# Patient Record
Sex: FEMALE | Race: WHITE | HISPANIC OR LATINO | Employment: FULL TIME | URBAN - NONMETROPOLITAN AREA
[De-identification: names, ages, dates, MRNs, and addresses within clinical notes are randomized per-mention and may not be internally consistent; named-entity substitution may affect disease eponyms.]

---

## 2017-01-05 ENCOUNTER — HOSPITAL ENCOUNTER (EMERGENCY)
Facility: HOSPITAL | Age: 22
Discharge: HOME | End: 2017-01-05
Attending: PHYSICIAN ASSISTANT | Admitting: PHYSICIAN ASSISTANT
Payer: COMMERCIAL

## 2017-01-05 VITALS
DIASTOLIC BLOOD PRESSURE: 83 MMHG | TEMPERATURE: 97.9 F | OXYGEN SATURATION: 100 % | SYSTOLIC BLOOD PRESSURE: 121 MMHG | RESPIRATION RATE: 16 BRPM

## 2017-01-05 DIAGNOSIS — K29.00 ACUTE SUPERFICIAL GASTRITIS WITHOUT HEMORRHAGE: Primary | ICD-10-CM

## 2017-01-05 LAB
ALBUMIN SERPL-MCNC: 3.9 G/DL (ref 3.4–5)
ALP SERPL-CCNC: 91 U/L (ref 40–150)
ALT SERPL W P-5'-P-CCNC: 37 U/L (ref 0–50)
ANION GAP SERPL CALCULATED.3IONS-SCNC: 9 MMOL/L (ref 3–14)
AST SERPL W P-5'-P-CCNC: 15 U/L (ref 0–45)
BASOPHILS # BLD AUTO: 0 10E9/L (ref 0–0.2)
BASOPHILS NFR BLD AUTO: 0.2 %
BILIRUB SERPL-MCNC: 0.2 MG/DL (ref 0.2–1.3)
BUN SERPL-MCNC: 12 MG/DL (ref 7–30)
CALCIUM SERPL-MCNC: 9 MG/DL (ref 8.5–10.1)
CHLORIDE SERPL-SCNC: 106 MMOL/L (ref 94–109)
CO2 SERPL-SCNC: 25 MMOL/L (ref 20–32)
CREAT SERPL-MCNC: 0.9 MG/DL (ref 0.52–1.04)
DIFFERENTIAL METHOD BLD: ABNORMAL
EOSINOPHIL # BLD AUTO: 0.2 10E9/L (ref 0–0.7)
EOSINOPHIL NFR BLD AUTO: 1.9 %
ERYTHROCYTE [DISTWIDTH] IN BLOOD BY AUTOMATED COUNT: 12.8 % (ref 10–15)
GFR SERPL CREATININE-BSD FRML MDRD: 79 ML/MIN/1.7M2
GLUCOSE SERPL-MCNC: 102 MG/DL (ref 70–99)
HCT VFR BLD AUTO: 38.6 % (ref 35–47)
HGB BLD-MCNC: 13.4 G/DL (ref 11.7–15.7)
IMM GRANULOCYTES # BLD: 0 10E9/L (ref 0–0.4)
IMM GRANULOCYTES NFR BLD: 0.2 %
LYMPHOCYTES # BLD AUTO: 3.7 10E9/L (ref 0.8–5.3)
LYMPHOCYTES NFR BLD AUTO: 29.8 %
MCH RBC QN AUTO: 30.2 PG (ref 26.5–33)
MCHC RBC AUTO-ENTMCNC: 34.7 G/DL (ref 31.5–36.5)
MCV RBC AUTO: 87 FL (ref 78–100)
MONOCYTES # BLD AUTO: 0.6 10E9/L (ref 0–1.3)
MONOCYTES NFR BLD AUTO: 5.1 %
NEUTROPHILS # BLD AUTO: 7.7 10E9/L (ref 1.6–8.3)
NEUTROPHILS NFR BLD AUTO: 62.8 %
NRBC # BLD AUTO: 0 10*3/UL
NRBC BLD AUTO-RTO: 0 /100
PLATELET # BLD AUTO: 303 10E9/L (ref 150–450)
POTASSIUM SERPL-SCNC: 3.4 MMOL/L (ref 3.4–5.3)
PROT SERPL-MCNC: 7.2 G/DL (ref 6.8–8.8)
RBC # BLD AUTO: 4.43 10E12/L (ref 3.8–5.2)
SODIUM SERPL-SCNC: 140 MMOL/L (ref 133–144)
WBC # BLD AUTO: 12.3 10E9/L (ref 4–11)

## 2017-01-05 PROCEDURE — 99283 EMERGENCY DEPT VISIT LOW MDM: CPT

## 2017-01-05 PROCEDURE — 99284 EMERGENCY DEPT VISIT MOD MDM: CPT | Performed by: PHYSICIAN ASSISTANT

## 2017-01-05 PROCEDURE — 80053 COMPREHEN METABOLIC PANEL: CPT | Performed by: PHYSICIAN ASSISTANT

## 2017-01-05 PROCEDURE — 85025 COMPLETE CBC W/AUTO DIFF WBC: CPT | Performed by: PHYSICIAN ASSISTANT

## 2017-01-05 PROCEDURE — 36415 COLL VENOUS BLD VENIPUNCTURE: CPT | Performed by: PHYSICIAN ASSISTANT

## 2017-01-05 RX ORDER — FAMOTIDINE 40 MG/1
40 TABLET, FILM COATED ORAL
Qty: 30 TABLET | Refills: 0 | Status: SHIPPED | OUTPATIENT
Start: 2017-01-05 | End: 2017-03-27

## 2017-01-05 ASSESSMENT — ENCOUNTER SYMPTOMS
DYSURIA: 0
UNEXPECTED WEIGHT CHANGE: 0
BLOOD IN STOOL: 0
ABDOMINAL PAIN: 1
CHILLS: 0
ABDOMINAL DISTENTION: 0
FLANK PAIN: 0
DIFFICULTY URINATING: 0
MUSCULOSKELETAL NEGATIVE: 1
SORE THROAT: 0
SHORTNESS OF BREATH: 0
CONSTIPATION: 0
FREQUENCY: 0
DIARRHEA: 0
HEMATURIA: 0
ANAL BLEEDING: 0
FEVER: 0
APPETITE CHANGE: 0
VOMITING: 0
NEUROLOGICAL NEGATIVE: 1
NAUSEA: 0

## 2017-01-05 NOTE — ED AVS SNAPSHOT
[facility]    [address] [address]    Phone:  [phone]                                       Carolyn Mallory   MRN: [id number]    Department:  [facility]   Date of Visit:  1/5/2017           Patient Information     Date Of Birth          1995        Your diagnoses for this visit were:     Acute superficial gastritis without hemorrhage        You were seen by Billie Casarez PA-C.      Follow-up Information     Follow up with None In 1 week.        Follow up with [facility].    Specialty:  EMERGENCY MEDICINE    Why:  If symptoms worsen    Contact information:    [address] [address] [phone]    Additional information:    From [place]: Take [address]. Turn left at [address]. Turn left at the first stoplight on [address]. At the first stop sign, take a right onto [address]. Take a left into the parking lot and continue through until you reach the North enterance of the building.       From [place]: Take [address]. Take the [address] ramp towards [place]. Turn left onto [address]. Take a slight right onto [address]. Turn left at the first stoplight on [address]. At the first stop sign, take a right onto [address]. Take a left into the parking lot and continue through until you reach the North enterance of the building.       From [place]: Take [address]. Take a right at [address]. At the first stop sign, take a right onto [address]. Take a left into the parking lot and continue through until you reach the North enterance of the building.         Discharge Instructions       Take the Pepcid as prescribed. Avoid spicy foods, caffeine, alcohol, and tobacco as this will worsen your symptoms. Follow up with primary care in 1 week for re-check.         Gastritis or Ulcer (No Antibiotic Treatment)    Gastritis is  irritation and inflammation of the stomach lining. This means the lining is red and swollen. An ulcer is an open sore in the lining of the stomach. It may also occur in the duodenum (first part of the small intestine). The causes and symptoms of gastritis and ulcers are very similar.  Causes and risk factors for both problems can include:    Long-term use of nonsteroidal anti-inflammatory drugs (NSAIDs), such as aspirin and ibuprofen    H. pylori bacteria infection    Tobacco use    Alcohol use  Symptoms for both problems can include:    Dull or burning pain in the upper part of the belly    Loss of appetite    Heartburn or upset stomach    Frequent burping    Bloated feeling    Nausea with or without vomiting  You likely had an evaluation to help determine the exact cause and extent of your problem. This may have included a health history, exam, and certain tests.  Results showed that your problem is not due to H. pylori infection. For this reason, no antibiotics are needed as part of your treatment.  Whether your problem is found to be gastritis or an ulcer, you will still need to take other medicines, however. You will also need to follow instructions to help reduce stomach irritation so your stomach can heal.   Home care    Take any medicines you re prescribed exactly as directed. Common medicines used to treat gastritis include:    Antacids. These help neutralize the normal acids in your stomach.    Proton pump inhibitors. These block your stomach from making any acid.    H2 blockers.These reduce the amount of acid your stomach makes.    Bismuth subsalicylate. This helps protect the lining of your stomach from acid.    Avoid taking any NSAIDS during your treatment. If you take NSAID to help treat other health problems, tell your healthcare provider. He or she may need to adjust your medicine plan or change the dosage.    Don t use tobacco. Also don t drink alcohol. These products can increase the amount of acid  your stomach makes. This can delay healing. It can also worsen symptoms.  Follow-up care  Follow up with your healthcare provider, or as advised. In some cases, further testing may be needed.  When to seek medical advice  Call your healthcare provider right away if any of these occur:    Fever of 100.4 F (38 C) or higher or as directed by your provider    Stomach pain that worsens or moves to the lower right part of abdomen    Extreme fatigue    Weakness or dizziness    Continued weight loss    Frequent vomiting, blood in your vomit, or coffee ground-like substance in your vomit    Black, tarry, or bloody stools  Call 911  Call 911 right away if any of these occur:    Chest pain appears or worsens, or spreads to the back, neck, shoulder, or arm    Unusually fast heart rate    Trouble breathing or swallowing    Confusion    Extreme drowsiness or trouble waking up    Fainting    Large amounts of blood present in vomit or stool    9339-8045 [employer]. [address] [place] [address]. All rights reserved. This information is not intended as a substitute for professional medical care. Always follow your healthcare professional's instructions.             Review of your medicines      START taking        Dose / Directions Last dose taken    famotidine 40 MG tablet   Commonly known as:  PEPCID   Dose:  40 mg   Quantity:  30 tablet        Take 1 tablet (40 mg) by mouth nightly as needed for heartburn   Refills:  0          Our records show that you are taking the medicines listed below. If these are incorrect, please call your family doctor or clinic.        Dose / Directions Last dose taken    EPINEPHrine 0.3 MG/0.3ML injection   Commonly known as:  EPIPEN   Dose:  0.3 mg   Quantity:  4 each        Inject 0.3 mLs (0.3 mg) into the muscle once as needed for anaphylaxis   Refills:  6        etonogestrel 68 MG Impl   Commonly known as:  IMPLANON/NEXPLANON   Dose:  1 each        1 each (68 mg) by  Subdermal route continuous   Refills:  0        ibuprofen 400 MG tablet   Commonly known as:  ADVIL/MOTRIN   Dose:  800 mg   Quantity:  60 tablet        Take 2 tablets (800 mg) by mouth every 6 hours as needed for other (cramping)   Refills:  1        loratadine 10 MG tablet   Commonly known as:  CLARITIN   Dose:  10 mg        Take 10 mg by mouth daily   Refills:  0        sulindac 200 MG tablet   Commonly known as:  CLINORIL   Dose:  200 mg   Quantity:  60 tablet        Take 1 tablet (200 mg) by mouth 2 times daily   Refills:  1                Prescriptions were sent or printed at these locations (1 Prescription)                   [id number] - [place], [place] - [address] AT [facility] [address]   [address], [place] [address]    Telephone:  [phone]   Fax:  [phone]   Hours:                  E-Prescribed (1 of 1)         famotidine (PEPCID) 40 MG tablet                Procedures and tests performed during your visit     CBC with platelets differential    Comprehensive metabolic panel      Orders Needing Specimen Collection     None      Pending Results     No orders found from 1/4/2017 to 1/6/2017.            Pending Culture Results     No orders found from 1/4/2017 to 1/6/2017.            Thank you for choosing [facility]       Thank you for choosing [facility] for your care. Our goal is always to provide you with excellent care. Hearing back from our patients is one way we can continue to improve our services. Please take a few minutes to complete the written survey that you may receive in the mail after you visit with us. Thank you!        [employer] Information     [employer] gives you secure access to your electronic health record. If you see a primary care provider, you can also send messages to your care team and make appointments. If you have questions, please call your primary care clinic.  If you do not have a primary care provider, please call [phone] and they will assist  you.        Care EveryWhere ID     This is your Care EveryWhere ID. This could be used by other organizations to access your House medical records  [id number]        After Visit Summary       This is your record. Keep this with you and show to your community pharmacist(s) and doctor(s) at your next visit.

## 2017-01-05 NOTE — ED AVS SNAPSHOT
[facility]    [address] [address]    Phone:  [phone]                                       Carolyn Mallory   MRN: [id number]    Department:  [facility]   Date of Visit:  1/5/2017           After Visit Summary Signature Page     I have received my discharge instructions, and my questions have been answered. I have discussed any challenges I see with this plan with the nurse or doctor.    ..........................................................................................................................................  Patient/Patient Representative Signature      ..........................................................................................................................................  Patient Representative Print Name and Relationship to Patient    ..................................................               ................................................  Date                                            Time    ..........................................................................................................................................  Reviewed by Signature/Title    ...................................................              ..............................................  Date                                                            Time

## 2017-01-06 NOTE — DISCHARGE INSTRUCTIONS
Take the Pepcid as prescribed. Avoid spicy foods, caffeine, alcohol, and tobacco as this will worsen your symptoms. Follow up with primary care in 1 week for re-check.         Gastritis or Ulcer (No Antibiotic Treatment)    Gastritis is irritation and inflammation of the stomach lining. This means the lining is red and swollen. An ulcer is an open sore in the lining of the stomach. It may also occur in the duodenum (first part of the small intestine). The causes and symptoms of gastritis and ulcers are very similar.  Causes and risk factors for both problems can include:    Long-term use of nonsteroidal anti-inflammatory drugs (NSAIDs), such as aspirin and ibuprofen    H. pylori bacteria infection    Tobacco use    Alcohol use  Symptoms for both problems can include:    Dull or burning pain in the upper part of the belly    Loss of appetite    Heartburn or upset stomach    Frequent burping    Bloated feeling    Nausea with or without vomiting  You likely had an evaluation to help determine the exact cause and extent of your problem. This may have included a health history, exam, and certain tests.  Results showed that your problem is not due to H. pylori infection. For this reason, no antibiotics are needed as part of your treatment.  Whether your problem is found to be gastritis or an ulcer, you will still need to take other medicines, however. You will also need to follow instructions to help reduce stomach irritation so your stomach can heal.   Home care    Take any medicines you re prescribed exactly as directed. Common medicines used to treat gastritis include:    Antacids. These help neutralize the normal acids in your stomach.    Proton pump inhibitors. These block your stomach from making any acid.    H2 blockers.These reduce the amount of acid your stomach makes.    Bismuth subsalicylate. This helps protect the lining of your stomach from acid.    Avoid taking any NSAIDS during your treatment. If you take  NSAID to help treat other health problems, tell your healthcare provider. He or she may need to adjust your medicine plan or change the dosage.    Don t use tobacco. Also don t drink alcohol. These products can increase the amount of acid your stomach makes. This can delay healing. It can also worsen symptoms.  Follow-up care  Follow up with your healthcare provider, or as advised. In some cases, further testing may be needed.  When to seek medical advice  Call your healthcare provider right away if any of these occur:    Fever of 100.4 F (38 C) or higher or as directed by your provider    Stomach pain that worsens or moves to the lower right part of abdomen    Extreme fatigue    Weakness or dizziness    Continued weight loss    Frequent vomiting, blood in your vomit, or coffee ground-like substance in your vomit    Black, tarry, or bloody stools  Call 911  Call 911 right away if any of these occur:    Chest pain appears or worsens, or spreads to the back, neck, shoulder, or arm    Unusually fast heart rate    Trouble breathing or swallowing    Confusion    Extreme drowsiness or trouble waking up    Fainting    Large amounts of blood present in vomit or stool    [id number] [employer]. [address] [place] [address]. All rights reserved. This information is not intended as a substitute for professional medical care. Always follow your healthcare professional's instructions.

## 2017-01-06 NOTE — ED PROVIDER NOTES
History     Chief Complaint   Patient presents with     Abdominal Pain     denies pain at present note pain this morning around 7am. points to epigastric area. emesis x2. notes ate chili for supper tonight and no vomiting.      Dizziness     HPI  Carolyn Mallory is a 21 year old female who presents for evaluation following an episode of epigastric pain that awoke her from sleep this morning that lasted about 45 minutes. The episode was accompanied by diaphoresis and vomiting. States she has felt shaky the rest of the day. Has been able to eat without difficulties today, had chili for dinner. Denies pain currently. Notes this has been happening intermittently for the past month. H/o cholesyctectomy.     I have reviewed the Medications, Allergies, Past Medical and Surgical History, and Social History in the Epic system.    Review of Systems   Constitutional: Negative for fever, chills, appetite change and unexpected weight change.   HENT: Negative for sore throat.    Respiratory: Negative for shortness of breath.    Cardiovascular: Negative for chest pain.   Gastrointestinal: Positive for abdominal pain. Negative for nausea, vomiting, diarrhea, constipation, blood in stool, abdominal distention and anal bleeding.   Genitourinary: Negative.  Negative for dysuria, urgency, frequency, hematuria, flank pain, vaginal bleeding, vaginal discharge, difficulty urinating, genital sores, vaginal pain, pelvic pain and dyspareunia.   Musculoskeletal: Negative.    Skin: Negative.    Neurological: Negative.    All other systems reviewed and are negative.    Past Medical History:   Past Medical History   Diagnosis Date     NO ACTIVE PROBLEMS      Pregnancy      LMP 4/2015       Past Surgical History   Procedure Laterality Date     None       Arthroscopy knee Right 5/4/2015     Procedure: ARTHROSCOPY KNEE;  Surgeon: Hernando Kulkarni MD;  Location: [facility]     Laparoscopic cholecystectomy N/A 10/10/2015     Procedure: LAPAROSCOPIC  CHOLECYSTECTOMY;  Surgeon: Drew Padgett MD;  Location: [facility]       Social History     Social History     Marital Status: Single     Spouse Name: N/A     Number of Children: N/A     Years of Education: N/A     Occupational History     Not on file.     Social History Main Topics     Smoking status: Current Every Day Smoker -- 0.20 packs/day     Types: Cigarettes     Start date: 01/20/2014     Smokeless tobacco: Never Used     Alcohol Use: No     Drug Use: No     Sexual Activity:     Partners: Male     Other Topics Concern      Service No     Blood Transfusions Yes     Permits if needed     Seat Belt Yes     Social History Narrative       Patient's Medications   New Prescriptions    FAMOTIDINE (PEPCID) 40 MG TABLET    Take 1 tablet (40 mg) by mouth nightly as needed for heartburn   Previous Medications    EPINEPHRINE (EPIPEN) 0.3 MG/0.3ML INJECTION    Inject 0.3 mLs (0.3 mg) into the muscle once as needed for anaphylaxis    ETONOGESTREL (IMPLANON/NEXPLANON) 68 MG IMPL    1 each (68 mg) by Subdermal route continuous    IBUPROFEN (ADVIL,MOTRIN) 400 MG TABLET    Take 2 tablets (800 mg) by mouth every 6 hours as needed for other (cramping)    LORATADINE (CLARITIN) 10 MG TABLET    Take 10 mg by mouth daily    SULINDAC (CLINORIL) 200 MG TABLET    Take 1 tablet (200 mg) by mouth 2 times daily   Modified Medications    No medications on file   Discontinued Medications    ORDER FOR DME    Equipment being ordered: Chopat strap       Allergies: Oxycodone and Tylenol    Physical Exam   BP: 134/88 mmHg  Heart Rate: 93  Temp: 97.3  F (36.3  C)  Resp: 16  SpO2: 100 %  Physical Exam   Constitutional: She is oriented to person, place, and time. Vital signs are normal. She appears well-developed and well-nourished.  Non-toxic appearance. She does not have a sickly appearance. She does not appear ill. No distress.   HENT:   Head: Normocephalic and atraumatic.   Right Ear: External ear normal.   Left Ear: External ear  normal.   Nose: Nose normal.   Mouth/Throat: Oropharynx is clear and moist. No oropharyngeal exudate.   Eyes: Conjunctivae are normal. Pupils are equal, round, and reactive to light. Right eye exhibits no discharge. Left eye exhibits no discharge. No scleral icterus.   Neck: Normal range of motion. Neck supple.   Cardiovascular: Normal rate, regular rhythm, normal heart sounds and intact distal pulses.  Exam reveals no gallop and no friction rub.    No murmur heard.  Pulmonary/Chest: Effort normal and breath sounds normal. No respiratory distress. She has no wheezes. She has no rales. She exhibits no tenderness.   Abdominal: Soft. Bowel sounds are normal. She exhibits no distension and no mass. There is no tenderness. There is no rebound and no guarding.   Musculoskeletal: Normal range of motion. She exhibits no edema.   Lymphadenopathy:     She has no cervical adenopathy.   Neurological: She is alert and oriented to person, place, and time. No cranial nerve deficit.   Skin: Skin is warm and dry. No rash noted. She is not diaphoretic. No erythema. No pallor.   Psychiatric: She has a normal mood and affect. Her behavior is normal. Judgment and thought content normal.   Nursing note and vitals reviewed.      ED Course   Procedures               Labs Ordered and Resulted from Time of ED Arrival Up to the Time of Departure from the ED   CBC WITH PLATELETS DIFFERENTIAL - Abnormal; Notable for the following:     WBC 12.3 (*)     All other components within normal limits   COMPREHENSIVE METABOLIC PANEL - Abnormal; Notable for the following:     Glucose 102 (*)     All other components within normal limits       Assessments & Plan (with Medical Decision Making)   Pt is in NAD and VS are WNL. Labs unremarkable. No pain while here and no abdominal tenderness. History consistent with gastritis vs ulcer. Will prescribe her pepcid for home to take nightly PRN. She was discharged home in good condition with her friend.     Plan:  Take the Pepcid as prescribed. Avoid spicy foods, caffeine, alcohol, and tobacco as this will worsen your symptoms. Follow up with primary care in 1 week for re-check.     I have reviewed the nursing notes.    I have reviewed the findings, diagnosis, plan and need for follow up with the patient.    New Prescriptions    FAMOTIDINE (PEPCID) 40 MG TABLET    Take 1 tablet (40 mg) by mouth nightly as needed for heartburn       Final diagnoses:   Acute superficial gastritis without hemorrhage       1/5/2017   [facility]      Billie Casarez PA-C  01/05/17 [id number]

## 2017-01-16 ENCOUNTER — OFFICE VISIT (OUTPATIENT)
Dept: FAMILY MEDICINE | Facility: OTHER | Age: 22
End: 2017-01-16
Attending: NURSE PRACTITIONER
Payer: COMMERCIAL

## 2017-01-16 VITALS
DIASTOLIC BLOOD PRESSURE: 72 MMHG | TEMPERATURE: 99.1 F | OXYGEN SATURATION: 98 % | RESPIRATION RATE: 22 BRPM | HEIGHT: 61 IN | BODY MASS INDEX: 33.99 KG/M2 | HEART RATE: 101 BPM | WEIGHT: 180 LBS | SYSTOLIC BLOOD PRESSURE: 116 MMHG

## 2017-01-16 DIAGNOSIS — R10.13 ABDOMINAL PAIN, EPIGASTRIC: Primary | ICD-10-CM

## 2017-01-16 PROCEDURE — 99213 OFFICE O/P EST LOW 20 MIN: CPT | Performed by: NURSE PRACTITIONER

## 2017-01-16 ASSESSMENT — PAIN SCALES - GENERAL: PAINLEVEL: WORST PAIN (10)

## 2017-01-16 NOTE — PROGRESS NOTES
[facility] Website verified for patient immunization history.      SUBJECTIVE:                                                    Carolyn Mallory is a 21 year old female who presents to clinic today for the following health issues:    Carolyn states she has a friend with similar symptoms and was diagnosed with a hiatel hernia. Carolyn states she has had 2 episodes in the past 2 weeks of severe abdominal pain that radiates around lower rib cage. She has tried eliminating foods such as spicy foods, dairy and gluten.  Carolyn states she is mainly eating venison, occasionally will have noodles and some crackers. If she eats small amounts of food she can tolerate it, but if she eats a regular meal she vomits.    Rib pain      Duration: 5 months    Description (location/character/radiation): sharp pain starts between breast, wakes her up frequently at night; pain radiated around chest by ribs.    Intensity:  severe    Accompanying signs and symptoms: N&V, sweating, crying with the pain    History (similar episodes/previous evaluation): yes, has been to ER3 times with dx reflux and mediations that are not helping    Precipitating or alleviating factors: nothing    Therapies tried and outcome: sulindac and pepcid           Problem list and histories reviewed & adjusted, as indicated.  Additional history: as documented    Patient Active Problem List   Diagnosis     Allergic rhinitis     Food allergy     Allergic rhinitis due to pollen     Patellofemoral syndrome of both knees     Acute cholecystitis     Dehydration, moderate     C. difficile enteritis     Rectal bleeding     Sacro ilial pain     Pain     Surgery, elective     Calculus of kidney     Flank pain     ACP (advance care planning)     Chronic right-sided thoracic back pain     Past Surgical History   Procedure Laterality Date     None       Arthroscopy knee Right 5/4/2015     Procedure: ARTHROSCOPY KNEE;  Surgeon: Hernando Kulkarni MD;  Location: [facility]     Laparoscopic  cholecystectomy N/A 10/10/2015     Procedure: LAPAROSCOPIC CHOLECYSTECTOMY;  Surgeon: Drew Padgett MD;  Location: [facility]       Social History   Substance Use Topics     Smoking status: Current Every Day Smoker -- 0.04 packs/day     Types: Cigarettes     Start date: 01/20/2014     Smokeless tobacco: Never Used     Alcohol Use: No     Family History   Problem Relation Age of Onset     Asthma Sister      Thrombophilia Mother      blood clotting     Lupus Mother      erythematosus     DIABETES Mother      DIABETES Maternal Grandfather      Hypertension Maternal Grandfather      Hypertension Father      Lupus Maternal Aunt      erythematosus         Current Outpatient Prescriptions   Medication Sig Dispense Refill     famotidine (PEPCID) 40 MG tablet Take 1 tablet (40 mg) by mouth nightly as needed for heartburn 30 tablet 0     sulindac (CLINORIL) 200 MG tablet Take 1 tablet (200 mg) by mouth 2 times daily 60 tablet 1     etonogestrel (IMPLANON/NEXPLANON) 68 MG IMPL 1 each (68 mg) by Subdermal route continuous       ibuprofen (ADVIL,MOTRIN) 400 MG tablet Take 2 tablets (800 mg) by mouth every 6 hours as needed for other (cramping) 60 tablet 1     loratadine (CLARITIN) 10 MG tablet Take 10 mg by mouth daily       EPINEPHrine (EPIPEN) 0.3 MG/0.3ML injection Inject 0.3 mLs (0.3 mg) into the muscle once as needed for anaphylaxis 4 each 6     Allergies   Allergen Reactions     Oxycodone      Vomiting, hallucinations.     Tylenol [Acetaminophen] Other (See Comments)     Patient reports seeing spots after taking Tylenol.       ROS:  C: NEGATIVE for fever, chills, change in weight  I: NEGATIVE for worrisome rashes, moles or lesions  E: NEGATIVE for vision changes or irritation  E/M: NEGATIVE for ear, mouth and throat problems  R: NEGATIVE for significant cough or SOB  CV: NEGATIVE for chest pain, palpitations or peripheral edema  GI: abdominal pain epigastric, gas or bloating, nausea and vomiting  : NEGATIVE for  "frequency, dysuria, or hematuria  M: NEGATIVE for significant arthralgias or myalgia  N: NEGATIVE for weakness, dizziness or paresthesias  E: NEGATIVE for temperature intolerance, skin/hair changes  H: NEGATIVE for bleeding problems  P: NEGATIVE for changes in mood or affect    OBJECTIVE:                                                    /72 mmHg  Pulse 101  Temp(Src) 99.1  F (37.3  C) (Tympanic)  Resp 22  Ht 5' 1\" (1.549 m)  Wt 180 lb (81.647 kg)  BMI 34.03 kg/m2  SpO2 98%  Body mass index is 34.03 kg/(m^2).   GENERAL: healthy, alert and no distress  RESP: lungs clear to auscultation - no rales, rhonchi or wheezes  CV: regular rate and rhythm, normal S1 S2, no S3 or S4, no murmur, click or rub, no peripheral edema and peripheral pulses strong  ABDOMEN: tenderness epigastric, bowel sounds normal and no palpable or pulsatile masses  SKIN: no suspicious lesions or rashes  PSYCH: mentation appears normal, affect normal/bright    Diagnostic Test Results:  none      ASSESSMENT:                                                        PLAN:                                                    ASSESSMENT / PLAN:  (R10.13) Abdominal pain, epigastric  (primary encounter diagnosis)  Comment:   Plan:  GASTROENTEROLOGY ADULT REFERRAL +/- PROCEDURE   Stop taking sulindac   Make a diary of the day of events and diet when having an episode    Follow up with Gastroenterology for continued symptoms and no relief            Susan Clifford, [facility]    "

## 2017-01-16 NOTE — MR AVS SNAPSHOT
After Visit Summary   1/16/2017    Carolyn Mallory    MRN: [id number]           Patient Information     Date Of Birth          1995        Visit Information        Provider Department      1/16/2017 11:00 AM Susan Clifford APRN [facility]        Today's Diagnoses     Abdominal pain, epigastric    -  1       Care Instructions      Tips to Control Acid Reflux  To control acid reflux, you ll need to make some basic diet and lifestyle changes. The simple steps outlined below may be all you ll need to relieve discomfort.  Watch What You Eat      Avoid fatty foods and spicy foods.    Eat fewer acidic foods, such as citrus and tomato-based foods. These can increase symptoms.    Limit drinking alcohol, caffeine, and fizzy beverages. All increase acid reflux.    Try limiting chocolate, peppermint, and spearmint. These can worsen acid reflux in some people.  Watch When You Eat    Avoid lying down for 3 hours after eating.    Do not snack before going to bed.  Raise Your Head    Raising your head and upper body by 4 inches to 6 inches helps limit reflux when you re lying down. Put blocks under the head of the bed frame to raise it.  Other Changes    Lose weight, if you need to.    Don t work out near bedtime.    Avoid tight-fitting clothes.    Limit aspirin and ibuprofen.    Stop smoking.     4972-4088 [employer]. [address] [place] [address], [place] [id number]. All rights reserved. This information is not intended as a substitute for professional medical care. Always follow your healthcare professional's instructions.              Follow-ups after your visit        Additional Services     GASTROENTEROLOGY ADULT REFERRAL +/- PROCEDURE       Your provider has referred you to Gastroenterology Services.    English    Procedure/Referral: REFERRAL ONLY - [facility] in [place]    Please be aware that coverage of these services is subject to the terms and limitations of your  health insurance plan.  Call member services at your health plan with any benefit or coverage questions.  Any procedures must be performed at a [facility] facility OR coordinated by your clinic's referral office.    Please bring the following with you to your appointment:    (1) Any X-Rays, CTs or MRIs which have been performed.  Contact the facility where they were done to arrange for  prior to your scheduled appointment.    (2) List of current medications   (3) This referral request   (4) Any documents/labs given to you for this referral                  Who to contact     If you have questions or need follow up information about today's clinic visit or your schedule please contact [facility] directly at [phone].  Normal or non-critical lab and imaging results will be communicated to you by MyChart, letter or phone within 4 business days after the clinic has received the results. If you do not hear from us within 7 days, please contact the clinic through [employer] or phone. If you have a critical or abnormal lab result, we will notify you by phone as soon as possible.  Submit refill requests through [employer] or call your pharmacy and they will forward the refill request to us. Please allow 3 business days for your refill to be completed.          Additional Information About Your Visit        [employer] Information     [employer] gives you secure access to your electronic health record. If you see a primary care provider, you can also send messages to your care team and make appointments. If you have questions, please call your primary care clinic.  If you do not have a primary care provider, please call [phone] and they will assist you.        Care EveryWhere ID     This is your Care EveryWhere ID. This could be used by other organizations to access your [facility] medical records  [id number]        Your Vitals Were     Pulse Temperature Respirations Height BMI (Body Mass Index) Pulse Oximetry  "   101 99.1  F (37.3  C) (Tympanic) 22 5' 1\" (1.549 m) 34.03 kg/m2 98%       Blood Pressure from Last 3 Encounters:   01/16/17 116/72   01/05/17 121/83   11/05/16 105/72    Weight from Last 3 Encounters:   01/16/17 180 lb (81.647 kg)   10/20/16 173 lb (78.472 kg)   10/05/16 170 lb (77.111 kg)              We Performed the Following     GASTROENTEROLOGY ADULT REFERRAL +/- PROCEDURE        Primary Care Provider    None       No address on file        Thank you!     [facility] you [facility]  [facility]. Our goal is always to provide you with excellent care. Hearing back from our patients is one way we can continue to improve our services. Please take a few minutes to complete the written survey that you may receive in the mail after your visit with us. Thank you!             Your Updated Medication List - Protect others around you: Learn how to safely use, store and throw away your medicines at www.disposemymeds.org.          This list is accurate as of: 1/16/17 11:36 AM.  Always use your most recent med list.                   Brand Name Dispense Instructions for use    EPINEPHrine 0.3 MG/0.3ML injection    EPIPEN    4 each    Inject 0.3 mLs (0.3 mg) into the muscle once as needed for anaphylaxis       etonogestrel 68 MG Impl    IMPLANON/NEXPLANON     1 each (68 mg) by Subdermal route continuous       famotidine 40 MG tablet    PEPCID    30 tablet    Take 1 tablet (40 mg) by mouth nightly as needed for heartburn       ibuprofen 400 MG tablet    ADVIL/MOTRIN    60 tablet    Take 2 tablets (800 mg) by mouth every 6 hours as needed for other (cramping)       loratadine 10 MG tablet    CLARITIN     Take 10 mg by mouth daily         "

## 2017-01-16 NOTE — PATIENT INSTRUCTIONS
Tips to Control Acid Reflux  To control acid reflux, you ll need to make some basic diet and lifestyle changes. The simple steps outlined below may be all you ll need to relieve discomfort.  Watch What You Eat      Avoid fatty foods and spicy foods.    Eat fewer acidic foods, such as citrus and tomato-based foods. These can increase symptoms.    Limit drinking alcohol, caffeine, and fizzy beverages. All increase acid reflux.    Try limiting chocolate, peppermint, and spearmint. These can worsen acid reflux in some people.  Watch When You Eat    Avoid lying down for 3 hours after eating.    Do not snack before going to bed.  Raise Your Head    Raising your head and upper body by 4 inches to 6 inches helps limit reflux when you re lying down. Put blocks under the head of the bed frame to raise it.  Other Changes    Lose weight, if you need to.    Don t work out near bedtime.    Avoid tight-fitting clothes.    Limit aspirin and ibuprofen.    Stop smoking.     [id number] [employer]. [address] [place] [address], [place] [address]. All rights reserved. This information is not intended as a substitute for professional medical care. Always follow your healthcare professional's instructions.

## 2017-01-16 NOTE — NURSING NOTE
"Declines HPV.  Chief Complaint   Patient presents with     Rib Pain       Initial /72 mmHg  Pulse 101  Temp(Src) 99.1  F (37.3  C) (Tympanic)  Resp 22  Ht 5' 1\" (1.549 m)  Wt 180 lb (81.647 kg)  BMI 34.03 kg/m2  SpO2 98% Estimated body mass index is 34.03 kg/(m^2) as calculated from the following:    Height as of this encounter: 5' 1\" (1.549 m).    Weight as of this encounter: 180 lb (81.647 kg).  BP completed using cuff size: regular  Carly Perez      "

## 2017-03-27 ENCOUNTER — OFFICE VISIT (OUTPATIENT)
Dept: FAMILY MEDICINE | Facility: OTHER | Age: 22
End: 2017-03-27
Attending: NURSE PRACTITIONER
Payer: COMMERCIAL

## 2017-03-27 VITALS
WEIGHT: 187 LBS | RESPIRATION RATE: 20 BRPM | SYSTOLIC BLOOD PRESSURE: 110 MMHG | TEMPERATURE: 98.3 F | HEIGHT: 61 IN | DIASTOLIC BLOOD PRESSURE: 62 MMHG | OXYGEN SATURATION: 99 % | BODY MASS INDEX: 35.3 KG/M2 | HEART RATE: 88 BPM

## 2017-03-27 DIAGNOSIS — Z30.9 ENCOUNTER FOR CONTRACEPTIVE MANAGEMENT, UNSPECIFIED CONTRACEPTIVE ENCOUNTER TYPE: ICD-10-CM

## 2017-03-27 DIAGNOSIS — K21.9 GASTROESOPHAGEAL REFLUX DISEASE WITHOUT ESOPHAGITIS: Primary | ICD-10-CM

## 2017-03-27 PROCEDURE — 99213 OFFICE O/P EST LOW 20 MIN: CPT | Performed by: NURSE PRACTITIONER

## 2017-03-27 RX ORDER — FAMOTIDINE 40 MG/1
40 TABLET, FILM COATED ORAL
Qty: 30 TABLET | Refills: 0 | Status: SHIPPED | OUTPATIENT
Start: 2017-03-27 | End: 2017-10-09

## 2017-03-27 RX ORDER — DIPHENHYDRAMINE HCL 25 MG
25 TABLET ORAL
COMMUNITY
Start: 2013-09-24 | End: 2019-04-17

## 2017-03-27 ASSESSMENT — PAIN SCALES - GENERAL: PAINLEVEL: NO PAIN (0)

## 2017-03-27 NOTE — NURSING NOTE
"Chief Complaint   Patient presents with     Edema     left upper arm       Initial /62 (BP Location: Right arm, Patient Position: Chair, Cuff Size: Adult Regular)  Pulse 88  Temp 98.3  F (36.8  C) (Tympanic)  Resp 20  Ht 5' 1\" (1.549 m)  Wt 187 lb (84.8 kg)  SpO2 99%  BMI 35.33 kg/m2 Estimated body mass index is 35.33 kg/(m^2) as calculated from the following:    Height as of this encounter: 5' 1\" (1.549 m).    Weight as of this encounter: 187 lb (84.8 kg).  Medication Reconciliation: complete   Carly Perez      "

## 2017-03-27 NOTE — MR AVS SNAPSHOT
After Visit Summary   3/27/2017    Carolyn Mallory    MRN: [id number]           Patient Information     Date Of Birth          1995        Visit Information        Provider Department      3/27/2017 9:20 AM Susan Clifford APRN CNP [facility]        Today's Diagnoses     Gastroesophageal reflux disease without esophagitis    -  1    Encounter for contraceptive management, unspecified contraceptive encounter type          Care Instructions    Follow up with Jennifer Fang for evaluation of implanted birth control    Soak arm in warm water  Ice to area  Ibuprofen as needed for discomfort        Follow-ups after your visit        Additional Services     OB/GYN REFERRAL       Your provider has referred you to:  Jennifer Fang - [facility]    Please be aware that coverage of these services is subject to the terms and limitations of your health insurance plan.  Call member services at your health plan with any benefit or coverage questions.      Please bring the following with you to your appointment:    (1) Any X-Rays, CTs or MRIs which have been performed.  Contact the facility where they were done to arrange for  prior to your scheduled appointment.   (2) List of current medications   (3) This referral request   (4) Any documents/labs given to you for this referral                  Who to contact     If you have questions or need follow up information about today's clinic visit or your schedule please contact [facility] directly at [phone].  Normal or non-critical lab and imaging results will be communicated to you by MyChart, letter or phone within 4 business days after the clinic has received the results. If you do not hear from us within 7 days, please contact the clinic through MyChart or phone. If you have a critical or abnormal lab result, we will notify you by phone as soon as possible.  Submit refill requests through [employer] or call your  "pharmacy and they will forward the refill request to us. Please allow 3 business days for your refill to be completed.          Additional Information About Your Visit        [employer] Information     [employer] gives you secure access to your electronic health record. If you see a primary care provider, you can also send messages to your care team and make appointments. If you have questions, please call your primary care clinic.  If you do not have a primary care provider, please call [phone] and they will assist you.        Care EveryWhere ID     This is your Care EveryWhere ID. This could be used by other organizations to access your [facility] medical records  [id number]        Your Vitals Were     Pulse Temperature Respirations Height Pulse Oximetry BMI (Body Mass Index)    88 98.3  F (36.8  C) (Tympanic) 20 5' 1\" (1.549 m) 99% 35.33 kg/m2       Blood Pressure from Last 3 Encounters:   03/27/17 110/62   01/16/17 116/72   01/05/17 121/83    Weight from Last 3 Encounters:   03/27/17 187 lb (84.8 kg)   01/16/17 180 lb (81.6 kg)   10/20/16 173 lb (78.5 kg)              We Performed the Following     OB/GYN REFERRAL          Where to get your medicines      These medications were sent to [facility] [address] [facility], [place] - [address] AT [facility] [address]  [address], [place] [facility] [address]     Phone:  [phone]     famotidine 40 MG tablet          Primary Care Provider    None       No address on file        Thank you!     Thank you for choosing [facility]  for your care. Our goal is always to provide you with excellent care. Hearing back from our patients is one way we can continue to improve our services. Please take a few minutes to complete the written survey that you may receive in the mail after your visit with us. Thank you!             Your Updated Medication List - Protect others around you: Learn how to safely use, store and throw away your medicines at " www.disposemymeds.org.          This list is accurate as of: 3/27/17  9:49 AM.  Always use your most recent med list.                   Brand Name Dispense Instructions for use    diphenhydrAMINE 25 MG tablet    BENADRYL     Take 25 mg by mouth       EPINEPHrine 0.3 MG/0.3ML injection    EPIPEN    4 each    Inject 0.3 mLs (0.3 mg) into the muscle once as needed for anaphylaxis       etonogestrel 68 MG Impl    IMPLANON/NEXPLANON     1 each (68 mg) by Subdermal route continuous       famotidine 40 MG tablet    PEPCID    30 tablet    Take 1 tablet (40 mg) by mouth nightly as needed for heartburn       ibuprofen 400 MG tablet    ADVIL/MOTRIN    60 tablet    Take 2 tablets (800 mg) by mouth every 6 hours as needed for other (cramping)       loratadine 10 MG tablet    CLARITIN     Take 10 mg by mouth daily

## 2017-03-27 NOTE — PATIENT INSTRUCTIONS
Follow up with Jennifer Fang for evaluation of implanted birth control    Soak arm in warm water  Ice to area  Ibuprofen as needed for discomfort

## 2017-03-27 NOTE — PROGRESS NOTES
SUBJECTIVE:                                                    Carolyn Mallory is a 21 year old female who presents to clinic today for the following health issues:      edema      Duration: 5 months    Description (location/character/radiation): hot, swollen, left arm    Intensity:  Pain only when bumped    Accompanying signs and symptoms: pain at times, prescence of implant-etonogestrel from [facility] Kyte, placed approx one year ago    History (similar episodes/previous evaluation): None    Precipitating or alleviating factors: bumping area causes discomfort - only time she has pain    Therapies tried and outcome: ibuprofen -            Problem list and histories reviewed & adjusted, as indicated.  Additional history: as documented    Patient Active Problem List   Diagnosis     Allergic rhinitis     Food allergy     Allergic rhinitis due to pollen     Patellofemoral syndrome of both knees     Acute cholecystitis     Dehydration, moderate     C. difficile enteritis     Rectal bleeding     Sacro ilial pain     Pain     Surgery, elective     Calculus of kidney     Flank pain     ACP (advance care planning)     Chronic right-sided thoracic back pain     Past Surgical History:   Procedure Laterality Date     ARTHROSCOPY KNEE Right 5/4/2015    Procedure: ARTHROSCOPY KNEE;  Surgeon: Hernando Kulkarni MD;  Location: [facility]     AS ESOPHAGOSCOPY, DIAGNOSTIC      upper     LAPAROSCOPIC CHOLECYSTECTOMY N/A 10/10/2015    Procedure: LAPAROSCOPIC CHOLECYSTECTOMY;  Surgeon: Drew Padgett MD;  Location: [facility] OR     none         Social History   Substance Use Topics     Smoking status: Current Every Day Smoker     Packs/day: 0.04     Types: Cigarettes     Start date: 1/20/2014     Smokeless tobacco: Never Used     Alcohol use No     Family History   Problem Relation Age of Onset     Thrombophilia Mother      blood clotting     Lupus Mother      erythematosus     DIABETES Mother      Hypertension Father      Asthma Sister       "DIABETES Maternal Grandfather      Hypertension Maternal Grandfather      Lupus Maternal Aunt      erythematosus         Current Outpatient Prescriptions   Medication Sig Dispense Refill     diphenhydrAMINE (BENADRYL) 25 MG tablet Take 25 mg by mouth       famotidine (PEPCID) 40 MG tablet Take 1 tablet (40 mg) by mouth nightly as needed for heartburn 30 tablet 0     etonogestrel (IMPLANON/NEXPLANON) 68 MG IMPL 1 each (68 mg) by Subdermal route continuous       ibuprofen (ADVIL,MOTRIN) 400 MG tablet Take 2 tablets (800 mg) by mouth every 6 hours as needed for other (cramping) 60 tablet 1     loratadine (CLARITIN) 10 MG tablet Take 10 mg by mouth daily       EPINEPHrine (EPIPEN) 0.3 MG/0.3ML injection Inject 0.3 mLs (0.3 mg) into the muscle once as needed for anaphylaxis 4 each 6     Allergies   Allergen Reactions     Oxycodone      Vomiting, hallucinations.     Tylenol [Acetaminophen] Other (See Comments)     Patient reports seeing spots after taking Tylenol.       Reviewed and updated as needed this visit by clinical staff  Tobacco  Allergies  Meds  Med Hx  Surg Hx  Fam Hx  Soc Hx      Reviewed and updated as needed this visit by Provider         ROS:  C: NEGATIVE for fever, chills, change in weight  INTEGUMENTARY/SKIN: swelling left upper arm  R: NEGATIVE for significant cough or SOB  CV: NEGATIVE for chest pain, palpitations or peripheral edema  MUSCULOSKELETAL: tenderness and edema left upper arm    OBJECTIVE:                                                    /62 (BP Location: Right arm, Patient Position: Chair, Cuff Size: Adult Regular)  Pulse 88  Temp 98.3  F (36.8  C) (Tympanic)  Resp 20  Ht 5' 1\" (1.549 m)  Wt 187 lb (84.8 kg)  SpO2 99%  BMI 35.33 kg/m2  Body mass index is 35.33 kg/(m^2).   GENERAL: healthy, alert and no distress  RESP: Non-labored  MS: normal muscle tone, normal range of motion, slight edema to left upper arm, peripheral pulses normal, tenderness to palpation point " tenderness to lump and Negative for erthema  SKIN: no suspicious lesions or rashes  PSYCH: mentation appears normal, affect normal/bright    Diagnostic Test Results:  none      ASSESSMENT:                                                        PLAN:                                                    ASSESSMENT / PLAN:  (K21.9) Gastroesophageal reflux disease without esophagitis  (primary encounter diagnosis)  Comment:   Plan:  famotidine (PEPCID) 40 MG tablet            (Z30.9) Encounter for contraceptive management, unspecified contraceptive encounter type  Comment:   Plan:  OB/GYN REFERRAL - Jennifer Fang (3/28/27)   Warm soaks   Ice to area   Ibuprofen as needed            Follow up with OB/GYN  Follow up with PCP if no improvement or worsening symptoms        Susan Clifford, AMEL[facility]

## 2017-05-30 ENCOUNTER — OFFICE VISIT (OUTPATIENT)
Dept: OBGYN | Facility: OTHER | Age: 22
End: 2017-05-30
Attending: NURSE PRACTITIONER
Payer: COMMERCIAL

## 2017-05-30 VITALS
HEIGHT: 61 IN | WEIGHT: 185 LBS | BODY MASS INDEX: 34.93 KG/M2 | HEART RATE: 88 BPM | SYSTOLIC BLOOD PRESSURE: 120 MMHG | DIASTOLIC BLOOD PRESSURE: 70 MMHG

## 2017-05-30 DIAGNOSIS — M79.622 PAIN OF LEFT UPPER ARM: Primary | ICD-10-CM

## 2017-05-30 PROCEDURE — 99212 OFFICE O/P EST SF 10 MIN: CPT | Performed by: NURSE PRACTITIONER

## 2017-05-30 NOTE — PROGRESS NOTES
"[facility]                HPI   Carolyn presents for concern of a lump that has developed on her left triceps.  It has been present for about 1 month and has increased in size from silver dollar to about 5 cm across.  She notes dull ache and a sharp pain if she bumps it.  She states the swelling is worse some days than others but that it does not go away.  She was recently seen by Susan Farah NP who felt it may be related to her Nexplanon.  Nexplanon has been in place for 1 year without concerns.  No shifting, moving, or pain at insertion site. She was instructed to try ibuprofen and ice which did not help.               Medications:     Current Outpatient Prescriptions Ordered in Epic   Medication     diphenhydrAMINE (BENADRYL) 25 MG tablet     famotidine (PEPCID) 40 MG tablet     etonogestrel (IMPLANON/NEXPLANON) 68 MG IMPL     ibuprofen (ADVIL,MOTRIN) 400 MG tablet     loratadine (CLARITIN) 10 MG tablet     EPINEPHrine (EPIPEN) 0.3 MG/0.3ML injection     No current [facility]-ordered facility-administered medications on file.                 Allergies:   Oxycodone and Tylenol [acetaminophen]         Review of Systems:   The 5 point Review of Systems is negative other than noted in the HPI                     Physical Exam:   Blood pressure 120/70, pulse 88, height 5' 1\" (1.549 m), weight 185 lb (83.9 kg), not currently breastfeeding.  Constitutional:   awake, alert, cooperative, no apparent distress, and appears stated age     Musculoskeletal:   left upper extremity with poorly defined, edematous, slightly tender, enlargement of the triceps.  left upper extremity tone normal, no trauma noted. No ecchymosis.   Nexplanon implant remains just below the skin surface at the original insertion site.  No edema, non tender.              Assessment and Plan:   Arm pain - encouraged to continue with RICE rx for comfort.  Reassurance provided that i think this is not related to her Nexplanon.  We will set " her up with PC to establish care and follow further for this.      ROLDAN Bridges  5/30/2017  2:52 PM

## 2017-05-30 NOTE — NURSING NOTE
"Chief Complaint   Patient presents with     Mass     Patients arm swollen has been for awhile. She believes it is due to her nexplanon.       Initial /70 (BP Location: Left arm, Patient Position: Chair, Cuff Size: Adult Large)  Pulse 88  Ht 5' 1\" (1.549 m)  Wt 185 lb (83.9 kg)  BMI 34.96 kg/m2 Estimated body mass index is 34.96 kg/(m^2) as calculated from the following:    Height as of this encounter: 5' 1\" (1.549 m).    Weight as of this encounter: 185 lb (83.9 kg).  Medication Reconciliation: complete   Amy Iglesias      "

## 2017-05-30 NOTE — MR AVS SNAPSHOT
After Visit Summary   5/30/2017    Carolyn Mallory    MRN: [id number]           Patient Information     Date Of Birth          1995        Visit Information        Provider Department      5/30/2017 11:00 AM Jennifer Senior NP [facility]        Today's Diagnoses     Pain of left upper arm    -  1      Care Instructions    Follow up with primary care as scheduled.          Follow-ups after your visit        Your next 10 appointments already scheduled     Jun 08, 2017  1:45 PM CDT   (Arrive by 1:30 PM)   Office Visit with Randee Jones MD   [facility] [place] [facility]    [address]  [place] [address]   [phone]           Bring a current list of meds and any records pertaining to this visit.  For Physicals, please bring immunization records and any forms needing to be filled out.    Please arrive 15 minutes early to complete paperwork and register.              Who to contact     If you have questions or need follow up information about today's clinic visit or your schedule please contact [facility] directly at [phone].  Normal or non-critical lab and imaging results will be communicated to you by MyChart, letter or phone within 4 business days after the clinic has received the results. If you do not hear from us within 7 days, please contact the clinic through [employer] or phone. If you have a critical or abnormal lab result, we will notify you by phone as soon as possible.  Submit refill requests through [employer] or call your pharmacy and they will forward the refill request to us. Please allow 3 business days for your refill to be completed.          Additional Information About Your Visit        MyChart Information     [employer] gives you secure access to your electronic health record. If you see a primary care provider, you can also send messages to your care team and make appointments. If you have questions, please call your primary care  "clinic.  If you do not have a primary care provider, please call [phone] and they will assist you.        Care EveryWhere ID     This is your Care EveryWhere ID. This could be used by other organizations to access your [place] medical records  [id number]        Your Vitals Were     Pulse Height BMI (Body Mass Index)             88 5' 1\" (1.549 m) 34.96 kg/m2          Blood Pressure from Last 3 Encounters:   05/30/17 120/70   03/27/17 110/62   01/16/17 116/72    Weight from Last 3 Encounters:   05/30/17 185 lb (83.9 kg)   03/27/17 187 lb (84.8 kg)   01/16/17 180 lb (81.6 kg)              Today, you had the following     No orders found for display       Primary Care Provider    None       No address on file        Thank you!     Thank you for choosing [facility]  for your care. Our goal is always to provide you with excellent care. Hearing back from our patients is one way we can continue to improve our services. Please take a few minutes to complete the written survey that you may receive in the mail after your visit with us. Thank you!             Your Updated Medication List - Protect others around you: Learn how to safely use, store and throw away your medicines at www.disposemymeds.org.          This list is accurate as of: 5/30/17  3:06 PM.  Always use your most recent med list.                   Brand Name Dispense Instructions for use    diphenhydrAMINE 25 MG tablet    BENADRYL     Take 25 mg by mouth       EPINEPHrine 0.3 MG/0.3ML injection    EPIPEN    4 each    Inject 0.3 mLs (0.3 mg) into the muscle once as needed for anaphylaxis       etonogestrel 68 MG Impl    IMPLANON/NEXPLANON     1 each (68 mg) by Subdermal route continuous       famotidine 40 MG tablet    PEPCID    30 tablet    Take 1 tablet (40 mg) by mouth nightly as needed for heartburn       ibuprofen 400 MG tablet    ADVIL/MOTRIN    60 tablet    Take 2 tablets (800 mg) by mouth every 6 hours as needed for other (cramping) "       loratadine 10 MG tablet    CLARITIN     Take 10 mg by mouth daily

## 2017-06-09 ENCOUNTER — OFFICE VISIT (OUTPATIENT)
Dept: FAMILY MEDICINE | Facility: OTHER | Age: 22
End: 2017-06-09
Attending: FAMILY MEDICINE
Payer: COMMERCIAL

## 2017-06-09 VITALS
OXYGEN SATURATION: 98 % | SYSTOLIC BLOOD PRESSURE: 128 MMHG | HEART RATE: 77 BPM | DIASTOLIC BLOOD PRESSURE: 64 MMHG | RESPIRATION RATE: 20 BRPM | WEIGHT: 180 LBS | HEIGHT: 62 IN | BODY MASS INDEX: 33.13 KG/M2

## 2017-06-09 DIAGNOSIS — J30.89 SEASONAL ALLERGIC RHINITIS DUE TO OTHER ALLERGIC TRIGGER: ICD-10-CM

## 2017-06-09 DIAGNOSIS — D17.1 BENIGN LIPOMATOUS NEOPLASM OF SKIN AND SUBCUTANEOUS TISSUE OF TRUNK: ICD-10-CM

## 2017-06-09 DIAGNOSIS — Z76.89 ESTABLISHING CARE WITH NEW DOCTOR, ENCOUNTER FOR: Primary | ICD-10-CM

## 2017-06-09 DIAGNOSIS — K21.9 GASTROESOPHAGEAL REFLUX DISEASE, ESOPHAGITIS PRESENCE NOT SPECIFIED: ICD-10-CM

## 2017-06-09 DIAGNOSIS — F17.200 TOBACCO USE DISORDER: ICD-10-CM

## 2017-06-09 DIAGNOSIS — E66.9 OBESITY, UNSPECIFIED OBESITY SEVERITY, UNSPECIFIED OBESITY TYPE: ICD-10-CM

## 2017-06-09 PROCEDURE — 99213 OFFICE O/P EST LOW 20 MIN: CPT

## 2017-06-09 PROCEDURE — 99203 OFFICE O/P NEW LOW 30 MIN: CPT | Performed by: FAMILY MEDICINE

## 2017-06-09 ASSESSMENT — ANXIETY QUESTIONNAIRES
3. WORRYING TOO MUCH ABOUT DIFFERENT THINGS: SEVERAL DAYS
1. FEELING NERVOUS, ANXIOUS, OR ON EDGE: SEVERAL DAYS
2. NOT BEING ABLE TO STOP OR CONTROL WORRYING: SEVERAL DAYS
GAD7 TOTAL SCORE: 5
5. BEING SO RESTLESS THAT IT IS HARD TO SIT STILL: NOT AT ALL
IF YOU CHECKED OFF ANY PROBLEMS ON THIS QUESTIONNAIRE, HOW DIFFICULT HAVE THESE PROBLEMS MADE IT FOR YOU TO DO YOUR WORK, TAKE CARE OF THINGS AT HOME, OR GET ALONG WITH OTHER PEOPLE: SOMEWHAT DIFFICULT
6. BECOMING EASILY ANNOYED OR IRRITABLE: SEVERAL DAYS
7. FEELING AFRAID AS IF SOMETHING AWFUL MIGHT HAPPEN: SEVERAL DAYS

## 2017-06-09 ASSESSMENT — PATIENT HEALTH QUESTIONNAIRE - PHQ9: 5. POOR APPETITE OR OVEREATING: NOT AT ALL

## 2017-06-09 ASSESSMENT — PAIN SCALES - GENERAL: PAINLEVEL: NO PAIN (0)

## 2017-06-09 NOTE — PATIENT INSTRUCTIONS
Referral to general surgery for lipoma excision.  Smoking cessation advised.  Continue to work on healthy diet, exercise, weight loss.  Consider dietician referral.  Follow up when due for annual exam.  Pap is due age 22.

## 2017-06-09 NOTE — MR AVS SNAPSHOT
After Visit Summary   6/9/2017    Carolyn Mallory    MRN: [id number]           Patient Information     Date Of Birth          1995        Visit Information        Provider Department      6/9/2017 11:15 Randee Cummings MD [facility]        Today's Diagnoses     Establishing care with new doctor, encounter for    -  1    Benign lipomatous neoplasm of skin and subcutaneous tissue of trunk        Obesity, unspecified obesity severity, unspecified obesity type        Tobacco use disorder          Care Instructions    Referral to general surgery for lipoma excision.  Smoking cessation advised.  Continue to work on healthy diet, exercise, weight loss.  Consider dietician referral.  Follow up when due for annual exam.  Pap is due age 21.          Follow-ups after your visit        Additional Services     GENERAL SURG ADULT REFERRAL       Your provider has referred you to: [facility]: Marcella May [phone]   [email or web address]    Large lipoma, left upper arm    Please be aware that coverage of these services is subject to the terms and limitations of your health insurance plan.  Call member services at your health plan with any benefit or coverage questions.      Please bring the following with you to your appointment:    (1) Any X-Rays, CTs or MRIs which have been performed.  Contact the facility where they were done to arrange for  prior to your scheduled appointment.   (2) List of current medications   (3) This referral request   (4) Any documents/labs given to you for this referral                  Who to contact     If you have questions or need follow up information about today's clinic visit or your schedule please contact [facility] directly at [phone].  Normal or non-critical lab and imaging results will be communicated to you by MyChart, letter or phone within 4 business days  "after the clinic has received the results. If you do not hear from us within 7 days, please contact the clinic through [employer] or phone. If you have a critical or abnormal lab result, we will notify you by phone as soon as possible.  Submit refill requests through [employer] or call your pharmacy and they will forward the refill request to us. Please allow 3 business days for your refill to be completed.          Additional Information About Your Visit        [employer] Information     [employer] gives you secure access to your electronic health record. If you see a primary care provider, you can also send messages to your care team and make appointments. If you have questions, please call your primary care clinic.  If you do not have a primary care provider, please call [phone] and they will assist you.        Care EveryWhere ID     This is your Care EveryWhere ID. This could be used by other organizations to access your [place] medical records  [id number]        Your Vitals Were     Pulse Respirations Height Pulse Oximetry Breastfeeding? BMI (Body Mass Index)    77 20 5' 2\" (1.575 m) 98% No 32.92 kg/m2       Blood Pressure from Last 3 Encounters:   06/09/17 128/64   05/30/17 120/70   03/27/17 110/62    Weight from Last 3 Encounters:   06/09/17 180 lb (81.6 kg)   05/30/17 185 lb (83.9 kg)   03/27/17 187 lb (84.8 kg)              We Performed the Following     GENERAL SURG ADULT REFERRAL        Primary Care Provider    None       No address on file        Thank you!     Thank you for choosing [facility]  for your care. Our goal is always to provide you with excellent care. Hearing back from our patients is one way we can continue to improve our services. Please take a few minutes to complete the written survey that you may receive in the mail after your visit with us. Thank you!             Your Updated Medication List - Protect others around you: Learn how to safely use, store and throw away your " medicines at www.disposemymeds.org.          This list is accurate as of: 6/9/17 11:38 AM.  Always use your most recent med list.                   Brand Name Dispense Instructions for use    diphenhydrAMINE 25 MG tablet    BENADRYL     Take 25 mg by mouth       EPINEPHrine 0.3 MG/0.3ML injection    EPIPEN    4 each    Inject 0.3 mLs (0.3 mg) into the muscle once as needed for anaphylaxis       etonogestrel 68 MG Impl    IMPLANON/NEXPLANON     1 each (68 mg) by Subdermal route continuous       famotidine 40 MG tablet    PEPCID    30 tablet    Take 1 tablet (40 mg) by mouth nightly as needed for heartburn       ibuprofen 400 MG tablet    ADVIL/MOTRIN    60 tablet    Take 2 tablets (800 mg) by mouth every 6 hours as needed for other (cramping)       loratadine 10 MG tablet    CLARITIN     Take 10 mg by mouth daily

## 2017-06-09 NOTE — NURSING NOTE
"Chief Complaint   Patient presents with     Establish Care     Mass     lump on left arm been there for a long time, causing some numbness. No known injury. No pain today, just tender to the touch       Initial /64  Pulse 77  Resp 20  Ht 5' 2\" (1.575 m)  Wt 180 lb (81.6 kg)  SpO2 98%  Breastfeeding? No  BMI 32.92 kg/m2 Estimated body mass index is 32.92 kg/(m^2) as calculated from the following:    Height as of this encounter: 5' 2\" (1.575 m).    Weight as of this encounter: 180 lb (81.6 kg).  Medication Reconciliation: complete   Zoila Avilez      "

## 2017-06-09 NOTE — PROGRESS NOTES
SUBJECTIVE:  Carolyn is a 21 year old female who comes in today for establish care.  Main concern is lump of left upper arm, present for months, increasing in size, tender to touch or pressure.  Some tingling in arm and hand.  No neck pain.  No injury.  No rash.  Is a smoker, 3-5 cigarettes per day.  Not ready to quit.  Denies other drug use or regular alcohol use.    Is obese, and weight fluctuates.  No routine exercise or specific diet.  Does have seasonal allergies and uses Claritin  Has reflux and uses Pepcid with fair relief.    Current Outpatient Prescriptions   Medication     diphenhydrAMINE (BENADRYL) 25 MG tablet     famotidine (PEPCID) 40 MG tablet     etonogestrel (IMPLANON/NEXPLANON) 68 MG IMPL     ibuprofen (ADVIL,MOTRIN) 400 MG tablet     loratadine (CLARITIN) 10 MG tablet     EPINEPHrine (EPIPEN) 0.3 MG/0.3ML injection     No current facility-administered medications for this visit.         Allergies   Allergen Reactions     Oxycodone      Vomiting, hallucinations.     Tylenol [Acetaminophen] Other (See Comments)     Patient reports seeing spots after taking Tylenol.       Past Medical History:   Diagnosis Date     NO ACTIVE PROBLEMS      Pregnancy     LMP 4/2015     Past Surgical History:   Procedure Laterality Date     ARTHROSCOPY KNEE Right 5/4/2015    Procedure: ARTHROSCOPY KNEE;  Surgeon: Hernando Kulkarni MD;  Location: [facility] OR     AS ESOPHAGOSCOPY, DIAGNOSTIC      upper     LAPAROSCOPIC CHOLECYSTECTOMY N/A 10/10/2015    Procedure: LAPAROSCOPIC CHOLECYSTECTOMY;  Surgeon: Drew Padgett MD;  Location: [facility] OR     none       Social History     Social History     Marital status: Single     Spouse name: N/A     Number of children: N/A     Years of education: N/A     Occupational History     Not on file.     Social History Main Topics     Smoking status: Current Every Day Smoker     Packs/day: 0.04     Types: Cigarettes     Start date: 1/20/2014     Smokeless tobacco: Never Used     Alcohol use No  "    Drug use: No     Sexual activity: Yes     Partners: Male     Other Topics Concern     Parent/Sibling W/ Cabg, Mi Or Angioplasty Before 65f 55m? No      Service No     Blood Transfusions Yes     Permits if needed     Seat Belt Yes     Social History Narrative         ROS:  General: negative for, fever  Skin: negative for, rash  ENT: positive for postnasal drainage, rhinorrhea  Resp: No cough  CV: negative for and chest pain  GI: positive for reflux  : negative  Psychiatric: negative  PHQ-9 SCORE 6/9/2017   Total Score 3     HAMMAD-7 SCORE 10/20/2016 6/9/2017   Total Score 0 5       OBJECTIVE:  Vitals:    06/09/17 1101   BP: 128/64   Pulse: 77   Resp: 20   SpO2: 98%   Weight: 180 lb (81.6 kg)   Height: 5' 2\" (1.575 m)     GENERAL APPEARANCE: alert, no distress, cooperative and obese  NECK: no adenopathy, no asymmetry, masses, or scars and thyroid normal to palpation  BREAST: normal without masses, tenderness or nipple discharge and no palpable axillary masses or adenopathy  CV: regular rates and rhythm, normal S1 S2, no S3 or S4 and no murmur, click or rub -  MS: gait normal, normal muscle tone and left upper arm, overlying mid humerous, there is an ill defined subcutaneous mass, several cm in size, tender to palpation; normal AROM UE and neck  SKIN: no suspicious lesions or rashes  PSYCH: mentation appears normal. and affect normal/bright    ASSESSMENT/ORDERS:    ICD-10-CM    1. Establishing care with new doctor, encounter for Z71.89    2. Benign lipomatous neoplasm of skin and subcutaneous tissue of trunk D17.1 GENERAL SURG ADULT REFERRAL   3. Obesity, unspecified obesity severity, unspecified obesity type E66.9    4. Tobacco use disorder F17.200    5. Seasonal allergic rhinitis due to other allergic trigger J30.89    6. Gastroesophageal reflux disease, esophagitis presence not specified K21.9      PLAN:  Discussed probably lipoma, benign nature.  However, given increasing size and symptoms, she would " like it excised.    Patient Instructions   Referral to general surgery for lipoma excision.  Smoking cessation advised.  Continue to work on healthy diet, exercise, weight loss.  Consider dietician referral.  Follow up when due for annual exam.  Pap is due age 22.        Randee Estes

## 2017-06-10 ASSESSMENT — PATIENT HEALTH QUESTIONNAIRE - PHQ9: SUM OF ALL RESPONSES TO PHQ QUESTIONS 1-9: 3

## 2017-06-10 ASSESSMENT — ANXIETY QUESTIONNAIRES: GAD7 TOTAL SCORE: 5

## 2017-06-16 ENCOUNTER — HOSPITAL ENCOUNTER (EMERGENCY)
Facility: HOSPITAL | Age: 22
Discharge: HOME | End: 2017-06-16
Attending: NURSE PRACTITIONER | Admitting: NURSE PRACTITIONER
Payer: COMMERCIAL

## 2017-06-16 VITALS
OXYGEN SATURATION: 100 % | SYSTOLIC BLOOD PRESSURE: 124 MMHG | TEMPERATURE: 99.2 F | RESPIRATION RATE: 16 BRPM | DIASTOLIC BLOOD PRESSURE: 76 MMHG

## 2017-06-16 DIAGNOSIS — M79.89 SWELLING OF LEFT RING FINGER: ICD-10-CM

## 2017-06-16 PROCEDURE — 99212 OFFICE O/P EST SF 10 MIN: CPT

## 2017-06-16 PROCEDURE — 99213 OFFICE O/P EST LOW 20 MIN: CPT | Performed by: NURSE PRACTITIONER

## 2017-06-16 ASSESSMENT — ENCOUNTER SYMPTOMS: CONSTITUTIONAL NEGATIVE: 1

## 2017-06-16 NOTE — ED AVS SNAPSHOT
[facility]    [address] [address]    Phone:  [phone]                                       Carolyn Mallory   MRN: [id number]    Department:  [facility]   Date of Visit:  6/16/2017           Patient Information     Date Of Birth          1995        Your diagnoses for this visit were:     Swelling of left ring finger Removal of ring       You were seen by Carlene Starks, NP.      Follow-up Information     Please follow up.    Why:  Follow up with PCP as needed        Discharge Instructions       Ice and elevate the hand.       Future Appointments        Provider Department Dept Phone Center    7/13/2017 1:00 PM Brandon Smith DO [facility] [phone] [facility]         Review of your medicines      Our records show that you are taking the medicines listed below. If these are incorrect, please call your family doctor or clinic.        Dose / Directions Last dose taken    diphenhydrAMINE 25 MG tablet   Commonly known as:  BENADRYL   Dose:  25 mg        Take 25 mg by mouth   Refills:  0        EPINEPHrine 0.3 MG/0.3ML injection   Commonly known as:  EPIPEN   Dose:  0.3 mg   Quantity:  4 each        Inject 0.3 mLs (0.3 mg) into the muscle once as needed for anaphylaxis   Refills:  6        etonogestrel 68 MG Impl   Commonly known as:  IMPLANON/NEXPLANON   Dose:  1 each        1 each (68 mg) by Subdermal route continuous   Refills:  0        famotidine 40 MG tablet   Commonly known as:  PEPCID   Dose:  40 mg   Quantity:  30 tablet        Take 1 tablet (40 mg) by mouth nightly as needed for heartburn   Refills:  0        ibuprofen 400 MG tablet   Commonly known as:  ADVIL/MOTRIN   Dose:  800 mg   Quantity:  60 tablet        Take 2 tablets (800 mg) by mouth every 6 hours as needed for other (cramping)   Refills:  1        loratadine 10 MG tablet   Commonly known as:  CLARITIN   Dose:  10 mg        Take 10 mg by mouth daily   Refills:   0                Orders Needing Specimen Collection     None      Pending Results     No orders found from 6/14/2017 to 6/17/2017.            Pending Culture Results     No orders found from 6/14/2017 to 6/17/2017.            Thank you for choosing [facility]       Thank you for choosing [facility] for your care. Our goal is always to provide you with excellent care. Hearing back from our patients is one way we can continue to improve our services. Please take a few minutes to complete the written survey that you may receive in the mail after you visit with us. Thank you!        [employer] Information     "[employer]" gives you secure access to your electronic health record. If you see a primary care provider, you can also send messages to your care team and make appointments. If you have questions, please call your primary care clinic.  If you do not have a primary care provider, please call [phone] and they will assist you.        Care EveryWhere ID     This is your Care EveryWhere ID. This could be used by other organizations to access your [facility] medical records  [id number]        After Visit Summary       This is your record. Keep this with you and show to your community pharmacist(s) and doctor(s) at your next visit.

## 2017-06-16 NOTE — ED NOTES
Pt presents with swelling to left 4th finger since this morning. Has a tumor in left upper arm. Wants engagement ring off with a string.

## 2017-06-16 NOTE — ED PROVIDER NOTES
"  History     Chief Complaint   Patient presents with     Swelling     left arm and hand, Reports \"its been going on for a long time for about 9 months\"     HPI  Carolyn Mallory is a 21 year old female who presents with a ring on her fingers, unable to remove it d/t swelling. 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 Recently engaged and had been wearing her new ring. Attempted to remove it at home today with soaps, lotions, and the string method with no success. Finger is painful at this time, has good color and sensation is intact.     I have reviewed the Medications, Allergies, Past Medical and Surgical History, and Social History in the Epic system.    Allergies:   Allergies   Allergen Reactions     Oxycodone      Vomiting, hallucinations.     Tylenol [Acetaminophen] Other (See Comments)     Patient reports seeing spots after taking Tylenol.         No current facility-administered medications on file prior to encounter.   Current Outpatient Prescriptions on File Prior to Encounter:  diphenhydrAMINE (BENADRYL) 25 MG tablet Take 25 mg by mouth   famotidine (PEPCID) 40 MG tablet Take 1 tablet (40 mg) by mouth nightly as needed for heartburn   etonogestrel (IMPLANON/NEXPLANON) 68 MG IMPL 1 each (68 mg) by Subdermal route continuous   ibuprofen (ADVIL,MOTRIN) 400 MG tablet Take 2 tablets (800 mg) by mouth every 6 hours as needed for other (cramping)   loratadine (CLARITIN) 10 MG tablet Take 10 mg by mouth daily   EPINEPHrine (EPIPEN) 0.3 MG/0.3ML injection Inject 0.3 mLs (0.3 mg) into the muscle once as needed for anaphylaxis       Patient Active Problem List   Diagnosis     Allergic rhinitis     Food allergy     Patellofemoral syndrome of both knees     Sacro ilial pain     Calculus of kidney     ACP (advance care planning)     Chronic right-sided thoracic back pain     Obesity, unspecified obesity severity, unspecified obesity type     " "Tobacco use disorder     Esophageal reflux       Past Surgical History:   Procedure Laterality Date     ARTHROSCOPY KNEE Right 5/4/2015    Procedure: ARTHROSCOPY KNEE;  Surgeon: Hernando Kulkarni MD;  Location: [facility]     AS ESOPHAGOSCOPY, DIAGNOSTIC      upper     LAPAROSCOPIC CHOLECYSTECTOMY N/A 10/10/2015    Procedure: LAPAROSCOPIC CHOLECYSTECTOMY;  Surgeon: Drew Padgett MD;  Location: [facility]     none         Social History   Substance Use Topics     Smoking status: Current Every Day Smoker     Packs/day: 0.04     Types: Cigarettes     Start date: 1/20/2014     Smokeless tobacco: Never Used     Alcohol use No       Most Recent Immunizations   Administered Date(s) Administered     Influenza Vaccine IM 3yrs+ 4 Valent IIV4 09/24/2015     TDAP Vaccine (Adacel) 01/07/2015     TDAP Vaccine (Boostrix) 02/09/2016       BMI: Estimated body mass index is 32.92 kg/(m^2) as calculated from the following:    Height as of 6/9/17: 1.575 m (5' 2\").    Weight as of 6/9/17: 81.6 kg (180 lb).      Review of Systems   Constitutional: Negative.    Musculoskeletal:        Left finger pain and swelling, engagement ring in place       Physical Exam   BP: 124/76  Heart Rate: 93  Temp: 99.2  F (37.3  C)  Resp: 16  SpO2: 100 %  Physical Exam   Constitutional: She is oriented to person, place, and time. She appears well-developed and well-nourished. No distress.   HENT:   Head: Normocephalic and atraumatic.   Pulmonary/Chest: Effort normal.   Musculoskeletal:   Left ring finger, mildly erythematous and swollen. Large janelle ring on finger, attempted to remove with lubricant and ice. Nurse using the string method, very slowly wedged the ring up the finger and removed it without complications. She has full movement, color and sensation following the removal of the ring.    Neurological: She is alert and oriented to person, place, and time.   Skin: Skin is warm and dry. She is not diaphoretic.   Psychiatric: She has a normal mood and " affect. Her behavior is normal.   Nursing note and vitals reviewed.      ED Course     ED Course     Procedures          Assessments & Plan (with Medical Decision Making)     I have reviewed the nursing notes.  I have reviewed the findings, diagnosis, plan and need for follow up with the patient.  Follow up as needed.   Final diagnoses:   Swelling of left ring finger - Removal of ring       6/16/2017   [facility]     Carlene Starks NP  06/16/17 [id number]

## 2017-06-16 NOTE — LETTER
[facility]  [address]  [place] [address]  Phone: [phone]    April 16, 2018        Carolyn Mallory  [address]  [place] [address]          To whom it may concern:    RE: Carolyn Mallory    Patient was seen and treated today at our clinic.    Please, excuse her from school today.      Sincerely,        Ludmila Zavala Certified  Physician Assistant  4/16/2018  3:26 PM  UR[facility]

## 2017-07-13 ENCOUNTER — OFFICE VISIT (OUTPATIENT)
Dept: SURGERY | Facility: OTHER | Age: 22
End: 2017-07-13
Attending: FAMILY MEDICINE
Payer: COMMERCIAL

## 2017-07-13 VITALS
WEIGHT: 190 LBS | HEIGHT: 62 IN | HEART RATE: 89 BPM | DIASTOLIC BLOOD PRESSURE: 62 MMHG | TEMPERATURE: 98.6 F | OXYGEN SATURATION: 99 % | SYSTOLIC BLOOD PRESSURE: 116 MMHG | BODY MASS INDEX: 34.96 KG/M2

## 2017-07-13 DIAGNOSIS — Z71.6 ENCOUNTER FOR TOBACCO USE CESSATION COUNSELING: ICD-10-CM

## 2017-07-13 DIAGNOSIS — R60.0 EDEMA OF UPPER EXTREMITY: Primary | ICD-10-CM

## 2017-07-13 DIAGNOSIS — F17.200 TOBACCO DEPENDENCE: ICD-10-CM

## 2017-07-13 DIAGNOSIS — R20.2 ARM PARESTHESIA, LEFT: ICD-10-CM

## 2017-07-13 DIAGNOSIS — D17.1 BENIGN LIPOMATOUS NEOPLASM OF SKIN AND SUBCUTANEOUS TISSUE OF TRUNK: ICD-10-CM

## 2017-07-13 DIAGNOSIS — R29.898 LUE WEAKNESS: ICD-10-CM

## 2017-07-13 PROCEDURE — 99203 OFFICE O/P NEW LOW 30 MIN: CPT | Performed by: SURGERY

## 2017-07-13 PROCEDURE — 99213 OFFICE O/P EST LOW 20 MIN: CPT

## 2017-07-13 ASSESSMENT — PAIN SCALES - GENERAL: PAINLEVEL: MODERATE PAIN (4)

## 2017-07-13 NOTE — MR AVS SNAPSHOT
After Visit Summary   7/13/2017    Carolyn Mallory    MRN: [id number]           Patient Information     Date Of Birth          1995        Visit Information        Provider Department      7/13/2017 1:00 PM Brandon Smith, DO 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 Smith and our surgical team to participate in your care. Please call with any scheduling questions or concerns to [facility] at [phone] or for nursing questions Kena [phone].  You have an order placed with [facility]. They will call you to set up an appointment for your test specified by Dr. Smith.            Follow-ups after your visit        Who to contact     If you have questions or need follow up information about today's clinic visit or your schedule please contact [facility] directly at [phone].  Normal or non-critical lab and imaging results will be communicated to you by MyChart, letter or phone within 4 business days after the clinic has received the results. If you do not hear from us within 7 days, please contact the clinic through [employer] or phone. If you have a critical or abnormal lab result, we will notify you by phone as soon as possible.  Submit refill requests through [employer] or call your pharmacy and they will forward the refill request to us. Please allow 3 business days for your refill to be completed.          Additional Information About Your Visit        MyChart Information     [employer] gives you secure access to your electronic health record. If you see a primary care provider, you can also send messages to your care team and make appointments. If you have questions, please call your primary care clinic.  If you do not have a primary care provider, please call [phone] and they will assist you.        Care EveryWhere ID     This  "is your Care EveryWhere ID. This could be used by other organizations to access your [place] medical records  [id number]        Your Vitals Were     Pulse Temperature Height Pulse Oximetry BMI (Body Mass Index)       89 98.6  F (37  C) (Tympanic) 5' 2\" (1.575 m) 99% 34.75 kg/m2        Blood Pressure from Last 3 Encounters:   07/13/17 116/62   06/16/17 124/76   06/09/17 128/64    Weight from Last 3 Encounters:   07/13/17 190 lb (86.2 kg)   06/09/17 180 lb (81.6 kg)   05/30/17 185 lb (83.9 kg)               Primary Care Provider    None       No address on file        Equal Access to Services     CRISS CHEN : Laxmi Villasenor, waevangelistda lujuan carlosadaha, qaybta kaalmada devorayaflako, vianey sorenson . So [facility] [phone].    ATENCIÓN: Si habla español, tiene a solis disposición servicios gratuitos de asistencia lingüística. Llame al [phone].    We comply with applicable federal civil rights laws and [place] laws. We do not discriminate on the basis of race, color, national origin, age, disability sex, sexual orientation or gender identity.            Thank you!     Thank you for choosing [facility]  [facility]. Our goal is always to provide you with excellent care. Hearing back from our patients is one way we can continue to improve our services. Please take a few minutes to complete the written survey that you may receive in the mail after your visit with us. Thank you!             Your Updated Medication List - Protect others around you: Learn how to safely use, store and throw away your medicines at www.disposemymeds.org.          This list is accurate as of: 7/13/17  1:39 PM.  Always use your most recent med list.                   Brand Name Dispense Instructions for use Diagnosis    diphenhydrAMINE 25 MG tablet    BENADRYL     Take 25 mg by mouth        EPINEPHrine 0.3 MG/0.3ML injection    EPIPEN    4 each    Inject 0.3 mLs (0.3 mg) into the muscle once as " needed for anaphylaxis    Allergic rhinitis due to pollen       etonogestrel 68 MG Impl    IMPLANON/NEXPLANON     1 each (68 mg) by Subdermal route continuous    Nexplanon insertion       famotidine 40 MG tablet    PEPCID    30 tablet    Take 1 tablet (40 mg) by mouth nightly as needed for heartburn    Gastroesophageal reflux disease without esophagitis       ibuprofen 400 MG tablet    ADVIL/MOTRIN    60 tablet    Take 2 tablets (800 mg) by mouth every 6 hours as needed for other (cramping)     (spontaneous vaginal delivery)       loratadine 10 MG tablet    CLARITIN     Take 10 mg by mouth daily

## 2017-07-13 NOTE — PATIENT INSTRUCTIONS
Thank you for allowing Dr. Smith and our surgical team to participate in your care. Please call with any scheduling questions or concerns to [facility] at [phone] or for nursing questions Kena [phone].  You have an order placed with [facility]. They will call you to set up an appointment for your test specified by Dr. Smith.

## 2017-07-13 NOTE — PROGRESS NOTES
Surgery Consult Clinic Note      RE: Carolyn Mallory  : 1995  M[place]: 2017      Chief Complaint:  Arm mass    History of Present Illness:  Mrs. Mallory is a very pleasant 21 year old year old female who I am seeing at the request of Dr. Randee Jones MD for evaluation of left arm mass and to make further recommendations.  Noticed feeling a bulge 1 year ago.  Started off small and has progressively gotten bigger.  The mass causes pain with pressed.  Complains of hand weakness, dropping dishes, numbness, swelling.  Was recently seen in the ER for them to remove her wedding on her left hand because the swelling was so bad.  Swelling is worse in the morning and not improved with elevation.   She specifically denies fever, chills, nausea, vomiting, chest pain, shortness of breath or palpitations.      Medical history:  Past Medical History:   Diagnosis Date     NO ACTIVE PROBLEMS      Pregnancy     LMP 2015       Surgical history:  Past Surgical History:   Procedure Laterality Date     ARTHROSCOPY KNEE Right 2015    Procedure: ARTHROSCOPY KNEE;  Surgeon: Hernando Kulkarni MD;  [facility]: [facility]     AS ESOPHAGOSCOPY, DIAGNOSTIC      upper     LAPAROSCOPIC CHOLECYSTECTOMY N/A 10/10/2015    Procedure: LAPAROSCOPIC CHOLECYSTECTOMY;  Surgeon: Drew Padgett MD;  Location: [facility]     none         Family history:  Family History   Problem Relation Age of Onset     Thrombophilia Mother      blood clotting     Lupus Mother      erythematosus     DIABETES Mother      Hypertension Father      Asthma Sister      DIABETES Maternal Grandfather      Hypertension Maternal Grandfather      Lupus Maternal Aunt      erythematosus       Medications:  Current Outpatient Prescriptions   Medication Sig Dispense Refill     diphenhydrAMINE (BENADRYL) 25 MG tablet Take 25 mg by mouth       famotidine (PEPCID) 40 MG tablet Take 1 tablet (40 mg) by mouth nightly as needed for heartburn 30 tablet 0     etonogestrel  "(IMPLANON/NEXPLANON) 68 MG IMPL 1 each (68 mg) by Subdermal route continuous       ibuprofen (ADVIL,MOTRIN) 400 MG tablet Take 2 tablets (800 mg) by mouth every 6 hours as needed for other (cramping) 60 tablet 1     EPINEPHrine (EPIPEN) 0.3 MG/0.3ML injection Inject 0.3 mLs (0.3 mg) into the muscle once as needed for anaphylaxis 4 each 6     loratadine (CLARITIN) 10 MG tablet Take 10 mg by mouth daily       Allergies:  The patientis allergic to oxycodone and tylenol [acetaminophen].  .  Social history:  Social History   Substance Use Topics     Smoking status: Current Every Day Smoker     Packs/day: 0.04     Types: Cigarettes     Start date: 1/20/2014     Smokeless tobacco: Never Used     Alcohol use No     Marital status: single.    Review of Systems:    Constitutional: Negative for fever, chills and weight loss.   HENT: Negative for ear pain, nosebleeds, congestion, sore throat, tinnitus and ear discharge.    Eyes: Negative for blurred vision, double vision, photophobia and pain.   Respiratory: Negative for cough, hemoptysis, shortness of breath, wheezing and stridor.    Cardiovascular: Negative for chest pain, palpitations and orthopnea.   Gastrointestinal: Negative for heartburn, nausea, vomiting, abdominal pain and blood in stool.   Genitourinary: Negative for urgency, frequency and hematuria.   Musculoskeletal: Negative for myalgias, back pain and joint pain.   Neurological: Negative for tingling, speech change and headaches.   Endo/Heme/Allergies: Does not bruise/bleed easily.   Psychiatric/Behavioral: Negative for depression, suicidal ideas and hallucinations. The patient is not nervous/anxious.    Physical Examination:  /62 (BP Location: Right arm, Patient Position: Chair, Cuff Size: Adult Regular)  Pulse 89  Temp 98.6  F (37  C) (Tympanic)  Ht 1.575 m (5' 2\")  Wt 86.2 kg (190 lb)  SpO2 99%  BMI 34.75 kg/m2  General: AAOx4, NAD, WN/WD, ambulating without assistance  HEENT:NCAT, EOMI, PERRL " Sclerae anicteric; Trachea mideline, no JVD  Chest:   Clear to auscultation bilaterally.  Cardiac: S1S2 , regular rate and rhythm without additional sounds  Abdomen: Obese, soft, ND/NT no rebound, no gaurding  Extremities: Cursory exam reveals mild size discrepancy between the left and right upper extremity, the left looking bigger.  MS 5/5 B/L biceps, wrist, fingers.  Sensation intact C5-7.  Soft fixed mass left upper extremity lateral aspect near the deltoid insertion  Skin: Warm, dry, < 2 sec cap refill  Neuro: CN 2-12 grossly intact, no focal deficit, GCS 15  Psych: happy, calm, asks appropriate questions      Assessment/Plan:  #1 Left upper extremity mass  #2 Left upper extremity edema  #3 Left upper extremity paresthesia  #4 Left upper extremity weakness  #5 Tobacco dependence     Ordering MRI to characterize.  I would think a mass affecting neurovascular structures would be more on the medial aspect.  This is large enough to with possible invasion that worry about a malignant process.  Orthopedics might be the appropriate referral but will know after the MRI. We discussed her tobacco use increases risk of surgical complications and was encouraged to stop.            Dr Smith  [facility]  [address] [place] [address], [address]  [place]    [address]    Referring Provider:  [place] [address]     Primary Care Provider:  None

## 2017-07-13 NOTE — NURSING NOTE
"Chief Complaint   Patient presents with     Consult     Lipoma/lump on left upper arm-referred by Dr Jones.  The lump has been there for a year.  It makes her whole arm goesnumb and makes her hands swell.  Painful if touched.  Heavy, tired feeling in her arm.         Initial /62 (BP Location: Right arm, Patient Position: Chair, Cuff Size: Adult Regular)  Pulse 89  Temp 98.6  F (37  C) (Tympanic)  Ht 5' 2\" (1.575 m)  Wt 190 lb (86.2 kg)  SpO2 99%  BMI 34.75 kg/m2 Estimated body mass index is 34.75 kg/(m^2) as calculated from the following:    Height as of this encounter: 5' 2\" (1.575 m).    Weight as of this encounter: 190 lb (86.2 kg).  Medication Reconciliation: complete   BHARAT QUEEN      "

## 2017-07-14 DIAGNOSIS — D17.1 BENIGN LIPOMATOUS NEOPLASM OF SKIN AND SUBCUTANEOUS TISSUE OF TRUNK: Primary | ICD-10-CM

## 2017-07-17 ENCOUNTER — HOSPITAL ENCOUNTER (OUTPATIENT)
Dept: MRI IMAGING | Facility: HOSPITAL | Age: 22
Discharge: HOME | End: 2017-07-17
Attending: SURGERY | Admitting: SURGERY
Payer: COMMERCIAL

## 2017-07-17 PROCEDURE — 73218 MRI UPPER EXTREMITY W/O DYE: CPT | Mod: TC,LT

## 2017-07-19 ENCOUNTER — TELEPHONE (OUTPATIENT)
Dept: SURGERY | Facility: OTHER | Age: 22
End: 2017-07-19

## 2017-07-19 NOTE — TELEPHONE ENCOUNTER
Patient notified of her results.  She will await further explanation from Dr Smith once he is able to discuss her results with Radiology.

## 2017-07-19 NOTE — TELEPHONE ENCOUNTER
[facility] results are [facility]. Please call patient back at [phone], thanks.

## 2017-07-21 ENCOUNTER — HOSPITAL ENCOUNTER (EMERGENCY)
Facility: HOSPITAL | Age: 22
Discharge: HOME | End: 2017-07-21
Attending: NURSE PRACTITIONER | Admitting: NURSE PRACTITIONER
Payer: COMMERCIAL

## 2017-07-21 VITALS
HEART RATE: 94 BPM | DIASTOLIC BLOOD PRESSURE: 71 MMHG | RESPIRATION RATE: 12 BRPM | SYSTOLIC BLOOD PRESSURE: 133 MMHG | TEMPERATURE: 98 F | OXYGEN SATURATION: 98 %

## 2017-07-21 DIAGNOSIS — J30.2 ACUTE SEASONAL ALLERGIC RHINITIS, UNSPECIFIED TRIGGER: ICD-10-CM

## 2017-07-21 DIAGNOSIS — H69.93 ETD (EUSTACHIAN TUBE DYSFUNCTION), BILATERAL: ICD-10-CM

## 2017-07-21 PROCEDURE — 99213 OFFICE O/P EST LOW 20 MIN: CPT

## 2017-07-21 PROCEDURE — 99213 OFFICE O/P EST LOW 20 MIN: CPT | Performed by: NURSE PRACTITIONER

## 2017-07-21 RX ORDER — CIPROFLOXACIN AND DEXAMETHASONE 3; 1 MG/ML; MG/ML
4 SUSPENSION/ DROPS AURICULAR (OTIC) 2 TIMES DAILY
Qty: 7.5 ML | Refills: 0 | Status: SHIPPED | OUTPATIENT
Start: 2017-07-21 | End: 2017-07-28

## 2017-07-21 RX ORDER — FLUTICASONE PROPIONATE 50 MCG
1-2 SPRAY, SUSPENSION (ML) NASAL DAILY
Qty: 1 BOTTLE | Refills: 11 | Status: SHIPPED | OUTPATIENT
Start: 2017-07-21 | End: 2020-08-09

## 2017-07-21 NOTE — ED AVS SNAPSHOT
[facility]    [address] [address]    Phone:  [phone]                                       Carolyn Mallory   MRN: [id number]    Department:  [facility]   Date of Visit:  7/21/2017           After Visit Summary Signature Page     I have received my discharge instructions, and my questions have been answered. I have discussed any challenges I see with this plan with the nurse or doctor.    ..........................................................................................................................................  Patient/Patient Representative Signature      ..........................................................................................................................................  Patient Representative Print Name and Relationship to Patient    ..................................................               ................................................  Date                                            Time    ..........................................................................................................................................  Reviewed by Signature/Title    ...................................................              ..............................................  Date                                                            Time

## 2017-07-21 NOTE — ED AVS SNAPSHOT
[facility]    [address]    [place] [address]    Phone:  [phone]                                       Carolyn Mallory   MRN: [id number]    Department:  [facility]   Date of Visit:  7/21/2017           Patient Information     Date Of Birth          1995        Your diagnoses for this visit were:     ETD (eustachian tube dysfunction), bilateral     Acute seasonal allergic rhinitis, unspecified trigger        You were seen by Mari Bonilla NP.      Follow-up Information     Follow up with [facility].    Specialty:  EMERGENCY MEDICINE    Why:  As needed, If symptoms worsen    Contact information:    [address]  [place] [address] [phone]    Additional information:    From [facility]: Take [address]. Turn left at [address]. Turn left at the first stoplight on [address]. At the first stop sign, take a right onto [address]. Take a left into the parking lot and continue through until you reach the North enterance of the building.       From [place]: Take [address]. Take the [address] ramp towards [place]. Turn left onto [address]. Take a slight right onto [address]. Turn left at the first stoplight on [address]. At the first stop sign, take a right onto [address]. Take a left into the parking lot and continue through until you reach the North enterance of the building.       From [place]: Take [address]. Take a right at [address]. At the first stop sign, take a right onto [address]. Take a left into the parking lot and continue through until you reach the North enterance of the building.         Discharge Instructions         Allergic Rhinitis  Allergic rhinitis is an allergic reaction that affects the nose, and often the eyes. It s often known as nasal allergies. Nasal allergies are often due to things in the environment that are  breathed in. Depending what you are sensitive to, nasal allergies may occur only during certain seasons. Or they may occur year round. Common indoor allergens include house dust mites, mold, cockroaches, and pet dander. Outdoor allergens include pollen from trees, grasses, and weeds.   Symptoms include a drippy, stuffy, and itchy nose. They also include sneezing and red and itchy eyes. You may feel tired more often. Severe allergies may also affect your breathing and trigger a condition called asthma.   Tests can be done to see what allergens are affecting you. You may be referred to an allergy specialist for testing and further evaluation.  Home care  The healthcare provider may prescribe medicines to help relieve allergy symptoms.   Ask the provider for advice on how to avoid substances that you are allergic to. Below are a few tips for each type of allergen.  Pet dander:    Do not have pets with fur and feathers.    If you cannot avoid having a pet, keep it out of your bedroom and off upholstered furniture.  Pollen:    When pollen counts are high, keep windows of your car and home closed. If possible, use an air conditioner instead.    Wear a filter mask when mowing or doing yard work.  House dust mites:    Wash bedding every week in warm water and detergent and dry on a hot setting.    Cover the mattress, box spring, and pillows with allergy covers.     If possible, sleep in a room with no carpet, curtains, or upholstered furniture.  Cockroaches:    Store food in sealed containers.    Remove garbage from the home promptly.    Fix water leaks  Mold:    Keep humidity low by using a dehumidifier or air conditioner. Keep the dehumidifier and air conditioner clean and free of mold.    Clean moldy areas with bleach and water.  In general:    Vacuum once or twice a week. If possible, use a vacuum with a high-efficiency particulate air (HEPA) filter.    Do not smoke. Avoid cigarette smoke. Cigarette smoke is an irritant  that can make symptoms worse.  Follow-up care  Follow up as advised by the health care provider or our staff. If you were referred to an allergy specialist, make this appointment promptly.  When to seek medical advice  Call your healthcare provider right away if the following occur:    Coughing or wheezing    Fever greater than 100.4 F (38 C)    Continuing symptoms, new symptoms, or worsening symptoms  Call 911 right away if you have:    Trouble breathing    Hives (raised red bumps)    Severe swelling of the face or severe itching of the eyes or mouth  Date Last Reviewed: 4/26/2015 2000-2017 [employer]. [address] [place] [address]. All rights reserved. This information is not intended as a substitute for professional medical care. Always follow your healthcare professional's instructions.          Discharge References/Attachments     EXTERNAL EAR INFECTION (ADULT) (ENGLISH)         Review of your medicines      START taking        Dose / Directions Last dose taken    ciprofloxacin-dexamethasone otic suspension   Commonly known as:  CIPRODEX   Dose:  4 drop   Quantity:  7.5 mL        Place 4 drops into both ears 2 times daily for 7 days   Refills:  0        fluticasone 50 MCG/ACT spray   Commonly known as:  FLONASE   Dose:  1-2 spray   Quantity:  1 Bottle        Spray 1-2 sprays into both nostrils daily   Refills:  11          Our records show that you are taking the medicines listed below. If these are incorrect, please call your family doctor or clinic.        Dose / Directions Last dose taken    diphenhydrAMINE 25 MG tablet   Commonly known as:  BENADRYL   Dose:  25 mg        Take 25 mg by mouth   Refills:  0        EPINEPHrine 0.3 MG/0.3ML injection   Commonly known as:  EPIPEN   Dose:  0.3 mg   Quantity:  4 each        Inject 0.3 mLs (0.3 mg) into the muscle once as needed for anaphylaxis   Refills:  6        etonogestrel 68 MG Impl   Commonly known as:  IMPLANON/NEXPLANON   Dose:   1 each        1 each (68 mg) by Subdermal route continuous   Refills:  0        famotidine 40 MG tablet   Commonly known as:  PEPCID   Dose:  40 mg   Quantity:  30 tablet        Take 1 tablet (40 mg) by mouth nightly as needed for heartburn   Refills:  0        ibuprofen 400 MG tablet   Commonly known as:  ADVIL/MOTRIN   Dose:  800 mg   Quantity:  60 tablet        Take 2 tablets (800 mg) by mouth every 6 hours as needed for other (cramping)   Refills:  1        loratadine 10 MG tablet   Commonly known as:  CLARITIN   Dose:  10 mg        Take 10 mg by mouth daily   Refills:  0                Prescriptions were sent or printed at these locations (2 Prescriptions)                   [facility] - [place], [place] - [address] AT [facility] [address], [place] [address]    Telephone:  [phone]   Fax:  [phone]   Hours:                  E-Prescribed (2 of 2)         fluticasone (FLONASE) 50 MCG/ACT spray               ciprofloxacin-dexamethasone (CIPRODEX) otic suspension                Orders Needing Specimen Collection     None      Pending Results     No orders found from 7/19/2017 to 7/22/2017.            Pending Culture Results     No orders found from 7/19/2017 to 7/22/2017.            Thank you for choosing [facility]       Thank you for choosing [facility] for your care. Our goal is always to provide you with excellent care. Hearing back from our patients is one way we can continue to improve our services. Please take a few minutes to complete the written survey that you may receive in the mail after you visit with us. Thank you!        [employer] Information     [employer] gives you secure access to your electronic health record. If you see a primary care provider, you can also send messages to your care team and make appointments. If you have questions, please call your primary care clinic.  If you do not have a primary care provider, please call [phone] and they will  assist you.        Care EveryWhere ID     This is your Care EveryWhere ID. This could be used by other organizations to access your [facility] medical records  [id number]        Equal Access to Services     CRISS CHEN : Laxmi Villasenor, milton marie, shiraz olivia, vianey guevara. So [facility] [phone].    ATENCIÓN: Si habla español, tiene a solis disposición servicios gratuitos de asistencia lingüística. Llame al [phone].    We comply with applicable federal civil rights laws and [place] laws. We do not discriminate on the basis of race, color, national origin, age, disability sex, sexual orientation or gender identity.            After Visit Summary       This is your record. Keep this with you and show to your community pharmacist(s) and doctor(s) at your next visit.

## 2017-07-21 NOTE — DISCHARGE INSTRUCTIONS
Allergic Rhinitis  Allergic rhinitis is an allergic reaction that affects the nose, and often the eyes. It s often known as nasal allergies. Nasal allergies are often due to things in the environment that are breathed in. Depending what you are sensitive to, nasal allergies may occur only during certain seasons. Or they may occur year round. Common indoor allergens include house dust mites, mold, cockroaches, and pet dander. Outdoor allergens include pollen from trees, grasses, and weeds.   Symptoms include a drippy, stuffy, and itchy nose. They also include sneezing and red and itchy eyes. You may feel tired more often. Severe allergies may also affect your breathing and trigger a condition called asthma.   Tests can be done to see what allergens are affecting you. You may be referred to an allergy specialist for testing and further evaluation.  Home care  The healthcare provider may prescribe medicines to help relieve allergy symptoms.   Ask the provider for advice on how to avoid substances that you are allergic to. Below are a few tips for each type of allergen.  Pet dander:    Do not have pets with fur and feathers.    If you cannot avoid having a pet, keep it out of your bedroom and off upholstered furniture.  Pollen:    When pollen counts are high, keep windows of your car and home closed. If possible, use an air conditioner instead.    Wear a filter mask when mowing or doing yard work.  House dust mites:    Wash bedding every week in warm water and detergent and dry on a hot setting.    Cover the mattress, box spring, and pillows with allergy covers.     If possible, sleep in a room with no carpet, curtains, or upholstered furniture.  Cockroaches:    Store food in sealed containers.    Remove garbage from the home promptly.    Fix water leaks  Mold:    Keep humidity low by using a dehumidifier or air conditioner. Keep the dehumidifier and air conditioner clean and free of mold.    Clean moldy areas with  bleach and water.  In general:    Vacuum once or twice a week. If possible, use a vacuum with a high-efficiency particulate air (HEPA) filter.    Do not smoke. Avoid cigarette smoke. Cigarette smoke is an irritant that can make symptoms worse.  Follow-up care  Follow up as advised by the health care provider or our staff. If you were referred to an allergy specialist, make this appointment promptly.  When to seek medical advice  Call your healthcare provider right away if the following occur:    Coughing or wheezing    Fever greater than 100.4 F (38 C)    Continuing symptoms, new symptoms, or worsening symptoms  Call 911 right away if you have:    Trouble breathing    Hives (raised red bumps)    Severe swelling of the face or severe itching of the eyes or mouth  Date Last Reviewed: 4/26/2015 2000-2017 [employer]. [address] [place] [address], [place] [address]. All rights reserved. This information is not intended as a substitute for professional medical care. Always follow your healthcare professional's instructions.

## 2017-07-23 ASSESSMENT — ENCOUNTER SYMPTOMS
MUSCULOSKELETAL NEGATIVE: 1
EYE REDNESS: 0
FEVER: 0
CARDIOVASCULAR NEGATIVE: 1
RHINORRHEA: 0
CONSTITUTIONAL NEGATIVE: 1
HEMATOLOGIC/LYMPHATIC NEGATIVE: 1
EYE DISCHARGE: 0
RESPIRATORY NEGATIVE: 1
APPETITE CHANGE: 0
GASTROINTESTINAL NEGATIVE: 1
HEADACHES: 0
ACTIVITY CHANGE: 0
NEUROLOGICAL NEGATIVE: 1
EYE ITCHING: 0
EYES NEGATIVE: 1

## 2017-07-24 ENCOUNTER — TELEPHONE (OUTPATIENT)
Dept: SURGERY | Facility: OTHER | Age: 22
End: 2017-07-24

## 2017-07-24 NOTE — ED PROVIDER NOTES
"  History     Chief Complaint   Patient presents with     Hearing Problem     Left ear sounds \"muffled\" since Friday     The history is provided by the patient. No  was used.     Carolyn Mallory is a 21 year old female who Presents with a muffled sounding ear on the left, She has not been swimming, no hot tub use.  She has not had otalgia .  She has not tried anything.  No chronic issues with seasonal allergies. No URI sx. She has noted this for the past few days.   She  normally healthy     I have reviewed the Medications, Allergies, Past Medical and Surgical History, and Social History in the Epic system.    Review of Systems   Constitutional: Negative.  Negative for activity change, appetite change and fever.   HENT: Positive for ear pain (muffled sounding on the left) and hearing loss. Negative for congestion, ear discharge, mouth sores, postnasal drip, rhinorrhea and sneezing.    Eyes: Negative.  Negative for discharge, redness and itching.   Respiratory: Negative.    Cardiovascular: Negative.    Gastrointestinal: Negative.    Genitourinary: Negative.  Negative for decreased urine volume.   Musculoskeletal: Negative.    Skin: Negative.    Neurological: Negative.  Negative for headaches.   Hematological: Negative.        Physical Exam   BP: 133/71  Pulse: 94  Temp: 98  F (36.7  C)  Resp: 12  SpO2: 98 %  Physical Exam   Constitutional: She is oriented to person, place, and time. She appears well-developed and well-nourished. No distress.   HENT:   Head: Normocephalic and atraumatic.   Mouth/Throat: No oropharyngeal exudate.    Some mimal erythema on the left eac and pain with traction on the pinnae and tragus, TM's are flattened and retracted, BLM's are visualized and light reflexes are intact  Nasal mucosa is erythematous and pale and there is mucopurulent drainage present  Posterior pharynx with patchy erythema   Eyes: Conjunctivae and EOM are normal. Pupils are equal, round, and reactive to " light. Right eye exhibits no discharge. Left eye exhibits no discharge.   Neck: Normal range of motion. Neck supple.   Cardiovascular: Normal rate and normal heart sounds.  Exam reveals no gallop and no friction rub.    No murmur heard.  Pulmonary/Chest: Effort normal and breath sounds normal. She has no wheezes. She has no rales.   Abdominal: Soft. Bowel sounds are normal. She exhibits no mass. There is no tenderness. There is no rebound and no guarding.   No CVA tenderness   Musculoskeletal: Normal range of motion.   Neurological: She is alert and oriented to person, place, and time.   Skin: Skin is warm and dry. No rash noted. She is not diaphoretic.   Nursing note and vitals reviewed.      ED Course     ED Course     Procedures                 Labs Ordered and Resulted from Time of ED Arrival Up to the Time of Departure from the ED - No data to display    Assessments & Plan (with Medical Decision Making)     I have reviewed the nursing notes.    Pathophysiology, possible etiology and treatment with potential outcomes, risks, benefits, and alternatives discussed to the best of my ability      She has some minimal erythema which should resolve with the ciprodex, there may be fluid behind the TM's which will take time to resolve.  Decongestants, nasal spray may be helpful but It may take a month or more to clear up.   If pain develops, drainage or hearing worsens or  systemic signs of illness she needs to f/u.    I have reviewed the findings, diagnosis, plan and need for follow up with the patient.      Discharge Medication List as of 7/21/2017 12:28 PM      START taking these medications    Details   fluticasone (FLONASE) 50 MCG/ACT spray Spray 1-2 sprays into both nostrils daily, Disp-1 Bottle, R-11, E-Prescribe      ciprofloxacin-dexamethasone (CIPRODEX) otic suspension Place 4 drops into both ears 2 times daily for 7 days, Disp-7.5 mL, R-0, E-Prescribe             Final diagnoses:   ETD (eustachian tube  dysfunction), bilateral   Acute seasonal allergic rhinitis, unspecified trigger     Pt verbalizes understanding and agreement with plan.  Follow up for worsening symptoms    7/21/2017   [facility]     Mari Bonilla, NP  07/23/17 2736

## 2017-07-24 NOTE — TELEPHONE ENCOUNTER
Patient would like a call back regarding if Dr Smith has spoke with the radiologist yet. Please call patient back at [phone], thanks.

## 2017-07-26 NOTE — TELEPHONE ENCOUNTER
Spoke with patient and told her again that Dr Estrada will not be back in the office until Monday July 31st.     Kena Sahni

## 2017-08-01 ENCOUNTER — TELEPHONE (OUTPATIENT)
Dept: SURGERY | Facility: OTHER | Age: 22
End: 2017-08-01

## 2017-08-01 DIAGNOSIS — R22.32 MASS OF LEFT UPPER EXTREMITY: Primary | ICD-10-CM

## 2017-08-01 NOTE — TELEPHONE ENCOUNTER
Patient called and wants to know if Dr Smith has talked to the radiologist yet. Please call patient back at [phone]. Thanks.

## 2017-08-21 ENCOUNTER — TELEPHONE (OUTPATIENT)
Dept: SURGERY | Facility: OTHER | Age: 22
End: 2017-08-21

## 2017-08-21 DIAGNOSIS — R22.32 MASS OF LEFT UPPER EXTREMITY: Primary | ICD-10-CM

## 2017-08-21 NOTE — TELEPHONE ENCOUNTER
Patient called regarding getting a referral for an Ultrasound in [place]. She stated that her Ultrasound that was scheduled here on 8-10-17 was cancelled due to the radiologist not feeling comfortable giving her her results. Her chart shows that she did not show up for her Ultrasound that day. She said when she called to reschedule this they told her that she could get in sooner in [place] and to get a referral for down there. Please advise on what you would like to do.   Thank you.

## 2017-09-06 ENCOUNTER — TELEPHONE (OUTPATIENT)
Dept: SURGERY | Facility: OTHER | Age: 22
End: 2017-09-06

## 2017-09-06 NOTE — TELEPHONE ENCOUNTER
Patient would like a call back regarding test results. Patient can be reached at [phone], vopcdv.

## 2017-09-07 DIAGNOSIS — I89.0 LYMPHEDEMA OF LEFT UPPER EXTREMITY: Primary | ICD-10-CM

## 2017-09-07 NOTE — TELEPHONE ENCOUNTER
Writer was able to track down report from [facility] on procedure she had done and had surgeon look at report. The report was non diagnostic and patient was instructed to follow up with her pcp or if there was someone she was seeing at [facility].     Kena Sahni

## 2017-09-07 NOTE — TELEPHONE ENCOUNTER
Pt called back looking for test results from her scan at [facility] (pt wasn't sure if this was where it was done for sure). Stated she had test done for LT arm tumor on Wed. Please call her back at [phone]

## 2017-09-21 ENCOUNTER — HOSPITAL ENCOUNTER (OUTPATIENT)
Dept: OCCUPATIONAL THERAPY | Facility: HOSPITAL | Age: 22
Setting detail: THERAPIES SERIES
End: 2017-09-21
Attending: SURGERY
Payer: COMMERCIAL

## 2017-09-21 PROCEDURE — 40000118 ZZH STATISTIC OT DEPT VISIT

## 2017-09-21 PROCEDURE — 97165 OT EVAL LOW COMPLEX 30 MIN: CPT | Mod: GO

## 2017-09-21 NOTE — PROGRESS NOTES
09/21/17 0834   Rehab Discipline   Discipline OT   Type of Visit   Type of visit Initial Edema Evaluation   General Information   Start of care 09/21/17   Referring physician Brandon Smith MD   Orders Evaluate and treat as indicated   Order date 09/07/17   Medical diagnosis L u/e lymphedema   Onset of illness / date of surgery 04/10/16   Edema onset 01/21/17   Affected body parts LUE   Edema etiology (Mass in upper arm)   Pertinent history of current problem (PT: include personal factors and/or comorbidities that impact the POC; OT: include additional occupational profile info) Pt noticed a mass in upper arm after her son was born   Surgical / medical history reviewed Yes   Edema special tests Ultrasound;MRI   Prior level of functional mobility independent   Prior treatment Elevation  (ice, heat, ibuprofen)   Community support Family / friend caregiver   Patient role / employment history Employed  (struggling with job d/t weakness in u/e)   Psychosocial concerns Impaired body image;Isolation;Depression   Living environment [place] [facility]   General observations Pt reports other symptoms such as exhaustion, waking in the night with nausea and vomitting and feels short of breath with shooting pain from her chest to her back.     Fall Screening   Fall screen completed by OT   Per patient, fall 2 or more times in past year? No   Subjective Report   Patient report of symptoms swelling, weak, drops things, unable to wear her engagement ring,    Patient / Family Goals   Patient / family goals statement to be able to wear engagement ring again   Pain   Patient currently in pain Yes   Pain location l u/e    Pain rating 5/10   Pain description (tingling in fingers, shooting from hand to upper arm)   Pain comments Pt reports pain in intermittent, driving, holding her son and working exaccerbate pain   Cognitive Status   Orientation Orientation to person, place and time   Level of consciousness Alert   Edema Exam /  Assessment   Ulceration No   Girth Measurements   Girth Measurements Refer to separate girth measurement flowsheet   Volume UE   Right UE (mL) 2,667.88   Left UE (mL) 2567.39   UE volume comparison RUE volume greater than LUE volume   Range of Motion   ROM No deficits were identified   Strength   Strength Other  (R U/E 5/5 throughout  L U/E 4/5 throughout)   Right hand  (pounds) 65   Left hand  (pounds) 55   Strength comments rihjt handed   Bed Mobility   Bed mobility no deficits identified   Transfers   Transfers no deficits identified   Gait / Locomotion   Gait / Locomotion no deficites identified   Sensory   Sensory perception Light touch;Proprioception   Light touch Intact   Proprioception Intact   Clinical Impression   Criteria for skilled therapeutic intervention met No problems identified which require skilled intervention   Clinical Decision Making (Complexity) Low complexity   Total Evaluation Time   Total evaluation time 36

## 2017-09-25 DIAGNOSIS — I89.0 LYMPHEDEMA OF LEFT UPPER EXTREMITY: Primary | ICD-10-CM

## 2017-09-28 ENCOUNTER — HOSPITAL ENCOUNTER (OUTPATIENT)
Dept: CT IMAGING | Facility: HOSPITAL | Age: 22
Discharge: HOME | End: 2017-09-28
Attending: SURGERY | Admitting: SURGERY
Payer: COMMERCIAL

## 2017-09-28 PROCEDURE — 71260 CT THORAX DX C+: CPT | Mod: TC

## 2017-09-28 RX ORDER — IOPAMIDOL 612 MG/ML
100 INJECTION, SOLUTION INTRAVASCULAR ONCE
Status: COMPLETED | OUTPATIENT
Start: 2017-09-28 | End: 2017-09-28

## 2017-09-28 RX ADMIN — IOPAMIDOL 100 ML: 612 INJECTION, SOLUTION INTRAVASCULAR at 13:55

## 2017-09-29 ENCOUNTER — TELEPHONE (OUTPATIENT)
Dept: SURGERY | Facility: OTHER | Age: 22
End: 2017-09-29

## 2017-09-29 NOTE — TELEPHONE ENCOUNTER
Patient called inquiring about if her CT results were back yet. Please call patient back at [phone].

## 2017-09-29 NOTE — TELEPHONE ENCOUNTER
Spoke with patient and let her know that we didn't receive her ct results and that we would call her as soon as we had them.    Kena Sahni

## 2017-10-02 ENCOUNTER — TELEPHONE (OUTPATIENT)
Dept: SURGERY | Facility: OTHER | Age: 22
End: 2017-10-02

## 2017-10-02 NOTE — TELEPHONE ENCOUNTER
[facility] CT results. Please call patient back at [phone].

## 2017-10-09 ENCOUNTER — OFFICE VISIT (OUTPATIENT)
Dept: FAMILY MEDICINE | Facility: OTHER | Age: 22
End: 2017-10-09
Attending: NURSE PRACTITIONER
Payer: COMMERCIAL

## 2017-10-09 VITALS
OXYGEN SATURATION: 99 % | WEIGHT: 195 LBS | TEMPERATURE: 99.2 F | HEIGHT: 62 IN | HEART RATE: 96 BPM | SYSTOLIC BLOOD PRESSURE: 118 MMHG | BODY MASS INDEX: 35.88 KG/M2 | RESPIRATION RATE: 18 BRPM | DIASTOLIC BLOOD PRESSURE: 56 MMHG

## 2017-10-09 DIAGNOSIS — Z71.6 ENCOUNTER FOR SMOKING CESSATION COUNSELING: Primary | ICD-10-CM

## 2017-10-09 DIAGNOSIS — R53.83 FATIGUE, UNSPECIFIED TYPE: ICD-10-CM

## 2017-10-09 DIAGNOSIS — K21.9 GASTROESOPHAGEAL REFLUX DISEASE WITHOUT ESOPHAGITIS: ICD-10-CM

## 2017-10-09 LAB
ALBUMIN UR-MCNC: NEGATIVE MG/DL
ANION GAP SERPL CALCULATED.3IONS-SCNC: 6 MMOL/L (ref 3–14)
APPEARANCE UR: CLEAR
BACTERIA #/AREA URNS HPF: ABNORMAL /HPF
BASOPHILS # BLD AUTO: 0 10E9/L (ref 0–0.2)
BASOPHILS NFR BLD AUTO: 0.3 %
BILIRUB UR QL STRIP: NEGATIVE
BUN SERPL-MCNC: 11 MG/DL (ref 7–30)
CALCIUM SERPL-MCNC: 8.9 MG/DL (ref 8.5–10.1)
CHLORIDE SERPL-SCNC: 110 MMOL/L (ref 94–109)
CO2 SERPL-SCNC: 25 MMOL/L (ref 20–32)
COLOR UR AUTO: ABNORMAL
CREAT SERPL-MCNC: 0.87 MG/DL (ref 0.52–1.04)
DIFFERENTIAL METHOD BLD: NORMAL
EOSINOPHIL # BLD AUTO: 0.2 10E9/L (ref 0–0.7)
EOSINOPHIL NFR BLD AUTO: 2.4 %
ERYTHROCYTE [DISTWIDTH] IN BLOOD BY AUTOMATED COUNT: 12.8 % (ref 10–15)
GFR SERPL CREATININE-BSD FRML MDRD: 82 ML/MIN/1.7M2
GLUCOSE SERPL-MCNC: 95 MG/DL (ref 70–99)
GLUCOSE UR STRIP-MCNC: NEGATIVE MG/DL
HCT VFR BLD AUTO: 38.9 % (ref 35–47)
HGB BLD-MCNC: 13.3 G/DL (ref 11.7–15.7)
HGB UR QL STRIP: NEGATIVE
IMM GRANULOCYTES # BLD: 0 10E9/L (ref 0–0.4)
IMM GRANULOCYTES NFR BLD: 0.2 %
KETONES UR STRIP-MCNC: NEGATIVE MG/DL
LEUKOCYTE ESTERASE UR QL STRIP: ABNORMAL
LYMPHOCYTES # BLD AUTO: 3.6 10E9/L (ref 0.8–5.3)
LYMPHOCYTES NFR BLD AUTO: 38.2 %
MCH RBC QN AUTO: 31.1 PG (ref 26.5–33)
MCHC RBC AUTO-ENTMCNC: 34.2 G/DL (ref 31.5–36.5)
MCV RBC AUTO: 91 FL (ref 78–100)
MONOCYTES # BLD AUTO: 0.6 10E9/L (ref 0–1.3)
MONOCYTES NFR BLD AUTO: 6.9 %
MUCOUS THREADS #/AREA URNS LPF: PRESENT /LPF
NEUTROPHILS # BLD AUTO: 4.8 10E9/L (ref 1.6–8.3)
NEUTROPHILS NFR BLD AUTO: 52 %
NITRATE UR QL: NEGATIVE
NRBC # BLD AUTO: 0 10*3/UL
NRBC BLD AUTO-RTO: 0 /100
PH UR STRIP: 6 PH (ref 4.7–8)
PLATELET # BLD AUTO: 311 10E9/L (ref 150–450)
POTASSIUM SERPL-SCNC: 3.9 MMOL/L (ref 3.4–5.3)
RBC # BLD AUTO: 4.28 10E12/L (ref 3.8–5.2)
RBC #/AREA URNS AUTO: 1 /HPF (ref 0–2)
SODIUM SERPL-SCNC: 141 MMOL/L (ref 133–144)
SOURCE: ABNORMAL
SP GR UR STRIP: 1.01 (ref 1–1.03)
SQUAMOUS #/AREA URNS AUTO: 4 /HPF (ref 0–1)
TSH SERPL DL<=0.005 MIU/L-ACNC: 1.85 MU/L (ref 0.4–4)
UROBILINOGEN UR STRIP-MCNC: NORMAL MG/DL (ref 0–2)
WBC # BLD AUTO: 9.3 10E9/L (ref 4–11)
WBC #/AREA URNS AUTO: 4 /HPF (ref 0–2)

## 2017-10-09 PROCEDURE — 84443 ASSAY THYROID STIM HORMONE: CPT | Mod: ZL | Performed by: NURSE PRACTITIONER

## 2017-10-09 PROCEDURE — 81001 URINALYSIS AUTO W/SCOPE: CPT | Mod: ZL | Performed by: NURSE PRACTITIONER

## 2017-10-09 PROCEDURE — 99213 OFFICE O/P EST LOW 20 MIN: CPT

## 2017-10-09 PROCEDURE — 85025 COMPLETE CBC W/AUTO DIFF WBC: CPT | Mod: ZL | Performed by: NURSE PRACTITIONER

## 2017-10-09 PROCEDURE — 80048 BASIC METABOLIC PNL TOTAL CA: CPT | Mod: ZL | Performed by: NURSE PRACTITIONER

## 2017-10-09 PROCEDURE — 36415 COLL VENOUS BLD VENIPUNCTURE: CPT | Mod: ZL | Performed by: NURSE PRACTITIONER

## 2017-10-09 PROCEDURE — 82306 VITAMIN D 25 HYDROXY: CPT | Mod: ZL | Performed by: NURSE PRACTITIONER

## 2017-10-09 PROCEDURE — 99214 OFFICE O/P EST MOD 30 MIN: CPT | Performed by: NURSE PRACTITIONER

## 2017-10-09 PROCEDURE — 99000 SPECIMEN HANDLING OFFICE-LAB: CPT | Performed by: NURSE PRACTITIONER

## 2017-10-09 RX ORDER — FAMOTIDINE 40 MG/1
40 TABLET, FILM COATED ORAL DAILY
Qty: 90 TABLET | Refills: 3 | Status: SHIPPED | OUTPATIENT
Start: 2017-10-09 | End: 2019-04-17

## 2017-10-09 ASSESSMENT — ANXIETY QUESTIONNAIRES
4. TROUBLE RELAXING: NEARLY EVERY DAY
1. FEELING NERVOUS, ANXIOUS, OR ON EDGE: MORE THAN HALF THE DAYS
3. WORRYING TOO MUCH ABOUT DIFFERENT THINGS: NEARLY EVERY DAY
GAD7 TOTAL SCORE: 17
7. FEELING AFRAID AS IF SOMETHING AWFUL MIGHT HAPPEN: MORE THAN HALF THE DAYS
IF YOU CHECKED OFF ANY PROBLEMS ON THIS QUESTIONNAIRE, HOW DIFFICULT HAVE THESE PROBLEMS MADE IT FOR YOU TO DO YOUR WORK, TAKE CARE OF THINGS AT HOME, OR GET ALONG WITH OTHER PEOPLE: SOMEWHAT DIFFICULT
5. BEING SO RESTLESS THAT IT IS HARD TO SIT STILL: MORE THAN HALF THE DAYS
2. NOT BEING ABLE TO STOP OR CONTROL WORRYING: MORE THAN HALF THE DAYS
6. BECOMING EASILY ANNOYED OR IRRITABLE: NEARLY EVERY DAY

## 2017-10-09 ASSESSMENT — PATIENT HEALTH QUESTIONNAIRE - PHQ9: SUM OF ALL RESPONSES TO PHQ QUESTIONS 1-9: 15

## 2017-10-09 ASSESSMENT — PAIN SCALES - GENERAL: PAINLEVEL: MILD PAIN (2)

## 2017-10-09 NOTE — PATIENT INSTRUCTIONS
"  GERD (Adult)    The esophagus is a tube that carries food from the mouth to the stomach. A valve at the lower end of the esophagus prevents stomach acid from flowing upward. When this valve doesn't work properly, stomach contents may repeatedly flow back up (reflux) into the esophagus. This is called gastroesophageal reflux disease (GERD). GERD can irritate the esophagus. It can cause problems with swallowing or breathing. In severe cases, GERD can cause recurrent pneumonia or other serious problems.  Symptoms of reflux include burning, pressure or sharp pain in the upper abdomen or mid to lower chest. The pain can spread to the neck, back, or shoulder. There may be belching, an acid taste in the back of the throat, chronic cough, or sore throat or hoarseness. GERD symptoms often occur during the day after a big meal. They can also occur at night when lying down.   Home care  Lifestyle changes can help reduce symptoms. If needed, medicines may be prescribed. Symptoms often improve with treatment, but if treatment is stopped, the symptoms often return after a few months. So most persons with GERD will need to continue treatment.  Lifestyle changes    Limit or avoid fatty, fried, and spicy foods, as well as coffee, chocolate, mint, and foods with high acid content such as tomatoes and citrus fruit and juices (orange, grapefruit, lemon).    Don t eat large meals, especially at night. Frequent, smaller meals are best. Do not lie down right after eating. And don t eat anything 3 hours before going to bed.    Avoid drinking alcohol and smoking. As much as possible, stay away from second hand smoke.    If you are overweight, losing weight will reduce symptoms.     Avoid wearing tight clothing around your stomach area.    If your symptoms occur during sleep, use a foam wedge to elevate your upper body (not just your head.) Or, place 4\" blocks under the head of your bed.  Medicines  If needed, medicines can help relieve " the symptoms of GERD and prevent damage to the esophagus. Discuss a medicine plan with your healthcare provider. This may include one or more of the following medicines:    Antacids to help neutralize the normal acids in your stomach.    Acid blockers (H2 blockers) to decrease acid production.    Acid inhibitors (PPIs) to decrease acid production in a different way than the blockers. They may work better, but can take a little longer to take effect.  Take an antacid 30-60 minutes after eating and at bedtime, but not at the same time as an acid blocker.  Try not to take medicines such as ibuprofen and aspirin. If you are taking aspirin for your heart or other medical reasons, talk to your healthcare provider about stopping it.  Follow-up care  Follow up with your healthcare provider or as advised by our staff.  When to seek medical advice  Call your healthcare provider if any of the following occur:    Stomach pain gets worse or moves to the lower right abdomen (appendix area)    Chest pain appears or gets worse, or spreads to the back, neck, shoulder, or arm    Frequent vomiting (can t keep down liquids)    Blood in the stool or vomit (red or black in color)    Feeling weak or dizzy    Fever of 100.4 F (38 C) or higher, or as directed by your healthcare provider  Date Last Reviewed: 6/23/2015 2000-2017 [employer]. [address] [place] [address], [place] [address]. All rights reserved. This information is not intended as a substitute for professional medical care. Always follow your healthcare professional's instructions.      Managing Fatigue     Family members can help with meals and chores around the house.   Fatigue is common. It can be caused by worry, lack of sleep, or poor appetite. Fatigue can also be a sign of anemia, a shortage of red blood cells. You might need medical treatment for anemia. The tips below can help you feel better.  Conserving energy    Keep track of the times of day when you are  most tired and plan around them. For instance, if you are more tired in the afternoon, try to get tasks done in the morning.    Decide which tasks are most important. Do those first.    Pass tasks along to others when you need to. Ask for help.    Accept help when it s offered. Tell people what they can do to help. For instance, you may need someone to fix a meal, fold clothes, or put gas in your car.    Plan rest times. You may want to take a nap each day. Just sitting quietly for a few minutes can make you feel more rested.  What you can do to feel better    Relax before you try to sleep. Take a bath or read for a while.    Form a sleep pattern. Go to bed at the same time each night and get up at the same time each morning.    Eat well. Choose foods from all of the food groups each day.    Exercise. Take a brisk walk to help increase your energy.    Avoid caffeine and alcohol. Drink plenty of water or fruit juices instead.  Treating anemia  If you begin to feel more tired than normal, tell your doctor. Fatigue could be a sign of anemia. This problem is fairly common in cancer patients, especially during chemotherapy and radiation treatments. If your red blood cell count is too low, you may get a blood transfusion. In some cases, you may need medicine to increase the number of red blood cells your body makes.  When to call your healthcare provider  Call your healthcare provider if you have:    Shortness of breath or chest pain    A dizzy feeling when you get up from lying or sitting down    Paler skin than normal    Extreme tiredness that is not helped by sleep   Date Last Reviewed: 1/3/2016    6077-8456 [employer]. [address] [place] [address], [place] [address]. All rights reserved. This information is not intended as a substitute for professional medical care. Always follow your healthcare professional's instructions.          HOW TO QUIT SMOKING  Smoking is one of the hardest habits to break. About  half of all those who have ever smoked have been able to quit, and most of those (about 70%) who still smoke want to quit. Here are some of the best ways to stop smoking.     KEEP TRYING:  It takes most smokers about 8 tries before they are finally able to fully quit. So, the more often you try and fail, the better your chance of quitting the next time! So, don't give up!    GO COLD TURKEY:  Most ex-smokers quit cold turkey. Trying to cut back gradually doesn't seem to work as well, perhaps because it continues the smoking habit. Also, it is possible to fool yourself by inhaling more while smoking fewer cigarettes. This results in the same amount of nicotine in your body!    GET SUPPORT:  Support programs can make an important difference, especially for the heavy smoker. These groups offer lectures, methods to change your behavior and peer support. Call the free national Quitline for more information. 800-QUIT-NOW (069-216-8798). Low-cost or free programs are offered by many hospitals, local chapters of the American Lung Association (899-710-4137) and the American Cancer Society (067-652-0070). Support at home is important too. Non-smokers can help by offering praise and encouragement. If the smoker fails to quit, encourage them to try again!    OVER-THE-COUNTER MEDICINES:  For those who can't quit on their own, Nicotine Replacement Therapy (NRT) may make quitting much easier. Certain aids such as the nicotine patch, gum and lozenge are available without a prescription. However, it is best to use these under the guidance of your doctor. The skin patch provides a steady supply of nicotine to the body. Nicotine gum and lozenge gives temporary bursts of low levels of nicotine. Both methods take the edge off the craving for cigarettes. WARNING: If you feel symptoms of nicotine overdose, such as nausea, vomiting, dizziness, weakness, or fast heartbeat, stop using these and see your doctor.    PRESCRIPTION  MEDICINES:  After evaluating your smoking patterns and prior attempts at quitting, your doctor may offer a prescription medicine such as bupropion (Zyban, Wellbutrin), varenicline (Chantix, Champix), a niocotine inhaler or nasal spray. Each has its unique advantage and side effects which your doctor can review with you.    HEALTH BENEFITS OF QUITTING:  The benefits of quitting start right away and keep improving the longer you go without smokin minutes: blood pressure and pulse return to normal  8 hours: oxygen levels return to normal  2 days: ability to smell and taste begins to improve as damaged nerves start to regrow  2-3 weeks: circulation and lung function improves  1-9 months: decreased cough, congestion and shortness of breath; less tired  1 year: risk of heart attack decreases by half  5 years: risk of lung cancer decreases by half; risk of stroke becomes the same as a non-smoker  For information about how to quit smoking, visit the following links:  National [employer] [place] ,   Clearing the Air, Quit Smoking Today   - an online booklet. http://www.smokefree.gov/pubs/clearing_the_air.pdf  Smokefree.gov http://smokefree.gov/  QuitNet http://www.quitnet.com/    9379-5152 [facility], [address] [place] [address], [place] [address]. All rights reserved. This information is not intended as a substitute for professional medical care. Always follow your healthcare professional's instructions.    The Benefits of Living Smoke Free  What do you want to gain from quitting? Check off some reasons to quit.  Health Benefits  ___ Reduce my risk of lung cancer, heart disease, chronic lung disease  ___ Have fewer wrinkles and softer skin  ___ Improve my sense of taste and smell  ___ For pregnant women reduce the risk of having a miscarriage, stillbirth, premature birth, or low-birth-weight baby  Personal Benefits  ___ Feel more in control of my life  ___ Have better-smelling hair, breath, clothes, home, and car  ___  Save time by not having to take smoke breaks, buy cigarettes, or hunt for a light  ___ Have whiter teeth  Family Benefits  ___ Reduce my children s respiratory tract infections  ___ Set a good example for my children  ___ Reduce my family s cancer risk  Financial Benefits  ___ Save hundreds of dollars each year that would be spent on cigarettes  ___ Save money on medical bills  ___ Save on life, health, and car insurance premiums    Those Dollars Add Up!  Cigarettes are expensive, and getting more expensive all the time. Do you realize how much money you are spending on cigarettes per year? What is the average amount you spend on a pack of cigarettes? What is the average number of packs that you smoke per day? Using your answers to these questions, fill in this formula to help you find out:  ($ _____ per pack) ×  ( _____ number of packs per day) × (365 days) =  $ _____ yearly cost of smoking  Besides tobacco, there are other costs, including extra cleaning bills and replacement costs for clothing and furniture; medical expenses for smoking-related illnesses; and higher health, life, and car insurance premiums.    Cigars and Pipes Count Too!  Cigars and pipes are also dangerous. So are smokeless (chewing) tobacco and snuff. All of these products contain nicotine, a highly addictive substance that has harmful effects on your body. Quitting smoking means giving up all tobacco products.      9690-9884 [facility], [address] [place] [address], [place] [address]. All rights reserved. This information is not intended as a substitute for professional medical care. Always follow your healthcare professional's instructions.

## 2017-10-09 NOTE — NURSING NOTE
"Chief Complaint   Patient presents with     Establish Care       Initial /56 (BP Location: Right arm, Patient Position: Sitting, Cuff Size: Adult Large)  Pulse 96  Temp 99.2  F (37.3  C) (Tympanic)  Resp 18  Ht 5' 2\" (1.575 m)  Wt 195 lb (88.5 kg)  SpO2 99%  BMI 35.67 kg/m2 Estimated body mass index is 35.67 kg/(m^2) as calculated from the following:    Height as of this encounter: 5' 2\" (1.575 m).    Weight as of this encounter: 195 lb (88.5 kg).  Medication Reconciliation: complete   Carly Perez      "

## 2017-10-09 NOTE — MR AVS SNAPSHOT
After Visit Summary   10/9/2017    Carolyn Mallory    MRN: [id number]           Patient Information     Date Of Birth          1995        Visit Information        Provider Department      10/9/2017 1:30 PM Susan Clifford APRN [facility]        Today's Diagnoses     Encounter for smoking cessation counseling    -  1    Gastroesophageal reflux disease without esophagitis        Fatigue, unspecified type          Care Instructions      GERD (Adult)    The esophagus is a tube that carries food from the mouth to the stomach. A valve at the lower end of the esophagus prevents stomach acid from flowing upward. When this valve doesn't work properly, stomach contents may repeatedly flow back up (reflux) into the esophagus. This is called gastroesophageal reflux disease (GERD). GERD can irritate the esophagus. It can cause problems with swallowing or breathing. In severe cases, GERD can cause recurrent pneumonia or other serious problems.  Symptoms of reflux include burning, pressure or sharp pain in the upper abdomen or mid to lower chest. The pain can spread to the neck, back, or shoulder. There may be belching, an acid taste in the back of the throat, chronic cough, or sore throat or hoarseness. GERD symptoms often occur during the day after a big meal. They can also occur at night when lying down.   Home care  Lifestyle changes can help reduce symptoms. If needed, medicines may be prescribed. Symptoms often improve with treatment, but if treatment is stopped, the symptoms often return after a few months. So most persons with GERD will need to continue treatment.  Lifestyle changes    Limit or avoid fatty, fried, and spicy foods, as well as coffee, chocolate, mint, and foods with high acid content such as tomatoes and citrus fruit and juices (orange, grapefruit, lemon).    Don t eat large meals, especially at night. Frequent, smaller meals are best. Do not lie down right after  "eating. And don t eat anything 3 hours before going to bed.    Avoid drinking alcohol and smoking. As much as possible, stay away from second hand smoke.    If you are overweight, losing weight will reduce symptoms.     Avoid wearing tight clothing around your stomach area.    If your symptoms occur during sleep, use a foam wedge to elevate your upper body (not just your head.) Or, place 4\" blocks under the head of your bed.  Medicines  If needed, medicines can help relieve the symptoms of GERD and prevent damage to the esophagus. Discuss a medicine plan with your healthcare provider. This may include one or more of the following medicines:    Antacids to help neutralize the normal acids in your stomach.    Acid blockers (H2 blockers) to decrease acid production.    Acid inhibitors (PPIs) to decrease acid production in a different way than the blockers. They may work better, but can take a little longer to take effect.  Take an antacid 30-60 minutes after eating and at bedtime, but not at the same time as an acid blocker.  Try not to take medicines such as ibuprofen and aspirin. If you are taking aspirin for your heart or other medical reasons, talk to your healthcare provider about stopping it.  Follow-up care  Follow up with your healthcare provider or as advised by our staff.  When to seek medical advice  Call your healthcare provider if any of the following occur:    Stomach pain gets worse or moves to the lower right abdomen (appendix area)    Chest pain appears or gets worse, or spreads to the back, neck, shoulder, or arm    Frequent vomiting (can t keep down liquids)    Blood in the stool or vomit (red or black in color)    Feeling weak or dizzy    Fever of 100.4 F (38 C) or higher, or as directed by your healthcare provider  Date Last Reviewed: 6/23/2015 2000-2017 [employer]. [address] [place] [address], [place] [address]. All rights reserved. This information is not intended as a substitute for " professional medical care. Always follow your healthcare professional's instructions.      Managing Fatigue     Family members can help with meals and chores around the house.   Fatigue is common. It can be caused by worry, lack of sleep, or poor appetite. Fatigue can also be a sign of anemia, a shortage of red blood cells. You might need medical treatment for anemia. The tips below can help you feel better.  Conserving energy    Keep track of the times of day when you are most tired and plan around them. For instance, if you are more tired in the afternoon, try to get tasks done in the morning.    Decide which tasks are most important. Do those first.    Pass tasks along to others when you need to. Ask for help.    Accept help when it s offered. Tell people what they can do to help. For instance, you may need someone to fix a meal, fold clothes, or put gas in your car.    Plan rest times. You may want to take a nap each day. Just sitting quietly for a few minutes can make you feel more rested.  What you can do to feel better    Relax before you try to sleep. Take a bath or read for a while.    Form a sleep pattern. Go to bed at the same time each night and get up at the same time each morning.    Eat well. Choose foods from all of the food groups each day.    Exercise. Take a brisk walk to help increase your energy.    Avoid caffeine and alcohol. Drink plenty of water or fruit juices instead.  Treating anemia  If you begin to feel more tired than normal, tell your doctor. Fatigue could be a sign of anemia. This problem is fairly common in cancer patients, especially during chemotherapy and radiation treatments. If your red blood cell count is too low, you may get a blood transfusion. In some cases, you may need medicine to increase the number of red blood cells your body makes.  When to call your healthcare provider  Call your healthcare provider if you have:    Shortness of breath or chest pain    A dizzy feeling  when you get up from lying or sitting down    Paler skin than normal    Extreme tiredness that is not helped by sleep   Date Last Reviewed: 1/3/2016    4203-6393 [employer], [employer]. [address] [place] [address], [place] [address]. All rights reserved. This information is not intended as a substitute for professional medical care. Always follow your healthcare professional's instructions.          HOW TO QUIT SMOKING  Smoking is one of the hardest habits to break. About half of all those who have ever smoked have been able to quit, and most of those (about 70%) who still smoke want to quit. Here are some of the best ways to stop smoking.     KEEP TRYING:  It takes most smokers about 8 tries before they are finally able to fully quit. So, the more often you try and fail, the better your chance of quitting the next time! So, don't give up!    GO COLD TURKEY:  Most ex-smokers quit cold turkey. Trying to cut back gradually doesn't seem to work as well, perhaps because it continues the smoking habit. Also, it is possible to fool yourself by inhaling more while smoking fewer cigarettes. This results in the same amount of nicotine in your body!    GET SUPPORT:  Support programs can make an important difference, especially for the heavy smoker. These groups offer lectures, methods to change your behavior and peer support. Call the free national Quitline for more information. 800-QUIT-NOW (441-891-3540). Low-cost or free programs are offered by many hospitals, local chapters of the American Lung Association (976-998-1704) and the American Cancer Society (428-619-8859). Support at home is important too. Non-smokers can help by offering praise and encouragement. If the smoker fails to quit, encourage them to try again!    OVER-THE-COUNTER MEDICINES:  For those who can't quit on their own, Nicotine Replacement Therapy (NRT) may make quitting much easier. Certain aids such as the nicotine patch, gum and lozenge are available  without a prescription. However, it is best to use these under the guidance of your doctor. The skin patch provides a steady supply of nicotine to the body. Nicotine gum and lozenge gives temporary bursts of low levels of nicotine. Both methods take the edge off the craving for cigarettes. WARNING: If you feel symptoms of nicotine overdose, such as nausea, vomiting, dizziness, weakness, or fast heartbeat, stop using these and see your doctor.    PRESCRIPTION MEDICINES:  After evaluating your smoking patterns and prior attempts at quitting, your doctor may offer a prescription medicine such as bupropion (Zyban, Wellbutrin), varenicline (Chantix, Champix), a niocotine inhaler or nasal spray. Each has its unique advantage and side effects which your doctor can review with you.    HEALTH BENEFITS OF QUITTING:  The benefits of quitting start right away and keep improving the longer you go without smokin minutes: blood pressure and pulse return to normal  8 hours: oxygen levels return to normal  2 days: ability to smell and taste begins to improve as damaged nerves start to regrow  2-3 weeks: circulation and lung function improves  1-9 months: decreased cough, congestion and shortness of breath; less tired  1 year: risk of heart attack decreases by half  5 years: risk of lung cancer decreases by half; risk of stroke becomes the same as a non-smoker  For information about how to quit smoking, visit the following links:  [employer] [place] ,   Clearing the Air, Quit Smoking Today   - an online booklet. http://www.smokefree.gov/pubs/clearing_the_air.pdf  Smokefree.gov http://smokefree.gov/  QuitNet http://www.quitnet.com/    5170-9569 [employer], [address] [place] [address], [place] [address]. All rights reserved. This information is not intended as a substitute for professional medical care. Always follow your healthcare professional's instructions.    The Benefits of Living Smoke Free  What do you want to gain  from quitting? Check off some reasons to quit.  Health Benefits  ___ Reduce my risk of lung cancer, heart disease, chronic lung disease  ___ Have fewer wrinkles and softer skin  ___ Improve my sense of taste and smell  ___ For pregnant women--reduce the risk of having a miscarriage, stillbirth, premature birth, or low-birth-weight baby  Personal Benefits  ___ Feel more in control of my life  ___ Have better-smelling hair, breath, clothes, home, and car  ___ Save time by not having to take smoke breaks, buy cigarettes, or hunt for a light  ___ Have whiter teeth  Family Benefits  ___ Reduce my children s respiratory tract infections  ___ Set a good example for my children  ___ Reduce my family s cancer risk  Financial Benefits  ___ Save hundreds of dollars each year that would be spent on cigarettes  ___ Save money on medical bills  ___ Save on life, health, and car insurance premiums    Those Dollars Add Up!  Cigarettes are expensive, and getting more expensive all the time. Do you realize how much money you are spending on cigarettes per year? What is the average amount you spend on a pack of cigarettes? What is the average number of packs that you smoke per day? Using your answers to these questions, fill in this formula to help you find out:  ($ _____ per pack) ×  ( _____ number of packs per day) × (365 days) =  $ _____ yearly cost of smoking  Besides tobacco, there are other costs, including extra cleaning bills and replacement costs for clothing and furniture; medical expenses for smoking-related illnesses; and higher health, life, and car insurance premiums.    Cigars and Pipes Count Too!  Cigars and pipes are also dangerous. So are smokeless (chewing) tobacco and snuff. All of these products contain nicotine, a highly addictive substance that has harmful effects on your body. Quitting smoking means giving up all tobacco products.      0248-8393 [employer], [address] [place] [address], [place] [address]. All  "rights reserved. This information is not intended as a substitute for professional medical care. Always follow your healthcare professional's instructions.          Follow-ups after your visit        Follow-up notes from your care team     Return if symptoms worsen or fail to improve.      Who to contact     If you have questions or need follow up information about today's clinic visit or your schedule please contact [facility] [place] directly at [phone].  Normal or non-critical lab and imaging results will be communicated to you by [employer], letter or phone within 4 business days after the clinic has received the results. If you do not hear from us within 7 days, please contact the clinic through [employer] or phone. If you have a critical or abnormal lab result, we will notify you by phone as soon as possible.  Submit refill requests through [employer] or call your pharmacy and they will forward the refill request to us. Please allow 3 business days for your refill to be completed.          Additional Information About Your Visit        MyChart Information     [employer] gives you secure access to your electronic health record. If you see a primary care provider, you can also send messages to your care team and make appointments. If you have questions, please call your primary care clinic.  If you do not have a primary care provider, please call [phone] and they will assist you.        Care EveryWhere ID     This is your Care EveryWhere ID. This could be used by other organizations to access your [facility] medical records  [id number]        Your Vitals Were     Pulse Temperature Respirations Height Pulse Oximetry BMI (Body Mass Index)    96 99.2  F (37.3  C) (Tympanic) 18 5' 2\" (1.575 m) 99% 35.67 kg/m2       Blood Pressure from Last 3 Encounters:   10/09/17 118/56   07/21/17 133/71   07/13/17 116/62    Weight from Last 3 Encounters:   10/09/17 195 lb (88.5 kg)   07/13/17 190 lb (86.2 kg)   06/09/17 180 lb " (81.6 kg)              We Performed the Following     Basic metabolic panel  (Ca, Cl, CO2, Creat, Gluc, K, Na, BUN)     CBC with platelets and differential     Tobacco Cessation - for Health Maintenance     TSH with free T4 reflex     UA with Microscopic reflex to Culture - [place]     Vitamin D Deficiency          Today's Medication Changes          These changes are accurate as of: 10/9/17  2:56 PM.  If you have any questions, ask your nurse or doctor.               These medicines have changed or have updated prescriptions.        Dose/Directions    famotidine 40 MG tablet   Commonly known as:  PEPCID   This may have changed:    - when to take this  - reasons to take this   Used for:  Gastroesophageal reflux disease without esophagitis   Changed by:  Susan Clifford APRN CNP        Dose:  40 mg   Take 1 tablet (40 mg) by mouth daily   Quantity:  90 tablet   Refills:  3            Where to get your medicines      These medications [id number] [place] [facility] [address] AT [facility] [address], [place] [address]     Phone:  [phone]     famotidine 40 MG tablet                Primary Care Provider    Physician No Ref-Primary       NO REF-PRIMARY PHYSICIAN        Equal Access to Services     CARLOS CHEN AH: Hadii stella meyers hadasho Soelisabeth, waaxda luqadaha, qaybta kaalmada adeegyada, vianey sorenson . So [facility] [phone].    ATENCIÓN: Si habla español, tiene a solis disposición servicios gratuitos de asistencia lingüística. [facility] [phone].    We comply with applicable federal civil rights laws and [place] laws. We do not discriminate on the basis of race, color, national origin, age, disability, sex, sexual orientation, or gender identity.            Thank you!     Thank you for choosing [facility]  for your care. Our goal is always to provide you with excellent care. Hearing back from our patients is one way  we can continue to improve our services. Please take a few minutes to complete the written survey that you may receive in the mail after your visit with us. Thank you!             Your Updated Medication List - Protect others around you: Learn how to safely use, store and throw away your medicines at www.disposemymeds.org.          This list is accurate as of: 10/9/17  2:56 PM.  Always use your most recent med list.                   Brand Name Dispense Instructions for use Diagnosis    diphenhydrAMINE 25 MG tablet    BENADRYL     Take 25 mg by mouth        EPINEPHrine 0.3 MG/0.3ML injection    EPIPEN    4 each    Inject 0.3 mLs (0.3 mg) into the muscle once as needed for anaphylaxis    Allergic rhinitis due to pollen       etonogestrel 68 MG Impl    IMPLANON/NEXPLANON     1 each (68 mg) by Subdermal route continuous    Nexplanon insertion       famotidine 40 MG tablet    PEPCID    90 tablet    Take 1 tablet (40 mg) by mouth daily    Gastroesophageal reflux disease without esophagitis       fluticasone 50 MCG/ACT spray    FLONASE    1 Bottle    Spray 1-2 sprays into both nostrils daily        ibuprofen 400 MG tablet    ADVIL/MOTRIN    60 tablet    Take 2 tablets (800 mg) by mouth every 6 hours as needed for other (cramping)     (spontaneous vaginal delivery)       loratadine 10 MG tablet    CLARITIN     Take 10 mg by mouth daily

## 2017-10-09 NOTE — PROGRESS NOTES
SUBJECTIVE:   Carolyn Mallory is a 22 year old female who presents to clinic today for the following health issues:      New Patient/Transfer of Care    Abdominal Pain      Duration: 2 months    Description (location/character/radiation): stayed at belly button and moved downward       Associated flank pain: on and off, heating pad helps    Intensity:  moderate    Accompanying signs and symptoms:        Fever/Chills: no        Gas/Bloating: no        Nausea/vomitting: YES       Diarrhea: YES       Dysuria or Hematuria: no     History (previous similar pain/trauma/previous testing): none    Precipitating or alleviating factors:       Pain worse with eating/BM/urination: not worse       Pain relieved by BM: no     Therapies tried and outcome: None    LMP:  Does not get has implant    Did see GI and had EGD done    Derm-left arm      Duration: one year    Description (location/character/radiation): left upper arm 2 inch swelling under skin    Intensity:  moderate    Accompanying signs and symptoms: painful    History (similar episodes/previous evaluation): yes evaluated by Dr. Smith.  Tingling down whole arm and some numbness.    Precipitating or alleviating factors: None    Therapies tried and outcome: MRI done previously.  UA of artery.  No results given. Trying some stretching and exercising.        Fatigue  For a couple months feels like she cannot get enough sleep. To bed around 9 PM and wakes around 8 AM. Usually sleeps through the night. Takes about a 3 mile walk every evening. Feels like she is stay hydrated. Small meal once a day. Drinks maybe one coffee or pop a day.     Problem list and histories reviewed & adjusted, as indicated.  Additional history: as documented    Patient Active Problem List   Diagnosis     Allergic rhinitis     Food allergy     Patellofemoral syndrome of both knees     Sacro ilial pain     Calculus of kidney     ACP (advance care planning)     Chronic right-sided thoracic back pain      Obesity, unspecified obesity severity, unspecified obesity type     Tobacco use disorder     Esophageal reflux     Past Surgical History:   Procedure Laterality Date     ARTHROSCOPY KNEE Right 5/4/2015    Procedure: ARTHROSCOPY KNEE;  Surgeon: Hernando Kulkarni MD;  Location: [facility]     AS ESOPHAGOSCOPY, DIAGNOSTIC      upper     LAPAROSCOPIC CHOLECYSTECTOMY N/A 10/10/2015    Procedure: LAPAROSCOPIC CHOLECYSTECTOMY;  Surgeon: Drew Padgett MD;  Location: [facility]     none         Social History   Substance Use Topics     Smoking status: Current Every Day Smoker     Packs/day: 0.03     Types: Cigarettes     Start date: 1/20/2014     Smokeless tobacco: Never Used     Alcohol use No     Family History   Problem Relation Age of Onset     Thrombophilia Mother      blood clotting     Lupus Mother      erythematosus     DIABETES Mother      Hypertension Father      Asthma Sister      DIABETES Maternal Grandfather      Hypertension Maternal Grandfather      Lupus Maternal Aunt      erythematosus         Current Outpatient Prescriptions   Medication Sig Dispense Refill     fluticasone (FLONASE) 50 MCG/ACT spray Spray 1-2 sprays into both nostrils daily 1 Bottle 11     diphenhydrAMINE (BENADRYL) 25 MG tablet Take 25 mg by mouth       famotidine (PEPCID) 40 MG tablet Take 1 tablet (40 mg) by mouth nightly as needed for heartburn 30 tablet 0     etonogestrel (IMPLANON/NEXPLANON) 68 MG IMPL 1 each (68 mg) by Subdermal route continuous       ibuprofen (ADVIL,MOTRIN) 400 MG tablet Take 2 tablets (800 mg) by mouth every 6 hours as needed for other (cramping) 60 tablet 1     loratadine (CLARITIN) 10 MG tablet Take 10 mg by mouth daily       EPINEPHrine (EPIPEN) 0.3 MG/0.3ML injection Inject 0.3 mLs (0.3 mg) into the muscle once as needed for anaphylaxis 4 each 6     Allergies   Allergen Reactions     Oxycodone      Vomiting, hallucinations.     Tylenol [Acetaminophen] Other (See Comments)     Patient reports seeing spots  "after taking Tylenol.         Reviewed and updated as needed this visit by clinical staffTobacco  Allergies  Meds  Med Hx  Surg Hx  Fam Hx  Soc Hx      Reviewed and updated as needed this visit by Provider         ROS:  C: NEGATIVE for fever, chills, change in weight  INTEGUMENTARY/SKIN: NEGATIVE for worrisome rashes, moles or lesions  E/M: NEGATIVE for ear, mouth and throat problems  RESP:shortness of breath with stairs, but able to walk 3 miles every evening - worse in the heat  CV: feels occasional pain in chest with taking a deep - states she does not have pain when she is taking her pepcid  GI: abdominal pain RUQ, diarrhea, heartburn or reflux, nausea and vomiting  : denies dysuria, has the nexplanon and does not have a menstrual cycle   PSYCHIATRIC: NEGATIVE for changes in mood or affect    OBJECTIVE:     /56 (BP Location: Right arm, Patient Position: Sitting, Cuff Size: Adult Large)  Pulse 96  Temp 99.2  F (37.3  C) (Tympanic)  Resp 18  Ht 5' 2\" (1.575 m)  Wt 195 lb (88.5 kg)  SpO2 99%  BMI 35.67 kg/m2  Body mass index is 35.67 kg/(m^2).   GENERAL: healthy, alert and no distress  EYES: Eyes grossly normal to inspection, PERRL and conjunctivae and sclerae normal  HENT: ear canals and TM's normal, nose and mouth without ulcers or lesions  NECK: no adenopathy, no asymmetry, masses, or scars and thyroid normal to palpation  RESP: lungs clear to auscultation - no rales, rhonchi or wheezes  CV: regular rate and rhythm, normal S1 S2, no S3 or S4, no murmur, click or rub, no peripheral edema and peripheral pulses strong  ABDOMEN: soft, nontender, no hepatosplenomegaly, no masses and bowel sounds normal  SKIN: no suspicious lesions or rashes  PSYCH: mentation appears normal and affect flat  LYMPH: normal ant/post cervical, supraclavicular nodes    Diagnostic Test Results:  Results for orders placed or performed in visit on 10/09/17 (from the past 24 hour(s))   CBC with platelets and differential "   Result Value Ref Range    WBC 9.3 4.0 - 11.0 10e9/L    RBC Count 4.28 3.8 - 5.2 10e12/L    Hemoglobin 13.3 11.7 - 15.7 g/dL    Hematocrit 38.9 35.0 - 47.0 %    MCV 91 78 - 100 fl    MCH 31.1 26.5 - 33.0 pg    MCHC 34.2 31.5 - 36.5 g/dL    RDW 12.8 10.0 - 15.0 %    Platelet Count 311 150 - 450 10e9/L    Diff Method Automated Method     % Neutrophils 52.0 %    % Lymphocytes 38.2 %    % Monocytes 6.9 %    % Eosinophils 2.4 %    % Basophils 0.3 %    % Immature Granulocytes 0.2 %    Nucleated RBCs 0 0 /100    Absolute Neutrophil 4.8 1.6 - 8.3 10e9/L    Absolute Lymphocytes 3.6 0.8 - 5.3 10e9/L    Absolute Monocytes 0.6 0.0 - 1.3 10e9/L    Absolute Eosinophils 0.2 0.0 - 0.7 10e9/L    Absolute Basophils 0.0 0.0 - 0.2 10e9/L    Abs Immature Granulocytes 0.0 0 - 0.4 10e9/L    Absolute Nucleated RBC 0.0    Basic metabolic panel  (Ca, Cl, CO2, Creat, Gluc, K, Na, BUN)   Result Value Ref Range    Sodium 141 133 - 144 mmol/L    Potassium 3.9 3.4 - 5.3 mmol/L    Chloride 110 (H) 94 - 109 mmol/L    Carbon Dioxide 25 20 - 32 mmol/L    Anion Gap 6 3 - 14 mmol/L    Glucose 95 70 - 99 mg/dL    Urea Nitrogen 11 7 - 30 mg/dL    Creatinine 0.87 0.52 - 1.04 mg/dL    GFR Estimate 82 >60 mL/min/1.7m2    GFR Estimate If Black >90 >60 mL/min/1.7m2    Calcium 8.9 8.5 - 10.1 mg/dL   TSH with free T4 reflex   Result Value Ref Range    TSH 1.85 0.40 - 4.00 mU/L   UA with Microscopic reflex to Culture - [place]   Result Value Ref Range    Color Urine Light Yellow     Appearance Urine Clear     Glucose Urine Negative NEG^Negative mg/dL    Bilirubin Urine Negative NEG^Negative    Ketones Urine Negative NEG^Negative mg/dL    Specific Gravity Urine 1.015 1.003 - 1.035    Blood Urine Negative NEG^Negative    pH Urine 6.0 4.7 - 8.0 pH    Protein Albumin Urine Negative NEG^Negative mg/dL    Urobilinogen mg/dL Normal 0.0 - 2.0 mg/dL    Nitrite Urine Negative NEG^Negative    Leukocyte Esterase Urine Trace (A) NEG^Negative    Source Midstream Urine      WBC Urine 4 (H) 0 - 2 /HPF    RBC Urine 1 0 - 2 /HPF    Bacteria Urine None (A) NEG^Negative /HPF    Squamous Epithelial /HPF Urine 4 (H) 0 - 1 /HPF    Mucous Urine Present (A) NEG^Negative /LPF     Vitamin D level - pending        Barney Love MD - 2017  9:51 AM CST  [facility]    Patient Name: NANDINI ZAMUDIO  Date of Service:  2017   : 1995 Age: 21Y Sex: F  MRN: [id number]    Site MRN:   Patient Loc/Room #: VRHPAC/V PACU  Provider: Barney Love MD, Gastroenterology    GI PROCEDURE    SITE: [facility]  ORDERED BY:      PROCEDURE: EGD with biopsies.     PREOPERATIVE DIAGNOSIS: Abdominal pain, nausea.     POSTOPERATIVE DIAGNOSIS: Same, LA grade A reflux esophagitis status post biopsies of distal esophagus, mild gastritis status post biopsies for CLOtest, preliminary results negative.     ANESTHESIA: Propofol.     SCOPE: GIF-190.     ESTIMATED BLOOD LOSS: Minimal.     COMPLICATIONS: None.     DESCRIPTION OF PROCEDURE: After obtaining informed consent, and reviewing risks of bleeding, perforation and infection, the patient is placed in a left lateral decubitus position, sedated, and GIF-190 guided to the posterior pharynx. Examination of the esophagus reveals small linear erosions arising from the GE junction. Biopsies of the distal esophagus are obtained. Examination of the stomach including retroflexion reveals mild edema and erythema in the antrum without erosions or ulcerations. Biopsies of the antrum and body are obtained for DIONY testing. Preliminary results are negative. The duodenal bulb and second duodenum are normal in appearance. The endoscope was withdrawn. The patient tolerated the procedure well.     RECOMMENDATIONS: Check biopsy results.       Barney Love MD  [facility]  Gastroenterology     Non-Invasive Vascular  "Laboratory                                                                                                                                                     [address] [address]                                                                                                                                                      [phone]                                                                   Upper Extremity Segmental Exam  Name: NANDINI ZAMUDIO                   Study Date: 2017 01:31 PM  MRN: [id number]                           Patient Location: [facility]  : 1995 (M/d/yyyy)             Gender: Female  Age: 22 yrs                            Ethnicity:       History  thoracic outlet syndrome.    Interpretation Summary  Normal multiphasic flow seen throughout both arms, although the right ulnar artery waveform has elevated baseline.    On provocative maneuvers the left arm first digit ppg waveform diminishes at some locations including \"symptomatic\". This can be normal but could represent  arterial compression in such position. It does not diagnose neurogenic thoracic outlet compression.          _________________________________________________________________________________________________________________________________________________________    Electronically Signed by:Amaury Ricketts MD 2017 04:23 PM  Ordering Physician: CHAU VIRK  Performed By: Aysha Lee RVT  ASSESSMENT:       PLAN:   ASSESSMENT / PLAN:  (Z71.6,  Z72.0) Encounter for smoking cessation counseling  (primary encounter diagnosis)  Comment: not interested in any additional " assistance at this time. She will continue to try smoking cessation on her own.  Plan:  Tobacco Cessation - for Health Maintenance            (K21.9) Gastroesophageal reflux disease without esophagitis  Comment: reordered pepcid today as it helps decrease her acid reflux. She did follow up with GI for an EGD and was told to continue with the Pepcid for the inflammation. She is encouraged to start eating small frequent meals and stay hydrated. She should limit acidic foods.  Plan: famotidine (PEPCID) 40 MG tablet            (R53.83) Fatigue, unspecified type  Comment: Encouraged to start taking Vitamin D3 daily. She is encouraged to eat regularly because at this time she is only eating once a day. She should continue to stay active. May need to consider weight management to increase energy with a possible dietary referral. Also discussed possible therapy for CBT. Carolyn agrees with the plan.  Will follow up as needed  Plan:  UA with Microscopic reflex to Culture - [place],    CBC with platelets and differential,    Basic metabolic panel  (Ca, Cl, CO2, Creat, Gluc, K, Na, BUN),    TSH with free T4 reflex,    Vitamin D Deficiency          Discussed tenderness and swelling in upper left arm that continues to bother her. With negative results at this time. Carolyn can consider possible massage therapy.      Tobacco Cessation:   reports that she has been smoking Cigarettes.  She started smoking about 3 years ago. She has been smoking about 0.03 packs per day. She has never used smokeless tobacco.  Tobacco Cessation Action Plan: Information offered: Patient not interested at this time  Self help information given to patient        See Patient Instructions    [facility]

## 2017-10-10 ENCOUNTER — TELEPHONE (OUTPATIENT)
Dept: FAMILY MEDICINE | Facility: OTHER | Age: 22
End: 2017-10-10

## 2017-10-10 ASSESSMENT — ANXIETY QUESTIONNAIRES: GAD7 TOTAL SCORE: 17

## 2017-10-10 NOTE — TELEPHONE ENCOUNTER
Pt. Called, advised will call when labs released to me, still waiting for some results.  Carly Perez

## 2017-10-10 NOTE — TELEPHONE ENCOUNTER
2:47 PM    Reason for Call: Phone Call    Description: Patient is requesting the results of labs performed on 10-9-17    Was an appointment offered for this call? No  If yes : Appointment type              Date    Preferred method for responding to this message: Telephone Call  What is your phone number ? [phone]    If we cannot reach you directly, may we leave a detailed response at the number you provided? Yes    Can this message wait until your PCP/provider returns, if available today? YES    Mara Hernandez

## 2017-10-11 DIAGNOSIS — E55.9 VITAMIN D DEFICIENCY: Primary | ICD-10-CM

## 2017-10-11 LAB — DEPRECATED CALCIDIOL+CALCIFEROL SERPL-MC: 12 UG/L (ref 20–75)

## 2017-10-11 RX ORDER — ERGOCALCIFEROL 1.25 MG/1
50000 CAPSULE, LIQUID FILLED ORAL
Qty: 8 CAPSULE | Refills: 0 | Status: SHIPPED | OUTPATIENT
Start: 2017-10-11 | End: 2017-11-30

## 2017-10-11 NOTE — PROGRESS NOTES
Vitamin D Deficiency screening 12 (L) 20 - 75 ug/L Final 10/09/2017  2:26 PM 51   Comment:     Low vitamin D level ordered D2 weekly for 8 weeks. Should have recheck in 3 months. Continue with daily D3 on other days of the week.  Order sent to [facility] in [place]

## 2017-10-31 ENCOUNTER — HOSPITAL ENCOUNTER (EMERGENCY)
Facility: HOSPITAL | Age: 22
Discharge: HOME | End: 2017-10-31
Attending: INTERNAL MEDICINE | Admitting: INTERNAL MEDICINE
Payer: COMMERCIAL

## 2017-10-31 ENCOUNTER — APPOINTMENT (OUTPATIENT)
Dept: GENERAL RADIOLOGY | Facility: HOSPITAL | Age: 22
End: 2017-10-31
Attending: INTERNAL MEDICINE
Payer: COMMERCIAL

## 2017-10-31 VITALS — HEART RATE: 109 BPM | TEMPERATURE: 98.6 F | OXYGEN SATURATION: 96 % | RESPIRATION RATE: 17 BRPM

## 2017-10-31 DIAGNOSIS — J20.9 ACUTE BRONCHITIS, UNSPECIFIED ORGANISM: ICD-10-CM

## 2017-10-31 PROCEDURE — 71020 XR CHEST 2 VW: CPT | Mod: TC

## 2017-10-31 PROCEDURE — 25000125 ZZHC RX 250: Performed by: INTERNAL MEDICINE

## 2017-10-31 PROCEDURE — 25000132 ZZH RX MED GY IP 250 OP 250 PS 637

## 2017-10-31 PROCEDURE — 99283 EMERGENCY DEPT VISIT LOW MDM: CPT | Mod: 25

## 2017-10-31 PROCEDURE — 25000132 ZZH RX MED GY IP 250 OP 250 PS 637: Performed by: INTERNAL MEDICINE

## 2017-10-31 PROCEDURE — 99284 EMERGENCY DEPT VISIT MOD MDM: CPT | Performed by: INTERNAL MEDICINE

## 2017-10-31 RX ORDER — AZITHROMYCIN 250 MG/1
500 TABLET, FILM COATED ORAL ONCE
Status: COMPLETED | OUTPATIENT
Start: 2017-10-31 | End: 2017-10-31

## 2017-10-31 RX ORDER — BENZONATATE 200 MG/1
200 CAPSULE ORAL 2 TIMES DAILY PRN
Qty: 6 CAPSULE | Refills: 0 | Status: SHIPPED | OUTPATIENT
Start: 2017-10-31 | End: 2018-01-10

## 2017-10-31 RX ORDER — PREDNISONE 20 MG/1
20 TABLET ORAL DAILY
Qty: 3 TABLET | Refills: 0 | Status: SHIPPED | OUTPATIENT
Start: 2017-10-31 | End: 2017-11-03

## 2017-10-31 RX ORDER — PREDNISONE 20 MG/1
20 TABLET ORAL ONCE
Status: COMPLETED | OUTPATIENT
Start: 2017-10-31 | End: 2017-10-31

## 2017-10-31 RX ORDER — BENZONATATE 100 MG/1
CAPSULE ORAL
Status: COMPLETED
Start: 2017-10-31 | End: 2017-10-31

## 2017-10-31 RX ORDER — ONDANSETRON 4 MG/1
4 TABLET, ORALLY DISINTEGRATING ORAL ONCE
Status: COMPLETED | OUTPATIENT
Start: 2017-10-31 | End: 2017-10-31

## 2017-10-31 RX ORDER — BENZONATATE 100 MG/1
100 CAPSULE ORAL ONCE
Status: COMPLETED | OUTPATIENT
Start: 2017-10-31 | End: 2017-10-31

## 2017-10-31 RX ORDER — AZITHROMYCIN 250 MG/1
250 TABLET, FILM COATED ORAL DAILY
Qty: 4 TABLET | Refills: 0 | Status: SHIPPED | OUTPATIENT
Start: 2017-11-01 | End: 2018-01-10

## 2017-10-31 RX ADMIN — BENZONATATE 100 MG: 100 CAPSULE ORAL at 05:30

## 2017-10-31 RX ADMIN — AZITHROMYCIN 500 MG: 250 TABLET, FILM COATED ORAL at 06:04

## 2017-10-31 RX ADMIN — PREDNISONE 20 MG: 20 TABLET ORAL at 06:04

## 2017-10-31 RX ADMIN — ONDANSETRON 4 MG: 4 TABLET, ORALLY DISINTEGRATING ORAL at 05:35

## 2017-10-31 RX ADMIN — BENZONATATE 100 MG: 100 CAPSULE, LIQUID FILLED ORAL at 05:30

## 2017-10-31 ASSESSMENT — ENCOUNTER SYMPTOMS
NUMBNESS: 0
ABDOMINAL DISTENTION: 0
HEMATURIA: 0
NAUSEA: 0
NECK PAIN: 0
VOICE CHANGE: 0
MYALGIAS: 0
SHORTNESS OF BREATH: 0
COUGH: 1
ABDOMINAL PAIN: 0
FLANK PAIN: 0
PALPITATIONS: 0
CHEST TIGHTNESS: 0
FEVER: 1
CHILLS: 1
SINUS PRESSURE: 1
COLOR CHANGE: 0
LIGHT-HEADEDNESS: 0
WOUND: 0
DIAPHORESIS: 0
ARTHRALGIAS: 0
BLOOD IN STOOL: 0
VOMITING: 0
HEADACHES: 0
CONFUSION: 0
DIZZINESS: 0
ANAL BLEEDING: 0
DYSURIA: 0
BACK PAIN: 0
NECK STIFFNESS: 0

## 2017-10-31 NOTE — DISCHARGE INSTRUCTIONS

## 2017-10-31 NOTE — ED AVS SNAPSHOT
[facility]    [address] [address]    Phone:  [phone]                                       Carolyn Mallory   MRN: [id number]    Department:  [facility]   Date of Visit:  10/31/2017           After Visit Summary Signature Page     I have received my discharge instructions, and my questions have been answered. I have discussed any challenges I see with this plan with the nurse or doctor.    ..........................................................................................................................................  Patient/Patient Representative Signature      ..........................................................................................................................................  Patient Representative Print Name and Relationship to Patient    ..................................................               ................................................  Date                                            Time    ..........................................................................................................................................  Reviewed by Signature/Title    ...................................................              ..............................................  Date                                                            Time

## 2017-10-31 NOTE — ED AVS SNAPSHOT
[facility]    [address]    [place] [address]    Phone:  [phone]                                       Carolyn Mallory   MRN: [id number]    Department:  [facility]   Date of Visit:  10/31/2017           Patient Information     Date Of Birth          1995        Your diagnoses for this visit were:     Acute bronchitis, unspecified organism        You were seen by Augustus Barnes MD.      Follow-up Information     Schedule an appointment as soon as possible for a visit with [facility], MD.        Discharge Instructions         Acute Bronchitis  Your healthcare provider has told you that you have acute bronchitis. Bronchitis is infection or inflammation of the bronchial tubes (airways in the lungs). Normally, air moves easily in and out of the airways. Bronchitis narrows the airways, making it harder for air to flow in and out of the lungs. This causes symptoms such as shortness of breath, coughing up yellow or green mucus, and wheezing. Bronchitis can be acute or chronic. Acute means the condition comes on quickly and goes away in a short time, usually within 3 to 10 days. Chronic means a condition lasts a long time and often comes back.    What causes acute bronchitis?  Acute bronchitis almost always starts as a viral respiratory infection, such as a cold or the flu. Certain factors make it more likely for a cold or flu to turn into bronchitis. These include being very young, being elderly, having a heart or lung problem, or having a weak immune system. Cigarette smoking also makes bronchitis more likely.  When bronchitis develops, the airways become swollen. The airways may also become infected with bacteria. This is known as a secondary infection.  Diagnosing acute bronchitis  Your healthcare provider will examine you and ask about your symptoms and health history. You may also have a sputum culture to test the fluid in your lungs. Chest X-rays may  be done to look for infection in the lungs.  Treating acute bronchitis  Bronchitis usually clears up as the cold or flu goes away. You can help feel better faster by doing the following:    Take medicine as directed. You may be told to take ibuprofen or other over-the-counter medicines. These help relieve inflammation in your bronchial tubes. Your healthcare provider may prescribe an inhaler to help open up the bronchial tubes. Most of the time, acute bronchitis is caused by a viral infection. Antibiotics are usually not prescribed for viral infections.    Drink plenty of fluids, such as water, juice, or warm soup. Fluids loosen mucus so that you can cough it up. This helps you breathe more easily. Fluids also prevent dehydration.    Make sure you get plenty of rest.    Do not smoke. Do not allow anyone else to smoke in your home.  Recovery and follow-up  Follow up with your doctor as you are told. You will likely feel better in a week or two. But a dry cough can linger beyond that time. Let your doctor know if you still have symptoms (other than a dry cough) after 2 weeks, or if you re prone to getting bronchial infections. Take steps to protect yourself from future infections. These steps include stopping smoking and avoiding tobacco smoke, washing your hands often, and getting a yearly flu shot.  When to call your healthcare provider  Call the healthcare provider if you have any of the following:    Fever of 100.4 F (38.0 C) or higher, or as advised    Symptoms that get worse, or new symptoms    Trouble breathing    Symptoms that don t start to improve within a week, or within 3 days of taking antibiotics   Date Last Reviewed: 12/1/2016 2000-2017 [employer]. [address] [place] [address], [place] [address]. All rights reserved. This information is not intended as a substitute for professional medical care. Always follow your healthcare professional's instructions.             Review of your medicines       START taking        Dose / Directions Last dose taken    azithromycin 250 MG tablet   Commonly known as:  ZITHROMAX   Dose:  250 mg   Quantity:  4 tablet   Start taking on:  11/1/2017        Take 1 tablet (250 mg) by mouth daily   Refills:  0        benzonatate 200 MG capsule   Commonly known as:  TESSALON   Dose:  200 mg   Quantity:  6 capsule        Take 1 capsule (200 mg) by mouth 2 times daily as needed for cough   Refills:  0        predniSONE 20 MG tablet   Commonly known as:  DELTASONE   Dose:  20 mg   Quantity:  3 tablet        Take 1 tablet (20 mg) by mouth daily for 3 days   Refills:  0          Our records show that you are taking the medicines listed below. If these are incorrect, please call your family doctor or clinic.        Dose / Directions Last dose taken    diphenhydrAMINE 25 MG tablet   Commonly known as:  BENADRYL   Dose:  25 mg        Take 25 mg by mouth   Refills:  0        EPINEPHrine 0.3 MG/0.3ML injection   Commonly known as:  EPIPEN   Dose:  0.3 mg   Quantity:  4 each        Inject 0.3 mLs (0.3 mg) into the muscle once as needed for anaphylaxis   Refills:  6        etonogestrel 68 MG Impl   Commonly known as:  IMPLANON/NEXPLANON   Dose:  1 each        1 each (68 mg) by Subdermal route continuous   Refills:  0        famotidine 40 MG tablet   Commonly known as:  PEPCID   Dose:  40 mg   Quantity:  90 tablet        Take 1 tablet (40 mg) by mouth daily   Refills:  3        fluticasone 50 MCG/ACT spray   Commonly known as:  FLONASE   Dose:  1-2 spray   Quantity:  1 Bottle        Spray 1-2 sprays into both nostrils daily   Refills:  11        ibuprofen 400 MG tablet   Commonly known as:  ADVIL/MOTRIN   Dose:  800 mg   Quantity:  60 tablet        Take 2 tablets (800 mg) by mouth every 6 hours as needed for other (cramping)   Refills:  1        loratadine 10 MG tablet   Commonly known as:  CLARITIN   Dose:  10 mg        Take 10 mg by mouth daily   Refills:  0        vitamin D 85956 UNIT  capsule   Commonly known as:  ERGOCALCIFEROL   Dose:  48007 Units   Quantity:  8 capsule        Take 1 capsule (50,000 Units) by mouth every 7 days for 8 doses   Refills:  0                Prescriptions were sent or printed at these locations (3 Prescriptions)                   [facility] [place], [place] - [address] AT [facility] [address]   [address], [place] [address]    Telephone:  [phone]   Fax:  [phone]   Hours:                  Printed at Department/Unit printer (3 of 3)         azithromycin (ZITHROMAX) 250 MG tablet               predniSONE (DELTASONE) 20 MG tablet               benzonatate (TESSALON) 200 MG capsule                Procedures and tests performed during your visit     Chest XR,  PA & LAT      Orders Needing Specimen Collection     None      Pending Results     Date and Time Order Name Status Description    10/31/2017 0527 Chest XR,  PA & LAT In process             Pending Culture Results     No orders found from 10/29/2017 to 11/1/2017.            Thank you for choosing [facility]       Thank you for choosing [facility] for your care. Our goal is always to provide you with excellent care. Hearing back from our patients is one way we can continue to improve our services. Please take a few minutes to complete the written survey that you may receive in the mail after you visit with us. Thank you!        [employer] Information     [employer] gives you secure access to your electronic health record. If you see a primary care provider, you can also send messages to your care team and make appointments. If you have questions, please call your primary care clinic.  If you do not have a primary care provider, please call [phone] and they will assist you.        Care EveryWhere ID     This is your Care EveryWhere ID. This could be used by other organizations to access your [facility] medical records  [id number]        Equal Access to Services     CRISS CHEN AH: Laxmi douglas  mira Villasenor, milton marie, shiraz olivia, vianey guevara. So [facility] [phone].    ATENCIÓN: Si habla español, tiene a solis disposición servicios gratuitos de asistencia lingüística. Llame al [phone].    We comply with applicable federal civil rights laws and [place] laws. We do not discriminate on the basis of race, color, national origin, age, disability, sex, sexual orientation, or gender identity.            After Visit Summary       This is your record. Keep this with you and show to your community pharmacist(s) and doctor(s) at your next visit.

## 2017-10-31 NOTE — LETTER
To Whom it may concern:      Carolyn Mallory was seen in our Emergency Department today, 10/31/17.  I expect her condition to improve over the next 2 days.  She may return to work/school when improved.    Sincerely,        Augustus Barnes MD

## 2017-10-31 NOTE — ED NOTES
C/o cough since Saturday. States tonight she has been coughing so hard she vomits up sputum and now has chest pain on the right side that she rates 8/10 during coughing ad 4/10 when sitting still. Denies any fevers. Denies sore throat. Denies SOB. States is a smoker.

## 2017-10-31 NOTE — ED NOTES
Patient coughing very frequently and very forcefully. Call to Dr. Barnes to inform him that patient is coughing until she vomits and requested oral Zofran. Telephone order for oral Zofran from Dr. Barnes.

## 2017-10-31 NOTE — ED PROVIDER NOTES
History     Chief Complaint   Patient presents with     Cough     since Saturday, states she coughs so hard she vomits and now has a sharp pain on right side when she coughs     Otalgia     left ear, started on Saturday also.     Patient is a 22 year old female presenting with cough. The history is provided by the patient.   Cough   Cough characteristics:  Productive  Sputum characteristics:  Brown  Severity:  Severe  Onset quality:  Gradual  Timing:  Constant  Progression:  Worsening  Chronicity:  New  Associated symptoms: chills, ear pain and fever    Associated symptoms: no chest pain, no diaphoresis, no headaches, no myalgias, no rash and no shortness of breath        Problem List:    Patient Active Problem List    Diagnosis Date Noted     Obesity, unspecified obesity severity, unspecified obesity type 06/09/2017     Priority: Medium     Tobacco use disorder 06/09/2017     Priority: Medium     Esophageal reflux 06/09/2017     Priority: Medium     Chronic right-sided thoracic back pain 05/24/2016     Priority: Medium     ACP (advance care planning) 04/26/2016     Priority: Medium     Advance Care Planning 4/26/2016: ACP Review of Chart / Resources Provided:  Reviewed chart for advance care plan.  Carolyn Mallory has no plan or code status on file. Discussed available resources and provided with information. .  Added by Lizzette Garcia           Calculus of kidney 12/16/2015     Priority: Medium     Bilateral hydronephrosis, flank pain, 3mm right lower pole non obstructing stone.  Dr. [facility] consult.  Declined stents.  Reconsider if hospitalization and no improvement 2-3 days.         Sacro ilial pain 10/30/2015     Priority: Medium     Patellofemoral syndrome of both knees 01/20/2015     Priority: Medium     Allergic rhinitis 01/17/2014     Priority: Medium     Problem list name updated by automated process. Provider to review       Food allergy 01/17/2014     Priority: Medium        Past Medical History:     Past Medical History:   Diagnosis Date     NO ACTIVE PROBLEMS      Pregnancy        Past Surgical History:    Past Surgical History:   Procedure Laterality Date     ARTHROSCOPY KNEE Right 5/4/2015    Procedure: ARTHROSCOPY KNEE;  Surgeon: Hernando Kulkarni MD;  Location: [facility]     AS ESOPHAGOSCOPY, DIAGNOSTIC      upper     LAPAROSCOPIC CHOLECYSTECTOMY N/A 10/10/2015    Procedure: LAPAROSCOPIC CHOLECYSTECTOMY;  Surgeon: Drew Padgett MD;  Location: [facility] OR     none         Family History:    Family History   Problem Relation Age of Onset     Thrombophilia Mother      blood clotting     Lupus Mother      erythematosus     DIABETES Mother      Hypertension Father      Asthma Sister      DIABETES Maternal Grandfather      Hypertension Maternal Grandfather      Lupus Maternal Aunt      erythematosus       Social History:  Marital Status:  Single [1]  Social History   Substance Use Topics     Smoking status: Current Every Day Smoker     Packs/day: 0.03     Types: Cigarettes     Start date: 1/20/2014     Smokeless tobacco: Never Used     Alcohol use No        Medications:      [START ON 11/1/2017] azithromycin (ZITHROMAX) 250 MG tablet   predniSONE (DELTASONE) 20 MG tablet   benzonatate (TESSALON) 200 MG capsule   vitamin D (ERGOCALCIFEROL) 95401 UNIT capsule   famotidine (PEPCID) 40 MG tablet   fluticasone (FLONASE) 50 MCG/ACT spray   etonogestrel (IMPLANON/NEXPLANON) 68 MG IMPL   ibuprofen (ADVIL,MOTRIN) 400 MG tablet   diphenhydrAMINE (BENADRYL) 25 MG tablet   loratadine (CLARITIN) 10 MG tablet   EPINEPHrine (EPIPEN) 0.3 MG/0.3ML injection         Review of Systems   Constitutional: Positive for chills and fever. Negative for diaphoresis.   HENT: Positive for congestion, ear pain and sinus pressure. Negative for drooling and voice change.    Eyes: Negative for visual disturbance.   Respiratory: Positive for cough. Negative for chest tightness and shortness of breath.    Cardiovascular: Negative for chest pain,  palpitations and leg swelling.   Gastrointestinal: Negative for abdominal distention, abdominal pain, anal bleeding, blood in stool, nausea and vomiting.   Genitourinary: Negative for decreased urine volume, dysuria, flank pain and hematuria.   Musculoskeletal: Negative for arthralgias, back pain, gait problem, myalgias, neck pain and neck stiffness.   Skin: Negative for color change, pallor, rash and wound.   Neurological: Negative for dizziness, syncope, light-headedness, numbness and headaches.   Psychiatric/Behavioral: Negative for confusion and suicidal ideas.       Physical Exam   BP:  (declined)  Pulse: 109  Temp: 98.6  F (37  C)  Resp: 17  SpO2: 96 %      Physical Exam   Constitutional: She is oriented to person, place, and time. She appears well-developed and well-nourished.   HENT:   Head: Normocephalic and atraumatic.   Right Ear: Tympanic membrane is not perforated, not erythematous, not retracted and not bulging.   Left Ear: Tympanic membrane is erythematous. Tympanic membrane is not perforated, not retracted and not bulging. Tympanic membrane mobility is normal.   Mouth/Throat: No oropharyngeal exudate.   Eyes: Conjunctivae are normal. Pupils are equal, round, and reactive to light.   Neck: Normal range of motion. Neck supple. No JVD present. No tracheal deviation present. No thyromegaly present.   Cardiovascular: Normal rate, regular rhythm, normal heart sounds and intact distal pulses.  Exam reveals no gallop and no friction rub.    No murmur heard.  Pulmonary/Chest: Effort normal and breath sounds normal. No stridor. No respiratory distress. She has no wheezes. She has no rales. She exhibits no tenderness.   Abdominal: Soft. Bowel sounds are normal. She exhibits no distension and no mass. There is no tenderness. There is no rebound and no guarding.   Musculoskeletal: Normal range of motion. She exhibits no edema or tenderness.   Lymphadenopathy:     She has no cervical adenopathy.   Neurological:  She is alert and oriented to person, place, and time.   Skin: Skin is warm and dry. No rash noted. No erythema. No pallor.   Psychiatric: Her behavior is normal.   Nursing note and vitals reviewed.      ED Course     ED Course     Procedures                 Labs Ordered and Resulted from Time of ED Arrival Up to the Time of Departure from the ED - No data to display    Assessments & Plan (with Medical Decision Making)   Productive cough, wheezing  Fever reported  at home  smoker  cxr negative    Likely acute brochitis  z pack + low dose prednison  [facility] with family clinic  I have reviewed the nursing notes.    I have reviewed the findings, diagnosis, plan and need for follow up with the patient.      Discharge Medication List as of 10/31/2017  6:06 AM      START taking these medications    Details   azithromycin (ZITHROMAX) 250 MG tablet Take 1 tablet (250 mg) by mouth daily, Disp-4 tablet, R-0, Local Print      predniSONE (DELTASONE) 20 MG tablet Take 1 tablet (20 mg) by mouth daily for 3 days, Disp-3 tablet, R-0, Local Print      benzonatate (TESSALON) 200 MG capsule Take 1 capsule (200 mg) by mouth 2 times daily as needed for cough, Disp-6 capsule, R-0, Local Print             Final diagnoses:   Acute bronchitis, unspecified organism       10/31/2017   [facility]     Augustus Barnes MD  10/31/17 9020

## 2017-10-31 NOTE — ED NOTES
States pain is the same as when she arrived. Rates pain 4/10 when not coughing. Declined vitals. Discharge instructions given. Verbalized understanding. 3 signed prescriptions handed to patient along with signed work excuse and discharge instructions. Ambulated out of ED carrying toddler son and all of paperwork.

## 2017-11-26 ENCOUNTER — HEALTH MAINTENANCE LETTER (OUTPATIENT)
Age: 22
End: 2017-11-26

## 2018-01-10 ENCOUNTER — HOSPITAL ENCOUNTER (EMERGENCY)
Facility: HOSPITAL | Age: 23
Discharge: HOME | End: 2018-01-10
Attending: INTERNAL MEDICINE | Admitting: INTERNAL MEDICINE
Payer: COMMERCIAL

## 2018-01-10 VITALS
RESPIRATION RATE: 16 BRPM | OXYGEN SATURATION: 100 % | TEMPERATURE: 98 F | HEIGHT: 62 IN | SYSTOLIC BLOOD PRESSURE: 118 MMHG | DIASTOLIC BLOOD PRESSURE: 76 MMHG

## 2018-01-10 DIAGNOSIS — K29.00 OTHER ACUTE GASTRITIS WITHOUT HEMORRHAGE: ICD-10-CM

## 2018-01-10 DIAGNOSIS — G43.009 MIGRAINE WITHOUT AURA AND WITHOUT STATUS MIGRAINOSUS, NOT INTRACTABLE: ICD-10-CM

## 2018-01-10 LAB
ALBUMIN SERPL-MCNC: 3.9 G/DL (ref 3.4–5)
ALP SERPL-CCNC: 84 U/L (ref 40–150)
ALT SERPL W P-5'-P-CCNC: 38 U/L (ref 0–50)
AMYLASE SERPL-CCNC: 52 U/L (ref 30–110)
ANION GAP SERPL CALCULATED.3IONS-SCNC: 9 MMOL/L (ref 3–14)
AST SERPL W P-5'-P-CCNC: 21 U/L (ref 0–45)
BASOPHILS # BLD AUTO: 0 10E9/L (ref 0–0.2)
BASOPHILS NFR BLD AUTO: 0.3 %
BILIRUB SERPL-MCNC: 0.2 MG/DL (ref 0.2–1.3)
BUN SERPL-MCNC: 8 MG/DL (ref 7–30)
CALCIUM SERPL-MCNC: 8.3 MG/DL (ref 8.5–10.1)
CHLORIDE SERPL-SCNC: 106 MMOL/L (ref 94–109)
CO2 SERPL-SCNC: 25 MMOL/L (ref 20–32)
CREAT SERPL-MCNC: 0.79 MG/DL (ref 0.52–1.04)
DIFFERENTIAL METHOD BLD: ABNORMAL
EOSINOPHIL # BLD AUTO: 0.4 10E9/L (ref 0–0.7)
EOSINOPHIL NFR BLD AUTO: 3.7 %
ERYTHROCYTE [DISTWIDTH] IN BLOOD BY AUTOMATED COUNT: 12.8 % (ref 10–15)
FLUAV+FLUBV AG SPEC QL: NEGATIVE
FLUAV+FLUBV AG SPEC QL: NEGATIVE
GFR SERPL CREATININE-BSD FRML MDRD: >90 ML/MIN/1.7M2
GLUCOSE SERPL-MCNC: 88 MG/DL (ref 70–99)
HCT VFR BLD AUTO: 37.3 % (ref 35–47)
HGB BLD-MCNC: 13 G/DL (ref 11.7–15.7)
IMM GRANULOCYTES # BLD: 0 10E9/L (ref 0–0.4)
IMM GRANULOCYTES NFR BLD: 0.2 %
LIPASE SERPL-CCNC: 134 U/L (ref 73–393)
LYMPHOCYTES # BLD AUTO: 4.3 10E9/L (ref 0.8–5.3)
LYMPHOCYTES NFR BLD AUTO: 38 %
MCH RBC QN AUTO: 31.6 PG (ref 26.5–33)
MCHC RBC AUTO-ENTMCNC: 34.9 G/DL (ref 31.5–36.5)
MCV RBC AUTO: 91 FL (ref 78–100)
MONOCYTES # BLD AUTO: 0.6 10E9/L (ref 0–1.3)
MONOCYTES NFR BLD AUTO: 5.3 %
NEUTROPHILS # BLD AUTO: 6 10E9/L (ref 1.6–8.3)
NEUTROPHILS NFR BLD AUTO: 52.5 %
NRBC # BLD AUTO: 0 10*3/UL
NRBC BLD AUTO-RTO: 0 /100
PLATELET # BLD AUTO: 293 10E9/L (ref 150–450)
POTASSIUM SERPL-SCNC: 3.4 MMOL/L (ref 3.4–5.3)
PROT SERPL-MCNC: 7.3 G/DL (ref 6.8–8.8)
RBC # BLD AUTO: 4.12 10E12/L (ref 3.8–5.2)
SODIUM SERPL-SCNC: 140 MMOL/L (ref 133–144)
SPECIMEN SOURCE: NORMAL
WBC # BLD AUTO: 11.3 10E9/L (ref 4–11)

## 2018-01-10 PROCEDURE — 25000128 H RX IP 250 OP 636: Performed by: INTERNAL MEDICINE

## 2018-01-10 PROCEDURE — 99284 EMERGENCY DEPT VISIT MOD MDM: CPT | Performed by: INTERNAL MEDICINE

## 2018-01-10 PROCEDURE — 82150 ASSAY OF AMYLASE: CPT | Performed by: INTERNAL MEDICINE

## 2018-01-10 PROCEDURE — 99284 EMERGENCY DEPT VISIT MOD MDM: CPT | Mod: 25

## 2018-01-10 PROCEDURE — 85025 COMPLETE CBC W/AUTO DIFF WBC: CPT | Performed by: INTERNAL MEDICINE

## 2018-01-10 PROCEDURE — 96374 THER/PROPH/DIAG INJ IV PUSH: CPT

## 2018-01-10 PROCEDURE — 96361 HYDRATE IV INFUSION ADD-ON: CPT

## 2018-01-10 PROCEDURE — 87804 INFLUENZA ASSAY W/OPTIC: CPT | Performed by: FAMILY MEDICINE

## 2018-01-10 PROCEDURE — 25000125 ZZHC RX 250: Performed by: INTERNAL MEDICINE

## 2018-01-10 PROCEDURE — 80053 COMPREHEN METABOLIC PANEL: CPT | Performed by: INTERNAL MEDICINE

## 2018-01-10 PROCEDURE — 96375 TX/PRO/DX INJ NEW DRUG ADDON: CPT

## 2018-01-10 PROCEDURE — 83690 ASSAY OF LIPASE: CPT | Performed by: INTERNAL MEDICINE

## 2018-01-10 PROCEDURE — 36415 COLL VENOUS BLD VENIPUNCTURE: CPT | Performed by: INTERNAL MEDICINE

## 2018-01-10 RX ORDER — METOCLOPRAMIDE HYDROCHLORIDE 5 MG/ML
10 INJECTION INTRAMUSCULAR; INTRAVENOUS ONCE
Status: COMPLETED | OUTPATIENT
Start: 2018-01-10 | End: 2018-01-10

## 2018-01-10 RX ORDER — DIPHENHYDRAMINE HYDROCHLORIDE 50 MG/ML
50 INJECTION INTRAMUSCULAR; INTRAVENOUS ONCE
Status: COMPLETED | OUTPATIENT
Start: 2018-01-10 | End: 2018-01-10

## 2018-01-10 RX ADMIN — DIPHENHYDRAMINE HYDROCHLORIDE 50 MG: 50 INJECTION, SOLUTION INTRAMUSCULAR; INTRAVENOUS at 21:35

## 2018-01-10 RX ADMIN — METOCLOPRAMIDE 10 MG: 5 INJECTION, SOLUTION INTRAMUSCULAR; INTRAVENOUS at 21:37

## 2018-01-10 RX ADMIN — FAMOTIDINE 20 MG: 10 INJECTION, SOLUTION INTRAVENOUS at 21:39

## 2018-01-10 RX ADMIN — SODIUM CHLORIDE 1000 ML: 9 INJECTION, SOLUTION INTRAVENOUS at 21:33

## 2018-01-10 NOTE — LETTER
January 10, 2018      To Whom It May Concern:      Carolyn Mallory was seen in our Emergency Department today, 01/10/18.  I expect her condition to improve over the next 2 days.  She may return to work/school when improved.    Sincerely,        Augustus Barnes MD

## 2018-01-10 NOTE — ED AVS SNAPSHOT
[facility]    [address] [address]    Phone:  [phone]                                       Carolyn Mallory   MRN: [id number]    Department:  [facility]   Date of Visit:  1/10/2018           After Visit Summary Signature Page     I have received my discharge instructions, and my questions have been answered. I have discussed any challenges I see with this plan with the nurse or doctor.    ..........................................................................................................................................  Patient/Patient Representative Signature      ..........................................................................................................................................  Patient Representative Print Name and Relationship to Patient    ..................................................               ................................................  Date                                            Time    ..........................................................................................................................................  Reviewed by Signature/Title    ...................................................              ..............................................  Date                                                            Time

## 2018-01-10 NOTE — ED AVS SNAPSHOT
[facility]    [address]    [place] [address]    Phone:  [phone]                                       Carolyn Mallory   MRN: [id number]    Department:  [facility]   Date of Visit:  1/10/2018           Patient Information     Date Of Birth          1995        Your diagnoses for this visit were:     Other acute gastritis without hemorrhage     Migraine without aura and without status migrainosus, not intractable        You were seen by Augustus Barnes MD.        Discharge Instructions         Gastritis or Ulcer (No Antibiotic Treatment)    Gastritis is irritation and inflammation of the stomach lining. This means the lining is red and swollen. An ulcer is an open sore in the lining of the stomach. It may also occur in the duodenum (first part of the small intestine). The causes and symptoms of gastritis and ulcers are very similar.  Causes and risk factors for both problems can include:    Long-term use of nonsteroidal anti-inflammatory drugs (NSAIDs), such as aspirin and ibuprofen    H. pylori bacteria infection    Tobacco use    Alcohol use  Symptoms for both problems can include:    Dull or burning pain in the upper part of the belly    Loss of appetite    Heartburn or upset stomach    Frequent burping    Bloated feeling    Nausea with or without vomiting  You likely had an evaluation to help determine the exact cause and extent of your problem. This may have included a health history, exam, and certain tests.  Results showed that your problem is not due to H. pylori infection. For this reason, no antibiotics are needed as part of your treatment.  Whether your problem is found to be gastritis or an ulcer, you will still need to take other medicines, however. You will also need to follow instructions to help reduce stomach irritation so your stomach can heal.   Home care    Take any medicines you re prescribed exactly as directed. Common medicines used to treat  gastritis include:    Antacids. These help neutralize the normal acids in your stomach.    Proton pump inhibitors. These block your stomach from making any acid.    H2 blockers.These reduce the amount of acid your stomach makes.    Bismuth subsalicylate. This helps protect the lining of your stomach from acid.    Avoid taking any NSAIDS during your treatment. If you take NSAID to help treat other health problems, tell your healthcare provider. He or she may need to adjust your medicine plan or change the dosage.    Don t use tobacco. Also don t drink alcohol. These products can increase the amount of acid your stomach makes. This can delay healing. It can also worsen symptoms.  Follow-up care  Follow up with your healthcare provider, or as advised. In some cases, further testing may be needed.  When to seek medical advice  Call your healthcare provider right away if any of these occur:    Fever of 100.4 F (38 C) or higher or as directed by your provider    Stomach pain that worsens or moves to the lower right part of abdomen    Extreme fatigue    Weakness or dizziness    Continued weight loss    Frequent vomiting, blood in your vomit, or coffee ground-like substance in your vomit    Black, tarry, or bloody stools  Call 911  Call 911 right away if any of these occur:    Chest pain appears or worsens, or spreads to the back, neck, shoulder, or arm    Unusually fast heart rate    Trouble breathing or swallowing    Confusion    Extreme drowsiness or trouble waking up    Fainting    Large amounts of blood presen  About Migraine Headaches  What is a migraine headache?  A migraine is a very painful type of headache. It may last a few hours or days. During a migraine, you may have vision problems and feel sick to your stomach.  Migraines are three times more common in women than in men. Once they start, you may get them for the rest of your life. They may occur less often as you age.  What causes it?  We don't know the  exact cause, but many things can trigger a migraine. These include:  Stress and anxiety  Lack of food or sleep  Foods and drinks that contain tyramine, such as:  Red marianne and some beers  Aged cheeses (like cheddar or blue cheese)  Yeast  Aged, dried or cured meats  Fermented foods like sauerkraut, soy sauce, miso and destiney chi  Too much light  Chemicals (gasoline, cleaning products, perfume, glue, etc.)  Weather changes  Certain medicines  Hormone changes (in women).  What are symptoms?  Some people can tell when they're about to have a migraine. They may see flashing lights or zigzag lines in front of their eyes. Or they may lose their vision for a short time.  With a migraine, you may:  Feel pain or pulsing on one side of the head. For some people, the entire head hurts.  Be very sensitive to light and sound.  Feel nauseated (sick to your stomach) and vomit (throw up).  How is it treated?  Your care team may suggest medicine to prevent or relieve your symptoms. Once you start having migraines, you may also need medicine to keep them from getting worse.   Take your medicine at the first sign of a migraine. It may take several tries to find a medicine that works for you.   When a migraine comes:  Lie down in a quiet, dark room. Try not to bend over, as this may cause more pain.  Put a cold pack on your head. Try a bag of frozen vegetables, wrapped with a thin cloth.  Drink extra fluids. If you can't drink, suck on ice chips.  How can I prevent migraines?  It will help to keep a migraine diary. By keeping a diary, you may find the cause of your headaches. Once you know the cause, you can take steps to prevent migraines in the future.  It also helps to live a healthy lifestyle. This means:  Get regular exercise. (If exercise triggers your headaches, tell your doctor.)  Drink plenty of water.  Eat healthy meals at regular times.  Try to go to bed and get up and regular times.  Don't smoke. Avoid second-hand  smoke.  Avoid caffeine. Coffee, tea and soda may help a migraine. But if you drink them too often, they can cause migraines.  Find ways to relax, have fun and reduce stress in your life.  Try complementary therapies (yoga, acupuncture, massage, biofeedback, etc.).  When should I call my clinic?  Call your clinic at once if you have new or unusual symptoms, such as:   Pain that gets worse or lasts more than 24 hours.  Slurred speech or problems talking.  A weak arm or leg that you can't move normally.  A fever over 100 F (37.8 C), taken under the tongue.  Stiff neck.  Vomiting (throwing up) for several hours.  For more information about migraines  Contact the [employer] [place] for Headache Education (ACHE) at 1-822.803.8223 or www.headaches.org.  Local providers of complementary therapies  These services may not be covered by insurance. Check your insurance plan.  [facility]  [phone]   Includes pain education, behavioral therapy,   trigger point injections and more.  [facility]   [phone]   Includes acupuncture, massage, myofascial release.  [facility] at the [facility]  [phone]  www.takingcharge.[facility]  Includes mindfulness, meditation, yoga.  Community Education     [place]: commed.mpls.[facility].mn.[facility]: commedprograms.[facility].org  Look for programs on yoga, mindfulness, etc.  [facility]: A Health Crisis Resource Center   [phone]  www.[place]ways[place].org  Includes mindfulness, yoga, body-mind skills.  For informational purposes only. Not to replace the advice of your health care provider.   Copyright   2011 [facility]. All rights reserved. [employer] 030910 - 11/15.    t in vomit or stool  Date Last Reviewed: 6/16/2015 2000-2017 [employer]. [address] [place] [address], [place] [address]. All rights reserved. This information is not intended as a substitute for professional  medical care. Always follow your healthcare professional's instructions.             Review of your medicines      Our records show that you are taking the medicines listed below. If these are incorrect, please call your family doctor or clinic.        Dose / Directions Last dose taken    diphenhydrAMINE 25 MG tablet   Commonly known as:  BENADRYL   Dose:  25 mg        Take 25 mg by mouth   Refills:  0        EPINEPHrine 0.3 MG/0.3ML injection   Commonly known as:  EPIPEN   Dose:  0.3 mg   Quantity:  4 each        Inject 0.3 mLs (0.3 mg) into the muscle once as needed for anaphylaxis   Refills:  6        etonogestrel 68 MG Impl   Commonly known as:  IMPLANON/NEXPLANON   Dose:  1 each        1 each (68 mg) by Subdermal route continuous   Refills:  0        famotidine 40 MG tablet   Commonly known as:  PEPCID   Dose:  40 mg   Quantity:  90 tablet        Take 1 tablet (40 mg) by mouth daily   Refills:  3        fluticasone 50 MCG/ACT spray   Commonly known as:  FLONASE   Dose:  1-2 spray   Quantity:  1 Bottle        Spray 1-2 sprays into both nostrils daily   Refills:  11        ibuprofen 400 MG tablet   Commonly known as:  ADVIL/MOTRIN   Dose:  800 mg   Quantity:  60 tablet        Take 2 tablets (800 mg) by mouth every 6 hours as needed for other (cramping)   Refills:  1        loratadine 10 MG tablet   Commonly known as:  CLARITIN   Dose:  10 mg        Take 10 mg by mouth daily   Refills:  0                Procedures and tests performed during your visit     Amylase    CBC with platelets differential    Comprehensive metabolic panel    Influenza A/B antigen    Lipase      Orders Needing Specimen Collection     None      Pending Results     No orders found from 1/8/2018 to 1/11/2018.            Pending Culture Results     No orders found from 1/8/2018 to 1/11/2018.            Thank you for choosing [facility]       Thank you for choosing [facility] for your care. Our goal is always to provide you with excellent care.  Hearing back from our patients is one way we can continue to improve our services. Please take a few minutes to complete the written survey that you may receive in the mail after you visit with us. Thank you!        [employer] Information     [employer] gives you secure access to your electronic health record. If you see a primary care provider, you can also send messages to your care team and make appointments. If you have questions, please call your primary care clinic.  If you do not have a primary care provider, please call [phone] and they will assist you.        Care EveryWhere ID     This is your Care EveryWhere ID. This could be used by other organizations to access your [facility] medical records  [id number]        Equal Access to Services     CRISS CHEN : Laxmi Villasenor, milton marie, shiraz olivia, vianey guevara. So [facility] [phone].    ATENCIÓN: Si habla español, tiene a solis disposición servicios gratuitos de asistencia lingüística. Llame al [phone].    We comply with applicable federal civil rights laws and [place] laws. We do not discriminate on the basis of race, color, national origin, age, disability, sex, sexual orientation, or gender identity.            After Visit Summary       This is your record. Keep this with you and show to your community pharmacist(s) and doctor(s) at your next visit.

## 2018-01-11 ASSESSMENT — ENCOUNTER SYMPTOMS
NAUSEA: 1
HEADACHES: 1
VOMITING: 1

## 2018-01-11 NOTE — DISCHARGE INSTRUCTIONS
Gastritis or Ulcer (No Antibiotic Treatment)    Gastritis is irritation and inflammation of the stomach lining. This means the lining is red and swollen. An ulcer is an open sore in the lining of the stomach. It may also occur in the duodenum (first part of the small intestine). The causes and symptoms of gastritis and ulcers are very similar.  Causes and risk factors for both problems can include:    Long-term use of nonsteroidal anti-inflammatory drugs (NSAIDs), such as aspirin and ibuprofen    H. pylori bacteria infection    Tobacco use    Alcohol use  Symptoms for both problems can include:    Dull or burning pain in the upper part of the belly    Loss of appetite    Heartburn or upset stomach    Frequent burping    Bloated feeling    Nausea with or without vomiting  You likely had an evaluation to help determine the exact cause and extent of your problem. This may have included a health history, exam, and certain tests.  Results showed that your problem is not due to H. pylori infection. For this reason, no antibiotics are needed as part of your treatment.  Whether your problem is found to be gastritis or an ulcer, you will still need to take other medicines, however. You will also need to follow instructions to help reduce stomach irritation so your stomach can heal.   Home care    Take any medicines you re prescribed exactly as directed. Common medicines used to treat gastritis include:    Antacids. These help neutralize the normal acids in your stomach.    Proton pump inhibitors. These block your stomach from making any acid.    H2 blockers.These reduce the amount of acid your stomach makes.    Bismuth subsalicylate. This helps protect the lining of your stomach from acid.    Avoid taking any NSAIDS during your treatment. If you take NSAID to help treat other health problems, tell your healthcare provider. He or she may need to adjust your medicine plan or change the dosage.    Don t use tobacco. Also  don t drink alcohol. These products can increase the amount of acid your stomach makes. This can delay healing. It can also worsen symptoms.  Follow-up care  Follow up with your healthcare provider, or as advised. In some cases, further testing may be needed.  When to seek medical advice  Call your healthcare provider right away if any of these occur:    Fever of 100.4 F (38 C) or higher or as directed by your provider    Stomach pain that worsens or moves to the lower right part of abdomen    Extreme fatigue    Weakness or dizziness    Continued weight loss    Frequent vomiting, blood in your vomit, or coffee ground-like substance in your vomit    Black, tarry, or bloody stools  Call 911  Call 911 right away if any of these occur:    Chest pain appears or worsens, or spreads to the back, neck, shoulder, or arm    Unusually fast heart rate    Trouble breathing or swallowing    Confusion    Extreme drowsiness or trouble waking up    Fainting    Large amounts of blood presen  About Migraine Headaches  What is a migraine headache?  A migraine is a very painful type of headache. It may last a few hours or days. During a migraine, you may have vision problems and feel sick to your stomach.  Migraines are three times more common in women than in men. Once they start, you may get them for the rest of your life. They may occur less often as you age.  What causes it?  We don't know the exact cause, but many things can trigger a migraine. These include:  Stress and anxiety  Lack of food or sleep  Foods and drinks that contain tyramine, such as:  Red marianne and some beers  Aged cheeses (like cheddar or blue cheese)  Yeast  Aged, dried or cured meats  Fermented foods like sauerkraut, soy sauce, miso and destiney chi  Too much light  Chemicals (gasoline, cleaning products, perfume, glue, etc.)  Weather changes  Certain medicines  Hormone changes (in women).  What are symptoms?  Some people can tell when they're about to have a  migraine. They may see flashing lights or zigzag lines in front of their eyes. Or they may lose their vision for a short time.  With a migraine, you may:  Feel pain or pulsing on one side of the head. For some people, the entire head hurts.  Be very sensitive to light and sound.  Feel nauseated (sick to your stomach) and vomit (throw up).  How is it treated?  Your care team may suggest medicine to prevent or relieve your symptoms. Once you start having migraines, you may also need medicine to keep them from getting worse.   Take your medicine at the first sign of a migraine. It may take several tries to find a medicine that works for you.   When a migraine comes:  Lie down in a quiet, dark room. Try not to bend over, as this may cause more pain.  Put a cold pack on your head. Try a bag of frozen vegetables, wrapped with a thin cloth.  Drink extra fluids. If you can't drink, suck on ice chips.  How can I prevent migraines?  It will help to keep a migraine diary. By keeping a diary, you may find the cause of your headaches. Once you know the cause, you can take steps to prevent migraines in the future.  It also helps to live a healthy lifestyle. This means:  Get regular exercise. (If exercise triggers your headaches, tell your doctor.)  Drink plenty of water.  Eat healthy meals at regular times.  Try to go to bed and get up and regular times.  Don't smoke. Avoid second-hand smoke.  Avoid caffeine. Coffee, tea and soda may help a migraine. But if you drink them too often, they can cause migraines.  Find ways to relax, have fun and reduce stress in your life.  Try complementary therapies (yoga, acupuncture, massage, biofeedback, etc.).  When should I call my clinic?  Call your clinic at once if you have new or unusual symptoms, such as:   Pain that gets worse or lasts more than 24 hours.  Slurred speech or problems talking.  A weak arm or leg that you can't move normally.  A fever over 100 F (37.8 C), taken under the  tongue.  Stiff neck.  Vomiting (throwing up) for several hours.  For more information about migraines  Contact the [employer] [place] (ACHE) at 1-369.271.4627 or www.headaches.org.  Local providers of complementary therapies  These services may not be covered by insurance. Check your insurance plan.  [facility]  [phone]   Includes pain education, behavioral therapy,   trigger point injections and more.  [facility]   [phone]   Includes acupuncture, massage, myofascial release.  [facility]  [phone]  [email or web address]  Includes mindfulness, meditation, yoga.  Community Education     [place]: commed.mpls.[facility].mn.[facility]: commedprograms.[facility].org  Look for programs on yoga, mindfulness, etc.  [facility]   [phone]  www.[facility].org  Includes mindfulness, yoga, body-mind skills.  For informational purposes only. Not to replace the advice of your health care provider.   Copyright   2011 [facility]. All rights reserved. [place] [id number] - 11/15.    t in vomit or stool  Date Last Reviewed: 6/16/2015 2000-2017 [employer]. [address] [place] [address], [place] [address]. All rights reserved. This information is not intended as a substitute for professional medical care. Always follow your healthcare professional's instructions.

## 2018-01-11 NOTE — ED PROVIDER NOTES
History     Chief Complaint   Patient presents with     Headache     Pt stated headache started today and then nausea with vomiting after headache started.     Patient is a 22 year old female presenting with headaches. The history is provided by the patient.   Headache   Pain location:  Generalized  Quality:  Stabbing  Severity currently:  7/10  Onset quality:  Gradual  Timing:  Constant  Chronicity:  Recurrent  Associated symptoms: nausea and vomiting          Problem List:    Patient Active Problem List    Diagnosis Date Noted     Obesity, unspecified obesity severity, unspecified obesity type 06/09/2017     Priority: Medium     Tobacco use disorder 06/09/2017     Priority: Medium     Esophageal reflux 06/09/2017     Priority: Medium     Chronic right-sided thoracic back pain 05/24/2016     Priority: Medium     ACP (advance care planning) 04/26/2016     Priority: Medium     Advance Care Planning 4/26/2016: ACP Review of Chart / Resources Provided:  Reviewed chart for advance care plan.  Carolyn Mallory has no plan or code status on file. Discussed available resources and provided with information. .  Added by Lizzette Garcia           Calculus of kidney 12/16/2015     Priority: Medium     Bilateral hydronephrosis, flank pain, 3mm right lower pole non obstructing stone.  Dr. [facility] consult.  Declined stents.  Reconsider if hospitalization and no improvement 2-3 days.         Sacro ilial pain 10/30/2015     Priority: Medium     Patellofemoral syndrome of both knees 01/20/2015     Priority: Medium     Allergic rhinitis 01/17/2014     Priority: Medium     Problem list name updated by automated process. Provider to review       Food allergy 01/17/2014     Priority: Medium        Past Medical History:    Past Medical History:   Diagnosis Date     NO ACTIVE PROBLEMS      Pregnancy        Past Surgical History:    Past Surgical History:   Procedure Laterality Date     ARTHROSCOPY KNEE Right 5/4/2015    Procedure:  "ARTHROSCOPY KNEE;  Surgeon: Hernando Kulkarni MD;  Location: [facility] OR     AS ESOPHAGOSCOPY, DIAGNOSTIC      upper     LAPAROSCOPIC CHOLECYSTECTOMY N/A 10/10/2015    Procedure: LAPAROSCOPIC CHOLECYSTECTOMY;  Surgeon: Drew Padgett MD;  Location: [facility] OR     none         Family History:    Family History   Problem Relation Age of Onset     Thrombophilia Mother      blood clotting     Lupus Mother      erythematosus     DIABETES Mother      Hypertension Father      Asthma Sister      DIABETES Maternal Grandfather      Hypertension Maternal Grandfather      Lupus Maternal Aunt      erythematosus       Social History:  Marital Status:  Single [1]  Social History   Substance Use Topics     Smoking status: Current Every Day Smoker     Packs/day: 0.03     Types: Cigarettes     Start date: 1/20/2014     Smokeless tobacco: Never Used     Alcohol use No        Medications:      famotidine (PEPCID) 40 MG tablet   fluticasone (FLONASE) 50 MCG/ACT spray   etonogestrel (IMPLANON/NEXPLANON) 68 MG IMPL   ibuprofen (ADVIL,MOTRIN) 400 MG tablet   loratadine (CLARITIN) 10 MG tablet   diphenhydrAMINE (BENADRYL) 25 MG tablet   EPINEPHrine (EPIPEN) 0.3 MG/0.3ML injection         Review of Systems   Gastrointestinal: Positive for nausea and vomiting.   Neurological: Positive for headaches.       Physical Exam   BP: 130/80  Heart Rate: 115  Temp: 98.5  F (36.9  C)  Resp: 16  Height: 157.5 cm (5' 2\")  SpO2: 99 %      Physical Exam   Constitutional: She is oriented to person, place, and time. She appears well-developed and well-nourished.   HENT:   Head: Normocephalic and atraumatic.   Mouth/Throat: No oropharyngeal exudate.   Eyes: Conjunctivae are normal. Pupils are equal, round, and reactive to light.   Neck: Normal range of motion. Neck supple. No JVD present. No tracheal deviation present. No thyromegaly present.   Cardiovascular: Normal rate, regular rhythm, normal heart sounds and intact distal pulses.  Exam reveals no gallop and no " friction rub.    No murmur heard.  Pulmonary/Chest: Effort normal and breath sounds normal. No stridor. No respiratory distress. She has no wheezes. She has no rales. She exhibits no tenderness.   Abdominal: Soft. Bowel sounds are normal. She exhibits no distension and no mass. There is no tenderness. There is no rebound and no guarding.   Musculoskeletal: Normal range of motion. She exhibits no edema or tenderness.   Lymphadenopathy:     She has no cervical adenopathy.   Neurological: She is alert and oriented to person, place, and time.   Skin: Skin is warm and dry. No rash noted. No erythema. No pallor.   Psychiatric: Her behavior is normal.   Nursing note and vitals reviewed.      ED Course     ED Course     Procedures             Labs Ordered and Resulted from Time of ED Arrival Up to the Time of Departure from the ED   CBC WITH PLATELETS DIFFERENTIAL - Abnormal; Notable for the following:        Result Value    WBC 11.3 (*)     All other components within normal limits   COMPREHENSIVE METABOLIC PANEL - Abnormal; Notable for the following:     Calcium 8.3 (*)     All other components within normal limits   AMYLASE   LIPASE   INFLUENZA A/B ANTIGEN       Assessments & Plan (with Medical Decision Making)   Nausea vomiting headache  Symptoms resolved after receiving pepcid + reglan + benedryl  Symptoms likely related migraine and gastritis  Dc home  I have reviewed the nursing notes.    I have reviewed the findings, diagnosis, plan and need for follow up with the patient.      New Prescriptions    No medications on file       Final diagnoses:   Other acute gastritis without hemorrhage   Migraine without aura and without status migrainosus, not intractable       1/10/2018   [facility]     Augustus Barnes MD  01/11/18 0012

## 2018-01-11 NOTE — ED NOTES
Condition stable, understands discharge instructions.  Work note given to patient.  Discharged home.

## 2018-01-11 NOTE — ED NOTES
"In ED for \"dizziness, headache and intermittent nausea.  Also having generalized body aches.\"    "

## 2018-02-01 ENCOUNTER — HOSPITAL ENCOUNTER (EMERGENCY)
Facility: HOSPITAL | Age: 23
Discharge: HOME | End: 2018-02-02
Attending: INTERNAL MEDICINE | Admitting: INTERNAL MEDICINE
Payer: COMMERCIAL

## 2018-02-01 ENCOUNTER — APPOINTMENT (OUTPATIENT)
Dept: CT IMAGING | Facility: HOSPITAL | Age: 23
End: 2018-02-01
Attending: INTERNAL MEDICINE
Payer: COMMERCIAL

## 2018-02-01 DIAGNOSIS — N83.202 LEFT OVARIAN CYST: ICD-10-CM

## 2018-02-01 DIAGNOSIS — R10.31 ABDOMINAL PAIN, RIGHT LOWER QUADRANT: ICD-10-CM

## 2018-02-01 LAB
ALBUMIN SERPL-MCNC: 4 G/DL (ref 3.4–5)
ALBUMIN UR-MCNC: NEGATIVE MG/DL
ALP SERPL-CCNC: 87 U/L (ref 40–150)
ALT SERPL W P-5'-P-CCNC: 35 U/L (ref 0–50)
ANION GAP SERPL CALCULATED.3IONS-SCNC: 10 MMOL/L (ref 3–14)
APPEARANCE UR: CLEAR
AST SERPL W P-5'-P-CCNC: 19 U/L (ref 0–45)
BACTERIA #/AREA URNS HPF: ABNORMAL /HPF
BASOPHILS # BLD AUTO: 0 10E9/L (ref 0–0.2)
BASOPHILS NFR BLD AUTO: 0.4 %
BILIRUB SERPL-MCNC: 0.2 MG/DL (ref 0.2–1.3)
BILIRUB UR QL STRIP: NEGATIVE
BUN SERPL-MCNC: 8 MG/DL (ref 7–30)
CALCIUM SERPL-MCNC: 8.5 MG/DL (ref 8.5–10.1)
CHLORIDE SERPL-SCNC: 108 MMOL/L (ref 94–109)
CO2 SERPL-SCNC: 25 MMOL/L (ref 20–32)
COLOR UR AUTO: ABNORMAL
CREAT SERPL-MCNC: 0.81 MG/DL (ref 0.52–1.04)
CRP SERPL-MCNC: <2.9 MG/L (ref 0–8)
DIFFERENTIAL METHOD BLD: NORMAL
EOSINOPHIL # BLD AUTO: 0.2 10E9/L (ref 0–0.7)
EOSINOPHIL NFR BLD AUTO: 2.2 %
ERYTHROCYTE [DISTWIDTH] IN BLOOD BY AUTOMATED COUNT: 12.6 % (ref 10–15)
GFR SERPL CREATININE-BSD FRML MDRD: 88 ML/MIN/1.7M2
GLUCOSE SERPL-MCNC: 84 MG/DL (ref 70–99)
GLUCOSE UR STRIP-MCNC: NEGATIVE MG/DL
HCG UR QL: NEGATIVE
HCT VFR BLD AUTO: 37.6 % (ref 35–47)
HGB BLD-MCNC: 12.8 G/DL (ref 11.7–15.7)
HGB UR QL STRIP: NEGATIVE
IMM GRANULOCYTES # BLD: 0 10E9/L (ref 0–0.4)
IMM GRANULOCYTES NFR BLD: 0.1 %
KETONES UR STRIP-MCNC: NEGATIVE MG/DL
LEUKOCYTE ESTERASE UR QL STRIP: NEGATIVE
LYMPHOCYTES # BLD AUTO: 3.8 10E9/L (ref 0.8–5.3)
LYMPHOCYTES NFR BLD AUTO: 51.8 %
MCH RBC QN AUTO: 30.9 PG (ref 26.5–33)
MCHC RBC AUTO-ENTMCNC: 34 G/DL (ref 31.5–36.5)
MCV RBC AUTO: 91 FL (ref 78–100)
MONOCYTES # BLD AUTO: 0.4 10E9/L (ref 0–1.3)
MONOCYTES NFR BLD AUTO: 5.7 %
MUCOUS THREADS #/AREA URNS LPF: PRESENT /LPF
NEUTROPHILS # BLD AUTO: 2.9 10E9/L (ref 1.6–8.3)
NEUTROPHILS NFR BLD AUTO: 39.8 %
NITRATE UR QL: NEGATIVE
NRBC # BLD AUTO: 0 10*3/UL
NRBC BLD AUTO-RTO: 0 /100
PH UR STRIP: 7 PH (ref 4.7–8)
PLATELET # BLD AUTO: 335 10E9/L (ref 150–450)
POTASSIUM SERPL-SCNC: 3.5 MMOL/L (ref 3.4–5.3)
PROT SERPL-MCNC: 7.3 G/DL (ref 6.8–8.8)
RBC # BLD AUTO: 4.14 10E12/L (ref 3.8–5.2)
RBC #/AREA URNS AUTO: 1 /HPF (ref 0–2)
SODIUM SERPL-SCNC: 143 MMOL/L (ref 133–144)
SOURCE: ABNORMAL
SP GR UR STRIP: 1 (ref 1–1.03)
SQUAMOUS #/AREA URNS AUTO: 1 /HPF (ref 0–1)
UROBILINOGEN UR STRIP-MCNC: NORMAL MG/DL (ref 0–2)
WBC # BLD AUTO: 7.4 10E9/L (ref 4–11)
WBC #/AREA URNS AUTO: 1 /HPF (ref 0–2)

## 2018-02-01 PROCEDURE — 96374 THER/PROPH/DIAG INJ IV PUSH: CPT | Mod: 59

## 2018-02-01 PROCEDURE — 25000128 H RX IP 250 OP 636: Performed by: INTERNAL MEDICINE

## 2018-02-01 PROCEDURE — 74177 CT ABD & PELVIS W/CONTRAST: CPT | Mod: TC

## 2018-02-01 PROCEDURE — 36415 COLL VENOUS BLD VENIPUNCTURE: CPT | Performed by: INTERNAL MEDICINE

## 2018-02-01 PROCEDURE — 81025 URINE PREGNANCY TEST: CPT | Performed by: INTERNAL MEDICINE

## 2018-02-01 PROCEDURE — 81001 URINALYSIS AUTO W/SCOPE: CPT | Performed by: INTERNAL MEDICINE

## 2018-02-01 PROCEDURE — 86140 C-REACTIVE PROTEIN: CPT | Performed by: INTERNAL MEDICINE

## 2018-02-01 PROCEDURE — 99285 EMERGENCY DEPT VISIT HI MDM: CPT | Performed by: INTERNAL MEDICINE

## 2018-02-01 PROCEDURE — 99285 EMERGENCY DEPT VISIT HI MDM: CPT | Mod: 25

## 2018-02-01 PROCEDURE — 85025 COMPLETE CBC W/AUTO DIFF WBC: CPT | Performed by: INTERNAL MEDICINE

## 2018-02-01 PROCEDURE — 80053 COMPREHEN METABOLIC PANEL: CPT | Performed by: INTERNAL MEDICINE

## 2018-02-01 RX ORDER — ONDANSETRON 2 MG/ML
4 INJECTION INTRAMUSCULAR; INTRAVENOUS ONCE
Status: COMPLETED | OUTPATIENT
Start: 2018-02-01 | End: 2018-02-01

## 2018-02-01 RX ORDER — IOPAMIDOL 612 MG/ML
100 INJECTION, SOLUTION INTRAVASCULAR ONCE
Status: COMPLETED | OUTPATIENT
Start: 2018-02-01 | End: 2018-02-02

## 2018-02-01 RX ADMIN — ONDANSETRON 4 MG: 2 INJECTION INTRAMUSCULAR; INTRAVENOUS at 23:36

## 2018-02-01 NOTE — ED AVS SNAPSHOT
[facility]    [address] [address]    Phone:  [phone]                                       Carolyn Mallory   MRN: [id number]    Department:  [facility]   Date of Visit:  2/1/2018           After Visit Summary Signature Page     I have received my discharge instructions, and my questions have been answered. I have discussed any challenges I see with this plan with the nurse or doctor.    ..........................................................................................................................................  Patient/Patient Representative Signature      ..........................................................................................................................................  Patient Representative Print Name and Relationship to Patient    ..................................................               ................................................  Date                                            Time    ..........................................................................................................................................  Reviewed by Signature/Title    ...................................................              ..............................................  Date                                                            Time

## 2018-02-01 NOTE — ED AVS SNAPSHOT
[facility]    [address]    [place] [address]    Phone:  [phone]                                       Carolyn Mallory   MRN: [id number]    Department:  [facility]   Date of Visit:  2/1/2018           Patient Information     Date Of Birth          1995        Your diagnoses for this visit were:     Abdominal pain, right lower quadrant     Left ovarian cyst        You were seen by Augustus Barnes MD.      Follow-up Information     [facility] with [facility]    Contact information:    [place] [address]  [phone]          Discharge Instructions           Abdominal Pain    Abdominal pain is pain in the stomach or belly area. Everyone has this pain from time to time. In many cases it goes away on its own. But abdominal pain can sometimes be due to a serious problem, such as appendicitis. So it s important to know when to seek help.  Causes of abdominal pain  There are many possible causes of abdominal pain. Common causes in adults include:    Constipation, diarrhea, or gas    Stomach acid flowing back up into the esophagus (acid reflux or heartburn)    Severe acid reflux, called GERD (gastroesophageal reflux disease)    A sore in the lining of the stomach or small intestine (peptic ulcer)    Inflammation of the gallbladder, liver, or pancreas    Gallstones or kidney stones    Appendicitis     Intestinal blockage     An internal organ pushing through a muscle or other tissue (hernia)    Urinary tract infections    In women, menstrual cramps, fibroids, or endometriosis    Inflammation or infection of the intestines  Diagnosing the cause of abdominal pain  Your healthcare provider will do a physical exam help find the cause of your pain. If needed, tests will be ordered. Belly pain has many possible causes. So it can be hard to find the reason for your pain. Giving details about your pain can help. Tell your provider where and when you  feel the pain, and what makes it better or worse. Also let your provider know if you have other symptoms such as:    Fever    Tiredness    Upset stomach (nausea)    Vomiting    Changes in bathroom habits  Treating abdominal pain  Some causes of pain need emergency medical treatment right away. These include appendicitis or a bowel blockage. Other problems can be treated with rest, fluids, or medicines. Your healthcare provider can give you specific instructions for treatment or self-care based on what is causing your pain.  If you have vomiting or diarrhea, sip water or other clear fluids. When you are ready to eat solid foods again, start with small amounts of easy-to-digest, low-fat foods. These include apple sauce, toast, or crackers.   When to seek medical care  Call 911 or go to the hospital right away if you:    Can t pass stool and are vomiting    Are vomiting blood or have bloody diarrhea or black, tarry diarrhea    Have chest, neck, or shoulder pain    Feel like you might pass out    Have pain in your shoulder blades with nausea    Have sudden, severe belly pain    Have new, severe pain unlike any you have felt before    Have a belly that is rigid, hard, and tender to touch  Call your healthcare provider if you have:    Pain for more than 5 days    Bloating for more than 2 days    Diarrhea for more than 5 days    A fever of 100.4 F (38.0 C) or higher, or as directed by your provider    Pain that gets worse    Weight loss for no reason    Continued lack of appetite    Blood in your stool  How to prevent abdominal pain  Here are some tips to help prevent abdominal pain:    Eat smaller amounts of food at one time.    Avoid greasy, fried, or other high-fat foods.    Avoid foods that give you gas.    Exercise regularly.    Drink plenty of fluids.  To help prevent GERD symptoms:    Quit smoking.    Reduce alcohol and certain foods that increase stomach acid.    Avoid aspirin and over-the-counter pain and fever  medicines (NSAIDS or nonsteroidal anti-inflammatory drugs), if possible    Lose extra weight.    Finish eating at least 2 hours before you go to bed or lie down.    Raise the head of your bed.  Date Last Reviewed: 7/1/2016 2000-2017 [employer]. [address] [place] [address], [place] [address]. All rights reserved. This information is not intended as a substitute for professional medical care. Always follow your healthcare professional's instructions.      What to expect when you have contrast    During your exam, we will inject  contrast  into your vein or artery. (Contrast is a clear liquid with iodine in it. It shows up on X-rays.)    You may feel warm or hot. You may have a metal taste in your mouth and a slight upset stomach. You may also feel pressure near the kidneys and bladder. These effects will last about 1 to 3 minutes.    Please tell us if you have:    Sneezing     Itching    Hives     Swelling in the face    A hoarse voice    Breathing problems    Other new symptoms    Serious problems are rare.  They may include:    Irregular heartbeat     Seizures    Kidney failure              Tissue damage    Shock      Death    If you have any problems during the exam, we  will treat them right away.    When you get home    Call your hospital if you have any new symptoms in the next 2 days, like hives or swelling. (Phone numbers are at the bottom of this page.) Or call your family doctor.     If you have wheezing or trouble breathing, call 911.    Self-care  -Drink at least 4 extra glasses of water today.   This reduces the stress on your kidneys.  -Keep taking your regular medicines.    The contrast will pass out of your body in your  Urine(pee). This will happen in the next 24 hours. You  will not feel this. Your urine will not  change color.    If you have kidney problems or take metformin    Drink 4 to 8 large glasses of water for the next  2 days, if you are not on a fluid restriction.    ?If you  take metformin (Glucophage or Glucovance) for diabetes, keep taking it.      ?Your kidney function tests are abnormal.  If you take Metformin, do not take it for 48 hours. Please go to your clinic for a blood test within 3 days after your exam before the restarting this medicine.     (Note to provider:please give patient prescription for lab tests.)    ?Special instructions: Drink an extra 4 glasses of water to flush out the contrast.     I have read and understand the above information.    Patient Sign Here:______________________________________Date:________Time:______    Staff Sign Here:________________________________________Date:_______Time:______      Radiology Departments:     ?[facility]: [phone] ?[facility]: [phone]     ?[facility]: [phone] ?[facility]:[phone]      ?[facility]: [phone]  ?[facility]: [phone]  ?[facility]:[phone]    ?[facility]:[phone]  ?[facility]:[phone]       Review of your medicines      Our records show that you are taking the medicines listed below. If these are incorrect, please call your family doctor or clinic.        Dose / Directions Last dose taken    diphenhydrAMINE 25 MG tablet   Commonly known as:  BENADRYL   Dose:  25 mg        Take 25 mg by mouth   Refills:  0        EPINEPHrine 0.3 MG/0.3ML injection   Commonly known as:  EPIPEN   Dose:  0.3 mg   Quantity:  4 each        Inject 0.3 mLs (0.3 mg) into the muscle once as needed for anaphylaxis   Refills:  6        etonogestrel 68 MG Impl   Commonly known as:  IMPLANON/NEXPLANON   Dose:  1 each        1 each (68 mg) by Subdermal route continuous   Refills:  0        famotidine 40 MG tablet   Commonly known as:  PEPCID   Dose:  40 mg   Quantity:  90 tablet        Take 1 tablet (40 mg) by mouth daily   Refills:  3        fluticasone 50 MCG/ACT spray   Commonly known as:  FLONASE   Dose:  1-2 spray   Quantity:  1 Bottle        Spray 1-2 sprays into both nostrils daily   Refills:  11         ibuprofen 400 MG tablet   Commonly known as:  ADVIL/MOTRIN   Dose:  800 mg   Quantity:  60 tablet        Take 2 tablets (800 mg) by mouth every 6 hours as needed for other (cramping)   Refills:  1        loratadine 10 MG tablet   Commonly known as:  CLARITIN   Dose:  10 mg        Take 10 mg by mouth daily   Refills:  0                Procedures and tests performed during your visit     Abd/pelvis CT - IV contrast only TRAUMA / AAA    CBC with platelets differential    CRP inflammation    Comprehensive metabolic panel    HCG qualitative urine    UA with Microscopic reflex to Culture      Orders Needing Specimen Collection     None      Pending Results     Date and Time Order Name Status Description    2/1/2018 2233 Abd/pelvis CT - IV contrast only TRAUMA / AAA In process             Pending Culture Results     No orders found for last 3 day(s).            Thank you for choosing [facility]       Thank you for choosing [facility] for your care. Our goal is always to provide you with excellent care. Hearing back from our patients is one way we can continue to improve our services. Please take a few minutes to complete the written survey that you may receive in the mail after you visit with us. Thank you!        [employer] Information     [employer] gives you secure access to your electronic health record. If you see a primary care provider, you can also send messages to your care team and make appointments. If you have questions, please call your primary care clinic.  If you do not have a primary care provider, please call [phone] and they will assist you.        Care EveryWhere ID     This is your Care EveryWhere ID. This could be used by other organizations to access your [facility] medical records  [id number]        Equal Access to Services     CRISS CHEN : Laxmi Villasenor, milton marie, vianey padilla. So [facility] [phone].    ATENCIÓN: Godfrey choi  español, tiene a solis disposición servicios gratuitos de asistencia lingüística. Pepito al [phone].    We comply with applicable federal civil rights laws and [place] laws. We do not discriminate on the basis of race, color, national origin, age, disability, sex, sexual orientation, or gender identity.            After Visit Summary       This is your record. Keep this with you and show to your community pharmacist(s) and doctor(s) at your next visit.

## 2018-02-02 VITALS — TEMPERATURE: 98.4 F | DIASTOLIC BLOOD PRESSURE: 78 MMHG | OXYGEN SATURATION: 100 % | SYSTOLIC BLOOD PRESSURE: 120 MMHG

## 2018-02-02 PROCEDURE — 25000128 H RX IP 250 OP 636: Performed by: INTERNAL MEDICINE

## 2018-02-02 RX ADMIN — IOPAMIDOL 100 ML: 612 INJECTION, SOLUTION INTRAVENOUS at 00:38

## 2018-02-02 ASSESSMENT — ENCOUNTER SYMPTOMS
NUMBNESS: 0
NAUSEA: 1
VOICE CHANGE: 0
ANAL BLEEDING: 0
DIZZINESS: 0
VOMITING: 0
SHORTNESS OF BREATH: 0
NECK STIFFNESS: 0
CONFUSION: 0
ABDOMINAL DISTENTION: 0
BACK PAIN: 0
PALPITATIONS: 0
MYALGIAS: 0
ARTHRALGIAS: 0
DIAPHORESIS: 0
HEADACHES: 0
CHEST TIGHTNESS: 0
FEVER: 0
WHEEZING: 0
BLOOD IN STOOL: 0
COLOR CHANGE: 0
NECK PAIN: 0
HEMATURIA: 0
FLANK PAIN: 0
CHILLS: 0
COUGH: 0
DYSURIA: 0
ABDOMINAL PAIN: 1
WOUND: 0
LIGHT-HEADEDNESS: 0

## 2018-02-02 NOTE — DISCHARGE INSTRUCTIONS
Abdominal Pain    Abdominal pain is pain in the stomach or belly area. Everyone has this pain from time to time. In many cases it goes away on its own. But abdominal pain can sometimes be due to a serious problem, such as appendicitis. So it s important to know when to seek help.  Causes of abdominal pain  There are many possible causes of abdominal pain. Common causes in adults include:    Constipation, diarrhea, or gas    Stomach acid flowing back up into the esophagus (acid reflux or heartburn)    Severe acid reflux, called GERD (gastroesophageal reflux disease)    A sore in the lining of the stomach or small intestine (peptic ulcer)    Inflammation of the gallbladder, liver, or pancreas    Gallstones or kidney stones    Appendicitis     Intestinal blockage     An internal organ pushing through a muscle or other tissue (hernia)    Urinary tract infections    In women, menstrual cramps, fibroids, or endometriosis    Inflammation or infection of the intestines  Diagnosing the cause of abdominal pain  Your healthcare provider will do a physical exam help find the cause of your pain. If needed, tests will be ordered. Belly pain has many possible causes. So it can be hard to find the reason for your pain. Giving details about your pain can help. Tell your provider where and when you feel the pain, and what makes it better or worse. Also let your provider know if you have other symptoms such as:    Fever    Tiredness    Upset stomach (nausea)    Vomiting    Changes in bathroom habits  Treating abdominal pain  Some causes of pain need emergency medical treatment right away. These include appendicitis or a bowel blockage. Other problems can be treated with rest, fluids, or medicines. Your healthcare provider can give you specific instructions for treatment or self-care based on what is causing your pain.  If you have vomiting or diarrhea, sip water or other clear fluids. When you are ready to eat solid foods again,  start with small amounts of easy-to-digest, low-fat foods. These include apple sauce, toast, or crackers.   When to seek medical care  Call 911 or go to the hospital right away if you:    Can t pass stool and are vomiting    Are vomiting blood or have bloody diarrhea or black, tarry diarrhea    Have chest, neck, or shoulder pain    Feel like you might pass out    Have pain in your shoulder blades with nausea    Have sudden, severe belly pain    Have new, severe pain unlike any you have felt before    Have a belly that is rigid, hard, and tender to touch  Call your healthcare provider if you have:    Pain for more than 5 days    Bloating for more than 2 days    Diarrhea for more than 5 days    A fever of 100.4 F (38.0 C) or higher, or as directed by your provider    Pain that gets worse    Weight loss for no reason    Continued lack of appetite    Blood in your stool  How to prevent abdominal pain  Here are some tips to help prevent abdominal pain:    Eat smaller amounts of food at one time.    Avoid greasy, fried, or other high-fat foods.    Avoid foods that give you gas.    Exercise regularly.    Drink plenty of fluids.  To help prevent GERD symptoms:    Quit smoking.    Reduce alcohol and certain foods that increase stomach acid.    Avoid aspirin and over-the-counter pain and fever medicines (NSAIDS or nonsteroidal anti-inflammatory drugs), if possible    Lose extra weight.    Finish eating at least 2 hours before you go to bed or lie down.    Raise the head of your bed.  Date Last Reviewed: 7/1/2016 2000-2017 [employer]. [address] [place] [address], [place] [address]. All rights reserved. This information is not intended as a substitute for professional medical care. Always follow your healthcare professional's instructions.      What to expect when you have contrast    During your exam, we will inject  contrast  into your vein or artery. (Contrast is a clear liquid with iodine in it. It shows up on  X-rays.)    You may feel warm or hot. You may have a metal taste in your mouth and a slight upset stomach. You may also feel pressure near the kidneys and bladder. These effects will last about 1 to 3 minutes.    Please tell us if you have:    Sneezing     Itching    Hives     Swelling in the face    A hoarse voice    Breathing problems    Other new symptoms    Serious problems are rare.  They may include:    Irregular heartbeat     Seizures    Kidney failure              Tissue damage    Shock      Death    If you have any problems during the exam, we  will treat them right away.    When you get home    Call your hospital if you have any new symptoms in the next 2 days, like hives or swelling. (Phone numbers are at the bottom of this page.) Or call your family doctor.     If you have wheezing or trouble breathing, call 911.    Self-care  -Drink at least 4 extra glasses of water today.   This reduces the stress on your kidneys.  -Keep taking your regular medicines.    The contrast will pass out of your body in your  Urine(pee). This will happen in the next 24 hours. You  will not feel this. Your urine will not  change color.    If you have kidney problems or take metformin    Drink 4 to 8 large glasses of water for the next  2 days, if you are not on a fluid restriction.    ?If you take metformin (Glucophage or Glucovance) for diabetes, keep taking it.      ?Your kidney function tests are abnormal.  If you take Metformin, do not take it for 48 hours. Please go to your clinic for a blood test within 3 days after your exam before the restarting this medicine.     (Note to provider:please give patient prescription for lab tests.)    ?Special instructions: Drink an extra 4 glasses of water to flush out the contrast.     I have read and understand the above information.    Patient Sign Here:______________________________________Date:________Time:______    Staff Sign  Here:________________________________________Date:_______Time:______      Radiology Departments:     ?[facility]: [phone] ?[facility]: [phone]     ?[facility]: [phone] ?[facility]:[phone]      ?[facility]: [phone]  ?[facility]: [phone]  ?[facility]:[phone]    ?[facility]:[phone]  ?[facility]:[phone]

## 2018-02-02 NOTE — ED NOTES
Discharge instructions gone over with patient and she states understanding. Patient is then discharged in stable condition, ambulatory.

## 2018-02-02 NOTE — ED NOTES
Patient states that at 1300 today she developed right lower quadrant pain and some nausea. States pain came on all of a sudden while sitting. States it is a sharp pain that comes and goes and lasts about 1/2 hour. Last bowel movement was this morning. Denies problems with urination. Denies vaginal bleeding or discharge. Does state that she also developed some pain in right flank a little while after abdomen pain started. States that she last ate at 1800 and had some pizza. Nausea and pain increased after eating. Has not vomited. Also states history of kidney stones and thinking this is similar to when she had them before.

## 2018-02-03 NOTE — ED PROVIDER NOTES
History     Chief Complaint   Patient presents with     Abdominal Pain     sharp, intermittent pain RLQ since about 1330     Patient is a 22 year old female presenting with abdominal pain. The history is provided by the patient.   Abdominal Pain   Pain location:  RLQ  Pain quality: aching and sharp    Pain radiates to:  Does not radiate  Onset quality:  Gradual  Timing:  Constant  Progression:  Worsening  Chronicity:  New  Associated symptoms: nausea    Associated symptoms: no chest pain, no chills, no cough, no dysuria, no fever, no hematuria, no shortness of breath and no vomiting          Problem List:    Patient Active Problem List    Diagnosis Date Noted     Obesity, unspecified obesity severity, unspecified obesity type 06/09/2017     Priority: Medium     Tobacco use disorder 06/09/2017     Priority: Medium     Esophageal reflux 06/09/2017     Priority: Medium     Chronic right-sided thoracic back pain 05/24/2016     Priority: Medium     ACP (advance care planning) 04/26/2016     Priority: Medium     Advance Care Planning 4/26/2016: ACP Review of Chart / Resources Provided:  Reviewed chart for advance care plan.  Carolyn OLIVIA Mallory has no plan or code status on file. Discussed available resources and provided with information. .  Added by Lizzette Garcia           Calculus of kidney 12/16/2015     Priority: Medium     Bilateral hydronephrosis, flank pain, 3mm right lower pole non obstructing stone.  Dr. [facility] consult.  Declined stents.  Reconsider if hospitalization and no improvement 2-3 days.         Sacro ilial pain 10/30/2015     Priority: Medium     Patellofemoral syndrome of both knees 01/20/2015     Priority: Medium     Allergic rhinitis 01/17/2014     Priority: Medium     Problem list name updated by automated process. Provider to review       Food allergy 01/17/2014     Priority: Medium        Past Medical History:    Past Medical History:   Diagnosis Date     NO ACTIVE PROBLEMS      Pregnancy         Past Surgical History:    Past Surgical History:   Procedure Laterality Date     ARTHROSCOPY KNEE Right 5/4/2015    Procedure: ARTHROSCOPY KNEE;  Surgeon: Hernando Kulkarni MD;  Location: [facility]     AS ESOPHAGOSCOPY, DIAGNOSTIC      upper     LAPAROSCOPIC CHOLECYSTECTOMY N/A 10/10/2015    Procedure: LAPAROSCOPIC CHOLECYSTECTOMY;  Surgeon: Drew Padgett MD;  Location: [facility] OR     none         Family History:    Family History   Problem Relation Age of Onset     Thrombophilia Mother      blood clotting     Lupus Mother      erythematosus     DIABETES Mother      Hypertension Father      Asthma Sister      DIABETES Maternal Grandfather      Hypertension Maternal Grandfather      Lupus Maternal Aunt      erythematosus       Social History:  Marital Status:  Single [1]  Social History   Substance Use Topics     Smoking status: Current Every Day Smoker     Packs/day: 0.03     Types: Cigarettes     Start date: 1/20/2014     Smokeless tobacco: Never Used     Alcohol use No        Medications:      famotidine (PEPCID) 40 MG tablet   fluticasone (FLONASE) 50 MCG/ACT spray   diphenhydrAMINE (BENADRYL) 25 MG tablet   etonogestrel (IMPLANON/NEXPLANON) 68 MG IMPL   ibuprofen (ADVIL,MOTRIN) 400 MG tablet   loratadine (CLARITIN) 10 MG tablet   EPINEPHrine (EPIPEN) 0.3 MG/0.3ML injection         Review of Systems   Constitutional: Negative for chills, diaphoresis and fever.   HENT: Negative for voice change.    Eyes: Negative for visual disturbance.   Respiratory: Negative for cough, chest tightness, shortness of breath and wheezing.    Cardiovascular: Negative for chest pain, palpitations and leg swelling.   Gastrointestinal: Positive for abdominal pain and nausea. Negative for abdominal distention, anal bleeding, blood in stool and vomiting.   Genitourinary: Negative for decreased urine volume, dysuria, flank pain and hematuria.   Musculoskeletal: Negative for arthralgias, back pain, gait problem, myalgias, neck pain and  neck stiffness.   Skin: Negative for color change, pallor, rash and wound.   Neurological: Negative for dizziness, syncope, light-headedness, numbness and headaches.   Psychiatric/Behavioral: Negative for confusion and suicidal ideas.       Physical Exam   BP: 132/75  Heart Rate: 100  Temp: 98.2  F (36.8  C)  SpO2: 100 %      Physical Exam   Constitutional: She is oriented to person, place, and time. She appears well-developed and well-nourished.   HENT:   Head: Normocephalic and atraumatic.   Mouth/Throat: No oropharyngeal exudate.   Eyes: Conjunctivae are normal. Pupils are equal, round, and reactive to light.   Neck: Normal range of motion. Neck supple. No JVD present. No tracheal deviation present. No thyromegaly present.   Cardiovascular: Normal rate, regular rhythm, normal heart sounds and intact distal pulses.  Exam reveals no gallop and no friction rub.    No murmur heard.  Pulmonary/Chest: Effort normal and breath sounds normal. No stridor. No respiratory distress. She has no wheezes. She has no rales. She exhibits no tenderness.   Abdominal: Soft. Bowel sounds are normal. She exhibits no distension and no mass. There is tenderness in the right lower quadrant. There is no rebound and no guarding.   Musculoskeletal: Normal range of motion. She exhibits no edema or tenderness.   Lymphadenopathy:     She has no cervical adenopathy.   Neurological: She is alert and oriented to person, place, and time.   Skin: Skin is warm and dry. No rash noted. No erythema. No pallor.   Psychiatric: Her behavior is normal.   Nursing note and vitals reviewed.      ED Course     ED Course     Procedures                 Labs Ordered and Resulted from Time of ED Arrival Up to the Time of Departure from the ED   ROUTINE UA WITH MICROSCOPIC REFLEX TO CULTURE - Abnormal; Notable for the following:        Result Value    Bacteria Urine Few (*)     Mucous Urine Present (*)     All other components within normal limits   CBC WITH  PLATELETS DIFFERENTIAL   COMPREHENSIVE METABOLIC PANEL   CRP INFLAMMATION   HCG QUALITATIVE URINE       Assessments & Plan (with Medical Decision Making)   RLQ pain and tenderness since afternoon  Feeling nauseated  Labs: WNL  UA:negative  CT abd negative excepted incidental 3 cm left ovarian cyst    Pt observed in ER , symptoms resolved  I explained to her about left ovarian cyst, I advised her to follow with her PCP for following official report of CT abd and US outpatient if needed  She understood and agreed  I have reviewed the nursing notes.    I have reviewed the findings, diagnosis, plan and need for follow up with the patient.      Discharge Medication List as of 2/2/2018  2:09 AM          Final diagnoses:   Abdominal pain, right lower quadrant   Left ovarian cyst       2/1/2018   [facility]     Augustus Barnes MD  02/02/18 1222

## 2018-03-01 ENCOUNTER — DOCUMENTATION ONLY (OUTPATIENT)
Dept: FAMILY MEDICINE | Facility: OTHER | Age: 23
End: 2018-03-01

## 2018-03-01 PROBLEM — Z91.09 ALLERGY TO POLLEN: Status: ACTIVE | Noted: 2018-03-01

## 2018-03-13 ENCOUNTER — HOSPITAL ENCOUNTER (EMERGENCY)
Facility: HOSPITAL | Age: 23
Discharge: HOME | End: 2018-03-14
Attending: FAMILY MEDICINE | Admitting: FAMILY MEDICINE
Payer: COMMERCIAL

## 2018-03-13 ENCOUNTER — APPOINTMENT (OUTPATIENT)
Dept: CT IMAGING | Facility: HOSPITAL | Age: 23
End: 2018-03-13
Attending: FAMILY MEDICINE
Payer: COMMERCIAL

## 2018-03-13 DIAGNOSIS — R10.84 ABDOMINAL PAIN, GENERALIZED: ICD-10-CM

## 2018-03-13 DIAGNOSIS — N83.202 LEFT OVARIAN CYST: ICD-10-CM

## 2018-03-13 LAB
ALBUMIN SERPL-MCNC: 3.7 G/DL (ref 3.4–5)
ALBUMIN UR-MCNC: NEGATIVE MG/DL
ALP SERPL-CCNC: 84 U/L (ref 40–150)
ALT SERPL W P-5'-P-CCNC: 27 U/L (ref 0–50)
ANION GAP SERPL CALCULATED.3IONS-SCNC: 6 MMOL/L (ref 3–14)
APPEARANCE UR: CLEAR
AST SERPL W P-5'-P-CCNC: 12 U/L (ref 0–45)
BASOPHILS # BLD AUTO: 0 10E9/L (ref 0–0.2)
BASOPHILS NFR BLD AUTO: 0.2 %
BILIRUB SERPL-MCNC: 0.2 MG/DL (ref 0.2–1.3)
BILIRUB UR QL STRIP: NEGATIVE
BUN SERPL-MCNC: 6 MG/DL (ref 7–30)
CALCIUM SERPL-MCNC: 8.4 MG/DL (ref 8.5–10.1)
CHLORIDE SERPL-SCNC: 108 MMOL/L (ref 94–109)
CO2 SERPL-SCNC: 25 MMOL/L (ref 20–32)
COLOR UR AUTO: NORMAL
CREAT SERPL-MCNC: 0.79 MG/DL (ref 0.52–1.04)
CRP SERPL-MCNC: 13.5 MG/L (ref 0–8)
DIFFERENTIAL METHOD BLD: ABNORMAL
EOSINOPHIL # BLD AUTO: 0.2 10E9/L (ref 0–0.7)
EOSINOPHIL NFR BLD AUTO: 1.7 %
ERYTHROCYTE [DISTWIDTH] IN BLOOD BY AUTOMATED COUNT: 12.1 % (ref 10–15)
GFR SERPL CREATININE-BSD FRML MDRD: >90 ML/MIN/1.7M2
GLUCOSE SERPL-MCNC: 107 MG/DL (ref 70–99)
GLUCOSE UR STRIP-MCNC: NEGATIVE MG/DL
HCG UR QL: NEGATIVE
HCT VFR BLD AUTO: 38.5 % (ref 35–47)
HGB BLD-MCNC: 13.4 G/DL (ref 11.7–15.7)
HGB UR QL STRIP: NEGATIVE
IMM GRANULOCYTES # BLD: 0 10E9/L (ref 0–0.4)
IMM GRANULOCYTES NFR BLD: 0.3 %
KETONES UR STRIP-MCNC: NEGATIVE MG/DL
LEUKOCYTE ESTERASE UR QL STRIP: NEGATIVE
LIPASE SERPL-CCNC: 118 U/L (ref 73–393)
LYMPHOCYTES # BLD AUTO: 3.3 10E9/L (ref 0.8–5.3)
LYMPHOCYTES NFR BLD AUTO: 26.9 %
MCH RBC QN AUTO: 31.1 PG (ref 26.5–33)
MCHC RBC AUTO-ENTMCNC: 34.8 G/DL (ref 31.5–36.5)
MCV RBC AUTO: 89 FL (ref 78–100)
MONOCYTES # BLD AUTO: 0.7 10E9/L (ref 0–1.3)
MONOCYTES NFR BLD AUTO: 5.8 %
NEUTROPHILS # BLD AUTO: 7.9 10E9/L (ref 1.6–8.3)
NEUTROPHILS NFR BLD AUTO: 65.1 %
NITRATE UR QL: NEGATIVE
NRBC # BLD AUTO: 0 10*3/UL
NRBC BLD AUTO-RTO: 0 /100
PH UR STRIP: 5.5 PH (ref 4.7–8)
PLATELET # BLD AUTO: 313 10E9/L (ref 150–450)
POTASSIUM SERPL-SCNC: 3.4 MMOL/L (ref 3.4–5.3)
PROT SERPL-MCNC: 7 G/DL (ref 6.8–8.8)
RBC # BLD AUTO: 4.31 10E12/L (ref 3.8–5.2)
SODIUM SERPL-SCNC: 139 MMOL/L (ref 133–144)
SOURCE: NORMAL
SP GR UR STRIP: 1 (ref 1–1.03)
UROBILINOGEN UR STRIP-MCNC: NORMAL MG/DL (ref 0–2)
WBC # BLD AUTO: 12.1 10E9/L (ref 4–11)

## 2018-03-13 PROCEDURE — 36415 COLL VENOUS BLD VENIPUNCTURE: CPT | Performed by: FAMILY MEDICINE

## 2018-03-13 PROCEDURE — 83690 ASSAY OF LIPASE: CPT | Performed by: FAMILY MEDICINE

## 2018-03-13 PROCEDURE — 96361 HYDRATE IV INFUSION ADD-ON: CPT

## 2018-03-13 PROCEDURE — 99285 EMERGENCY DEPT VISIT HI MDM: CPT | Mod: 25

## 2018-03-13 PROCEDURE — 81025 URINE PREGNANCY TEST: CPT | Performed by: FAMILY MEDICINE

## 2018-03-13 PROCEDURE — 96374 THER/PROPH/DIAG INJ IV PUSH: CPT | Mod: 59

## 2018-03-13 PROCEDURE — 86140 C-REACTIVE PROTEIN: CPT | Performed by: FAMILY MEDICINE

## 2018-03-13 PROCEDURE — 74177 CT ABD & PELVIS W/CONTRAST: CPT | Mod: TC

## 2018-03-13 PROCEDURE — 81003 URINALYSIS AUTO W/O SCOPE: CPT | Performed by: FAMILY MEDICINE

## 2018-03-13 PROCEDURE — 25000128 H RX IP 250 OP 636: Performed by: FAMILY MEDICINE

## 2018-03-13 PROCEDURE — 99285 EMERGENCY DEPT VISIT HI MDM: CPT | Mod: Z6 | Performed by: FAMILY MEDICINE

## 2018-03-13 PROCEDURE — 80053 COMPREHEN METABOLIC PANEL: CPT | Performed by: FAMILY MEDICINE

## 2018-03-13 PROCEDURE — 85025 COMPLETE CBC W/AUTO DIFF WBC: CPT | Performed by: FAMILY MEDICINE

## 2018-03-13 RX ORDER — ONDANSETRON 2 MG/ML
4 INJECTION INTRAMUSCULAR; INTRAVENOUS ONCE
Status: COMPLETED | OUTPATIENT
Start: 2018-03-13 | End: 2018-03-13

## 2018-03-13 RX ORDER — IOPAMIDOL 612 MG/ML
100 INJECTION, SOLUTION INTRAVASCULAR ONCE
Status: COMPLETED | OUTPATIENT
Start: 2018-03-13 | End: 2018-03-13

## 2018-03-13 RX ADMIN — IOPAMIDOL 100 ML: 612 INJECTION, SOLUTION INTRAVENOUS at 23:09

## 2018-03-13 RX ADMIN — SODIUM CHLORIDE 1000 ML: 9 INJECTION, SOLUTION INTRAVENOUS at 22:08

## 2018-03-13 RX ADMIN — ONDANSETRON 4 MG: 2 INJECTION INTRAMUSCULAR; INTRAVENOUS at 22:08

## 2018-03-13 ASSESSMENT — ENCOUNTER SYMPTOMS
CHILLS: 1
ABDOMINAL PAIN: 1
COUGH: 0
FLATUS: 0
CONSTIPATION: 0
HEMATEMESIS: 0
BELCHING: 0
ANOREXIA: 1
SORE THROAT: 0
DIARRHEA: 0
DYSURIA: 0
FATIGUE: 1
HEMATOCHEZIA: 0
SHORTNESS OF BREATH: 0
NAUSEA: 1
FEVER: 1
HEMATURIA: 0

## 2018-03-13 NOTE — ED AVS SNAPSHOT
[facility]    [address] [address]    Phone:  [phone]                                       Carolyn Mallory   MRN: [id number]    Department:  [facility]   Date of Visit:  3/13/2018           After Visit Summary Signature Page     I have received my discharge instructions, and my questions have been answered. I have discussed any challenges I see with this plan with the nurse or doctor.    ..........................................................................................................................................  Patient/Patient Representative Signature      ..........................................................................................................................................  Patient Representative Print Name and Relationship to Patient    ..................................................               ................................................  Date                                            Time    ..........................................................................................................................................  Reviewed by Signature/Title    ...................................................              ..............................................  Date                                                            Time

## 2018-03-13 NOTE — ED AVS SNAPSHOT
[facility]    [address]    [place] [address]    Phone:  [phone]                                       Carolyn Mallory   MRN: [id number]    Department:  [facility]   Date of Visit:  3/13/2018           Patient Information     Date Of Birth          1995        Your diagnoses for this visit were:     Abdominal pain, generalized     Left ovarian cyst        You were seen by Seble Ordaz MD.      Follow-up Information     Follow up with No Ref-Primary, Physician.        Discharge Instructions           Abdominal Pain    Abdominal pain is pain in the stomach or belly area. Everyone has this pain from time to time. In many cases it goes away on its own. But abdominal pain can sometimes be due to a serious problem, such as appendicitis. So it s important to know when to seek help.  Causes of abdominal pain  There are many possible causes of abdominal pain. Common causes in adults include:    Constipation, diarrhea, or gas    Stomach acid flowing back up into the esophagus (acid reflux or heartburn)    Severe acid reflux, called GERD (gastroesophageal reflux disease)    A sore in the lining of the stomach or small intestine (peptic ulcer)    Inflammation of the gallbladder, liver, or pancreas    Gallstones or kidney stones    Appendicitis     Intestinal blockage     An internal organ pushing through a muscle or other tissue (hernia)    Urinary tract infections    In women, menstrual cramps, fibroids, or endometriosis    Inflammation or infection of the intestines  Diagnosing the cause of abdominal pain  Your healthcare provider will do a physical exam help find the cause of your pain. If needed, tests will be ordered. Belly pain has many possible causes. So it can be hard to find the reason for your pain. Giving details about your pain can help. Tell your provider where and when you feel the pain, and what makes it better or worse. Also let your provider  know if you have other symptoms such as:    Fever    Tiredness    Upset stomach (nausea)    Vomiting    Changes in bathroom habits  Treating abdominal pain  Some causes of pain need emergency medical treatment right away. These include appendicitis or a bowel blockage. Other problems can be treated with rest, fluids, or medicines. Your healthcare provider can give you specific instructions for treatment or self-care based on what is causing your pain.  If you have vomiting or diarrhea, sip water or other clear fluids. When you are ready to eat solid foods again, start with small amounts of easy-to-digest, low-fat foods. These include apple sauce, toast, or crackers.   When to seek medical care  Call 911 or go to the hospital right away if you:    Can t pass stool and are vomiting    Are vomiting blood or have bloody diarrhea or black, tarry diarrhea    Have chest, neck, or shoulder pain    Feel like you might pass out    Have pain in your shoulder blades with nausea    Have sudden, severe belly pain    Have new, severe pain unlike any you have felt before    Have a belly that is rigid, hard, and tender to touch  Call your healthcare provider if you have:    Pain for more than 5 days    Bloating for more than 2 days    Diarrhea for more than 5 days    A fever of 100.4 F (38.0 C) or higher, or as directed by your provider    Pain that gets worse    Weight loss for no reason    Continued lack of appetite    Blood in your stool  How to prevent abdominal pain  Here are some tips to help prevent abdominal pain:    Eat smaller amounts of food at one time.    Avoid greasy, fried, or other high-fat foods.    Avoid foods that give you gas.    Exercise regularly.    Drink plenty of fluids.  To help prevent GERD symptoms:    Quit smoking.    Reduce alcohol and certain foods that increase stomach acid.    Avoid aspirin and over-the-counter pain and fever medicines (NSAIDS or nonsteroidal anti-inflammatory drugs), if  possible    Lose extra weight.    Finish eating at least 2 hours before you go to bed or lie down.    Raise the head of your bed.  Date Last Reviewed: 7/1/2016 2000-2017 [employer]. [address] [place] [address], [place] [address]. All rights reserved. This information is not intended as a substitute for professional medical care. Always follow your healthcare professional's instructions.      What to expect when you have contrast    During your exam, we will inject  contrast  into your vein or artery. (Contrast is a clear liquid with iodine in it. It shows up on X-rays.)    You may feel warm or hot. You may have a metal taste in your mouth and a slight upset stomach. You may also feel pressure near the kidneys and bladder. These effects will last about 1 to 3 minutes.    Please tell us if you have:    Sneezing     Itching    Hives     Swelling in the face    A hoarse voice    Breathing problems    Other new symptoms    Serious problems are rare.  They may include:    Irregular heartbeat     Seizures    Kidney failure              Tissue damage    Shock      Death    If you have any problems during the exam, we  will treat them right away.    When you get home    Call your hospital if you have any new symptoms in the next 2 days, like hives or swelling. (Phone numbers are at the bottom of this page.) Or call your family doctor.     If you have wheezing or trouble breathing, call 911.    Self-care  -Drink at least 4 extra glasses of water today.   This reduces the stress on your kidneys.  -Keep taking your regular medicines.    The contrast will pass out of your body in your  Urine(pee). This will happen in the next 24 hours. You  will not feel this. Your urine will not  change color.    If you have kidney problems or take metformin    Drink 4 to 8 large glasses of water for the next  2 days, if you are not on a fluid restriction.    ?If you take metformin (Glucophage or Glucovance) for diabetes, keep  taking it.      ?Your kidney function tests are abnormal.  If you take Metformin, do not take it for 48 hours. Please go to your clinic for a blood test within 3 days after your exam before the restarting this medicine.     (Note to provider:please give patient prescription for lab tests.)    ?Special instructions:     I have read and understand the above information.    Patient Sign Here:______________________________________Date:________Time:______    Staff Sign Here:________________________________________Date:_______Time:______      Radiology Departments:     ?[facility]: [phone] ?[facility]: [phone]     ?[facility]: [phone] ?[facility]:[phone]      ?[facility]: [phone]  ?[facility]: [phone]  ?[facility]:[phone]    ?[facility]:[phone]  ?[facility]:[phone]       Review of your medicines      Our records show that you are taking the medicines listed below. If these are incorrect, please call your family doctor or clinic.        Dose / Directions Last dose taken    diphenhydrAMINE 25 MG tablet   Commonly known as:  BENADRYL   Dose:  25 mg        Take 25 mg by mouth   Refills:  0        EPINEPHrine 0.3 MG/0.3ML injection   Commonly known as:  EPIPEN   Dose:  0.3 mg   Quantity:  4 each        Inject 0.3 mLs (0.3 mg) into the muscle once as needed for anaphylaxis   Refills:  6        etonogestrel 68 MG Impl   Commonly known as:  IMPLANON/NEXPLANON   Dose:  1 each        1 each (68 mg) by Subdermal route continuous   Refills:  0        famotidine 40 MG tablet   Commonly known as:  PEPCID   Dose:  40 mg   Quantity:  90 tablet        Take 1 tablet (40 mg) by mouth daily   Refills:  3        fluticasone 50 MCG/ACT spray   Commonly known as:  FLONASE   Dose:  1-2 spray   Quantity:  1 Bottle        Spray 1-2 sprays into both nostrils daily   Refills:  11        ibuprofen 400 MG tablet   Commonly known as:  ADVIL/MOTRIN   Dose:  800 mg   Quantity:  60 tablet        Take 2 tablets (800  mg) by mouth every 6 hours as needed for other (cramping)   Refills:  1        loratadine 10 MG tablet   Commonly known as:  CLARITIN   Dose:  10 mg        Take 10 mg by mouth daily   Refills:  0                Procedures and tests performed during your visit     CBC with platelets differential    CRP inflammation    CT Abdomen Pelvis w Contrast    Comprehensive metabolic panel    HCG qualitative urine    Lipase    Peripheral IV catheter    UA reflex to Microscopic      Orders Needing Specimen Collection     None      Pending Results     Date and Time Order Name Status Description    3/13/2018 2243 CT Abdomen Pelvis w Contrast In process             Pending Culture Results     No orders found for last 3 day(s).            Thank you for choosing [facility]       Thank you for choosing [facility] for your care. Our goal is always to provide you with excellent care. Hearing back from our patients is one way we can continue to improve our services. Please take a few minutes to complete the written survey that you may receive in the mail after you visit with us. Thank you!        [employer] Information     [employer] gives you secure access to your electronic health record. If you see a primary care provider, you can also send messages to your care team and make appointments. If you have questions, please call your primary care clinic.  If you do not have a primary care provider, please call [phone] and they will assist you.        Care EveryWhere ID     This is your Care EveryWhere ID. This could be used by other organizations to access your [facility] medical records  [id number]        Equal Access to Services     CRISS CHEN AH: Laxmi Villasenor, waevangelistda luqadaha, qaybta kaalmavianey ronquillo. So [facility] [phone].    ATENCIÓN: Si habla español, tiene a solis disposición servicios gratuitos de asistencia lingüística. Llame al [phone].    We comply with applicable federal  civil rights laws and [place] laws. We do not discriminate on the basis of race, color, national origin, age, disability, sex, sexual orientation, or gender identity.            After Visit Summary       This is your record. Keep this with you and show to your community pharmacist(s) and doctor(s) at your next visit.

## 2018-03-14 ENCOUNTER — OFFICE VISIT (OUTPATIENT)
Dept: FAMILY MEDICINE | Facility: OTHER | Age: 23
End: 2018-03-14
Attending: NURSE PRACTITIONER
Payer: COMMERCIAL

## 2018-03-14 VITALS
RESPIRATION RATE: 18 BRPM | BODY MASS INDEX: 38.28 KG/M2 | TEMPERATURE: 99.5 F | SYSTOLIC BLOOD PRESSURE: 108 MMHG | WEIGHT: 208 LBS | OXYGEN SATURATION: 98 % | DIASTOLIC BLOOD PRESSURE: 54 MMHG | HEIGHT: 62 IN | HEART RATE: 108 BPM

## 2018-03-14 VITALS
DIASTOLIC BLOOD PRESSURE: 70 MMHG | SYSTOLIC BLOOD PRESSURE: 111 MMHG | OXYGEN SATURATION: 100 % | TEMPERATURE: 99.7 F | RESPIRATION RATE: 16 BRPM | HEART RATE: 83 BPM

## 2018-03-14 DIAGNOSIS — K52.9 GASTROENTERITIS: Primary | ICD-10-CM

## 2018-03-14 PROCEDURE — G0463 HOSPITAL OUTPT CLINIC VISIT: HCPCS | Mod: 25,27

## 2018-03-14 PROCEDURE — 99213 OFFICE O/P EST LOW 20 MIN: CPT | Performed by: NURSE PRACTITIONER

## 2018-03-14 PROCEDURE — G0463 HOSPITAL OUTPT CLINIC VISIT: HCPCS

## 2018-03-14 RX ORDER — ONDANSETRON 4 MG
4 TABLET,DISINTEGRATING ORAL EVERY 8 HOURS PRN
Status: DISCONTINUED | OUTPATIENT
Start: 2018-03-14 | End: 2018-03-14 | Stop reason: HOSPADM

## 2018-03-14 ASSESSMENT — ANXIETY QUESTIONNAIRES
4. TROUBLE RELAXING: MORE THAN HALF THE DAYS
2. NOT BEING ABLE TO STOP OR CONTROL WORRYING: SEVERAL DAYS
7. FEELING AFRAID AS IF SOMETHING AWFUL MIGHT HAPPEN: NOT AT ALL
1. FEELING NERVOUS, ANXIOUS, OR ON EDGE: MORE THAN HALF THE DAYS
IF YOU CHECKED OFF ANY PROBLEMS ON THIS QUESTIONNAIRE, HOW DIFFICULT HAVE THESE PROBLEMS MADE IT FOR YOU TO DO YOUR WORK, TAKE CARE OF THINGS AT HOME, OR GET ALONG WITH OTHER PEOPLE: SOMEWHAT DIFFICULT
3. WORRYING TOO MUCH ABOUT DIFFERENT THINGS: SEVERAL DAYS
GAD7 TOTAL SCORE: 7
5. BEING SO RESTLESS THAT IT IS HARD TO SIT STILL: NOT AT ALL
6. BECOMING EASILY ANNOYED OR IRRITABLE: SEVERAL DAYS

## 2018-03-14 ASSESSMENT — PAIN SCALES - GENERAL: PAINLEVEL: MODERATE PAIN (5)

## 2018-03-14 NOTE — ED NOTES
Pt given verbal and written d/c instructions and pt verbalizes understanding. Pt given TH pack of 4 tablets of zofran ODT. Pt states that she doesn't have a PCP and declines assistance from case management at this time. Reinforced Dr. Hidalgo's instructions that pt needs to have f/u with PCP and pt verbalizes understanding.

## 2018-03-14 NOTE — MR AVS SNAPSHOT
After Visit Summary   3/14/2018    Carolyn Mallory    MRN: [id number]           Patient Information     Date Of Birth          1995        Visit Information        Provider Department      3/14/2018 6:00 PM Susan Clifford APRN [facility]        Care Instructions      Noninfectious Gastroenteritis (Ages 6 Years to Adult)    Gastroenteritis can cause nausea, vomiting, diarrhea, and abdominal cramping. This may occur as a result of food sensitivity, inflammation of your gastrointestinal tract, medicines, stress, or other causes not related to infection. Your symptoms will usually last from 1 to 3 days, but can last longer. Antibiotics are not effective, but simple home treatment will be helpful.  Home care  Medicine    You may use acetaminophen or NSAID medicines like ibuprofen or naproxen to control fever, unless another medicine is prescribed. (Note: If you have chronic liver or kidney disease, or ever had a stomach ulcer or gastrointestinalI bleeding, talk with your healthcare provider before using these medicines.) Aspirin should never be used in anyone under 18 years of age who is ill with a fever. It may cause severe liver damage. Don't increase your NSAID medicines if you are already taking these medicines for another condition (like arthritis). Don't use NSAIDS if you are on aspirin (such as for heart disease, or after a stroke).    If medicines for diarrhea or vomiting are prescribed, take only as directed.  General care and preventing spread of the illness    If symptoms are severe, rest at home for the next 24 hours or until you feel better.    Hand washing with soap and water is the best way to prevent the spread of infection. Wash your hands after touching anyone who is sick.    Wash your hands after using the toilet and before meals. Clean the toilet after each use.    Caffeine, tobacco, and alcohol can make your diarrhea, cramping, and pain  worse.  Diet    Water and clear liquids are important so you do not get dehydrated. Drink a small amount at a time.    Do not force yourself to eat, especially if you have cramps, vomiting, or diarrhea. When you finally decide to start eating, do not eat large amounts at a time, even if you are hungry.    If you eat, avoid fatty, greasy, spicy, or fried foods.    Do not eat dairy products if you have diarrhea; they can make the diarrhea worse.  During the first 24 hours (the first full day), follow the diet below:    Beverages: Water, clear liquids, soft drinks without caffeine, like ginger ale; mineral water (plain or flavored); decaffeinated tea and coffee.    Soups: Clear broth, consommé, and bouillon Sports drinks aren't a good choice because they have too much sugar and not enough electrolytes. In this case, commercially available products called oral rehydration solutions are best.    Desserts: Plain gelatin, popsicles, and fruit juice bars.  During the next 24 hours (the second day), you may add the following to the above if you have improved. If not, continue what you did the first day:    Hot cereal, plain toast, bread, rolls, crackers    Plain noodles, rice, mashed potatoes, chicken noodle or rice soup    Unsweetened canned fruit (avoid pineapple), bananas    Limit caffeine and chocolate. No spices or seasonings except salt.  During the next 24 hours    Gradually resume a normal diet, as you feel better and your symptoms improve.    If at any time your symptoms start getting worse, go back to clear liquids until you feel better.  Food preparation    If you have diarrhea, you should not prepare food for others. When you  prepare food for yourself, wash your hands before and after.    Wash your hands after using cutting boards, countertops, and knives that have been in contact with raw food.    Keep uncooked meats away from cooked and ready-to-eat foods.  Follow-up care  Follow up with your healthcare  provider if you are not improving over the next 2 to 3 days, or as advised. If a stool (diarrhea) sample was taken, call for the results as directed.  When to seek medical care  Call your healthcare provider right away if any of these occur:     Increasing abdominal pain or constant lower right abdominal pain    Continued vomiting (unable to keep liquids down)    Frequent diarrhea (more than 5 times a day)    Blood in vomit or stool (black or red color)    Inability to tolerate solid food after a few days.    Dark urine, reduced urine output    Weakness, dizziness    Drowsiness    Fever of 100.4 F (38.0 C) or higher, or as directed by your healthcare provider    New rash  Call 911  Call 911 if any of these occur:    Trouble breathing    Chest pain    Confusion    Severe drowsiness or trouble awakening    Seizure    Stiff neck  Date Last Reviewed: 11/16/2015 2000-2017 [employer]. [address] [place] [address]. All rights reserved. This information is not intended as a substitute for professional medical care. Always follow your healthcare professional's instructions.                Follow-ups after your visit        Your next 10 appointments already scheduled     Mar 14, 2018  6:00 PM CDT   (Arrive by 5:45 PM)   SHORT with AMELIA Pineda CNP   [facility] ([facility] )    [address] [place] [address] Ave  [place] [address]   [phone]              Who to contact     If you have questions or need follow up information about today's clinic visit or your schedule please contact [facility] directly at [phone].  Normal or non-critical lab and imaging results will be communicated to you by MyChart, letter or phone within 4 business days after the clinic has received the results. If you do not hear from us within 7 days, please contact the clinic through MyChart or phone. If you have a critical or abnormal lab result, we will notify you by  "phone as soon as possible.  Submit refill requests through [employer] or call your pharmacy and they will forward the refill request to us. Please allow 3 business days for your refill to be completed.          Additional Information About Your Visit        [employer] Information     [employer] gives you secure access to your electronic health record. If you see a primary care provider, you can also send messages to your care team and make appointments. If you have questions, please call your primary care clinic.  If you do not have a primary care provider, please call [phone] and they will assist you.        Care EveryWhere ID     This is your Care EveryWhere ID. This could be used by other organizations to access your [facility] medical records  [id number]        Your Vitals Were     Pulse Temperature Respirations Height Pulse Oximetry BMI (Body Mass Index)    108 99.5  F (37.5  C) (Tympanic) 18 5' 2\" (1.575 m) 98% 38.04 kg/m2       Blood Pressure from Last 3 Encounters:   03/14/18 108/54   03/14/18 111/70   02/02/18 120/78    Weight from Last 3 Encounters:   03/14/18 208 lb (94.3 kg)   10/09/17 195 lb (88.5 kg)   07/13/17 190 lb (86.2 kg)              Today, you had the following     No orders found for display       Primary Care Provider Office Phone # Fax #    Susan Clifford APRKRISTI [facility] [phone]       [address]  [facility] [place] [address]        Equal Access to Services     [facility]: Hadii aad ku hadasho Soomaali, waaxda luqadaha, qaybta kaalmada adeegyada, vianey sorenson . So [facility] [phone].    ATENCIÓN: Si habla español, tiene a solis disposición servicios gratuitos de asistencia lingüística. Llame al [phone].    We comply with applicable federal civil rights laws and [place] laws. We do not discriminate on the basis of race, color, national origin, age, disability, sex, sexual orientation, or gender identity.            Thank you!     Thank you for " choosing [facility] [place]  for your care. Our goal is always to provide you with excellent care. Hearing back from our patients is one way we can continue to improve our services. Please take a few minutes to complete the written survey that you may receive in the mail after your visit with us. Thank you!             Your Updated Medication List - Protect others around you: Learn how to safely use, store and throw away your medicines at www.disposemymeds.org.          This list is accurate as of 3/14/18  4:53 PM.  Always use your most recent med list.                   Brand Name Dispense Instructions for use Diagnosis    diphenhydrAMINE 25 MG tablet    BENADRYL     Take 25 mg by mouth        EPINEPHrine 0.3 MG/0.3ML injection    EPIPEN    4 each    Inject 0.3 mLs (0.3 mg) into the muscle once as needed for anaphylaxis    Allergic rhinitis due to pollen       etonogestrel 68 MG Impl    IMPLANON/NEXPLANON     1 each (68 mg) by Subdermal route continuous    Nexplanon insertion       famotidine 40 MG tablet    PEPCID    90 tablet    Take 1 tablet (40 mg) by mouth daily    Gastroesophageal reflux disease without esophagitis       fluticasone 50 MCG/ACT spray    FLONASE    1 Bottle    Spray 1-2 sprays into both nostrils daily        ibuprofen 400 MG tablet    ADVIL/MOTRIN    60 tablet    Take 2 tablets (800 mg) by mouth every 6 hours as needed for other (cramping)     (spontaneous vaginal delivery)       loratadine 10 MG tablet    CLARITIN     Take 10 mg by mouth daily

## 2018-03-14 NOTE — DISCHARGE INSTRUCTIONS
Abdominal Pain    Abdominal pain is pain in the stomach or belly area. Everyone has this pain from time to time. In many cases it goes away on its own. But abdominal pain can sometimes be due to a serious problem, such as appendicitis. So it s important to know when to seek help.  Causes of abdominal pain  There are many possible causes of abdominal pain. Common causes in adults include:    Constipation, diarrhea, or gas    Stomach acid flowing back up into the esophagus (acid reflux or heartburn)    Severe acid reflux, called GERD (gastroesophageal reflux disease)    A sore in the lining of the stomach or small intestine (peptic ulcer)    Inflammation of the gallbladder, liver, or pancreas    Gallstones or kidney stones    Appendicitis     Intestinal blockage     An internal organ pushing through a muscle or other tissue (hernia)    Urinary tract infections    In women, menstrual cramps, fibroids, or endometriosis    Inflammation or infection of the intestines  Diagnosing the cause of abdominal pain  Your healthcare provider will do a physical exam help find the cause of your pain. If needed, tests will be ordered. Belly pain has many possible causes. So it can be hard to find the reason for your pain. Giving details about your pain can help. Tell your provider where and when you feel the pain, and what makes it better or worse. Also let your provider know if you have other symptoms such as:    Fever    Tiredness    Upset stomach (nausea)    Vomiting    Changes in bathroom habits  Treating abdominal pain  Some causes of pain need emergency medical treatment right away. These include appendicitis or a bowel blockage. Other problems can be treated with rest, fluids, or medicines. Your healthcare provider can give you specific instructions for treatment or self-care based on what is causing your pain.  If you have vomiting or diarrhea, sip water or other clear fluids. When you are ready to eat solid foods again,  start with small amounts of easy-to-digest, low-fat foods. These include apple sauce, toast, or crackers.   When to seek medical care  Call 911 or go to the hospital right away if you:    Can t pass stool and are vomiting    Are vomiting blood or have bloody diarrhea or black, tarry diarrhea    Have chest, neck, or shoulder pain    Feel like you might pass out    Have pain in your shoulder blades with nausea    Have sudden, severe belly pain    Have new, severe pain unlike any you have felt before    Have a belly that is rigid, hard, and tender to touch  Call your healthcare provider if you have:    Pain for more than 5 days    Bloating for more than 2 days    Diarrhea for more than 5 days    A fever of 100.4 F (38.0 C) or higher, or as directed by your provider    Pain that gets worse    Weight loss for no reason    Continued lack of appetite    Blood in your stool  How to prevent abdominal pain  Here are some tips to help prevent abdominal pain:    Eat smaller amounts of food at one time.    Avoid greasy, fried, or other high-fat foods.    Avoid foods that give you gas.    Exercise regularly.    Drink plenty of fluids.  To help prevent GERD symptoms:    Quit smoking.    Reduce alcohol and certain foods that increase stomach acid.    Avoid aspirin and over-the-counter pain and fever medicines (NSAIDS or nonsteroidal anti-inflammatory drugs), if possible    Lose extra weight.    Finish eating at least 2 hours before you go to bed or lie down.    Raise the head of your bed.  Date Last Reviewed: 7/1/2016 2000-2017 [employer]. [address] [place] [address], [place] [address]. All rights reserved. This information is not intended as a substitute for professional medical care. Always follow your healthcare professional's instructions.      What to expect when you have contrast    During your exam, we will inject  contrast  into your vein or artery. (Contrast is a clear liquid with iodine in it. It shows up on  X-rays.)    You may feel warm or hot. You may have a metal taste in your mouth and a slight upset stomach. You may also feel pressure near the kidneys and bladder. These effects will last about 1 to 3 minutes.    Please tell us if you have:    Sneezing     Itching    Hives     Swelling in the face    A hoarse voice    Breathing problems    Other new symptoms    Serious problems are rare.  They may include:    Irregular heartbeat     Seizures    Kidney failure              Tissue damage    Shock      Death    If you have any problems during the exam, we  will treat them right away.    When you get home    Call your hospital if you have any new symptoms in the next 2 days, like hives or swelling. (Phone numbers are at the bottom of this page.) Or call your family doctor.     If you have wheezing or trouble breathing, call 911.    Self-care  -Drink at least 4 extra glasses of water today.   This reduces the stress on your kidneys.  -Keep taking your regular medicines.    The contrast will pass out of your body in your  Urine(pee). This will happen in the next 24 hours. You  will not feel this. Your urine will not  change color.    If you have kidney problems or take metformin    Drink 4 to 8 large glasses of water for the next  2 days, if you are not on a fluid restriction.    ?If you take metformin (Glucophage or Glucovance) for diabetes, keep taking it.      ?Your kidney function tests are abnormal.  If you take Metformin, do not take it for 48 hours. Please go to your clinic for a blood test within 3 days after your exam before the restarting this medicine.     (Note to provider:please give patient prescription for lab tests.)    ?Special instructions:     I have read and understand the above information.    Patient Sign Here:______________________________________Date:________Time:______    Staff Sign Here:________________________________________Date:_______Time:______      Radiology Departments:     ?[facility]:  [phone] ?[place]: [phone]     ?[place]: [phone] ?[facility]:[phone]      ?[place]: [phone]  ?[place]: [phone]  ?[place]:[phone]    ?[facility]:[phone]  ?[facility]:[phone]

## 2018-03-14 NOTE — PROGRESS NOTES
CT Abdomen Pelvis w Contrast   IMPRESSION: There is a 2.6 x 3.8 cm left adnexal cyst. Size is  slightly greater than that seen on the prior study. The exam is  otherwise unremarkable.    Result routed to PCP- Susan Clifford.  Patient advised to follow up with PCP.  Patient was given results of left ovarian cyst in ED.

## 2018-03-14 NOTE — PROGRESS NOTES
SUBJECTIVE:   Carolyn Mallory is a 22 year old female who presents to clinic today for the following health issues:      ED/UC Followup:    Facility:  [facility] [place]  Date of visit: 3-13-18  Reason for visit: Abdominal pain, ovarian cyst  Current Status: Unchanged, still in pain    Has tried ibuprofen 600-800 twice a day and heating pad for comfort             Problem list and histories reviewed & adjusted, as indicated.  Additional history: as documented    Patient Active Problem List   Diagnosis     Allergic rhinitis     Food allergy     Patellofemoral syndrome of both knees     Sacro ilial pain     Calculus of kidney     ACP (advance care planning)     Chronic right-sided thoracic back pain     Obesity, unspecified obesity severity, unspecified obesity type     Tobacco use disorder     Esophageal reflux     Allergy to pollen     Past Surgical History:   Procedure Laterality Date     ARTHROSCOPY KNEE Right 5/4/2015    Procedure: ARTHROSCOPY KNEE;  Surgeon: Hernando Kulkarni MD;  Location: [facility] OR     AS ESOPHAGOSCOPY, DIAGNOSTIC      upper     LAPAROSCOPIC CHOLECYSTECTOMY N/A 10/10/2015    Procedure: LAPAROSCOPIC CHOLECYSTECTOMY;  Surgeon: Drew Padgett MD;  Location: [facility] OR     none         Social History   Substance Use Topics     Smoking status: Current Every Day Smoker     Packs/day: 0.03     Years: 1.00     Types: Cigarettes     Start date: 1/20/2014     Smokeless tobacco: Never Used     Alcohol use No     Family History   Problem Relation Age of Onset     Thrombophilia Mother      blood clotting     Lupus Mother      erythematosus     DIABETES Mother      Hypertension Father      Asthma Sister      DIABETES Maternal Grandfather      Hypertension Maternal Grandfather      Lupus Maternal Aunt      erythematosus         Current Outpatient Prescriptions   Medication Sig Dispense Refill     famotidine (PEPCID) 40 MG tablet Take 1 tablet (40 mg) by mouth daily 90 tablet 3     fluticasone (FLONASE) 50 MCG/ACT spray Spray  "1-2 sprays into both nostrils daily 1 Bottle 11     diphenhydrAMINE (BENADRYL) 25 MG tablet Take 25 mg by mouth       etonogestrel (IMPLANON/NEXPLANON) 68 MG IMPL 1 each (68 mg) by Subdermal route continuous       ibuprofen (ADVIL,MOTRIN) 400 MG tablet Take 2 tablets (800 mg) by mouth every 6 hours as needed for other (cramping) 60 tablet 1     loratadine (CLARITIN) 10 MG tablet Take 10 mg by mouth daily       EPINEPHrine (EPIPEN) 0.3 MG/0.3ML injection Inject 0.3 mLs (0.3 mg) into the muscle once as needed for anaphylaxis 4 each 6     Allergies   Allergen Reactions     Oxycodone      Vomiting, hallucinations.     Tylenol [Acetaminophen] Other (See Comments)     Patient reports seeing spots after taking Tylenol.       Reviewed and updated as needed this visit by clinical staff  Tobacco  Allergies  Meds  Med Hx  Surg Hx  Fam Hx  Soc Hx      Reviewed and updated as needed this visit by Provider         ROS:  CONSTITUTIONAL:chills, fatigue and fever   INTEGUMENTARY/SKIN: rash to left upper arm/bicept area at Nexplanon site  RESP: mild cough  CV: NEGATIVE for chest pain, palpitations or peripheral edema  GI: abdominal pain across mid abdomen, nausea, poor appetite and vomiting  : does not have a normal menstrual cycle due to Nexplanon implant, denies dysuria    OBJECTIVE:     /54  Pulse 108  Temp 99.5  F (37.5  C) (Tympanic)  Resp 18  Ht 5' 2\" (1.575 m)  Wt 208 lb (94.3 kg)  SpO2 98%  BMI 38.04 kg/m2  Body mass index is 38.04 kg/(m^2).   GENERAL: alert and no distress  NECK: no adenopathy, no asymmetry, masses, or scars and thyroid normal to palpation  RESP: lungs clear to auscultation - no rales, rhonchi or wheezes  CV: regular rate and rhythm, normal S1 S2, no S3 or S4, no murmur, click or rub, no peripheral edema and peripheral pulses strong  ABDOMEN: tenderness across lower abdomen and bowel sounds normal  SKIN: mild erythema at nexplanon site on left upper inner arm  PSYCH: mentation appears " normal and affect flat  LYMPH: normal ant/post cervical, supraclavicular nodes    Diagnostic Test Results:  Results for orders placed or performed during the hospital encounter of 03/13/18   CT Abdomen Pelvis w Contrast    Narrative    Exam:CT ABDOMEN PELVIS W CONTRAST    History: 22 years Female mid abdominal pain     Comparisons: 2/2/2018    Technique: Axial CT imaging of the abdomen and pelvis was performed  with IV contrast. Coronal and sagittal reconstructions were obtained      FINDINGS:  Lung bases:The lung bases are clear    Abdomen:  Liver:Unremarkable  Gallbladder and biliary tree: The gallbladder is absent. There is no  biliary dilatation.  Pancreas:Unremarkable  Spleen:Unremarkable  Adrenals:Normal    Kidneys and ureters:Unremarkable    Lymph nodes:There is no significant lymphadenopathy    Bowel:No abnormally distended or thickened loops of bowel are present.  There is no evidence of bowel obstruction.    Appendix:Unremarkable    Vessels:Unremarkable    Osseous structures:Unremarkable    Pelvis: There is a left adnexal cyst measuring 3.8 x 2.6 cm in  dimension. This is slightly larger than that seen on the prior study.            Impression    IMPRESSION: There is a 2.6 x 3.8 cm left adnexal cyst. Size is  slightly greater than that seen on the prior study. The exam is  otherwise unremarkable.    EDUARDO MONTESINOS MD   UA reflex to Microscopic   Result Value Ref Range    Color Urine Light Yellow     Appearance Urine Clear     Glucose Urine Negative NEG^Negative mg/dL    Bilirubin Urine Negative NEG^Negative    Ketones Urine Negative NEG^Negative mg/dL    Specific Gravity Urine 1.005 1.003 - 1.035    Blood Urine Negative NEG^Negative    pH Urine 5.5 4.7 - 8.0 pH    Protein Albumin Urine Negative NEG^Negative mg/dL    Urobilinogen mg/dL Normal 0.0 - 2.0 mg/dL    Nitrite Urine Negative NEG^Negative    Leukocyte Esterase Urine Negative NEG^Negative    Source Midstream Urine    CBC with platelets  differential   Result Value Ref Range    WBC 12.1 (H) 4.0 - 11.0 10e9/L    RBC Count 4.31 3.8 - 5.2 10e12/L    Hemoglobin 13.4 11.7 - 15.7 g/dL    Hematocrit 38.5 35.0 - 47.0 %    MCV 89 78 - 100 fl    MCH 31.1 26.5 - 33.0 pg    MCHC 34.8 31.5 - 36.5 g/dL    RDW 12.1 10.0 - 15.0 %    Platelet Count 313 150 - 450 10e9/L    Diff Method Automated Method     % Neutrophils 65.1 %    % Lymphocytes 26.9 %    % Monocytes 5.8 %    % Eosinophils 1.7 %    % Basophils 0.2 %    % Immature Granulocytes 0.3 %    Nucleated RBCs 0 0 /100    Absolute Neutrophil 7.9 1.6 - 8.3 10e9/L    Absolute Lymphocytes 3.3 0.8 - 5.3 10e9/L    Absolute Monocytes 0.7 0.0 - 1.3 10e9/L    Absolute Eosinophils 0.2 0.0 - 0.7 10e9/L    Absolute Basophils 0.0 0.0 - 0.2 10e9/L    Abs Immature Granulocytes 0.0 0 - 0.4 10e9/L    Absolute Nucleated RBC 0.0    CRP inflammation   Result Value Ref Range    CRP Inflammation 13.5 (H) 0.0 - 8.0 mg/L   Comprehensive metabolic panel   Result Value Ref Range    Sodium 139 133 - 144 mmol/L    Potassium 3.4 3.4 - 5.3 mmol/L    Chloride 108 94 - 109 mmol/L    Carbon Dioxide 25 20 - 32 mmol/L    Anion Gap 6 3 - 14 mmol/L    Glucose 107 (H) 70 - 99 mg/dL    Urea Nitrogen 6 (L) 7 - 30 mg/dL    Creatinine 0.79 0.52 - 1.04 mg/dL    GFR Estimate >90 >60 mL/min/1.7m2    GFR Estimate If Black >90 >60 mL/min/1.7m2    Calcium 8.4 (L) 8.5 - 10.1 mg/dL    Bilirubin Total 0.2 0.2 - 1.3 mg/dL    Albumin 3.7 3.4 - 5.0 g/dL    Protein Total 7.0 6.8 - 8.8 g/dL    Alkaline Phosphatase 84 40 - 150 U/L    ALT 27 0 - 50 U/L    AST 12 0 - 45 U/L   Lipase   Result Value Ref Range    Lipase 118 73 - 393 U/L   HCG qualitative urine   Result Value Ref Range    HCG Qual Urine Negative NEG^Negative       ASSESSMENT/PLAN:     1. Gastroenteritis  Carolyn states pain across lower abdomen started 2 days ago with vomiting and fever. She has had intermittent RLQ pain with a diagnosis of right ovarian cyst - should have a repeat scan if pain persist. She  states this pain is different. She is encouraged to decrease her usage of ibuprofen unless she is having increased RLQ pain. She is encouraged to stay hydrated and slowly bring back food back into her diet. Carolyn should return to the ER if she has increased abdominal pain, unable to keep any fluids down or any concerning symptoms. She was given a note for work. Carolyn should return to the clinic if no improvement. Carolyn agrees with plan.       See Patient Instructions    AMELIA Martin [facility] HospitalRYANNE

## 2018-03-14 NOTE — ED PROVIDER NOTES
History     Chief Complaint   Patient presents with     Abdominal Pain     Patient is a 22 year old female presenting with abdominal pain.   Abdominal Pain   Pain location:  Generalized  Pain quality: sharp    Pain radiates to:  R flank  Pain severity:  Moderate  Onset quality:  Sudden  Duration:  1 day  Timing:  Constant  Progression:  Partially resolved  Chronicity:  New  Context: eating and retching    Context: not alcohol use, not awakening from sleep, not diet changes, not laxative use, not medication withdrawal, not previous surgeries, not recent illness, not recent sexual activity, not recent travel, not sick contacts, not suspicious food intake and not trauma    Relieved by:  Nothing  Worsened by:  Eating  Ineffective treatments:  NSAIDs  Associated symptoms: anorexia, chills, fatigue, fever and nausea    Associated symptoms: no belching, no constipation, no cough, no diarrhea, no dysuria, no flatus, no hematemesis, no hematochezia, no hematuria, no melena, no shortness of breath, no sore throat, no vaginal bleeding and no vaginal discharge    Risk factors: obesity    Risk factors: no alcohol abuse, no aspirin use, not elderly, has not had multiple surgeries, no NSAID use, not pregnant and no recent hospitalization      Carolyn Mallory is a 22 year old female who presents with abdominal pain and fever     Problem List:    Patient Active Problem List    Diagnosis Date Noted     Allergy to pollen 03/01/2018     Priority: Medium     Obesity, unspecified obesity severity, unspecified obesity type 06/09/2017     Priority: Medium     Tobacco use disorder 06/09/2017     Priority: Medium     Esophageal reflux 06/09/2017     Priority: Medium     Chronic right-sided thoracic back pain 05/24/2016     Priority: Medium     ACP (advance care planning) 04/26/2016     Priority: Medium     Advance Care Planning 4/26/2016: ACP Review of Chart / Resources Provided:  Reviewed chart for advance care plan.  Carolyn BAKER [place] has no  plan or code status on file. Discussed available resources and provided with information. .  Added by Lizzette Garcia           Calculus of kidney 12/16/2015     Priority: Medium     Bilateral hydronephrosis, flank pain, 3mm right lower pole non obstructing stone.  Dr. [facility] consult.  Declined stents.  Reconsider if hospitalization and no improvement 2-3 days.         Sacro ilial pain 10/30/2015     Priority: Medium     Patellofemoral syndrome of both knees 01/20/2015     Priority: Medium     Allergic rhinitis 01/17/2014     Priority: Medium     Problem list name updated by automated process. Provider to review       Food allergy 01/17/2014     Priority: Medium        Past Medical History:    Past Medical History:   Diagnosis Date     NO ACTIVE PROBLEMS      Pregnancy        Past Surgical History:    Past Surgical History:   Procedure Laterality Date     ARTHROSCOPY KNEE Right 5/4/2015    Procedure: ARTHROSCOPY KNEE;  Surgeon: Hernando Kulkarni MD;  Location: [facility] OR     AS ESOPHAGOSCOPY, DIAGNOSTIC      upper     LAPAROSCOPIC CHOLECYSTECTOMY N/A 10/10/2015    Procedure: LAPAROSCOPIC CHOLECYSTECTOMY;  Surgeon: Drew Padgett MD;  Location: [facility] OR     none         Family History:    Family History   Problem Relation Age of Onset     Thrombophilia Mother      blood clotting     Lupus Mother      erythematosus     DIABETES Mother      Hypertension Father      Asthma Sister      DIABETES Maternal Grandfather      Hypertension Maternal Grandfather      Lupus Maternal Aunt      erythematosus       Social History:  Marital Status:  Single [1]  Social History   Substance Use Topics     Smoking status: Current Every Day Smoker     Packs/day: 0.03     Types: Cigarettes     Start date: 1/20/2014     Smokeless tobacco: Never Used     Alcohol use No        Medications:      famotidine (PEPCID) 40 MG tablet   fluticasone (FLONASE) 50 MCG/ACT spray   etonogestrel (IMPLANON/NEXPLANON) 68 MG IMPL   ibuprofen (ADVIL,MOTRIN)  400 MG tablet   EPINEPHrine (EPIPEN) 0.3 MG/0.3ML injection   diphenhydrAMINE (BENADRYL) 25 MG tablet   loratadine (CLARITIN) 10 MG tablet         Review of Systems   Constitutional: Positive for chills, fatigue and fever.   HENT: Negative for sore throat.    Respiratory: Negative for cough and shortness of breath.    Gastrointestinal: Positive for abdominal pain, anorexia and nausea. Negative for constipation, diarrhea, flatus, hematemesis, hematochezia and melena.   Genitourinary: Negative for dysuria, hematuria, vaginal bleeding and vaginal discharge.   All other systems reviewed and are negative.      Physical Exam   BP: 140/71  Pulse: 89  Temp: 99.3  F (37.4  C)  Resp: 16  SpO2: 100 %      Physical Exam   Constitutional: She is oriented to person, place, and time. She appears well-developed and well-nourished. No distress.   HENT:   Head: Normocephalic and atraumatic.   Right Ear: External ear normal.   Left Ear: External ear normal.   Eyes: Pupils are equal, round, and reactive to light.   Neck: Normal range of motion. Neck supple.   Cardiovascular: Normal rate, regular rhythm, normal heart sounds and intact distal pulses.  Exam reveals no gallop and no friction rub.    No murmur heard.  Pulmonary/Chest: Effort normal and breath sounds normal. No respiratory distress. She has no wheezes. She has no rales. She exhibits no tenderness.   Abdominal: Soft. Bowel sounds are normal.   Right flank and right lower quadrant pain with rebound tenderness    Neurological: She is alert and oriented to person, place, and time.   Skin: Skin is warm and dry. She is not diaphoretic.   Psychiatric: She has a normal mood and affect. Her behavior is normal. Judgment and thought content normal.   Nursing note and vitals reviewed.      ED Course     ED Course     Procedures          Patient arrived to ER ambulatory with complaint of flank pain , vomitting and fever. Patient presented to ER with similar symptoms in FEbruary . Patient  triaged to exam room . Initial vital signs significant for low grade temp of 99.8 and pulse of 108 . Blood pressures stable. Medical records reviewed.History and exam completed. IV inserted. IV fluid bolus ordered. IV zofran ordered. Labs and diagnostics returned significant only for slight elevation of white count and CRP . CT abdomen and pelvis ordered. Results remarkable only for left ovarian cyst which has been present on previous studies but slightly larger on this study . Discussed results with patient . Patient with most likely diagnosis of gastroenteritis. Patient instructed to follow up with primary care for routine follow up of her known ovarian cyst and follow up of todays ER encounter . Patient in agreement with discharge plan         Results for orders placed or performed during the hospital encounter of 03/13/18   CT Abdomen Pelvis w Contrast    Narrative    Exam:CT ABDOMEN PELVIS W CONTRAST    History: 22 years Female mid abdominal pain     Comparisons: 2/2/2018    Technique: Axial CT imaging of the abdomen and pelvis was performed  with IV contrast. Coronal and sagittal reconstructions were obtained      FINDINGS:  Lung bases:The lung bases are clear    Abdomen:  Liver:Unremarkable  Gallbladder and biliary tree: The gallbladder is absent. There is no  biliary dilatation.  Pancreas:Unremarkable  Spleen:Unremarkable  Adrenals:Normal    Kidneys and ureters:Unremarkable    Lymph nodes:There is no significant lymphadenopathy    Bowel:No abnormally distended or thickened loops of bowel are present.  There is no evidence of bowel obstruction.    Appendix:Unremarkable    Vessels:Unremarkable    Osseous structures:Unremarkable    Pelvis: There is a left adnexal cyst measuring 3.8 x 2.6 cm in  dimension. This is slightly larger than that seen on the prior study.            Impression    IMPRESSION: There is a 2.6 x 3.8 cm left adnexal cyst. Size is  slightly greater than that seen on the prior study. The exam  is  otherwise unremarkable.    EDUARDO MONTESINOS MD   UA reflex to Microscopic   Result Value Ref Range    Color Urine Light Yellow     Appearance Urine Clear     Glucose Urine Negative NEG^Negative mg/dL    Bilirubin Urine Negative NEG^Negative    Ketones Urine Negative NEG^Negative mg/dL    Specific Gravity Urine 1.005 1.003 - 1.035    Blood Urine Negative NEG^Negative    pH Urine 5.5 4.7 - 8.0 pH    Protein Albumin Urine Negative NEG^Negative mg/dL    Urobilinogen mg/dL Normal 0.0 - 2.0 mg/dL    Nitrite Urine Negative NEG^Negative    Leukocyte Esterase Urine Negative NEG^Negative    Source Midstream Urine    CBC with platelets differential   Result Value Ref Range    WBC 12.1 (H) 4.0 - 11.0 10e9/L    RBC Count 4.31 3.8 - 5.2 10e12/L    Hemoglobin 13.4 11.7 - 15.7 g/dL    Hematocrit 38.5 35.0 - 47.0 %    MCV 89 78 - 100 fl    MCH 31.1 26.5 - 33.0 pg    MCHC 34.8 31.5 - 36.5 g/dL    RDW 12.1 10.0 - 15.0 %    Platelet Count 313 150 - 450 10e9/L    Diff Method Automated Method     % Neutrophils 65.1 %    % Lymphocytes 26.9 %    % Monocytes 5.8 %    % Eosinophils 1.7 %    % Basophils 0.2 %    % Immature Granulocytes 0.3 %    Nucleated RBCs 0 0 /100    Absolute Neutrophil 7.9 1.6 - 8.3 10e9/L    Absolute Lymphocytes 3.3 0.8 - 5.3 10e9/L    Absolute Monocytes 0.7 0.0 - 1.3 10e9/L    Absolute Eosinophils 0.2 0.0 - 0.7 10e9/L    Absolute Basophils 0.0 0.0 - 0.2 10e9/L    Abs Immature Granulocytes 0.0 0 - 0.4 10e9/L    Absolute Nucleated RBC 0.0    CRP inflammation   Result Value Ref Range    CRP Inflammation 13.5 (H) 0.0 - 8.0 mg/L   Comprehensive metabolic panel   Result Value Ref Range    Sodium 139 133 - 144 mmol/L    Potassium 3.4 3.4 - 5.3 mmol/L    Chloride 108 94 - 109 mmol/L    Carbon Dioxide 25 20 - 32 mmol/L    Anion Gap 6 3 - 14 mmol/L    Glucose 107 (H) 70 - 99 mg/dL    Urea Nitrogen 6 (L) 7 - 30 mg/dL    Creatinine 0.79 0.52 - 1.04 mg/dL    GFR Estimate >90 >60 mL/min/1.7m2    GFR Estimate If Black >90 >60  mL/min/1.7m2    Calcium 8.4 (L) 8.5 - 10.1 mg/dL    Bilirubin Total 0.2 0.2 - 1.3 mg/dL    Albumin 3.7 3.4 - 5.0 g/dL    Protein Total 7.0 6.8 - 8.8 g/dL    Alkaline Phosphatase 84 40 - 150 U/L    ALT 27 0 - 50 U/L    AST 12 0 - 45 U/L   Lipase   Result Value Ref Range    Lipase 118 73 - 393 U/L   HCG qualitative urine   Result Value Ref Range    HCG Qual Urine Negative NEG^Negative         No results found for this or any previous visit (from the past 24 hour(s)).    Medications - No data to display    Assessments & Plan (with Medical Decision Making)     I have reviewed the nursing notes.    I have reviewed the findings, diagnosis, plan and need for follow up with the patient.      New Prescriptions    No medications on file       Final diagnoses:   Abdominal pain, generalized   Left ovarian cyst       3/13/2018   [facility]     Seble Ordaz MD  03/16/18 [id number]

## 2018-03-14 NOTE — PATIENT INSTRUCTIONS
Noninfectious Gastroenteritis (Ages 6 Years to Adult)    Gastroenteritis can cause nausea, vomiting, diarrhea, and abdominal cramping. This may occur as a result of food sensitivity, inflammation of your gastrointestinal tract, medicines, stress, or other causes not related to infection. Your symptoms will usually last from 1 to 3 days, but can last longer. Antibiotics are not effective, but simple home treatment will be helpful.  Home care  Medicine    You may use acetaminophen or NSAID medicines like ibuprofen or naproxen to control fever, unless another medicine is prescribed. (Note: If you have chronic liver or kidney disease, or ever had a stomach ulcer or gastrointestinalI bleeding, talk with your healthcare provider before using these medicines.) Aspirin should never be used in anyone under 18 years of age who is ill with a fever. It may cause severe liver damage. Don't increase your NSAID medicines if you are already taking these medicines for another condition (like arthritis). Don't use NSAIDS if you are on aspirin (such as for heart disease, or after a stroke).    If medicines for diarrhea or vomiting are prescribed, take only as directed.  General care and preventing spread of the illness    If symptoms are severe, rest at home for the next 24 hours or until you feel better.    Hand washing with soap and water is the best way to prevent the spread of infection. Wash your hands after touching anyone who is sick.    Wash your hands after using the toilet and before meals. Clean the toilet after each use.    Caffeine, tobacco, and alcohol can make your diarrhea, cramping, and pain worse.  Diet    Water and clear liquids are important so you do not get dehydrated. Drink a small amount at a time.    Do not force yourself to eat, especially if you have cramps, vomiting, or diarrhea. When you finally decide to start eating, do not eat large amounts at a time, even if you are hungry.    If you eat, avoid  fatty, greasy, spicy, or fried foods.    Do not eat dairy products if you have diarrhea; they can make the diarrhea worse.  During the first 24 hours (the first full day), follow the diet below:    Beverages: Water, clear liquids, soft drinks without caffeine, like ginger ale; mineral water (plain or flavored); decaffeinated tea and coffee.    Soups: Clear broth, consommé, and bouillon Sports drinks aren't a good choice because they have too much sugar and not enough electrolytes. In this case, commercially available products called oral rehydration solutions are best.    Desserts: Plain gelatin, popsicles, and fruit juice bars.  During the next 24 hours (the second day), you may add the following to the above if you have improved. If not, continue what you did the first day:    Hot cereal, plain toast, bread, rolls, crackers    Plain noodles, rice, mashed potatoes, chicken noodle or rice soup    Unsweetened canned fruit (avoid pineapple), bananas    Limit caffeine and chocolate. No spices or seasonings except salt.  During the next 24 hours    Gradually resume a normal diet, as you feel better and your symptoms improve.    If at any time your symptoms start getting worse, go back to clear liquids until you feel better.  Food preparation    If you have diarrhea, you should not prepare food for others. When you  prepare food for yourself, wash your hands before and after.    Wash your hands after using cutting boards, countertops, and knives that have been in contact with raw food.    Keep uncooked meats away from cooked and ready-to-eat foods.  Follow-up care  Follow up with your healthcare provider if you are not improving over the next 2 to 3 days, or as advised. If a stool (diarrhea) sample was taken, call for the results as directed.  When to seek medical care  Call your healthcare provider right away if any of these occur:     Increasing abdominal pain or constant lower right abdominal pain    Continued  vomiting (unable to keep liquids down)    Frequent diarrhea (more than 5 times a day)    Blood in vomit or stool (black or red color)    Inability to tolerate solid food after a few days.    Dark urine, reduced urine output    Weakness, dizziness    Drowsiness    Fever of 100.4 F (38.0 C) or higher, or as directed by your healthcare provider    New rash  Call 911  Call 911 if any of these occur:    Trouble breathing    Chest pain    Confusion    Severe drowsiness or trouble awakening    Seizure    Stiff neck  Date Last Reviewed: 11/16/2015 2000-2017 [employer]. [address] [place] [id number]. All rights reserved. This information is not intended as a substitute for professional medical care. Always follow your healthcare professional's instructions.

## 2018-03-14 NOTE — ED NOTES
Pt in for complaints of right sided flank and abdominal pain, onset last night. Pt brought back to ED room 5. Gown provided, up to BR attempting to provide urine sample.

## 2018-03-14 NOTE — LETTER
March 14, 2018      Carolyn Mallory  [address] [place] [address]  [place] [address]        To Whom It May Concern:    Carolyn Mallory  was seen on 3/14/2018.  Please excuse her  until 3/16/2018 due to illness. She should not return to work on Friday if she continues to have a fever.        Sincerely,        AMELIA Martin CNP

## 2018-03-15 ASSESSMENT — PATIENT HEALTH QUESTIONNAIRE - PHQ9: SUM OF ALL RESPONSES TO PHQ QUESTIONS 1-9: 9

## 2018-03-15 ASSESSMENT — ANXIETY QUESTIONNAIRES: GAD7 TOTAL SCORE: 7

## 2018-03-16 ENCOUNTER — TELEPHONE (OUTPATIENT)
Dept: FAMILY MEDICINE | Facility: OTHER | Age: 23
End: 2018-03-16

## 2018-03-16 NOTE — TELEPHONE ENCOUNTER
Patient called states she is still having abdominal pain, body aches and headache. She states she had emesis all last night and again this morning. She saw you on 03/14 and wants to know what she should do now since its not going away. Please advise.

## 2018-03-16 NOTE — TELEPHONE ENCOUNTER
12:00 PM    Reason for Call: Phone Call    Description: Pt would like call back. Pt states the symptoms she had at her appointment on 3-14-18 have not gone away and the headache has gotten worse.    Was an appointment offered for this call? No  If yes : Appointment type              Date    Preferred method for responding to this message: Telephone Call  What is your phone number ? [phone]    If we cannot reach you directly, may we leave a detailed response at the number you provided? Yes    Can this message wait until your PCP/provider returns, if available today? Not applicable, provider is in today    Irma Perez

## 2018-03-18 ENCOUNTER — HOSPITAL ENCOUNTER (EMERGENCY)
Facility: HOSPITAL | Age: 23
Discharge: HOME | End: 2018-03-19
Attending: FAMILY MEDICINE | Admitting: FAMILY MEDICINE
Payer: COMMERCIAL

## 2018-03-18 DIAGNOSIS — H65.92 OME (OTITIS MEDIA WITH EFFUSION), LEFT: ICD-10-CM

## 2018-03-18 PROCEDURE — 99283 EMERGENCY DEPT VISIT LOW MDM: CPT

## 2018-03-18 PROCEDURE — 99283 EMERGENCY DEPT VISIT LOW MDM: CPT | Mod: Z6 | Performed by: FAMILY MEDICINE

## 2018-03-18 NOTE — LETTER
[facility]  [address]  [place] [address]  Phone: [phone]    March 19, 2018        Carolyn OLIVIA Mallory  [address]  [place] [facility] [place] [address]          To whom it may concern:    RE: Carolyn Mallory    Patient was seen and treated today at our clinic and missed work.    Please contact me for questions or concerns.      Sincerely,    Seble Smith    No name on file.

## 2018-03-18 NOTE — ED AVS SNAPSHOT
[facility]    [address]    [place] [address]    Phone:  [phone]                                       Carolyn Mallory   MRN: [id number]    Department:  [facility]   Date of Visit:  3/18/2018           Patient Information     Date Of Birth          1995        Your diagnoses for this visit were:     OME (otitis media with effusion), left        You were seen by Seble Ordaz MD.      Follow-up Information     Follow up with Susan Clifford APRN CNP.    Specialty:  Family Practice    Why:  As needed    Contact information:    [place] [id number]  [phone]          Discharge Instructions         Common Middle Ear Problems    Your middle ear may have been injured or infected recently. Over time, certain growths or bone disease can also harm the middle ear. Left untreated, middle ear problems often lead to lifelong hearing loss. There are two types of hearing loss: conductive and sensorineural. One or both kinds can occur. Injury, infection, certain growths, or bone disease can cause your symptoms. A ruptured eardrum or a long-lasting (chronic) ear infection may be painful and decrease hearing.  Symptoms    Hearing loss in one or both ears    Fluid, often smelly, draining from the ear    Pain, pressure, or discomfort in the ear    Ringing in the ear  Conductive and sensorineural hearing loss  Sound waves may be disrupted before they reach the inner ear. If this happens, conductive hearing loss may occur. The ear canal can be blocked by wax, infection, a tumor, or a foreign object. The eardrum can be injured or infected. Abnormal bone growth, infection, or tumors in the middle ear can block sound waves.  Sound waves may not be processed correctly in the inner ear. If this happens, sensorineural hearing loss may occur. Permanent hearing loss is most commonly associated with sensorineural problems.  The tests and evaluations used  to diagnose what type of hearing problem you have will depend on your symptoms.   Date Last Reviewed: 10/1/2016    3613-2702 [employer]. [address] [place] [address], [place] [address]. All rights reserved. This information is not intended as a substitute for professional medical care. Always follow your healthcare professional's instructions.          Otitis Media (Middle-Ear Infection) in Adults  Otitis media is another name for a middle-ear infection. It means an infection behind your eardrum. This kind of ear infection can happen after any condition that keeps fluid from draining from the middle ear. These conditions include allergies, a cold, a sore throat, or a respiratory infection.  Middle-ear infections are common in children, but they can also happen in adults. An ear infection in an adult may mean a more serious problem than in a child. So you may need additional tests. If you have an ear infection, you should see your health care provider for treatment.  What are the types of middle-ear infections?  Infections can affect the middle ear in several ways. They are:    Acute otitis media. This middle-ear infection occurs suddenly. It causes swelling and redness. Fluid and mucus become trapped inside the ear. You can have a fever and ear pain.    Otitis media with effusion. Fluid (effusion) and mucus build up in the middle ear after the infection goes away. You may feel like your middle ear is full. This can continue for months and may affect your hearing.    Chronic otitis media with effusion. Fluid (effusion) remains in the middle ear for a long time. Or it builds up again and again, even though there is no infection. This type of middle-ear infection may be hard to treat. It may also affect your hearing.  Who is more likely to get a middle-ear infection?  You are more likely to get an ear infection if you:    Smoke or are around someone who smokes    Have seasonal or year-round allergy  symptoms    Have a cold or other upper respiratory infection  What causes a middle-ear infection?  The middle ear connects to the throat by a canal called the eustachian tube. This tube helps even out the pressure between the outer ear and the inner ear. A cold or allergy can irritate the tube or cause the area around it to swell. This can keep fluid from draining from the middle ear. The fluid builds up behind the eardrum. Bacteria and viruses can grow in this fluid. The bacteria and viruses cause the middle-ear infection.  What are the symptoms of a middle-ear infection?  Common symptoms of a middle-ear infection in adults are:    Pain in 1 or both ears    Drainage from the ear    Muffled hearing    Sore throat   You may also have a fever. Rarely, your balance can be affected.  These symptoms may be the same as for other conditions. It s important to talk with your health care provider if you think you have a middle-ear infection. If you have a high fever, severe pain behind your ear, or paralysis in your face, see your provider as soon as you can.  How is a middle-ear infection diagnosed?  Your health care provider will take a medical history and do a physical exam. He or she will look at the outer ear and eardrum with an otoscope. The otoscope is a lighted tool that lets your provider see inside the ear. A pneumatic otoscope blows a puff of air into the ear to check how well your eardrum moves. If you eardrum doesn t move well, it may mean you have fluid behind it.  Your provider may also do a test called tympanometry. This test tells how well the middle ear is working. It can find any changes in pressure in the middle ear. Your provider may test your hearing with a tuning fork.  How is a middle-ear infection treated?  A middle-ear infection may be treated with:    Antibiotics, taken by mouth or as ear drops    Medication for pain    Decongestants, antihistamines, or nasal steroids  Your health care provider may  also have you try autoinsufflation. This helps adjust the air pressure in your ear. For this, you pinch your nose and gently exhale. This forces air back through the eustachian tube.  The exact treatment for your ear infection will depend on the type of infection you have. In general, if your symptoms don t get better in 48 to 72 hours, contact your health care provider.  Middle-ear infections can cause long-term problems if not treated. They can lead to:    Infection in other parts of the head    Permanent hearing loss    Paralysis of a nerve in your face  If you have a middle-ear infection that doesn t get better, you may need to see an ear, nose, and throat specialist (otolaryngologist). You may need a CT scan or MRI to check for head and neck cancer.  Ear tubes  Sometimes fluid stays in the middle ear even after you take antibiotics and the infection goes away. In this case, your health care provider may suggest that a small tube be placed in your ear. The tube is put at the opening of the eardrum. The tube keeps fluid from building up and relieves pressure in the middle ear. It can also help you hear better. This surgery is called myringotomy. It is not often done in adults.  The tubes usually fall out on their own after 6 months to a year.    5882-9583 [employer]. [address] [place] [address]. All rights reserved. This information is not intended as a substitute for professional medical care. Always follow your healthcare professional's instructions.             Review of your medicines      START taking        Dose / Directions Last dose taken    amoxicillin-clavulanate 875-125 MG per tablet   Commonly known as:  AUGMENTIN   Dose:  1 tablet   Quantity:  20 tablet        Take 1 tablet by mouth 2 times daily for 10 days   Refills:  0          Our records show that you are taking the medicines listed below. If these are incorrect, please call your family doctor or clinic.        Dose  / Directions Last dose taken    diphenhydrAMINE 25 MG tablet   Commonly known as:  BENADRYL   Dose:  25 mg        Take 25 mg by mouth   Refills:  0        EPINEPHrine 0.3 MG/0.3ML injection   Commonly known as:  EPIPEN   Dose:  0.3 mg   Quantity:  4 each        Inject 0.3 mLs (0.3 mg) into the muscle once as needed for anaphylaxis   Refills:  6        etonogestrel 68 MG Impl   Commonly known as:  IMPLANON/NEXPLANON   Dose:  1 each        1 each (68 mg) by Subdermal route continuous   Refills:  0        famotidine 40 MG tablet   Commonly known as:  PEPCID   Dose:  40 mg   Quantity:  90 tablet        Take 1 tablet (40 mg) by mouth daily   Refills:  3        fluticasone 50 MCG/ACT spray   Commonly known as:  FLONASE   Dose:  1-2 spray   Quantity:  1 Bottle        Spray 1-2 sprays into both nostrils daily   Refills:  11        ibuprofen 400 MG tablet   Commonly known as:  ADVIL/MOTRIN   Dose:  800 mg   Quantity:  60 tablet        Take 2 tablets (800 mg) by mouth every 6 hours as needed for other (cramping)   Refills:  1        loratadine 10 MG tablet   Commonly known as:  CLARITIN   Dose:  10 mg        Take 10 mg by mouth daily   Refills:  0                Prescriptions were sent or printed at these locations (1 Prescription)                   [facility] [address] [place] [address] [address] AT [facility] [address]   [address] [place] [address]    Telephone:  [phone]   Fax:  [phone]   Hours:                  E-Prescribed (1 of 1)         amoxicillin-clavulanate (AUGMENTIN) 875-125 MG per tablet                Orders Needing Specimen Collection     None      Pending Results     No orders found for last 3 day(s).            Pending Culture Results     No orders found for last 3 day(s).            Thank you for choosing [facility]       Thank you for choosing [facility] for your care. Our goal is always to provide you with excellent care. Hearing back from our patients is one way we can continue  to improve our services. Please take a few minutes to complete the written survey that you may receive in the mail after you visit with us. Thank you!        [employer] Information     [employer] gives you secure access to your electronic health record. If you see a primary care provider, you can also send messages to your care team and make appointments. If you have questions, please call your primary care clinic.  If you do not have a primary care provider, please call [phone] and they will assist you.        Care EveryWhere ID     This is your Care EveryWhere ID. This could be used by other organizations to access your [facility] medical records  [id number]        Equal Access to Services     CRISS CHEN : Laxmi Villasenor, milton marie, shiraz olivia, vianey guevara. So [facility] [phone].    ATENCIÓN: Si habla español, tiene a solis disposición servicios gratuitos de asistencia lingüística. Pepito al [phone].    We comply with applicable federal civil rights laws and [place] laws. We do not discriminate on the basis of race, color, national origin, age, disability, sex, sexual orientation, or gender identity.            After Visit Summary       This is your record. Keep this with you and show to your community pharmacist(s) and doctor(s) at your next visit.

## 2018-03-18 NOTE — ED AVS SNAPSHOT
[facility]    [address] [address]    Phone:  [phone]                                       Carolyn Mallory   MRN: [id number]    Department:  [facility]   Date of Visit:  3/18/2018           After Visit Summary Signature Page     I have received my discharge instructions, and my questions have been answered. I have discussed any challenges I see with this plan with the nurse or doctor.    ..........................................................................................................................................  Patient/Patient Representative Signature      ..........................................................................................................................................  Patient Representative Print Name and Relationship to Patient    ..................................................               ................................................  Date                                            Time    ..........................................................................................................................................  Reviewed by Signature/Title    ...................................................              ..............................................  Date                                                            Time

## 2018-03-19 VITALS
RESPIRATION RATE: 16 BRPM | TEMPERATURE: 97.6 F | DIASTOLIC BLOOD PRESSURE: 84 MMHG | OXYGEN SATURATION: 99 % | SYSTOLIC BLOOD PRESSURE: 123 MMHG

## 2018-03-19 PROCEDURE — 25000132 ZZH RX MED GY IP 250 OP 250 PS 637: Performed by: FAMILY MEDICINE

## 2018-03-19 RX ORDER — AZITHROMYCIN 250 MG/1
TABLET, FILM COATED ORAL
Qty: 6 TABLET | Refills: 0 | Status: SHIPPED | OUTPATIENT
Start: 2018-03-19 | End: 2018-03-20

## 2018-03-19 RX ADMIN — AMOXICILLIN AND CLAVULANATE POTASSIUM 1 TABLET: 875; 125 TABLET, FILM COATED ORAL at 00:20

## 2018-03-19 NOTE — ED PROVIDER NOTES
"  History     Chief Complaint   Patient presents with     Ear Fullness     c/o lt ear plugged since this am. notes painful and \"making me dizzy\"     HPI  Carolyn Mallory is a 22 year old female who presents with sensation of pressure in right ear Also,is dizzy . NO sinus pain or pressure. No allergies to antibiotics. NO fever chills or sweat . Slight cough Non productive     Problem List:    Patient Active Problem List    Diagnosis Date Noted     Allergy to pollen 03/01/2018     Priority: Medium     Obesity, unspecified obesity severity, unspecified obesity type 06/09/2017     Priority: Medium     Tobacco use disorder 06/09/2017     Priority: Medium     Esophageal reflux 06/09/2017     Priority: Medium     Chronic right-sided thoracic back pain 05/24/2016     Priority: Medium     ACP (advance care planning) 04/26/2016     Priority: Medium     Advance Care Planning 4/26/2016: ACP Review of Chart / Resources Provided:  Reviewed chart for advance care plan.  Carolyn Mallory has no plan or code status on file. Discussed available resources and provided with information. .  Added by Lizzette Garcia           Calculus of kidney 12/16/2015     Priority: Medium     Bilateral hydronephrosis, flank pain, 3mm right lower pole non obstructing stone.  Dr. [facility] consult.  Declined stents.  Reconsider if hospitalization and no improvement 2-3 days.         Sacro ilial pain 10/30/2015     Priority: Medium     Patellofemoral syndrome of both knees 01/20/2015     Priority: Medium     Allergic rhinitis 01/17/2014     Priority: Medium     Problem list name updated by automated process. Provider to review       Food allergy 01/17/2014     Priority: Medium        Past Medical History:    Past Medical History:   Diagnosis Date     NO ACTIVE PROBLEMS      Pregnancy        Past Surgical History:    Past Surgical History:   Procedure Laterality Date     ARTHROSCOPY KNEE Right 5/4/2015    Procedure: ARTHROSCOPY KNEE;  Surgeon: Cayla" Hernando BOSWELL MD;  Location[facility]     AS ESOPHAGOSCOPY, DIAGNOSTIC      upper     LAPAROSCOPIC CHOLECYSTECTOMY N/A 10/10/2015    Procedure: LAPAROSCOPIC CHOLECYSTECTOMY;  Surgeon: Drew Padgett MD;  Location: [facility]     none         Family History:    Family History   Problem Relation Age of Onset     Thrombophilia Mother      blood clotting     Lupus Mother      erythematosus     DIABETES Mother      Hypertension Father      Asthma Sister      DIABETES Maternal Grandfather      Hypertension Maternal Grandfather      Lupus Maternal Aunt      erythematosus       Social History:  Marital Status:  Single [1]  Social History   Substance Use Topics     Smoking status: Current Every Day Smoker     Packs/day: 0.03     Years: 1.00     Types: Cigarettes     Start date: 1/20/2014     Smokeless tobacco: Never Used     Alcohol use No        Medications:      famotidine (PEPCID) 40 MG tablet   fluticasone (FLONASE) 50 MCG/ACT spray   loratadine (CLARITIN) 10 MG tablet   diphenhydrAMINE (BENADRYL) 25 MG tablet   etonogestrel (IMPLANON/NEXPLANON) 68 MG IMPL   ibuprofen (ADVIL,MOTRIN) 400 MG tablet   EPINEPHrine (EPIPEN) 0.3 MG/0.3ML injection         Review of Systems   Constitutional: Negative.    HENT: Positive for congestion, ear pain, rhinorrhea and sinus pressure. Negative for sinus pain, sore throat and trouble swallowing.    Eyes: Negative.    Respiratory: Negative.    Gastrointestinal: Negative.    Endocrine: Negative.    Genitourinary: Negative.    Musculoskeletal: Negative.    Neurological: Negative.        Physical Exam   BP: 123/84  Heart Rate: 109  Temp: 97.6  F (36.4  C)  Resp: 16  SpO2: 99 %      Physical Exam   Constitutional: She is oriented to person, place, and time. She appears well-developed and well-nourished. No distress.   HENT:   Head: Normocephalic and atraumatic.   Right Ear: External ear normal.   Mouth/Throat: No oropharyngeal exudate.   Left TM brightly erythematous    Eyes: Pupils are equal, round,  and reactive to light. Right eye exhibits no discharge. Left eye exhibits no discharge.   Neck: Normal range of motion. Neck supple. No JVD present. No tracheal deviation present. No thyromegaly present.   Pulmonary/Chest: Effort normal and breath sounds normal. No stridor. No respiratory distress. She has no wheezes. She has no rales. She exhibits no tenderness.   Abdominal: Soft. Bowel sounds are normal. She exhibits no distension and no mass. There is no tenderness. There is no rebound and no guarding.   Musculoskeletal: Normal range of motion.   Lymphadenopathy:     She has cervical adenopathy.   Neurological: She is alert and oriented to person, place, and time. No cranial nerve deficit. Coordination normal.   Skin: Skin is warm and dry. She is not diaphoretic.   Psychiatric: She has a normal mood and affect.   Nursing note and vitals reviewed.      ED Course     ED Course     Procedures          Patient arrived to ER with complaint of left ear pain . Patient triaged to exam room . History and exam completed. Exam significant for left otitis media and sinusitis . Patient started on augmentin . Patient ed provided          No results found for this or any previous visit (from the past 24 hour(s)).    Medications - No data to display    Assessments & Plan (with Medical Decision Making)     I have reviewed the nursing notes.    I have reviewed the findings, diagnosis, plan and need for follow up with the patient.      New Prescriptions    No medications on file       Final diagnoses:   None   (H65.92) OME (otitis media with effusion), left          3/18/2018   [facility]     Seble Ordaz MD  03/22/18 1943

## 2018-03-20 ENCOUNTER — HOSPITAL ENCOUNTER (EMERGENCY)
Facility: HOSPITAL | Age: 23
Discharge: HOME | End: 2018-03-20
Attending: PHYSICIAN ASSISTANT | Admitting: PHYSICIAN ASSISTANT
Payer: COMMERCIAL

## 2018-03-20 VITALS
TEMPERATURE: 98.7 F | DIASTOLIC BLOOD PRESSURE: 67 MMHG | RESPIRATION RATE: 16 BRPM | SYSTOLIC BLOOD PRESSURE: 110 MMHG | OXYGEN SATURATION: 100 %

## 2018-03-20 DIAGNOSIS — H66.002 ACUTE SUPPURATIVE OTITIS MEDIA OF LEFT EAR WITHOUT SPONTANEOUS RUPTURE OF TYMPANIC MEMBRANE, RECURRENCE NOT SPECIFIED: ICD-10-CM

## 2018-03-20 PROCEDURE — 25000131 ZZH RX MED GY IP 250 OP 636 PS 637: Performed by: PHYSICIAN ASSISTANT

## 2018-03-20 PROCEDURE — G0463 HOSPITAL OUTPT CLINIC VISIT: HCPCS

## 2018-03-20 PROCEDURE — 99214 OFFICE O/P EST MOD 30 MIN: CPT | Performed by: PHYSICIAN ASSISTANT

## 2018-03-20 RX ORDER — MECLIZINE HYDROCHLORIDE 25 MG/1
25 TABLET ORAL ONCE
Status: COMPLETED | OUTPATIENT
Start: 2018-03-20 | End: 2018-03-20

## 2018-03-20 RX ORDER — MECLIZINE HCL 12.5 MG 12.5 MG/1
12.5-25 TABLET ORAL 4 TIMES DAILY PRN
Qty: 30 TABLET | Refills: 0 | Status: SHIPPED | OUTPATIENT
Start: 2018-03-20 | End: 2018-04-18

## 2018-03-20 RX ORDER — AZITHROMYCIN 250 MG/1
TABLET, FILM COATED ORAL
Qty: 6 TABLET | Refills: 0 | Status: SHIPPED | OUTPATIENT
Start: 2018-03-20 | End: 2019-02-11

## 2018-03-20 RX ADMIN — MECLIZINE HYDROCHLORIDE 25 MG: 25 TABLET ORAL at 21:19

## 2018-03-20 ASSESSMENT — ENCOUNTER SYMPTOMS
CONSTITUTIONAL NEGATIVE: 1
FEVER: 0
VOMITING: 1
PSYCHIATRIC NEGATIVE: 1
SINUS PRESSURE: 0
CARDIOVASCULAR NEGATIVE: 1
NEUROLOGICAL NEGATIVE: 1
SORE THROAT: 0
COUGH: 1
EYES NEGATIVE: 1
CHILLS: 0

## 2018-03-20 NOTE — ED AVS SNAPSHOT
[facility]    [address] [address]    Phone:  [phone]                                       Carolyn Mallory   MRN: [id number]    Department[facility]  [facility]   Date of Visit:  3/20/2018           After Visit Summary Signature Page     I have received my discharge instructions, and my questions have been answered. I have discussed any challenges I see with this plan with the nurse or doctor.    ..........................................................................................................................................  Patient/Patient Representative Signature      ..........................................................................................................................................  Patient Representative Print Name and Relationship to Patient    ..................................................               ................................................  Date                                            Time    ..........................................................................................................................................  Reviewed by Signature/Title    ...................................................              ..............................................  Date                                                            Time

## 2018-03-20 NOTE — ED AVS SNAPSHOT
[facility]    [address] [place] [address]    Phone:  [phone]                                       Carolyn Mallory   MRN: [id number]    Department:  [facility]   Date of Visit:  3/20/2018           Patient Information     Date Of Birth          1995        Your diagnoses for this visit were:     Acute suppurative otitis media of left ear without spontaneous rupture of tympanic membrane, recurrence not specified        You were seen by Patrick Sullivan PA.      Follow-up Information     Follow up with Susan Clifford APRN CNP.    Specialty:  Family Practice    Why:  friday    Contact information:    [address] [place] [id number] [phone]          Follow up with [facility].    Specialty:  EMERGENCY MEDICINE    Why:  If symptoms worsen    Contact information:    [address]  [place] [address] [phone]    Additional information:    From [place]: Take [address]. Turn left at [address]. Turn left at the first stoplight on [address] [place] [address]. At the first stop sign, take a right onto [address]. Take a left into the parking lot and continue through until you reach the North enterance of the building.       From [place]: Take [address]. Take the [address] ramp towards [place]. Turn left onto [address]. Take a slight right onto [address]. Turn left at the first stoplight on [address]. At the first stop sign, take a right onto [address]. Take a left into the parking lot and continue through until you reach the North enterance of the building.       From [place]: Take [address]. Take a right at [address]. At the first stop sign, take a right onto [address]. Take a left into the parking lot and continue through until you reach the North enterance of the building.         Discharge Instructions       - chew, speak and swallow to  help ears pop/drain  - meclizine for dizziness, but ear fluid needs to drain to clear up middle ear pressure - therefore dizziness.   - Zpack for infection.   - Primary care Friday if no better - recheck sooner with worsening.     Discharge References/Attachments     OTITIS MEDIA (MIDDLE-EAR INFECTION) IN ADULTS (ENGLISH)         Review of your medicines      START taking        Dose / Directions Last dose taken    meclizine 12.5 MG tablet   Commonly known as:  ANTIVERT   Dose:  12.5-25 mg   Quantity:  30 tablet        Take 1-2 tablets (12.5-25 mg) by mouth 4 times daily as needed for dizziness   Refills:  0          CONTINUE these medicines which may have CHANGED, or have new prescriptions. If we are uncertain of the size of tablets/capsules you have at home, strength may be listed as something that might have changed.        Dose / Directions Last dose taken    azithromycin 250 MG tablet   Commonly known as:  ZITHROMAX Z-PATRICK   What changed:  additional instructions   Quantity:  6 tablet        Two tablets on the first day, then one tablet daily for the next 4 days   Refills:  0          Our records show that you are taking the medicines listed below. If these are incorrect, please call your family doctor or clinic.        Dose / Directions Last dose taken    diphenhydrAMINE 25 MG tablet   Commonly known as:  BENADRYL   Dose:  25 mg        Take 25 mg by mouth   Refills:  0        EPINEPHrine 0.3 MG/0.3ML injection   Commonly known as:  EPIPEN   Dose:  0.3 mg   Quantity:  4 each        Inject 0.3 mLs (0.3 mg) into the muscle once as needed for anaphylaxis   Refills:  6        etonogestrel 68 MG Impl   Commonly known as:  IMPLANON/NEXPLANON   Dose:  1 each        1 each (68 mg) by Subdermal route continuous   Refills:  0        famotidine 40 MG tablet   Commonly known as:  PEPCID   Dose:  40 mg   Quantity:  90 tablet        Take 1 tablet (40 mg) by mouth daily   Refills:  3        fluticasone 50 MCG/ACT spray    Commonly known as:  FLONASE   Dose:  1-2 spray   Quantity:  1 Bottle        Spray 1-2 sprays into both nostrils daily   Refills:  11        ibuprofen 400 MG tablet   Commonly known as:  ADVIL/MOTRIN   Dose:  800 mg   Quantity:  60 tablet        Take 2 tablets (800 mg) by mouth every 6 hours as needed for other (cramping)   Refills:  1        loratadine 10 MG tablet   Commonly known as:  CLARITIN   Dose:  10 mg        Take 10 mg by mouth daily   Refills:  0          STOP taking        Dose Reason for stopping Comments    amoxicillin-clavulanate 875-125 MG per tablet   Commonly known as:  AUGMENTIN                      Prescriptions were sent or printed at these locations (2 Prescriptions)                   [facility] - [place], [place] - [address] AT [facility] [address], [place] [address]    Telephone:  [phone]   Fax:  [phone]   Hours:                  E-Prescribed (2 of 2)         azithromycin (ZITHROMAX Z-PATRICK) 250 MG tablet               meclizine (ANTIVERT) 12.5 MG tablet                Orders Needing Specimen Collection     None      Pending Results     No orders found from 3/18/2018 to 3/21/2018.            Pending Culture Results     No orders found from 3/18/2018 to 3/21/2018.            Thank you for choosing [facility]       Thank you for choosing [facility] for your care. Our goal is always to provide you with excellent care. Hearing back from our patients is one way we can continue to improve our services. Please take a few minutes to complete the written survey that you may receive in the mail after you visit with us. Thank you!        [facility] Information     [facility] gives you secure access to your electronic health record. If you see a primary care provider, you can also send messages to your care team and make appointments. If you have questions, please call your primary care clinic.  If you do not have a primary care provider, please call [phone] and  they will assist you.        Care EveryWhere ID     This is your Care EveryWhere ID. This could be used by other organizations to access your [facility] medical records  [id number]        Equal Access to Services     CRISS CHEN : Laxmi Villasenor, milton marie, shiraz olivia, vianey guevara. So [facility] [phone].    ATENCIÓN: Si habla español, tiene a solis disposición servicios gratuitos de asistencia lingüística. Llame al [phone].    We comply with applicable federal civil rights laws and [place] laws. We do not discriminate on the basis of race, color, national origin, age, disability, sex, sexual orientation, or gender identity.            After Visit Summary       This is your record. Keep this with you and show to your community pharmacist(s) and doctor(s) at your next visit.

## 2018-03-20 NOTE — LETTER
[facility] [address] [place] [address]  Main: [phone]  Dept: [phone]      March 20, 2018      Re: Carolyn Mallory      TO WHOM IT MAY CONCERN:    Carolyn Mallory was evaluated for acute illness starting on 3/17/18. I expect her condition to improve over the next 48 hrs. She may return to work/school after symptoms have resolved, unless otherwise advised by primary care. Please excuse Carolyn from any missed work/school.         Sincerely,      Patrick Sullivan PA-C   3/20/2018   9:22 PM

## 2018-03-21 NOTE — ED PROVIDER NOTES
History     Chief Complaint   Patient presents with     Otitis Media     c/o dizziness and vomiting, notes she has an ear infection which causes her symptoms. notes needs a dr note for work     HPI  Carolyn Mallory is a 22 year old female who presents to urgent care with left ear pain, pressure, intermittent dizziness that is because her vomiting today.  She states she was started on Augmentin but did not tolerate this medication.  She was told an alternative medication was prescribed it was not her pharmacy.  no fevers or 24 hours.  Patient denies injury prior to onset of dizziness/Vomiting.  Dizziness is described as the room spinning and occurs prior to onset of vomiting.  In addition she as her left ear still feels plugged with hearing muffled.    Problem List:    Patient Active Problem List    Diagnosis Date Noted     Allergy to pollen 03/01/2018     Priority: Medium     Obesity, unspecified obesity severity, unspecified obesity type 06/09/2017     Priority: Medium     Tobacco use disorder 06/09/2017     Priority: Medium     Esophageal reflux 06/09/2017     Priority: Medium     Chronic right-sided thoracic back pain 05/24/2016     Priority: Medium     ACP (advance care planning) 04/26/2016     Priority: Medium     Advance Care Planning 4/26/2016: ACP Review of Chart / Resources Provided:  Reviewed chart for advance care plan.  Carolyn Mallory has no plan or code status on file. Discussed available resources and provided with information. .  Added by Lizzette Garcia           Calculus of kidney 12/16/2015     Priority: Medium     Bilateral hydronephrosis, flank pain, 3mm right lower pole non obstructing stone.  Dr. [facility] consult.  Declined stents.  Reconsider if hospitalization and no improvement 2-3 days.         Sacro ilial pain 10/30/2015     Priority: Medium     Patellofemoral syndrome of both knees 01/20/2015     Priority: Medium     Allergic rhinitis 01/17/2014     Priority: Medium     Problem list name  updated by automated process. Provider to review       Food allergy 01/17/2014     Priority: Medium        Past Medical History:    Past Medical History:   Diagnosis Date     NO ACTIVE PROBLEMS      Pregnancy        Past Surgical History:    Past Surgical History:   Procedure Laterality Date     ARTHROSCOPY KNEE Right 5/4/2015    Procedure: ARTHROSCOPY KNEE;  Surgeon: Hernando Kulkarni MD;  Location: [facility]     AS ESOPHAGOSCOPY, DIAGNOSTIC      upper     LAPAROSCOPIC CHOLECYSTECTOMY N/A 10/10/2015    Procedure: LAPAROSCOPIC CHOLECYSTECTOMY;  Surgeon: Drew Padgett MD;  Location: [facility] OR     none         Family History:    Family History   Problem Relation Age of Onset     Thrombophilia Mother      blood clotting     Lupus Mother      erythematosus     DIABETES Mother      Hypertension Father      Asthma Sister      DIABETES Maternal Grandfather      Hypertension Maternal Grandfather      Lupus Maternal Aunt      erythematosus       Social History:  Marital Status:  Single [1]  Social History   Substance Use Topics     Smoking status: Current Every Day Smoker     Packs/day: 0.03     Years: 1.00     Types: Cigarettes     Start date: 1/20/2014     Smokeless tobacco: Never Used     Alcohol use No        Medications:      azithromycin (ZITHROMAX Z-PATRICK) 250 MG tablet   meclizine (ANTIVERT) 12.5 MG tablet   famotidine (PEPCID) 40 MG tablet   fluticasone (FLONASE) 50 MCG/ACT spray   diphenhydrAMINE (BENADRYL) 25 MG tablet   etonogestrel (IMPLANON/NEXPLANON) 68 MG IMPL   ibuprofen (ADVIL,MOTRIN) 400 MG tablet   loratadine (CLARITIN) 10 MG tablet   EPINEPHrine (EPIPEN) 0.3 MG/0.3ML injection         Review of Systems   Constitutional: Negative.  Negative for chills and fever.   HENT: Positive for congestion, ear pain, hearing loss and postnasal drip. Negative for ear discharge, sinus pressure and sore throat.    Eyes: Negative.    Respiratory: Positive for cough.    Cardiovascular: Negative.    Gastrointestinal: Positive  for vomiting.   Skin: Negative.    Neurological: Negative.    Psychiatric/Behavioral: Negative.        Physical Exam   BP: 110/67  Heart Rate: 100  Temp: 98.7  F (37.1  C)  Resp: 16  SpO2: 100 %      Physical Exam   Constitutional: She is oriented to person, place, and time. She appears well-developed and well-nourished. No distress.   HENT:   Head: Normocephalic and atraumatic.   Right Ear: External ear normal. Tympanic membrane is erythematous and bulging.   Left Ear: External ear normal. Tympanic membrane is not erythematous.   Nose: Mucosal edema present.   Mouth/Throat: Oropharynx is clear and moist.   Eyes: Conjunctivae and EOM are normal. Pupils are equal, round, and reactive to light.   Cardiovascular: Normal rate and normal heart sounds.    Pulmonary/Chest: Effort normal and breath sounds normal.   Neurological: She is oriented to person, place, and time.   Skin: Skin is warm and dry.   Psychiatric: She has a normal mood and affect.   Nursing note and vitals reviewed.      ED Course     ED Course     Procedures  No results found for this or any previous visit (from the past 24 hour(s)).    Medications   meclizine (ANTIVERT) tablet 25 mg (25 mg Oral Given 3/20/18 2119)       Assessments & Plan (with Medical Decision Making)     I have reviewed the nursing notes.  I have reviewed the findings, diagnosis, plan and need for follow up with the patient.    Discharge Medication List as of 3/20/2018  9:19 PM      START taking these medications    Details   meclizine (ANTIVERT) 12.5 MG tablet Take 1-2 tablets (12.5-25 mg) by mouth 4 times daily as needed for dizziness, Disp-30 tablet, R-0, E-Prescribe             Final diagnoses:   Acute suppurative otitis media of left ear without spontaneous rupture of tympanic membrane, recurrence not specified   This does appear to be related to her middle ear.  Will trial meclizine for her vertigo and antibiotic is switched to Zithromax.  Discussed mechanically clearing  eustachian tubes to help with her symptoms.  She is advised to follow-up with her primary care provider on Friday if symptoms persist.  She will seek attention sooner with worsening or change in character of her symptoms.  Patient is agreeable to plan and all questions were answered prior to discharge.    3/20/2018   [facility]     Patrick Sullivan PA  03/20/18 9458

## 2018-03-21 NOTE — DISCHARGE INSTRUCTIONS
- chew, speak and swallow to help ears pop/drain  - meclizine for dizziness, but ear fluid needs to drain to clear up middle ear pressure - therefore dizziness.   - Zpack for infection.   - Primary care Friday if no better - recheck sooner with worsening.

## 2018-03-21 NOTE — ED NOTES
Patient presents with dizziness, vomiting and ear infection X 1 week.  Patient request note for work today.

## 2018-03-22 ASSESSMENT — ENCOUNTER SYMPTOMS
CONSTITUTIONAL NEGATIVE: 1
SORE THROAT: 0
ENDOCRINE NEGATIVE: 1
MUSCULOSKELETAL NEGATIVE: 1
SINUS PAIN: 0
RESPIRATORY NEGATIVE: 1
RHINORRHEA: 1
NEUROLOGICAL NEGATIVE: 1
EYES NEGATIVE: 1
GASTROINTESTINAL NEGATIVE: 1
TROUBLE SWALLOWING: 0
SINUS PRESSURE: 1

## 2018-04-16 ENCOUNTER — HOSPITAL ENCOUNTER (EMERGENCY)
Facility: HOSPITAL | Age: 23
Discharge: HOME | End: 2018-04-16
Attending: PHYSICIAN ASSISTANT | Admitting: PHYSICIAN ASSISTANT
Payer: COMMERCIAL

## 2018-04-16 VITALS — TEMPERATURE: 98.9 F | OXYGEN SATURATION: 99 % | DIASTOLIC BLOOD PRESSURE: 78 MMHG | SYSTOLIC BLOOD PRESSURE: 122 MMHG

## 2018-04-16 DIAGNOSIS — L05.91 PILONIDAL CYST: ICD-10-CM

## 2018-04-16 PROCEDURE — G0463 HOSPITAL OUTPT CLINIC VISIT: HCPCS | Mod: 25

## 2018-04-16 PROCEDURE — 25000128 H RX IP 250 OP 636: Performed by: PHYSICIAN ASSISTANT

## 2018-04-16 PROCEDURE — 96372 THER/PROPH/DIAG INJ SC/IM: CPT

## 2018-04-16 PROCEDURE — 99213 OFFICE O/P EST LOW 20 MIN: CPT | Performed by: PHYSICIAN ASSISTANT

## 2018-04-16 RX ORDER — SULFAMETHOXAZOLE/TRIMETHOPRIM 800-160 MG
1 TABLET ORAL 2 TIMES DAILY
Qty: 20 TABLET | Refills: 0 | Status: SHIPPED | OUTPATIENT
Start: 2018-04-16 | End: 2019-02-11

## 2018-04-16 RX ORDER — CEFTRIAXONE SODIUM 1 G
1 VIAL (EA) INJECTION ONCE
Status: COMPLETED | OUTPATIENT
Start: 2018-04-16 | End: 2018-04-16

## 2018-04-16 RX ORDER — KETOROLAC TROMETHAMINE 10 MG/1
10 TABLET, FILM COATED ORAL EVERY 6 HOURS PRN
Qty: 20 TABLET | Refills: 0 | Status: SHIPPED | OUTPATIENT
Start: 2018-04-16 | End: 2018-08-05

## 2018-04-16 RX ADMIN — CEFTRIAXONE 1 G: 1 INJECTION, POWDER, FOR SOLUTION INTRAMUSCULAR; INTRAVENOUS at 15:02

## 2018-04-16 ASSESSMENT — ENCOUNTER SYMPTOMS
FEVER: 0
CARDIOVASCULAR NEGATIVE: 1
PSYCHIATRIC NEGATIVE: 1
WOUND: 1
NEUROLOGICAL NEGATIVE: 1

## 2018-04-16 NOTE — ED AVS SNAPSHOT
[facility]    [address] [address]    Phone:  [phone]                                       Carolyn Mallory   MRN: [id number]    Department[facility]  [facility]   Date of Visit:  4/16/2018           After Visit Summary Signature Page     I have received my discharge instructions, and my questions have been answered. I have discussed any challenges I see with this plan with the nurse or doctor.    ..........................................................................................................................................  Patient/Patient Representative Signature      ..........................................................................................................................................  Patient Representative Print Name and Relationship to Patient    ..................................................               ................................................  Date                                            Time    ..........................................................................................................................................  Reviewed by Signature/Title    ...................................................              ..............................................  Date                                                            Time

## 2018-04-16 NOTE — ED AVS SNAPSHOT
[facility]    [address]    [place] [address]    Phone:  [phone]                                       Carolyn Mallory   MRN: [id number]    Department:  [facility]   Date of Visit:  4/16/2018           Patient Information     Date Of Birth          1995        Your diagnoses for this visit were:     Pilonidal cyst        You were seen by Ludmila Zavala PA.      Follow-up Information     Follow up with Susan Clifford APRN CNP.    Specialty:  Family Practice    Why:  If symptoms worsen or if not resolving in next week to 10 days    Contact information:    [address]  [place] [address] [phone]          Follow up with [facility].    Specialty:  EMERGENCY MEDICINE    Why:  If fever/further concerns develop    Contact information:    [address]  [place] [address] [phone]    Additional information:    From [place] Area: Take [address]. Turn left at [address]. Turn left at the first stoplight on [address]. At the first stop sign, take a right onto [address]. Take a left into the parking lot and continue through until you reach the North enterance of the building.       From [place]: Take [address]. Take the [address] ramp towards [place]. Turn left onto [address]. Take a slight right onto [address]. Turn left at the first stoplight on [address]. At the first stop sign, take a right onto [address]. Take a left into the parking lot and continue through until you reach the North enterance of the building.       From [place]: Take [address]. Take a right at [address]. At the first stop sign, take a right onto [address]. Take a left into the parking lot and continue through until you reach the North enterance of the building.       Discharge References/Attachments     PILONIDAL CYST, INFECTED (ANTIBIOTICS) (ENGLISH)     CYST, PILONIDAL (ENGLISH)         Review of your medicines      START taking        Dose / Directions Last dose taken    ketorolac 10 MG tablet   Commonly known as:  TORADOL   Dose:  10 mg   Quantity:  20 tablet        Take 1 tablet (10 mg) by mouth every 6 hours as needed for moderate pain   Refills:  0        sulfamethoxazole-trimethoprim 800-160 MG per tablet   Commonly known as:  BACTRIM DS/SEPTRA DS   Dose:  1 tablet   Quantity:  20 tablet        Take 1 tablet by mouth 2 times daily for 10 days   Refills:  0          Our records show that you are taking the medicines listed below. If these are incorrect, please call your family doctor or clinic.        Dose / Directions Last dose taken    diphenhydrAMINE 25 MG tablet   Commonly known as:  BENADRYL   Dose:  25 mg        Take 25 mg by mouth   Refills:  0        EPINEPHrine 0.3 MG/0.3ML injection   Commonly known as:  EPIPEN   Dose:  0.3 mg   Quantity:  4 each        Inject 0.3 mLs (0.3 mg) into the muscle once as needed for anaphylaxis   Refills:  6        etonogestrel 68 MG Impl   Commonly known as:  IMPLANON/NEXPLANON   Dose:  1 each        1 each (68 mg) by Subdermal route continuous   Refills:  0        famotidine 40 MG tablet   Commonly known as:  PEPCID   Dose:  40 mg   Quantity:  90 tablet        Take 1 tablet (40 mg) by mouth daily   Refills:  3        fluticasone 50 MCG/ACT spray   Commonly known as:  FLONASE   Dose:  1-2 spray   Quantity:  1 Bottle        Spray 1-2 sprays into both nostrils daily   Refills:  11        ibuprofen 400 MG tablet   Commonly known as:  ADVIL/MOTRIN   Dose:  800 mg   Quantity:  60 tablet        Take 2 tablets (800 mg) by mouth every 6 hours as needed for other (cramping)   Refills:  1        loratadine 10 MG tablet   Commonly known as:  CLARITIN   Dose:  10 mg        Take 10 mg by mouth daily   Refills:  0        meclizine 12.5 MG tablet   Commonly known as:  ANTIVERT   Dose:  12.5-25 mg   Quantity:  30 tablet        Take 1-2  tablets (12.5-25 mg) by mouth 4 times daily as needed for dizziness   Refills:  0                Prescriptions were sent or printed at these locations (2 Prescriptions)                   [facility] - [place], [place] - [address] AT [facility] [address]   [address], [place] [address]    Telephone:  [phone]   Fax:  [phone]   Hours:                  E-Prescribed (2 of 2)         sulfamethoxazole-trimethoprim (BACTRIM DS/SEPTRA DS) 800-160 MG per tablet               ketorolac (TORADOL) 10 MG tablet                Orders Needing Specimen Collection     None      Pending Results     No orders found from 4/14/2018 to 4/17/2018.            Pending Culture Results     No orders found from 4/14/2018 to 4/17/2018.            Thank you for choosing [facility]       Thank you for choosing [facility] for your care. Our goal is always to provide you with excellent care. Hearing back from our patients is one way we can continue to improve our services. Please take a few minutes to complete the written survey that you may receive in the mail after you visit with us. Thank you!        [facility] Information     [facility] gives you secure access to your electronic health record. If you see a primary care provider, you can also send messages to your care team and make appointments. If you have questions, please call your primary care clinic.  If you do not have a primary care provider, please call [phone] and they will assist you.        Care EveryWhere ID     This is your Care EveryWhere ID. This could be used by other organizations to access your [facility] medical records  [id number]        Equal Access to Services     [facility] APRIL AH: Hadii aad ku hadasho Soleonorali, waaxda luqadaha, qaybta kaalmada adeegyada, vianey guevara. So [facility] [phone].    ATENCIÓN: Si habla español, tiene a solis disposición servicios gratuitos de asistencia lingüística. Llame al [phone].    We  comply with applicable federal civil rights laws and [place] laws. We do not discriminate on the basis of race, color, national origin, age, disability, sex, sexual orientation, or gender identity.            After Visit Summary       This is your record. Keep this with you and show to your community pharmacist(s) and doctor(s) at your next visit.

## 2018-04-16 NOTE — LETTER
[facility]  [address]  [place] [address]  Phone: [phone]    April 16, 2018        Carolyn Mallory  [address]  [place] [address]          To whom it may concern:    RE: Carolyn Malloyr    Patient was seen and treated today at our clinic.    Please, excuse her from school today.      Sincerely,        Ludmila Zavala Certified  Physician Assistant  4/16/2018  3:24 PM  URGENT CARE CLINIC.

## 2018-04-17 NOTE — ED PROVIDER NOTES
History     Chief Complaint   Patient presents with     Mass     lump first noted yesterday morning at the top of rectal crease     The history is provided by the patient. No  was used.     Carolyn Mallory is a 22 year old female who has developed a sore area on the right buttock, over last 2 days. Painful and swollen. No known injury. No fever.  No fall.    Problem List:    Patient Active Problem List    Diagnosis Date Noted     Allergy to pollen 03/01/2018     Priority: Medium     Obesity, unspecified obesity severity, unspecified obesity type 06/09/2017     Priority: Medium     Tobacco use disorder 06/09/2017     Priority: Medium     Esophageal reflux 06/09/2017     Priority: Medium     Chronic right-sided thoracic back pain 05/24/2016     Priority: Medium     ACP (advance care planning) 04/26/2016     Priority: Medium     Advance Care Planning 4/26/2016: ACP Review of Chart / Resources Provided:  Reviewed chart for advance care plan.  Carolyn Mallory has no plan or code status on file. Discussed available resources and provided with information. .  Added by Lizzette Garcia           Calculus of kidney 12/16/2015     Priority: Medium     Bilateral hydronephrosis, flank pain, 3mm right lower pole non obstructing stone.  Dr. [facility] consult.  Declined stents.  Reconsider if hospitalization and no improvement 2-3 days.         Sacro ilial pain 10/30/2015     Priority: Medium     Patellofemoral syndrome of both knees 01/20/2015     Priority: Medium     Allergic rhinitis 01/17/2014     Priority: Medium     Problem list name updated by automated process. Provider to review       Food allergy 01/17/2014     Priority: Medium        Past Medical History:    Past Medical History:   Diagnosis Date     NO ACTIVE PROBLEMS      Pregnancy        Past Surgical History:    Past Surgical History:   Procedure Laterality Date     ARTHROSCOPY KNEE Right 5/4/2015    Procedure: ARTHROSCOPY KNEE;  Surgeon:  Hernando Kulkarni MD;  Location: [facility] OR     AS ESOPHAGOSCOPY, DIAGNOSTIC      upper     LAPAROSCOPIC CHOLECYSTECTOMY N/A 10/10/2015    Procedure: LAPAROSCOPIC CHOLECYSTECTOMY;  Surgeon: Drew Padgett MD;  Location: [facility] OR     none         Family History:    Family History   Problem Relation Age of Onset     Thrombophilia Mother      blood clotting     Lupus Mother      erythematosus     DIABETES Mother      Hypertension Father      Asthma Sister      DIABETES Maternal Grandfather      Hypertension Maternal Grandfather      Lupus Maternal Aunt      erythematosus       Social History:  Marital Status:  Single [1]  Social History   Substance Use Topics     Smoking status: Current Every Day Smoker     Packs/day: 0.03     Years: 1.00     Types: Cigarettes     Start date: 1/20/2014     Smokeless tobacco: Never Used     Alcohol use No        Medications:      sulfamethoxazole-trimethoprim (BACTRIM DS/SEPTRA DS) 800-160 MG per tablet   ketorolac (TORADOL) 10 MG tablet   meclizine (ANTIVERT) 12.5 MG tablet   famotidine (PEPCID) 40 MG tablet   fluticasone (FLONASE) 50 MCG/ACT spray   diphenhydrAMINE (BENADRYL) 25 MG tablet   etonogestrel (IMPLANON/NEXPLANON) 68 MG IMPL   ibuprofen (ADVIL,MOTRIN) 400 MG tablet   loratadine (CLARITIN) 10 MG tablet   EPINEPHrine (EPIPEN) 0.3 MG/0.3ML injection         Review of Systems   Constitutional: Negative for fever.   HENT: Negative.    Cardiovascular: Negative.    Skin: Positive for wound.   Neurological: Negative.    Psychiatric/Behavioral: Negative.        Physical Exam   BP: 122/78  Heart Rate: 110  Temp: 98.9  F (37.2  C)  SpO2: 99 %      Physical Exam   Constitutional: She is oriented to person, place, and time. She appears well-developed and well-nourished. No distress.   Cardiovascular:   tachycardia   Pulmonary/Chest: Effort normal.   Neurological: She is alert and oriented to person, place, and time.   Skin: She is not diaphoretic.   Right side of gluteal cleft: moderate  TTP, area of induration approx 2 cm x 2 cm. Minimal erythema. No open wound. No fluctuance.   Psychiatric: She has a normal mood and affect.   Nursing note and vitals reviewed.      ED Course     ED Course     Procedures              Medications   cefTRIAXone (ROCEPHIN) injection 1 g (1 g Intramuscular Given 4/16/18 1502)   pt observed x 20 minutes. Pt tolerated well    Assessments & Plan (with Medical Decision Making)     I have reviewed the nursing notes.    I have reviewed the findings, diagnosis, plan and need for follow up with the patient.      Discharge Medication List as of 4/16/2018  3:14 PM      START taking these medications    Details   sulfamethoxazole-trimethoprim (BACTRIM DS/SEPTRA DS) 800-160 MG per tablet Take 1 tablet by mouth 2 times daily for 10 days, Disp-20 tablet, R-0, E-Prescribe      ketorolac (TORADOL) 10 MG tablet Take 1 tablet (10 mg) by mouth every 6 hours as needed for moderate pain, Disp-20 tablet, R-0, E-Prescribe             Final diagnoses:   Pilonidal cyst         Apply ice to area 15 min/hr  Patient verbally educated and given appropriate education sheets for the diagnoses and has no questions.  Take medications as directed.   Follow up with your Primary Care provider if symptoms increase or if concerns develop, return to the ER  Ludmila Zavala Certified  Physician Assistant  4/16/2018  10:44 PM  URGENT CARE CLINIC      4/16/2018   [facility]     Ludmila Zavala PA  04/16/18 2358

## 2018-04-18 ENCOUNTER — OFFICE VISIT (OUTPATIENT)
Dept: FAMILY MEDICINE | Facility: OTHER | Age: 23
End: 2018-04-18
Attending: NURSE PRACTITIONER
Payer: COMMERCIAL

## 2018-04-18 VITALS
TEMPERATURE: 98.2 F | SYSTOLIC BLOOD PRESSURE: 102 MMHG | HEART RATE: 91 BPM | WEIGHT: 198 LBS | BODY MASS INDEX: 36.44 KG/M2 | HEIGHT: 62 IN | DIASTOLIC BLOOD PRESSURE: 70 MMHG | OXYGEN SATURATION: 95 %

## 2018-04-18 DIAGNOSIS — N83.202 LEFT OVARIAN CYST: Primary | ICD-10-CM

## 2018-04-18 DIAGNOSIS — L05.91 PILONIDAL CYST: ICD-10-CM

## 2018-04-18 PROCEDURE — G0463 HOSPITAL OUTPT CLINIC VISIT: HCPCS

## 2018-04-18 PROCEDURE — 99213 OFFICE O/P EST LOW 20 MIN: CPT | Performed by: NURSE PRACTITIONER

## 2018-04-18 ASSESSMENT — ANXIETY QUESTIONNAIRES
GAD7 TOTAL SCORE: 4
1. FEELING NERVOUS, ANXIOUS, OR ON EDGE: NOT AT ALL
7. FEELING AFRAID AS IF SOMETHING AWFUL MIGHT HAPPEN: NOT AT ALL
3. WORRYING TOO MUCH ABOUT DIFFERENT THINGS: NOT AT ALL
6. BECOMING EASILY ANNOYED OR IRRITABLE: SEVERAL DAYS
2. NOT BEING ABLE TO STOP OR CONTROL WORRYING: NOT AT ALL
5. BEING SO RESTLESS THAT IT IS HARD TO SIT STILL: SEVERAL DAYS
4. TROUBLE RELAXING: MORE THAN HALF THE DAYS
IF YOU CHECKED OFF ANY PROBLEMS ON THIS QUESTIONNAIRE, HOW DIFFICULT HAVE THESE PROBLEMS MADE IT FOR YOU TO DO YOUR WORK, TAKE CARE OF THINGS AT HOME, OR GET ALONG WITH OTHER PEOPLE: SOMEWHAT DIFFICULT

## 2018-04-18 ASSESSMENT — PAIN SCALES - GENERAL: PAINLEVEL: WORST PAIN (10)

## 2018-04-18 NOTE — MR AVS SNAPSHOT
After Visit Summary   4/18/2018    Carolyn Mallory    MRN: [id number]           Patient Information     Date Of Birth          1995        Visit Information        Provider Department      4/18/2018 1:00 PM Susan Clifford APRN [facility] [place]        Today's Diagnoses     Left ovarian cyst    -  1    Pilonidal cyst          Care Instructions        Pilonidal Cyst    A pilonidal cyst is found near the base of the spine (tailbone) or top of the buttocks crease. It may look like a pit or small depression. In some cases, it may have a hollow tunnel (sinus tract) that connects it to the surface of the skin. Normally, a pilonidal cyst does not cause symptoms. But if it becomes infected, it can cause pain and swelling. This sheet tells you more about pilonidal cysts and how they are treated.  What causes a pilonidal cyst and who gets them?  Two main causes are:    Ingrown hairs. This happens when a hair is forced under the skin or when a hair follicle ruptures.    Injury to the area. This can happen from sitting for long periods of time.  These cysts are often diagnosed in people between ages 16 and 26. But people of any age can have a pilonidal cyst. They affect both men and women, but they are more common in men.  Symptoms of a pilonidal cyst infection  A pilonidal cyst does not cause symptoms unless it becomes infected. Once a pilonidal cyst becomes infected, it is called a pilonidal abscess. Infection may cause the following symptoms:    Pain, redness, and swelling of the cyst and area around it    Foul-smelling drainage from the cyst    Fever  Diagnosing a pilonidal cyst  A pilonidal cyst can be diagnosed by how it looks and by its location. Your healthcare provider will examine the suspected cyst to confirm a diagnosis. You will be told if any tests are needed.  Treating a pilonidal cyst infection  Most pilonidal cysts are left alone. But if a cyst becomes infected, treatment is  needed. It may include the following:    Incision and drainage. If needed, the cyst is cut open, and pus and other infected material is allowed to drain.    Antibiotic medicines for the infection. Know that medicines do not make the cyst go away, and antibiotics have limited use in treating an abscess. They also won t keep a cyst from becoming infected again.    Hot water soaks. These can help draw out the infection and ease pain and itching.    Surgery to remove the cyst (excision). This may be done if the infection is severe, does not respond to medicine, or keeps coming back. A surgeon cuts and removes the cyst and the tissue around it. Your healthcare provider can tell you more if this is needed.    Laser hair removalaround the area. This may decrease the frequency of flare-ups.  Preventing infection  A pilonidal cyst can easily become infected. Do the following to help prevent infections:    Keep the cyst and surrounding skin area clean.    Remove hair from the area of the cyst regularly. Ask your healthcare provider about safe hair removal products or procedures.    Avoid sitting in one position for long periods of time. This helps to reduce weight and pressure on your tailbone area. Sitting on a special cushion to relieve pressure on the tailbone may also help. Ask your healthcare provider about where to purchase these cushions.    Avoid tight-fitting clothing to reduce skin irritation around the cyst.  Date Last Reviewed: 2/1/2017 2000-2017 [employer]. [address] [place] [address]. All rights reserved. This information is not intended as a substitute for professional medical care. Always follow your healthcare professional's instructions.        Ovarian Cysts    Ovarian cysts are sacs filled with fluid or tissue that form on or inside the ovaries. The ovaries are two small organs located on each side of a woman s uterus (womb). They are part of the female reproductive  system.  Ovarian cysts are common in women, especially during childbearing years. There are different types of cysts. Most are harmless (benign) and go away on their own. They often cause no symptoms. If symptoms do occur, they can include mild pain or pressure in the lower belly (abdomen).  Cysts that are large or break (rupture) may cause more severe pain and symptoms. In these cases, hospital care or treatment such as surgery may be needed. More extensive treatment may also be needed if a cyst causes an ovary to twist (called torsion) or if a cyst is suspected to be cancerous. Keep in mind that most cysts are not cancerous, however.  General care    To help relieve pain, your healthcare provider may recommend using over-the-counter pain medicine. If needed, stronger pain medicine may be prescribed.    Depending on the type of cyst you have, your healthcare provider may advise taking birth control pills. These help shrink cysts in certain cases. They may also help prevent new cysts from forming. Be sure to take these medicines as directed if they are prescribed.    Your healthcare provider may advise you to watch your symptoms over time to see if they go away or worsen. Regular ultrasound tests may also be advised. These can help check if a cyst goes away or grows in size.  Follow-up care  Follow up with your healthcare provider, or as advised.  When to seek medical advice  Call your healthcare provider right away if any of these occur:    Pain worsens or fails to get better with home treatment    Fever of 100.4 F (38 C) or higher (or other fever amount directed by your healthcare provider)    Nausea and vomiting    Weakness, dizziness, or fainting    Abnormal vaginal bleeding  Date Last Reviewed: 6/11/2015 2000-2017 [employer]. [address] [place] [address], [place] [address]. All rights reserved. This information is not intended as a substitute for professional medical care. Always follow your  healthcare professional's instructions.                Follow-ups after your visit        Additional Services     OB/GYN REFERRAL       Your provider has referred you to:  [facility] [phone]  [email or web address]    Please be aware that coverage of these services is subject to the terms and limitations of your health insurance plan.  Call member services at your health plan with any benefit or coverage questions.      Please bring the following with you to your appointment:    (1) Any X-Rays, CTs or MRIs which have been performed.  Contact the facility where they were done to arrange for  prior to your scheduled appointment.   (2) List of current medications   (3) This referral request   (4) Any documents/labs given to you for this referral                  Follow-up notes from your care team     Return if symptoms worsen or fail to improve.      Who to contact     If you have questions or need follow up information about today's clinic visit or your schedule please contact [facility] directly at [phone].  Normal or non-critical lab and imaging results will be communicated to you by [employer], letter or phone within 4 business days after the clinic has received the results. If you do not hear from us within 7 days, please contact the clinic through [employer] or phone. If you have a critical or abnormal lab result, we will notify you by phone as soon as possible.  Submit refill requests through [employer] or call your pharmacy and they will forward the refill request to us. Please allow 3 business days for your refill to be completed.          Additional Information About Your Visit        [employer] Information     [employer] gives you secure access to your electronic health record. If you see a primary care provider, you can also send messages to your care team and make appointments. If you have questions, please call your primary care clinic.   "If you do not have a primary care provider, please call [phone] and they will assist you.        Care EveryWhere ID     This is your Care EveryWhere ID. This could be used by other organizations to access your [place] medical records  [id number]        Your Vitals Were     Pulse Temperature Height Pulse Oximetry BMI (Body Mass Index)       91 98.2  F (36.8  C) (Tympanic) 5' 2\" (1.575 m) 95% 36.21 kg/m2        Blood Pressure from Last 3 Encounters:   04/18/18 102/70   04/16/18 122/78   03/20/18 110/67    Weight from Last 3 Encounters:   04/18/18 198 lb (89.8 kg)   03/14/18 208 lb (94.3 kg)   10/09/17 195 lb (88.5 kg)              We Performed the Following     OB/GYN REFERRAL        Primary Care Provider Office Phone # Fax #    Susan Nasrin Clifford, APRN [facility] [phone]       [address] [facility]  [facility] [address]        Equal Access to Services     [facility]: Hadii aad ku hadasho Soomaali, waaxda luqadaha, qaybta kaalmada adeegyada, waxmaria fernanda sorenson . So [facility] [phone].    ATENCIÓN: Si habla español, tiene a solis disposición servicios gratuitos de asistencia lingüística. Llame al [phone].    We comply with applicable federal civil rights laws and [place] laws. We do not discriminate on the basis of race, color, national origin, age, disability, sex, sexual orientation, or gender identity.            Thank you!     Thank you for choosing [facility]  for your care. Our goal is always to provide you with excellent care. Hearing back from our patients is one way we can continue to improve our services. Please take a few minutes to complete the written survey that you may receive in the mail after your visit with us. Thank you!             Your Updated Medication List - Protect others around you: Learn how to safely use, store and throw away your medicines at www.disposemymeds.org.          This list is accurate as of 4/18/18  1:37 PM.  Always use " your most recent med list.                   Brand Name Dispense Instructions for use Diagnosis    diphenhydrAMINE 25 MG tablet    BENADRYL     Take 25 mg by mouth        EPINEPHrine 0.3 MG/0.3ML injection    EPIPEN    4 each    Inject 0.3 mLs (0.3 mg) into the muscle once as needed for anaphylaxis    Allergic rhinitis due to pollen       etonogestrel 68 MG Impl    IMPLANON/NEXPLANON     1 each (68 mg) by Subdermal route continuous    Nexplanon insertion       famotidine 40 MG tablet    PEPCID    90 tablet    Take 1 tablet (40 mg) by mouth daily    Gastroesophageal reflux disease without esophagitis       fluticasone 50 MCG/ACT spray    FLONASE    1 Bottle    Spray 1-2 sprays into both nostrils daily        ibuprofen 400 MG tablet    ADVIL/MOTRIN    60 tablet    Take 2 tablets (800 mg) by mouth every 6 hours as needed for other (cramping)     (spontaneous vaginal delivery)       ketorolac 10 MG tablet    TORADOL    20 tablet    Take 1 tablet (10 mg) by mouth every 6 hours as needed for moderate pain        loratadine 10 MG tablet    CLARITIN     Take 10 mg by mouth daily        PNV FOLIC ACID + IRON PO           sulfamethoxazole-trimethoprim 800-160 MG per tablet    BACTRIM DS/SEPTRA DS    20 tablet    Take 1 tablet by mouth 2 times daily for 10 days

## 2018-04-18 NOTE — PATIENT INSTRUCTIONS
Pilonidal Cyst    A pilonidal cyst is found near the base of the spine (tailbone) or top of the buttocks crease. It may look like a pit or small depression. In some cases, it may have a hollow tunnel (sinus tract) that connects it to the surface of the skin. Normally, a pilonidal cyst does not cause symptoms. But if it becomes infected, it can cause pain and swelling. This sheet tells you more about pilonidal cysts and how they are treated.  What causes a pilonidal cyst and who gets them?  Two main causes are:    Ingrown hairs. This happens when a hair is forced under the skin or when a hair follicle ruptures.    Injury to the area. This can happen from sitting for long periods of time.  These cysts are often diagnosed in people between ages 16 and 26. But people of any age can have a pilonidal cyst. They affect both men and women, but they are more common in men.  Symptoms of a pilonidal cyst infection  A pilonidal cyst does not cause symptoms unless it becomes infected. Once a pilonidal cyst becomes infected, it is called a pilonidal abscess. Infection may cause the following symptoms:    Pain, redness, and swelling of the cyst and area around it    Foul-smelling drainage from the cyst    Fever  Diagnosing a pilonidal cyst  A pilonidal cyst can be diagnosed by how it looks and by its location. Your healthcare provider will examine the suspected cyst to confirm a diagnosis. You will be told if any tests are needed.  Treating a pilonidal cyst infection  Most pilonidal cysts are left alone. But if a cyst becomes infected, treatment is needed. It may include the following:    Incision and drainage. If needed, the cyst is cut open, and pus and other infected material is allowed to drain.    Antibiotic medicines for the infection. Know that medicines do not make the cyst go away, and antibiotics have limited use in treating an abscess. They also won t keep a cyst from becoming infected again.    Hot water soaks.  These can help draw out the infection and ease pain and itching.    Surgery to remove the cyst (excision). This may be done if the infection is severe, does not respond to medicine, or keeps coming back. A surgeon cuts and removes the cyst and the tissue around it. Your healthcare provider can tell you more if this is needed.    Laser hair removalaround the area. This may decrease the frequency of flare-ups.  Preventing infection  A pilonidal cyst can easily become infected. Do the following to help prevent infections:    Keep the cyst and surrounding skin area clean.    Remove hair from the area of the cyst regularly. Ask your healthcare provider about safe hair removal products or procedures.    Avoid sitting in one position for long periods of time. This helps to reduce weight and pressure on your tailbone area. Sitting on a special cushion to relieve pressure on the tailbone may also help. Ask your healthcare provider about where to purchase these cushions.    Avoid tight-fitting clothing to reduce skin irritation around the cyst.  Date Last Reviewed: 2/1/2017 2000-2017 [employer]. [address] [place] [address]. All rights reserved. This information is not intended as a substitute for professional medical care. Always follow your healthcare professional's instructions.        Ovarian Cysts    Ovarian cysts are sacs filled with fluid or tissue that form on or inside the ovaries. The ovaries are two small organs located on each side of a woman s uterus (womb). They are part of the female reproductive system.  Ovarian cysts are common in women, especially during childbearing years. There are different types of cysts. Most are harmless (benign) and go away on their own. They often cause no symptoms. If symptoms do occur, they can include mild pain or pressure in the lower belly (abdomen).  Cysts that are large or break (rupture) may cause more severe pain and symptoms. In these cases,  hospital care or treatment such as surgery may be needed. More extensive treatment may also be needed if a cyst causes an ovary to twist (called torsion) or if a cyst is suspected to be cancerous. Keep in mind that most cysts are not cancerous, however.  General care    To help relieve pain, your healthcare provider may recommend using over-the-counter pain medicine. If needed, stronger pain medicine may be prescribed.    Depending on the type of cyst you have, your healthcare provider may advise taking birth control pills. These help shrink cysts in certain cases. They may also help prevent new cysts from forming. Be sure to take these medicines as directed if they are prescribed.    Your healthcare provider may advise you to watch your symptoms over time to see if they go away or worsen. Regular ultrasound tests may also be advised. These can help check if a cyst goes away or grows in size.  Follow-up care  Follow up with your healthcare provider, or as advised.  When to seek medical advice  Call your healthcare provider right away if any of these occur:    Pain worsens or fails to get better with home treatment    Fever of 100.4 F (38 C) or higher (or other fever amount directed by your healthcare provider)    Nausea and vomiting    Weakness, dizziness, or fainting    Abnormal vaginal bleeding  Date Last Reviewed: 6/11/2015 2000-2017 [employer]. [address] [place] [address], [place] [address]. All rights reserved. This information is not intended as a substitute for professional medical care. Always follow your healthcare professional's instructions.

## 2018-04-18 NOTE — NURSING NOTE
"Chief Complaint   Patient presents with     RECHECK       Initial /70  Pulse 91  Temp 98.2  F (36.8  C) (Tympanic)  Ht 5' 2\" (1.575 m)  Wt 198 lb (89.8 kg)  SpO2 95%  BMI 36.21 kg/m2 Estimated body mass index is 36.21 kg/(m^2) as calculated from the following:    Height as of this encounter: 5' 2\" (1.575 m).    Weight as of this encounter: 198 lb (89.8 kg).  Medication Reconciliation: complete   Carly Perez    "

## 2018-04-18 NOTE — PROGRESS NOTES
SUBJECTIVE:   Carolyn Mallory is a 22 year old female who presents to clinic today for the following health issues:      Abdominal Pain      Duration: 3-4 months    Description (location/character/radiation): left ovary       Associated flank pain: yes when thepain is at its worse    Intensity:  moderate    Accompanying signs and symptoms:        Fever/Chills: no        Gas/Bloating: no        Nausea/vomitting: YES- when pain bad       Diarrhea: no        Dysuria or Hematuria: no     History (previous similar pain/trauma/previous testing): on and off for last 4 months    Precipitating or alleviating factors:       Pain worse with eating/BM/urination: none       Pain relieved by BM: no     Therapies tried and outcome: just been watching and pain is not going away    LMP:  Last approx 1-2 months ago very irregular , has implant.      ED/UC Followup:    Facility:  [facility]  Date of visit: 4/16/2018  Reason for visit: Pilonidal cyst  Current Status: not as painful         Problem list and histories reviewed & adjusted, as indicated.  Additional history: as documented    Patient Active Problem List   Diagnosis     Allergic rhinitis     Food allergy     Patellofemoral syndrome of both knees     Sacro ilial pain     Calculus of kidney     ACP (advance care planning)     Chronic right-sided thoracic back pain     Obesity, unspecified obesity severity, unspecified obesity type     Tobacco use disorder     Esophageal reflux     Allergy to pollen     Astigmatism     BMI 34.0-34.9,adult     Hypermetropia     Past Surgical History:   Procedure Laterality Date     ARTHROSCOPY KNEE Right 5/4/2015    Procedure: ARTHROSCOPY KNEE;  Surgeon: Hernando Kulkarni MD;  Location: [facility]     AS ESOPHAGOSCOPY, DIAGNOSTIC      upper     LAPAROSCOPIC CHOLECYSTECTOMY N/A 10/10/2015    Procedure: LAPAROSCOPIC CHOLECYSTECTOMY;  Surgeon: Drew Padgett MD;  Location: [facility] OR     none         Social History   Substance Use Topics     Smoking  status: Current Every Day Smoker     Packs/day: 0.03     Years: 1.00     Types: Cigarettes     Start date: 1/20/2014     Smokeless tobacco: Never Used     Alcohol use No     Family History   Problem Relation Age of Onset     Thrombophilia Mother      blood clotting     Lupus Mother      erythematosus     DIABETES Mother      Hypertension Father      Asthma Sister      DIABETES Maternal Grandfather      Hypertension Maternal Grandfather      Lupus Maternal Aunt      erythematosus         Current Outpatient Prescriptions   Medication Sig Dispense Refill     diphenhydrAMINE (BENADRYL) 25 MG tablet Take 25 mg by mouth       EPINEPHrine (EPIPEN) 0.3 MG/0.3ML injection Inject 0.3 mLs (0.3 mg) into the muscle once as needed for anaphylaxis 4 each 6     etonogestrel (IMPLANON/NEXPLANON) 68 MG IMPL 1 each (68 mg) by Subdermal route continuous       famotidine (PEPCID) 40 MG tablet Take 1 tablet (40 mg) by mouth daily 90 tablet 3     fluticasone (FLONASE) 50 MCG/ACT spray Spray 1-2 sprays into both nostrils daily 1 Bottle 11     ibuprofen (ADVIL,MOTRIN) 400 MG tablet Take 2 tablets (800 mg) by mouth every 6 hours as needed for other (cramping) 60 tablet 1     ketorolac (TORADOL) 10 MG tablet Take 1 tablet (10 mg) by mouth every 6 hours as needed for moderate pain 20 tablet 0     loratadine (CLARITIN) 10 MG tablet Take 10 mg by mouth daily       Prenatal Vit-Fe Fumarate-FA (PNV FOLIC ACID + IRON PO)        sulfamethoxazole-trimethoprim (BACTRIM DS/SEPTRA DS) 800-160 MG per tablet Take 1 tablet by mouth 2 times daily for 10 days 20 tablet 0     Allergies   Allergen Reactions     Amoxicillin      Oxycodone      Vomiting, hallucinations.     Tylenol [Acetaminophen] Other (See Comments)     Patient reports seeing spots after taking Tylenol.       Reviewed and updated as needed this visit by clinical staff       Reviewed and updated as needed this visit by Provider         ROS:  CONSTITUTIONAL:fever yesterday  "102.0  INTEGUMENTARY/SKIN: pilonidal cyst  RESP: Occasional cyst  CV: NEGATIVE for chest pain, palpitations or peripheral edema  GI: hx of ovarian cyst and abdominal pain across lower abdomen  : has an implant in, but had irregular periods before that, denies dysuria     OBJECTIVE:     /70  Pulse 91  Temp 98.2  F (36.8  C) (Tympanic)  Ht 5' 2\" (1.575 m)  Wt 198 lb (89.8 kg)  SpO2 95%  BMI 36.21 kg/m2  Body mass index is 36.21 kg/(m^2).   GENERAL: healthy, alert and no distress  NECK: no adenopathy, no asymmetry, masses, or scars and thyroid normal to palpation  RESP: lungs clear to auscultation - no rales, rhonchi or wheezes  CV: regular rate and rhythm, normal S1 S2, no S3 or S4, no murmur, click or rub, no peripheral edema and peripheral pulses strong  ABDOMEN: tenderness lower abdomen LT>RT, bowel sounds normal and no palpable or pulsatile masses  SKIN: mild erythema to upper gluteal fold, no open areas noted  PSYCH: mentation appears normal  LYMPH: normal ant/post cervical, supraclavicular nodes    Diagnostic Test Results:  Results for orders placed or performed during the hospital encounter of 03/13/18   CT Abdomen Pelvis w Contrast    Narrative    Exam:CT ABDOMEN PELVIS W CONTRAST    History: 22 years Female mid abdominal pain     Comparisons: 2/2/2018    Technique: Axial CT imaging of the abdomen and pelvis was performed  with IV contrast. Coronal and sagittal reconstructions were obtained      FINDINGS:  Lung bases:The lung bases are clear    Abdomen:  Liver:Unremarkable  Gallbladder and biliary tree: The gallbladder is absent. There is no  biliary dilatation.  Pancreas:Unremarkable  Spleen:Unremarkable  Adrenals:Normal    Kidneys and ureters:Unremarkable    Lymph nodes:There is no significant lymphadenopathy    Bowel:No abnormally distended or thickened loops of bowel are present.  There is no evidence of bowel obstruction.    Appendix:Unremarkable    Vessels:Unremarkable    Osseous " structures:Unremarkable    Pelvis: There is a left adnexal cyst measuring 3.8 x 2.6 cm in  dimension. This is slightly larger than that seen on the prior study.            Impression    IMPRESSION: There is a 2.6 x 3.8 cm left adnexal cyst. Size is  slightly greater than that seen on the prior study. The exam is  otherwise unremarkable.    EDUARDO MONTESINSO MD   UA reflex to Microscopic   Result Value Ref Range    Color Urine Light Yellow     Appearance Urine Clear     Glucose Urine Negative NEG^Negative mg/dL    Bilirubin Urine Negative NEG^Negative    Ketones Urine Negative NEG^Negative mg/dL    Specific Gravity Urine 1.005 1.003 - 1.035    Blood Urine Negative NEG^Negative    pH Urine 5.5 4.7 - 8.0 pH    Protein Albumin Urine Negative NEG^Negative mg/dL    Urobilinogen mg/dL Normal 0.0 - 2.0 mg/dL    Nitrite Urine Negative NEG^Negative    Leukocyte Esterase Urine Negative NEG^Negative    Source Midstream Urine    CBC with platelets differential   Result Value Ref Range    WBC 12.1 (H) 4.0 - 11.0 10e9/L    RBC Count 4.31 3.8 - 5.2 10e12/L    Hemoglobin 13.4 11.7 - 15.7 g/dL    Hematocrit 38.5 35.0 - 47.0 %    MCV 89 78 - 100 fl    MCH 31.1 26.5 - 33.0 pg    MCHC 34.8 31.5 - 36.5 g/dL    RDW 12.1 10.0 - 15.0 %    Platelet Count 313 150 - 450 10e9/L    Diff Method Automated Method     % Neutrophils 65.1 %    % Lymphocytes 26.9 %    % Monocytes 5.8 %    % Eosinophils 1.7 %    % Basophils 0.2 %    % Immature Granulocytes 0.3 %    Nucleated RBCs 0 0 /100    Absolute Neutrophil 7.9 1.6 - 8.3 10e9/L    Absolute Lymphocytes 3.3 0.8 - 5.3 10e9/L    Absolute Monocytes 0.7 0.0 - 1.3 10e9/L    Absolute Eosinophils 0.2 0.0 - 0.7 10e9/L    Absolute Basophils 0.0 0.0 - 0.2 10e9/L    Abs Immature Granulocytes 0.0 0 - 0.4 10e9/L    Absolute Nucleated RBC 0.0    CRP inflammation   Result Value Ref Range    CRP Inflammation 13.5 (H) 0.0 - 8.0 mg/L   Comprehensive metabolic panel   Result Value Ref Range    Sodium 139 133 - 144  mmol/L    Potassium 3.4 3.4 - 5.3 mmol/L    Chloride 108 94 - 109 mmol/L    Carbon Dioxide 25 20 - 32 mmol/L    Anion Gap 6 3 - 14 mmol/L    Glucose 107 (H) 70 - 99 mg/dL    Urea Nitrogen 6 (L) 7 - 30 mg/dL    Creatinine 0.79 0.52 - 1.04 mg/dL    GFR Estimate >90 >60 mL/min/1.7m2    GFR Estimate If Black >90 >60 mL/min/1.7m2    Calcium 8.4 (L) 8.5 - 10.1 mg/dL    Bilirubin Total 0.2 0.2 - 1.3 mg/dL    Albumin 3.7 3.4 - 5.0 g/dL    Protein Total 7.0 6.8 - 8.8 g/dL    Alkaline Phosphatase 84 40 - 150 U/L    ALT 27 0 - 50 U/L    AST 12 0 - 45 U/L   Lipase   Result Value Ref Range    Lipase 118 73 - 393 U/L   HCG qualitative urine   Result Value Ref Range    HCG Qual Urine Negative NEG^Negative       ASSESSMENT/PLAN:     1. Left ovarian cyst  Continues to have lower abdominal pain without any change. Referral placed to OB/GYN for further evaluation. Carolyn agrees with plan.  - OB/GYN REFERRAL    2. Pilonidal cyst  Decreased pain to the site. Continue current therapy. If pain increases again can consider referral to surgery. Carolyn agrees with plan.         See Patient Instructions    AMELIA [facility] HUYEN

## 2018-04-19 ASSESSMENT — ANXIETY QUESTIONNAIRES: GAD7 TOTAL SCORE: 4

## 2018-04-19 ASSESSMENT — PATIENT HEALTH QUESTIONNAIRE - PHQ9: SUM OF ALL RESPONSES TO PHQ QUESTIONS 1-9: 5

## 2018-05-03 ENCOUNTER — OFFICE VISIT (OUTPATIENT)
Dept: OBGYN | Facility: OTHER | Age: 23
End: 2018-05-03
Attending: NURSE PRACTITIONER
Payer: COMMERCIAL

## 2018-05-03 VITALS
HEIGHT: 62 IN | OXYGEN SATURATION: 99 % | SYSTOLIC BLOOD PRESSURE: 106 MMHG | WEIGHT: 198 LBS | DIASTOLIC BLOOD PRESSURE: 64 MMHG | HEART RATE: 74 BPM | BODY MASS INDEX: 36.44 KG/M2

## 2018-05-03 DIAGNOSIS — Z12.4 SCREENING FOR CERVICAL CANCER: Primary | ICD-10-CM

## 2018-05-03 DIAGNOSIS — N83.202 LEFT OVARIAN CYST: ICD-10-CM

## 2018-05-03 DIAGNOSIS — B96.89 BV (BACTERIAL VAGINOSIS): ICD-10-CM

## 2018-05-03 DIAGNOSIS — N76.0 BV (BACTERIAL VAGINOSIS): ICD-10-CM

## 2018-05-03 DIAGNOSIS — Z11.3 SCREEN FOR STD (SEXUALLY TRANSMITTED DISEASE): ICD-10-CM

## 2018-05-03 DIAGNOSIS — R10.32 ABDOMINAL PAIN, LEFT LOWER QUADRANT: ICD-10-CM

## 2018-05-03 LAB
HCG UR QL: NEGATIVE
SPECIMEN SOURCE: ABNORMAL
WET PREP SPEC: ABNORMAL

## 2018-05-03 PROCEDURE — G0463 HOSPITAL OUTPT CLINIC VISIT: HCPCS | Performed by: COUNSELOR

## 2018-05-03 PROCEDURE — G0123 SCREEN CERV/VAG THIN LAYER: HCPCS | Mod: ZL | Performed by: NURSE PRACTITIONER

## 2018-05-03 PROCEDURE — 99213 OFFICE O/P EST LOW 20 MIN: CPT | Performed by: NURSE PRACTITIONER

## 2018-05-03 PROCEDURE — 87591 N.GONORRHOEAE DNA AMP PROB: CPT | Mod: ZL | Performed by: NURSE PRACTITIONER

## 2018-05-03 PROCEDURE — 87210 SMEAR WET MOUNT SALINE/INK: CPT | Mod: ZL | Performed by: NURSE PRACTITIONER

## 2018-05-03 PROCEDURE — 87491 CHLMYD TRACH DNA AMP PROBE: CPT | Mod: ZL | Performed by: NURSE PRACTITIONER

## 2018-05-03 PROCEDURE — 88142 CYTOPATH C/V THIN LAYER: CPT | Performed by: NURSE PRACTITIONER

## 2018-05-03 PROCEDURE — 81025 URINE PREGNANCY TEST: CPT | Mod: ZL | Performed by: NURSE PRACTITIONER

## 2018-05-03 RX ORDER — METRONIDAZOLE 7.5 MG/G
1 GEL VAGINAL AT BEDTIME
Qty: 70 G | Refills: 0 | Status: SHIPPED | OUTPATIENT
Start: 2018-05-03 | End: 2019-02-11

## 2018-05-03 ASSESSMENT — PAIN SCALES - GENERAL: PAINLEVEL: MODERATE PAIN (4)

## 2018-05-03 NOTE — NURSING NOTE
"Chief Complaint   Patient presents with     Cyst     Ovarian Cyst       Initial /64 (BP Location: Right arm, Patient Position: Sitting, Cuff Size: Adult Regular)  Pulse 74  Ht 5' 2\" (1.575 m)  Wt 198 lb (89.8 kg)  SpO2 99%  BMI 36.21 kg/m2 Estimated body mass index is 36.21 kg/(m^2) as calculated from the following:    Height as of this encounter: 5' 2\" (1.575 m).    Weight as of this encounter: 198 lb (89.8 kg).  Medication Reconciliation: complete     Constanza Yang      "

## 2018-05-03 NOTE — MR AVS SNAPSHOT
After Visit Summary   5/3/2018    Carolyn Mallory    MRN: [id number]           Patient Information     Date Of Birth          1995        Visit Information        Provider Department      5/3/2018 11:15 AM Jennifer Senior NP [facility]        Today's Diagnoses     Screening for cervical cancer    -  1    Left ovarian cyst        Screen for STD (sexually transmitted disease)        Abdominal pain, left lower quadrant        BV (bacterial vaginosis)           Follow-ups after your visit        Follow-up notes from your care team     Return if symptoms worsen or fail to improve.      Who to contact     If you have questions or need follow up information about today's clinic visit or your schedule please contact [facility] directly at [phone].  Normal or non-critical lab and imaging results will be communicated to you by MyChart, letter or phone within 4 business days after the clinic has received the results. If you do not hear from us within 7 days, please contact the clinic through Agolohart or phone. If you have a critical or abnormal lab result, we will notify you by phone as soon as possible.  Submit refill requests through Onzo or call your pharmacy and they will forward the refill request to us. Please allow 3 business days for your refill to be completed.          Additional Information About Your Visit        MyChart Information     Onzo gives you secure access to your electronic health record. If you see a primary care provider, you can also send messages to your care team and make appointments. If you have questions, please call your primary care clinic.  If you do not have a primary care provider, please call [phone] and they will assist you.        Care EveryWhere ID     This is your Care EveryWhere ID. This could be used by other organizations to access your [place] medical records  [id number]        Your Vitals Were     Pulse Height Pulse  "Oximetry BMI (Body Mass Index)          74 5' 2\" (1.575 m) 99% 36.21 kg/m2         Blood Pressure from Last 3 Encounters:   05/03/18 106/64   04/18/18 102/70   04/16/18 122/78    Weight from Last 3 Encounters:   05/03/18 198 lb (89.8 kg)   04/18/18 198 lb (89.8 kg)   03/14/18 208 lb (94.3 kg)              We Performed the Following     A pap thin layer screen only - recommended age 21 - 24 years     GC/Chlamydia by PCR - [facility]     HCG Qual, Urine - [facility] Payne  ([id number])     Wet prep          Today's Medication Changes          These changes are accurate as of 5/3/18 11:59 PM.  If you have any questions, ask your nurse or doctor.               Start taking these medicines.        Dose/Directions    metroNIDAZOLE 0.75 % vaginal gel   Commonly known as:  METROGEL   Used for:  BV (bacterial vaginosis)   Started by:  Jennifer Senior, NP        Dose:  1 applicator   Place 1 applicator (5 g) vaginally At Bedtime for 5 days   Quantity:  70 g   Refills:  0            Where to get your medicines      These medications were sent to [id number] - [place], [place] - [address] AT [facility] [address]  [address], [facility] [address]     Phone:  [phone]     metroNIDAZOLE 0.75 % vaginal gel                Primary Care Provider Office Phone # Fax #    Susan Stoutvandana Clifford, AMELIA [facility] [phone]       [address]  [facility] [address]        Equal Access to Services     [facility]: Hadii stella meyers hadasho Soomaali, waaxda luqadaha, qaybta kaalmada adeegyaflako, vianey guevara. So [facility] [phone].    ATENCIÓN: Si habla español, tiene a solis disposición servicios gratuitos de asistencia lingüística. Llame al [phone].    We comply with applicable federal civil rights laws and [place] laws. We do not discriminate on the basis of race, color, national origin, age, disability, sex, sexual orientation, or gender identity.            Thank you!     Thank you " for choosing [facility]  [facility] your [facility]. Our goal is always to provide you with excellent care. Hearing back from our patients is one way we can continue to improve our services. Please take a few minutes to complete the written survey that you may receive in the mail after your visit with us. Thank you!             Your Updated Medication List - Protect others around you: Learn how to safely use, store and throw away your medicines at www.disposemymeds.org.          This list is accurate as of 5/3/18 11:59 PM.  Always use your most recent med list.                   Brand Name Dispense Instructions for use Diagnosis    diphenhydrAMINE 25 MG tablet    BENADRYL     Take 25 mg by mouth        EPINEPHrine 0.3 MG/0.3ML injection    EPIPEN    4 each    Inject 0.3 mLs (0.3 mg) into the muscle once as needed for anaphylaxis    Allergic rhinitis due to pollen       etonogestrel 68 MG Impl    IMPLANON/NEXPLANON     1 each (68 mg) by Subdermal route continuous    Nexplanon insertion       famotidine 40 MG tablet    PEPCID    90 tablet    Take 1 tablet (40 mg) by mouth daily    Gastroesophageal reflux disease without esophagitis       fluticasone 50 MCG/ACT spray    FLONASE    1 Bottle    Spray 1-2 sprays into both nostrils daily        ibuprofen 400 MG tablet    ADVIL/MOTRIN    60 tablet    Take 2 tablets (800 mg) by mouth every 6 hours as needed for other (cramping)     (spontaneous vaginal delivery)       ketorolac 10 MG tablet    TORADOL    20 tablet    Take 1 tablet (10 mg) by mouth every 6 hours as needed for moderate pain        loratadine 10 MG tablet    CLARITIN     Take 10 mg by mouth daily        metroNIDAZOLE 0.75 % vaginal gel    METROGEL    70 g    Place 1 applicator (5 g) vaginally At Bedtime for 5 days    BV (bacterial vaginosis)       PNV FOLIC ACID + IRON PO

## 2018-05-04 ENCOUNTER — HOSPITAL ENCOUNTER (OUTPATIENT)
Dept: ULTRASOUND IMAGING | Facility: HOSPITAL | Age: 23
Discharge: HOME | End: 2018-05-04
Attending: NURSE PRACTITIONER | Admitting: NURSE PRACTITIONER
Payer: COMMERCIAL

## 2018-05-04 DIAGNOSIS — N83.202 LEFT OVARIAN CYST: ICD-10-CM

## 2018-05-04 LAB
C TRACH DNA SPEC QL PROBE+SIG AMP: NOT DETECTED
N GONORRHOEA DNA SPEC QL PROBE+SIG AMP: NOT DETECTED
SPECIMEN SOURCE: NORMAL

## 2018-05-04 PROCEDURE — 76856 US EXAM PELVIC COMPLETE: CPT | Mod: TC

## 2018-05-08 NOTE — PROGRESS NOTES
"[facility]                HPI   Carolyn presents with concerns of ongoing left sided pain and monitoring of left ovarian cyst.  She has been following with her PCP for left ovarian cyst identified on CT in February.  She notes pain has continued and recently has been increasing.  She is experiencing vomiting tid for the past week.  Daily nausea.  Daily loose stools for the past 2 months.  Pain with intercourse.  She has a Nexplanon implant and last menses was 3 months ago.               Medications:     Current Outpatient Prescriptions Ordered in Epic   Medication     diphenhydrAMINE (BENADRYL) 25 MG tablet     EPINEPHrine (EPIPEN) 0.3 MG/0.3ML injection     etonogestrel (IMPLANON/NEXPLANON) 68 MG IMPL     famotidine (PEPCID) 40 MG tablet     fluticasone (FLONASE) 50 MCG/ACT spray     ibuprofen (ADVIL,MOTRIN) 400 MG tablet     ketorolac (TORADOL) 10 MG tablet     loratadine (CLARITIN) 10 MG tablet     metroNIDAZOLE (METROGEL) 0.75 % vaginal gel     Prenatal Vit-Fe Fumarate-FA (PNV FOLIC ACID + IRON PO)     No current Epic-ordered facility-administered medications on file.                 Allergies:   Amoxicillin; Oxycodone; and Tylenol [acetaminophen]         Review of Systems:   The 5 point Review of Systems is negative other than noted in the HPI                     Physical Exam:   Blood pressure 106/64, pulse 74, height 5' 2\" (1.575 m), weight 198 lb (89.8 kg), SpO2 99 %, not currently breastfeeding.  Constitutional:   awake, alert, cooperative, no apparent distress, and appears stated age     Abdomen:   soft, non-distended and non-tender     Genitounirinary:   External Genitalia:  General appearance; normal  Vagina:  Discharge absent, Lesions absent  Cervix:  Discharge absent, Tenderness absent  Uterus:  Size normal, Tenderness absent  Adenexa:  Tenderness left without enlargement.               Data:   All laboratory data reviewed    All imaging studies reviewed by me.            Assessment and " Plan:   left ovarian cyst - pelvic US ordered  Bacterial vaginosis - treat as per ROLDAN Kasper  5/8/2018  9:16 AM

## 2018-05-10 LAB
COPATH REPORT: NORMAL
PAP: NORMAL

## 2018-08-05 ENCOUNTER — HOSPITAL ENCOUNTER (EMERGENCY)
Facility: HOSPITAL | Age: 23
Discharge: HOME | End: 2018-08-06
Admitting: FAMILY MEDICINE
Payer: COMMERCIAL

## 2018-08-05 VITALS
RESPIRATION RATE: 16 BRPM | HEART RATE: 93 BPM | OXYGEN SATURATION: 100 % | DIASTOLIC BLOOD PRESSURE: 66 MMHG | SYSTOLIC BLOOD PRESSURE: 137 MMHG | TEMPERATURE: 98.9 F

## 2018-08-05 DIAGNOSIS — K04.7 DENTAL ABSCESS: ICD-10-CM

## 2018-08-05 PROCEDURE — 99283 EMERGENCY DEPT VISIT LOW MDM: CPT | Mod: Z6 | Performed by: FAMILY MEDICINE

## 2018-08-05 PROCEDURE — 99283 EMERGENCY DEPT VISIT LOW MDM: CPT

## 2018-08-05 NOTE — ED AVS SNAPSHOT
[facility]    [address] [address]    Phone:  [phone]                                       Carolyn Mallory   MRN: [id number]    Department:  [facility]   Date of Visit:  8/5/2018           After Visit Summary Signature Page     I have received my discharge instructions, and my questions have been answered. I have discussed any challenges I see with this plan with the nurse or doctor.    ..........................................................................................................................................  Patient/Patient Representative Signature      ..........................................................................................................................................  Patient Representative Print Name and Relationship to Patient    ..................................................               ................................................  Date                                            Time    ..........................................................................................................................................  Reviewed by Signature/Title    ...................................................              ..............................................  Date                                                            Time

## 2018-08-05 NOTE — ED AVS SNAPSHOT
[facility]    [address]    [place] [address]    Phone:  [phone]                                       Carolyn Mallory   MRN: [id number]    Department:  [facility]   Date of Visit:  8/5/2018           Patient Information     Date Of Birth          1995        Your diagnoses for this visit were:     Dental abscess       Follow-up Information     Please follow up.    Why:  As needed         Discharge Instructions         Dental Abscess  An abscess is a sac of pus. A dental abscess forms when a tooth or the tissue around it becomes infected with bacteria. The bacteria can enter through a cavity or a crack in a tooth. It can also infect the gum tissue or bone around a tooth. An untreated abscess can cause the loss of the tooth. It can even spread to other parts of the body and become life-threatening.    Symptoms of a dental abscess   Signs of a dental abscess include:    Toothache, often severe    Tooth pain with hot, cold, or pressure    Pain in the gums, cheek, or jaw    Bad breath or bitter taste in the mouth    Trouble swallowing or opening the mouth    Fever    Swollen or enlarged glands in the neck  Diagnosing a dental abscess  An abscess is diagnosed by looking at your teeth and gums. You will be told if any tests are needed, such as dental X-rays.  Treating a dental abscess  Treatments for a dental abscess may include the following:    Antibiotic medicines. These treat the underlying infection.    Pain relievers. These help you feel more comfortable. Your healthcare provider may prescribe a medicine for you. Or you may use over-the-counter pain relievers, such as acetaminophen or ibuprofen.    Warm saltwater rinses. These can soothe discomfort and help clear away pus.    Root canal surgery.  This may be done if needed to save the tooth. With a root canal, the infected part of the tooth is removed. A special substance is then used to fill the empty space in the  tooth.    Draining the abscess. This may be doneif needed. Incisions are made to allow the infected material to drain from the tooth.    Removing the tooth. This is done in cases of severe infection that can t be treated another way.  You may need to be admitted to a hospital if the infection is severe, has spread, or doesn t respond to treatment.     When to call the dentist  Call your dentist right away if you have any of the following:    Fever of 100.4 F (38 C) or higher    Increased pain, redness, drainage, or swelling in the treated area    Swelling of the face or jawbone    Pain that can't be controlled with medicines   Preventing dental abscess  To prevent another abscess in the future, keep your teeth clean and healthy. Brush twice a day and floss at least once daily. See your dentist for regular tooth cleanings. And stay away from sugary foods and drinks that can lead to tooth decay.  Date Last Reviewed: 6/1/2017 2000-2017 [employer]. [address] [place] [address]. All rights reserved. This information is not intended as a substitute for professional medical care. Always follow your healthcare professional's instructions.             Review of your medicines      START taking        Dose / Directions Last dose taken    chlorhexidine 0.12 % solution   Commonly known as:  PERIDEX   Dose:  15 mL   Quantity:  300 mL        Swish and spit 15 mLs in mouth 2 times daily   Refills:  0        clindamycin 300 MG capsule   Commonly known as:  CLEOCIN   Dose:  300 mg   Quantity:  40 capsule        Take 1 capsule (300 mg) by mouth 4 times daily for 10 days   Refills:  0        FIRST-JEFFREY MOUTHWASH Susp   Dose:  5-10 mL   Quantity:  237 mL        Swish and swallow 5-10 mLs in mouth every 6 hours as needed   Refills:  1          Our records show that you are taking the medicines listed below. If these are incorrect, please call your family doctor or clinic.        Dose / Directions Last  dose taken    diphenhydrAMINE 25 MG tablet   Commonly known as:  BENADRYL   Dose:  25 mg        Take 25 mg by mouth   Refills:  0        EPINEPHrine 0.3 MG/0.3ML injection   Commonly known as:  EPIPEN   Dose:  0.3 mg   Quantity:  4 each        Inject 0.3 mLs (0.3 mg) into the muscle once as needed for anaphylaxis   Refills:  6        etonogestrel 68 MG Impl   Commonly known as:  IMPLANON/NEXPLANON   Dose:  1 each        1 each (68 mg) by Subdermal route continuous   Refills:  0        famotidine 40 MG tablet   Commonly known as:  PEPCID   Dose:  40 mg   Quantity:  90 tablet        Take 1 tablet (40 mg) by mouth daily   Refills:  3        fluticasone 50 MCG/ACT spray   Commonly known as:  FLONASE   Dose:  1-2 spray   Quantity:  1 Bottle        Spray 1-2 sprays into both nostrils daily   Refills:  11        ibuprofen 400 MG tablet   Commonly known as:  ADVIL/MOTRIN   Dose:  800 mg   Quantity:  60 tablet        Take 2 tablets (800 mg) by mouth every 6 hours as needed for other (cramping)   Refills:  1        loratadine 10 MG tablet   Commonly known as:  CLARITIN   Dose:  10 mg        Take 10 mg by mouth daily   Refills:  0                Prescriptions were sent or printed at these locations (3 Prescriptions)                   [facility] [address] [place] [address] [address] AT [facility] [address]   [address] [place] [address]    Telephone:  [phone]   Fax:  [phone]   Hours:                  E-Prescribed (3 of 3)         chlorhexidine (PERIDEX) 0.12 % solution               clindamycin (CLEOCIN) 300 MG capsule               Diphenhyd-HC-Nystatin-Tetracyc (FIRST-JEFFREY MOUTHWASH) SUSP                Orders Needing Specimen Collection     None      Pending Results     No orders found for last 3 day(s).            Pending Culture Results     No orders found for last 3 day(s).            Thank you for choosing [facility]       Thank you for choosing [facility] for your care. Our goal is always to  provide you with excellent care. Hearing back from our patients is one way we can continue to improve our services. Please take a few minutes to complete the written survey that you may receive in the mail after you visit with us. Thank you!        [employer] Information     [employer] gives you secure access to your electronic health record. If you see a primary care provider, you can also send messages to your care team and make appointments. If you have questions, please call your primary care clinic.  If you do not have a primary care provider, please call [phone] and they will assist you.        Care EveryWhere ID     This is your Care EveryWhere ID. This could be used by other organizations to access your [place] medical records  [id number]        Equal Access to Services     CRISS CHEN : Laxmi Villasenor, milton marie, shiraz olivia, vianey guevara. So [facility] [phone].    ATENCIÓN: Si habla español, tiene a solis disposición servicios gratuitos de asistencia lingüística. Llame al [phone].    We comply with applicable federal civil rights laws and [place] laws. We do not discriminate on the basis of race, color, national origin, age, disability, sex, sexual orientation, or gender identity.            After Visit Summary       This is your record. Keep this with you and show to your community pharmacist(s) and doctor(s) at your next visit.

## 2018-08-06 RX ORDER — CLINDAMYCIN HCL 300 MG
300 CAPSULE ORAL 4 TIMES DAILY
Qty: 40 CAPSULE | Refills: 0 | Status: SHIPPED | OUTPATIENT
Start: 2018-08-06 | End: 2019-02-11

## 2018-08-06 RX ORDER — CHLORHEXIDINE GLUCONATE ORAL RINSE 1.2 MG/ML
15 SOLUTION DENTAL 2 TIMES DAILY
Qty: 300 ML | Refills: 0 | Status: SHIPPED | OUTPATIENT
Start: 2018-08-06 | End: 2018-11-01

## 2018-08-06 NOTE — ED NOTES
Pt ambulatory room 1. Pt has small white raised area on lower right side of gums. Pt took 800mg of ibuprofen at 1700.

## 2018-08-06 NOTE — DISCHARGE INSTRUCTIONS
Dental Abscess  An abscess is a sac of pus. A dental abscess forms when a tooth or the tissue around it becomes infected with bacteria. The bacteria can enter through a cavity or a crack in a tooth. It can also infect the gum tissue or bone around a tooth. An untreated abscess can cause the loss of the tooth. It can even spread to other parts of the body and become life-threatening.    Symptoms of a dental abscess   Signs of a dental abscess include:    Toothache, often severe    Tooth pain with hot, cold, or pressure    Pain in the gums, cheek, or jaw    Bad breath or bitter taste in the mouth    Trouble swallowing or opening the mouth    Fever    Swollen or enlarged glands in the neck  Diagnosing a dental abscess  An abscess is diagnosed by looking at your teeth and gums. You will be told if any tests are needed, such as dental X-rays.  Treating a dental abscess  Treatments for a dental abscess may include the following:    Antibiotic medicines. These treat the underlying infection.    Pain relievers. These help you feel more comfortable. Your healthcare provider may prescribe a medicine for you. Or you may use over-the-counter pain relievers, such as acetaminophen or ibuprofen.    Warm saltwater rinses. These can soothe discomfort and help clear away pus.    Root canal surgery.  This may be done if needed to save the tooth. With a root canal, the infected part of the tooth is removed. A special substance is then used to fill the empty space in the tooth.    Draining the abscess. This may be doneif needed. Incisions are made to allow the infected material to drain from the tooth.    Removing the tooth. This is done in cases of severe infection that can t be treated another way.  You may need to be admitted to a hospital if the infection is severe, has spread, or doesn t respond to treatment.     When to call the dentist  Call your dentist right away if you have any of the following:    Fever of 100.4 F (38 C) or  higher    Increased pain, redness, drainage, or swelling in the treated area    Swelling of the face or jawbone    Pain that can't be controlled with medicines   Preventing dental abscess  To prevent another abscess in the future, keep your teeth clean and healthy. Brush twice a day and floss at least once daily. See your dentist for regular tooth cleanings. And stay away from sugary foods and drinks that can lead to tooth decay.  Date Last Reviewed: 6/1/2017 2000-2017 [employer]. [address] [place] [address], [place] [address]. All rights reserved. This information is not intended as a substitute for professional medical care. Always follow your healthcare professional's instructions.

## 2018-08-06 NOTE — ED NOTES
Pt given verbal and written discharge instructions and pt verbalizes understanding. Pt advised that prescriptions were e-scribed to [facility].

## 2018-08-06 NOTE — ED PROVIDER NOTES
History     Chief Complaint   Patient presents with     Mouth Lesions     Pt has sore to bottom right gum line     HPI  Carolyn Mallory is a 22 year old female who has a sore on her lower gum that she noticed over the last couple of days Tender to touch and radiates to lower jaw . NO drainage . NO facial swelling . No fever , chills or systemic symptoms. She does have a dental appointment scheduled for the next couple of weeks     Problem List:    Patient Active Problem List    Diagnosis Date Noted     Allergy to pollen 03/01/2018     Priority: Medium     Obesity, unspecified obesity severity, unspecified obesity type 06/09/2017     Priority: Medium     Tobacco use disorder 06/09/2017     Priority: Medium     Esophageal reflux 06/09/2017     Priority: Medium     BMI 34.0-34.9,adult 01/19/2017     Priority: Medium     Chronic right-sided thoracic back pain 05/24/2016     Priority: Medium     ACP (advance care planning) 04/26/2016     Priority: Medium     Advance Care Planning 4/26/2016: ACP Review of Chart / Resources Provided:  Reviewed chart for advance care plan.  Carolyn Mallory has no plan or code status on file. Discussed available resources and provided with information. .  Added by Lizzette Garcia           Calculus of kidney 12/16/2015     Priority: Medium     Bilateral hydronephrosis, flank pain, 3mm right lower pole non obstructing stone.  Dr. [facility] consult.  Declined stents.  Reconsider if hospitalization and no improvement 2-3 days.         Sacro ilial pain 10/30/2015     Priority: Medium     Patellofemoral syndrome of both knees 01/20/2015     Priority: Medium     Allergic rhinitis 01/17/2014     Priority: Medium     Problem list name updated by automated process. Provider to review       Food allergy 01/17/2014     Priority: Medium     Astigmatism 09/24/2013     Priority: Medium     Overview:   IMO Update       Hypermetropia 09/24/2013     Priority: Medium        Past Medical History:    Past  Medical History:   Diagnosis Date     NO ACTIVE PROBLEMS      Pregnancy        Past Surgical History:    Past Surgical History:   Procedure Laterality Date     ARTHROSCOPY KNEE Right 5/4/2015    Procedure: ARTHROSCOPY KNEE;  Surgeon: Hernando Kulkarni MD;  Location: [facility]     AS ESOPHAGOSCOPY, DIAGNOSTIC      upper     LAPAROSCOPIC CHOLECYSTECTOMY N/A 10/10/2015    Procedure: LAPAROSCOPIC CHOLECYSTECTOMY;  Surgeon: Drew Padgett MD;  Location: [facility] OR     none         Family History:    Family History   Problem Relation Age of Onset     Thrombophilia Mother      blood clotting     Lupus Mother      erythematosus     Diabetes Mother      Hypertension Father      Asthma Sister      Diabetes Maternal Grandfather      Hypertension Maternal Grandfather      Lupus Maternal Aunt      erythematosus       Social History:  Marital Status:  Single [1]  Social History   Substance Use Topics     Smoking status: Current Every Day Smoker     Packs/day: 0.25     Years: 1.00     Types: Cigarettes     Start date: 1/20/2014     Smokeless tobacco: Never Used     Alcohol use No        Medications:      diphenhydrAMINE (BENADRYL) 25 MG tablet   EPINEPHrine (EPIPEN) 0.3 MG/0.3ML injection   etonogestrel (IMPLANON/NEXPLANON) 68 MG IMPL   famotidine (PEPCID) 40 MG tablet   fluticasone (FLONASE) 50 MCG/ACT spray   ibuprofen (ADVIL,MOTRIN) 400 MG tablet   loratadine (CLARITIN) 10 MG tablet         Review of Systems   Constitutional: Negative.    Eyes: Negative.    Respiratory: Negative.    Gastrointestinal: Negative.    Genitourinary: Negative.    All other systems reviewed and are negative.      Physical Exam   BP: 137/66  Pulse: 93  Temp: 98.9  F (37.2  C)  Resp: 16  SpO2: 100 %      Physical Exam   Constitutional: She appears well-developed and well-nourished. No distress.   HENT:   Head: Normocephalic.   Mouth/Throat:       Tender fluctuant area base of tooth  . No drainage     Neck: Normal range of motion. Neck supple.    Cardiovascular: Normal rate and regular rhythm.    Pulmonary/Chest: Effort normal and breath sounds normal.   Abdominal: Soft. Bowel sounds are normal.   Skin: She is not diaphoretic.   Nursing note and vitals reviewed.      ED Course     ED Course     Procedures          Patient presents to ER with complaint of mouth pain . Patient triaged to exam room History and exam completed Exam consistent with inflammed mucocoele with possible abcess. Patient treated with viscous lidocaine , clindamycin , peridex. Followup as needed if not improving          No results found for this or any previous visit (from the past 24 hour(s)).    Medications - No data to display    Assessments & Plan (with Medical Decision Making)     I have reviewed the nursing notes.    I have reviewed the findings, diagnosis, plan and need for follow up with the patient.      New Prescriptions    No medications on file       Final diagnoses:   None     (K04.7) Dental abscess        8/5/2018   [facility]     Seble Ordaz MD  08/08/18 2022

## 2018-08-08 ASSESSMENT — ENCOUNTER SYMPTOMS
RESPIRATORY NEGATIVE: 1
EYES NEGATIVE: 1
GASTROINTESTINAL NEGATIVE: 1
CONSTITUTIONAL NEGATIVE: 1

## 2018-08-10 ENCOUNTER — OFFICE VISIT (OUTPATIENT)
Dept: OBGYN | Facility: OTHER | Age: 23
End: 2018-08-10
Attending: NURSE PRACTITIONER
Payer: COMMERCIAL

## 2018-08-10 VITALS — WEIGHT: 172 LBS | SYSTOLIC BLOOD PRESSURE: 116 MMHG | DIASTOLIC BLOOD PRESSURE: 62 MMHG | BODY MASS INDEX: 31.46 KG/M2

## 2018-08-10 DIAGNOSIS — Z30.46 ENCOUNTER FOR NEXPLANON REMOVAL: Primary | ICD-10-CM

## 2018-08-10 PROCEDURE — 11982 REMOVE DRUG IMPLANT DEVICE: CPT | Performed by: NURSE PRACTITIONER

## 2018-08-10 PROCEDURE — G0463 HOSPITAL OUTPT CLINIC VISIT: HCPCS | Mod: 25

## 2018-08-10 ASSESSMENT — PAIN SCALES - GENERAL: PAINLEVEL: NO PAIN (0)

## 2018-08-10 NOTE — MR AVS SNAPSHOT
After Visit Summary   8/10/2018    Carolyn Mallory    MRN: [id number]           Patient Information     Date Of Birth          1995        Visit Information        Provider Department      8/10/2018 10:15 AM Jennifer Senior NP [facility] [place]        Today's Diagnoses     Encounter for Nexplanon removal    -  1      Care Instructions    Follow up as needed          Follow-ups after your visit        Who to contact     If you have questions or need follow up information about today's clinic visit or your schedule please contact [facility] [place] directly at [phone].  Normal or non-critical lab and imaging results will be communicated to you by [employer], letter or phone within 4 business days after the clinic has received the results. If you do not hear from us within 7 days, please contact the clinic through [employer] or phone. If you have a critical or abnormal lab result, we will notify you by phone as soon as possible.  Submit refill requests through [employer] or call your pharmacy and they will forward the refill request to us. Please allow 3 business days for your refill to be completed.          Additional Information About Your Visit        MyChart Information     [employer] gives you secure access to your electronic health record. If you see a primary care provider, you can also send messages to your care team and make appointments. If you have questions, please call your primary care clinic.  If you do not have a primary care provider, please call [phone] and they will assist you.        Care EveryWhere ID     This is your Care EveryWhere ID. This could be used by other organizations to access your [facility] medical records  [id number]        Your Vitals Were     BMI (Body Mass Index)                   31.46 kg/m2            Blood Pressure from Last 3 Encounters:   08/10/18 116/62   08/05/18 137/66   05/03/18 106/64    Weight from Last 3 Encounters:   08/10/18 172 lb (78 kg)    05/03/18 198 lb (89.8 kg)   04/18/18 198 lb (89.8 kg)              We Performed the Following     REMOVAL NON-BIODEGRADABLE DRUG DELIVERY IMPLANT        Primary Care Provider Office Phone # Fax #    AMELIA Pineda CARLOS MANUEL [phone] [address] [place] [address]        Equal Access to Services     [facility] : Hadii aad ku hadasho Soomaali, waaxda luqadaha, qaybta kaalmada adeegyada, waxay idiin hayaan adeeg kharash la'neishan . So [facility] [phone].    ATENCIÓN: Si habla español, tiene a solis disposición servicios gratuitos de asistencia lingüística. Llame al [phone].    We comply with applicable federal civil rights laws and [place] laws. We do not discriminate on the basis of race, color, national origin, age, disability, sex, sexual orientation, or gender identity.            Thank you!     Thank you for choosing [facility]  for your care. Our goal is always to provide you with excellent care. Hearing back from our patients is one way we can continue to improve our services. Please take a few minutes to complete the written survey that you may receive in the mail after your visit with us. Thank you!             Your Updated Medication List - Protect others around you: Learn how to safely use, store and throw away your medicines at www.disposemymeds.org.          This list is accurate as of 8/10/18 10:41 AM.  Always use your most recent med list.                   Brand Name Dispense Instructions for use Diagnosis    chlorhexidine 0.12 % solution    PERIDEX    300 mL    Swish and spit 15 mLs in mouth 2 times daily        clindamycin 300 MG capsule    CLEOCIN    40 capsule    Take 1 capsule (300 mg) by mouth 4 times daily for 10 days        diphenhydrAMINE 25 MG tablet    BENADRYL     Take 25 mg by mouth        EPINEPHrine 0.3 MG/0.3ML injection    EPIPEN    4 each    Inject 0.3 mLs (0.3 mg) into the muscle once as needed for anaphylaxis    Allergic  rhinitis due to pollen       famotidine 40 MG tablet    PEPCID    90 tablet    Take 1 tablet (40 mg) by mouth daily    Gastroesophageal reflux disease without esophagitis       [facility] MOUTHWASH Susp     237 mL    Swish and swallow 5-10 mLs in mouth every 6 hours as needed        fluticasone 50 MCG/ACT spray    FLONASE    1 Bottle    Spray 1-2 sprays into both nostrils daily        ibuprofen 400 MG tablet    ADVIL/MOTRIN    60 tablet    Take 2 tablets (800 mg) by mouth every 6 hours as needed for other (cramping)     (spontaneous vaginal delivery)       loratadine 10 MG tablet    CLARITIN     Take 10 mg by mouth daily

## 2018-08-10 NOTE — PROGRESS NOTES
[facility]                HPI   Carolyn presents requesting removal of her Nexplanon implant.  She does not wish to have another method of contraception.              Medications:     Current Outpatient Prescriptions Ordered in Epic   Medication     chlorhexidine (PERIDEX) 0.12 % solution     clindamycin (CLEOCIN) 300 MG capsule     Diphenhyd-HC-Nystatin-Tetracyc (FIRST-JEFFREY MOUTHWASH) SUSP     diphenhydrAMINE (BENADRYL) 25 MG tablet     EPINEPHrine (EPIPEN) 0.3 MG/0.3ML injection     famotidine (PEPCID) 40 MG tablet     fluticasone (FLONASE) 50 MCG/ACT spray     loratadine (CLARITIN) 10 MG tablet     ibuprofen (ADVIL,MOTRIN) 400 MG tablet     No current Epic-ordered facility-administered medications on file.                 Allergies:   Amoxicillin; Oxycodone; and Tylenol [acetaminophen]    Procedure:  After obtaining consent from the patient the nexplanon was removed.  Her arm was prepped copiously with betadine and 1% lidocaine ~ 1 cc was injected into her arm over the nexplanon juan manuel.  A 0.8 cm incision was made over the previous scar and the nexplanon juan manuel was removed without difficulty. Pressure was placed over the incision and minimal bleeding was noted.  It was covered with 1/2 inch steri strips and the arm was bandaged with a kerlex guaze.  She will leave that on for at least 24 hrs.    She tolerated the procedure well.  No complications.           Assessment and Plan:   Nexplanon removal - as above. Follow up as needed     ROLDAN Bridges  8/10/2018  10:39 AM

## 2018-08-10 NOTE — NURSING NOTE
"Chief Complaint   Patient presents with     Contraception     nexplanon removal       Initial /62  Wt 172 lb (78 kg)  BMI 31.46 kg/m2 Estimated body mass index is 31.46 kg/(m^2) as calculated from the following:    Height as of 5/3/18: 5' 2\" (1.575 m).    Weight as of this encounter: 172 lb (78 kg).  Medication Reconciliation: complete    Liss Rawls LPN  "

## 2018-09-14 ENCOUNTER — HOSPITAL ENCOUNTER (EMERGENCY)
Facility: HOSPITAL | Age: 23
Discharge: HOME | End: 2018-09-14
Attending: NURSE PRACTITIONER | Admitting: NURSE PRACTITIONER
Payer: COMMERCIAL

## 2018-09-14 VITALS
RESPIRATION RATE: 16 BRPM | WEIGHT: 184 LBS | OXYGEN SATURATION: 100 % | BODY MASS INDEX: 33.65 KG/M2 | SYSTOLIC BLOOD PRESSURE: 127 MMHG | TEMPERATURE: 99 F | DIASTOLIC BLOOD PRESSURE: 74 MMHG

## 2018-09-14 DIAGNOSIS — Z32.02 PREGNANCY EXAMINATION OR TEST, NEGATIVE RESULT: ICD-10-CM

## 2018-09-14 LAB — HCG UR QL: NEGATIVE

## 2018-09-14 PROCEDURE — 81025 URINE PREGNANCY TEST: CPT | Performed by: NURSE PRACTITIONER

## 2018-09-14 PROCEDURE — 99212 OFFICE O/P EST SF 10 MIN: CPT | Performed by: NURSE PRACTITIONER

## 2018-09-14 PROCEDURE — G0463 HOSPITAL OUTPT CLINIC VISIT: HCPCS

## 2018-09-14 ASSESSMENT — ENCOUNTER SYMPTOMS
FEVER: 0
ABDOMINAL PAIN: 0

## 2018-09-14 NOTE — ED AVS SNAPSHOT
[facility]    [address] [address]    Phone:  [phone]                                       Carolyn Mallory   MRN: [id number]    Department:  [facility]   Date of Visit:  9/14/2018           After Visit Summary Signature Page     I have received my discharge instructions, and my questions have been answered. I have discussed any challenges I see with this plan with the nurse or doctor.    ..........................................................................................................................................  Patient/Patient Representative Signature      ..........................................................................................................................................  Patient Representative Print Name and Relationship to Patient    ..................................................               ................................................  Date                                   Time    ..........................................................................................................................................  Reviewed by Signature/Title    ...................................................              ..............................................  Date                                               Time          22EPIC Rev 08/18

## 2018-09-14 NOTE — ED AVS SNAPSHOT
[facility]    [address]    [place] [address]    Phone:  [phone]                                       Carolyn Mallory   MRN: [id number]    Department:  [facility]   Date of Visit:  9/14/2018           Patient Information     Date Of Birth          1995        Your diagnoses for this visit were:     Pregnancy examination or test, negative result        You were seen by Carlene Starks NP.      Follow-up Information     Follow up with Susan Clifford APRN CNP. Schedule an appointment as soon as possible for a visit in 4 weeks.    Specialty:  Diabetes Education    Why:  for re-evaluation    Contact information:    [place] [id number] [phone]          Discharge Instructions       Your current test is negative.   You may be pregnant but it is too early to tell.     You should start a prenatal vitamin.   Drink lots of fluids.   Eat a healthy diet.   Follow up with Primary Care in 4-5 weeks to be reassessed.          Review of your medicines      Our records show that you are taking the medicines listed below. If these are incorrect, please call your family doctor or clinic.        Dose / Directions Last dose taken    chlorhexidine 0.12 % solution   Commonly known as:  PERIDEX   Dose:  15 mL   Quantity:  300 mL        Swish and spit 15 mLs in mouth 2 times daily   Refills:  0        diphenhydrAMINE 25 MG tablet   Commonly known as:  BENADRYL   Dose:  25 mg        Take 25 mg by mouth   Refills:  0        EPINEPHrine 0.3 MG/0.3ML injection   Commonly known as:  EPIPEN   Dose:  0.3 mg   Quantity:  4 each        Inject 0.3 mLs (0.3 mg) into the muscle once as needed for anaphylaxis   Refills:  6        famotidine 40 MG tablet   Commonly known as:  PEPCID   Dose:  40 mg   Quantity:  90 tablet        Take 1 tablet (40 mg) by mouth daily   Refills:  3        FIRST-JEFFREY MOUTHWASH Susp   Dose:  5-10 mL   Quantity:  237 mL        Swish and  swallow 5-10 mLs in mouth every 6 hours as needed   Refills:  1        fluticasone 50 MCG/ACT spray   Commonly known as:  FLONASE   Dose:  1-2 spray   Quantity:  1 Bottle        Spray 1-2 sprays into both nostrils daily   Refills:  11        ibuprofen 400 MG tablet   Commonly known as:  ADVIL/MOTRIN   Dose:  800 mg   Quantity:  60 tablet        Take 2 tablets (800 mg) by mouth every 6 hours as needed for other (cramping)   Refills:  1        loratadine 10 MG tablet   Commonly known as:  CLARITIN   Dose:  10 mg        Take 10 mg by mouth daily   Refills:  0                Procedures and tests performed during your visit     HCG qualitative urine      Orders Needing Specimen Collection     None      Pending Results     No orders found from 9/12/2018 to 9/15/2018.            Pending Culture Results     No orders found from 9/12/2018 to 9/15/2018.            Thank you for choosing [facility]       Thank you for choosing [facility] for your care. Our goal is always to provide you with excellent care. Hearing back from our patients is one way we can continue to improve our services. Please take a few minutes to complete the written survey that you may receive in the mail after you visit with us. Thank you!        [employer] Information     [employer] gives you secure access to your electronic health record. If you see a primary care provider, you can also send messages to your care team and make appointments. If you have questions, please call your primary care clinic.  If you do not have a primary care provider, please call [phone] and they will assist you.        Care EveryWhere ID     This is your Care EveryWhere ID. This could be used by other organizations to access your [facility] medical records  [id number]        Equal Access to Services     CRISS CHEN : Laxmi Villasenor, milton marie, vianey padilla. So [facility] [phone].    ATENCIÓN: Godfrey choi  español, tiene a solis disposición servicios gratuitos de asistencia lingüística. Pepito al [phone].    We comply with applicable federal civil rights laws and [place] laws. We do not discriminate on the basis of race, color, national origin, age, disability, sex, sexual orientation, or gender identity.            After Visit Summary       This is your record. Keep this with you and show to your community pharmacist(s) and doctor(s) at your next visit.

## 2018-09-15 NOTE — ED TRIAGE NOTES
Pt presents today alone for c/o n/v for about a week, week and a half, she hasn't had a period since the beginning of August but says her periods are very irregular anyway. He was not the Nexplanon implant and had it removed about 3 weeks ago. Pt also says she took 2 home pregnancy tests and they were both initially negative and then an hour later both turned positive. Says with her son that they had to do a blood test because he hormones weren't reacting right.

## 2018-09-15 NOTE — DISCHARGE INSTRUCTIONS
Your current test is negative.   You may be pregnant but it is too early to tell.     You should start a prenatal vitamin.   Drink lots of fluids.   Eat a healthy diet.   Follow up with Primary Care in 4-5 weeks to be reassessed.

## 2018-09-15 NOTE — ED TRIAGE NOTES
"Pt states she is able to hold fluids down at times. \"I eat and then I get nauseated and throw up and then I am fine\". States has taken 2 home pregnancy tests and 1 said positive and the other negative. LMP around August 5th. Denies any pain with urination, denies any abdominal pain, denies any vaginal discharge.  "

## 2018-09-15 NOTE — ED TRIAGE NOTES
Pt states it is usually around supper time she feels nauseated. Stated she felt this way when she was pregnant with her son.

## 2018-09-15 NOTE — ED PROVIDER NOTES
History     Chief Complaint   Patient presents with     Nausea & Vomiting     Pt states for approx 1-1.5 weeks.      The history is provided by the patient. No  was used.     Carolyn Mallory is a 23 year old female who presents with complaints of N/V, concerned she is pregnant. No belly pain. Has had negative home pregnancy test at home that ended up turning positive an hour later.     Problem List:    Patient Active Problem List    Diagnosis Date Noted     Allergy to pollen 03/01/2018     Priority: Medium     Obesity, unspecified obesity severity, unspecified obesity type 06/09/2017     Priority: Medium     Tobacco use disorder 06/09/2017     Priority: Medium     Esophageal reflux 06/09/2017     Priority: Medium     BMI 34.0-34.9,adult 01/19/2017     Priority: Medium     Chronic right-sided thoracic back pain 05/24/2016     Priority: Medium     ACP (advance care planning) 04/26/2016     Priority: Medium     Advance Care Planning 4/26/2016: ACP Review of Chart / Resources Provided:  Reviewed chart for advance care plan.  Carolyn Mallory has no plan or code status on file. Discussed available resources and provided with information. .  Added by Lizzette Garcia           Calculus of kidney 12/16/2015     Priority: Medium     Bilateral hydronephrosis, flank pain, 3mm right lower pole non obstructing stone.  Dr. [facility] consult.  Declined stents.  Reconsider if hospitalization and no improvement 2-3 days.         Sacro ilial pain 10/30/2015     Priority: Medium     Patellofemoral syndrome of both knees 01/20/2015     Priority: Medium     Allergic rhinitis 01/17/2014     Priority: Medium     Problem list name updated by automated process. Provider to review       Food allergy 01/17/2014     Priority: Medium     Astigmatism 09/24/2013     Priority: Medium     Overview:   IMO Update       Hypermetropia 09/24/2013     Priority: Medium        Past Medical History:    Past Medical History:   Diagnosis  Date     NO ACTIVE PROBLEMS      Pregnancy        Past Surgical History:    Past Surgical History:   Procedure Laterality Date     ARTHROSCOPY KNEE Right 5/4/2015    Procedure: ARTHROSCOPY KNEE;  Surgeon: Hernando Kulkarni MD;  Location: [facility]     AS ESOPHAGOSCOPY, DIAGNOSTIC      upper     LAPAROSCOPIC CHOLECYSTECTOMY N/A 10/10/2015    Procedure: LAPAROSCOPIC CHOLECYSTECTOMY;  Surgeon: Drew Padgett MD;  Location: [facility]     none         Family History:    Family History   Problem Relation Age of Onset     Thrombophilia Mother      blood clotting     Lupus Mother      erythematosus     Diabetes Mother      Hypertension Father      Asthma Sister      Diabetes Maternal Grandfather      Hypertension Maternal Grandfather      Lupus Maternal Aunt      erythematosus       Social History:  Marital Status:  Single [1]  Social History   Substance Use Topics     Smoking status: Current Every Day Smoker     Packs/day: 0.25     Years: 1.00     Types: Cigarettes     Start date: 1/20/2014     Smokeless tobacco: Never Used     Alcohol use No        Medications:      chlorhexidine (PERIDEX) 0.12 % solution   Diphenhyd-HC-Nystatin-Tetracyc (FIRST-JEFFREY MOUTHWASH) SUSP   famotidine (PEPCID) 40 MG tablet   diphenhydrAMINE (BENADRYL) 25 MG tablet   EPINEPHrine (EPIPEN) 0.3 MG/0.3ML injection   fluticasone (FLONASE) 50 MCG/ACT spray   ibuprofen (ADVIL,MOTRIN) 400 MG tablet   loratadine (CLARITIN) 10 MG tablet         Review of Systems   Constitutional: Negative for fever.        Here for a pregnancy test.   Gastrointestinal: Negative for abdominal pain.       Physical Exam   BP: 127/74  Heart Rate: 98  Temp: 99  F (37.2  C)  Resp: 16  Weight: 83.5 kg (184 lb)  SpO2: 100 %      Physical Exam   Constitutional: She is oriented to person, place, and time. She appears well-developed and well-nourished. No distress.   Cardiovascular: Normal rate.    Pulmonary/Chest: Effort normal.   Musculoskeletal: Normal range of motion.    Neurological: She is alert and oriented to person, place, and time.   Skin: She is not diaphoretic.   Psychiatric: She has a normal mood and affect.   Nursing note and vitals reviewed.      ED Course     ED Course     Procedures     Results for orders placed or performed during the hospital encounter of 09/14/18 (from the past 24 hour(s))   HCG qualitative urine   Result Value Ref Range    HCG Qual Urine Negative NEG^Negative     Medications - No data to display    Assessments & Plan (with Medical Decision Making)     I have reviewed the nursing notes.  I have reviewed the findings, diagnosis, plan and need for follow up with the patient.    New Prescriptions    No medications on file     Final diagnoses:   Pregnancy examination or test, negative result     Advised:   Your current test is negative.   You may be pregnant but it is too early to tell.   You should start a prenatal vitamin.   Drink lots of fluids.   Eat a healthy diet.   Follow up with Primary Care in 4-5 weeks to be reassessed.     9/14/2018   [facility]     Carlene Starks NP  09/14/18 [id number]

## 2018-10-30 ENCOUNTER — HOSPITAL ENCOUNTER (EMERGENCY)
Facility: HOSPITAL | Age: 23
Discharge: HOME | End: 2018-10-30
Attending: INTERNAL MEDICINE | Admitting: INTERNAL MEDICINE
Payer: COMMERCIAL

## 2018-10-30 ENCOUNTER — APPOINTMENT (OUTPATIENT)
Dept: GENERAL RADIOLOGY | Facility: HOSPITAL | Age: 23
End: 2018-10-30
Attending: INTERNAL MEDICINE
Payer: COMMERCIAL

## 2018-10-30 VITALS
RESPIRATION RATE: 18 BRPM | SYSTOLIC BLOOD PRESSURE: 128 MMHG | TEMPERATURE: 97.6 F | DIASTOLIC BLOOD PRESSURE: 80 MMHG | OXYGEN SATURATION: 98 % | HEART RATE: 96 BPM

## 2018-10-30 DIAGNOSIS — S43.401A SPRAIN OF RIGHT SHOULDER, UNSPECIFIED SHOULDER SPRAIN TYPE, INITIAL ENCOUNTER: ICD-10-CM

## 2018-10-30 DIAGNOSIS — S53.401A SPRAIN OF RIGHT ELBOW, INITIAL ENCOUNTER: ICD-10-CM

## 2018-10-30 DIAGNOSIS — S60.211A CONTUSION OF RIGHT WRIST, INITIAL ENCOUNTER: ICD-10-CM

## 2018-10-30 PROCEDURE — 73030 X-RAY EXAM OF SHOULDER: CPT | Mod: TC,RT

## 2018-10-30 PROCEDURE — 25000132 ZZH RX MED GY IP 250 OP 250 PS 637: Performed by: INTERNAL MEDICINE

## 2018-10-30 PROCEDURE — 73110 X-RAY EXAM OF WRIST: CPT | Mod: TC,RT

## 2018-10-30 PROCEDURE — 99283 EMERGENCY DEPT VISIT LOW MDM: CPT

## 2018-10-30 PROCEDURE — 73080 X-RAY EXAM OF ELBOW: CPT | Mod: TC,RT

## 2018-10-30 PROCEDURE — 99283 EMERGENCY DEPT VISIT LOW MDM: CPT | Performed by: INTERNAL MEDICINE

## 2018-10-30 RX ORDER — NAPROXEN 500 MG/1
500 TABLET ORAL ONCE
Status: COMPLETED | OUTPATIENT
Start: 2018-10-30 | End: 2018-10-30

## 2018-10-30 RX ADMIN — NAPROXEN 500 MG: 500 TABLET ORAL at 01:01

## 2018-10-30 NOTE — DISCHARGE INSTRUCTIONS
Shoulder Sprain  A sprain is a stretching or tearing of the ligaments that hold a joint together. A sprain may take up to 8 weeks to fully heal, depending on how severe it is. Moderate to severe shoulder sprains are treated with a sling or shoulder immobilizer. Minor sprains can be treated without any special support.  Home care  The following guidelines will help you care for your injury at home:    If a sling was given to you, leave it in place for the time advised by your healthcare provider. If you aren t sure how long to wear it, ask for advice. If the sling becomes loose, adjust it so that your forearm is level with the ground. Your shoulder should feel well supported.    Put an ice pack on the injured area for 20 minutes every 1 to 2 hours the first day. You can make your own ice pack by putting ice cubes in a plastic bag. A bag of frozen peas or something similar works well too. Wrap the bag in a thin towel. Continue with ice packs 3 to 4 times a day for the next 2 to 3 days. Then use the pack as needed to ease pain and swelling.    You may use acetaminophen or ibuprofen to control pain, unless another pain medicine was prescribed. If you have chronic liver or kidney disease, talk with your healthcare provider before using these medicines. Also talk with your provider if you ve had a stomach ulcer or gastrointestinal bleeding.    Shoulder joints become stiff if left in a sling for too long. You should start range of motion exercises about 7 to 10 days after the injury. Talk with your provider to find out what type of exercises to do and how soon to start.  Follow-up care  Follow up with your healthcare provider, or as advised.  Any X-rays you had today don t show any broken bones, breaks, or fractures. Sometimes fractures don t show up on the first X-ray. Bruises and sprains can sometimes hurt as much as a fracture. These injuries can take time to heal completely. If your symptoms don t improve or they get  worse, talk with your provider. You may need a repeat X-ray or other treatments.  When to seek medical advice  Call your healthcare provider right away if any of these occur:    Shoulder pain or swelling in your arm that gets worse    Fingers become cold, blue, numb, or tingly    Large amount of bruising of the shoulder or upper arm    Fever or chills  Date Last Reviewed: 8/1/2016 2000-2017 [employer]. [address] [place] [address], [place] [id number]. All rights reserved. This information is not intended as a substitute for professional medical care. Always follow your healthcare professional's instructions.          Understanding Bone Bruise (Bone Contusion)  A bone bruise is an injury to a bone that is less severe than a bone fracture. Bone bruises are fairly common. They can happen to people of all ages. Any type of bone in your body can be bruised. Other injuries often happen along with a bone bruise, such as damage to nearby ligaments.  What happens when a bone is bruised?  Bone is made of different kinds of tissue. The periosteum is a thin layer of tissue that covers most of a bone. Where bones come together, there is usually a layer of cartilage at the edges. The bone here is called subchondral bone. Deep inside the bone is an area called the medulla. It contains the bone marrow and fibrous tissue called trabeculae.  With a bone fracture, all of the trabeculae in a region of bone have broken. But with a bone bruise, an injury only damages some of these trabeculae. An injury might cause blood to build up in the area beneath the periosteum. This causes a subperiosteal hematoma, a type of bone bruise. An injury might also cause bleeding and swelling in the area between your cartilage and the bone beneath it. This causes a subchondral bone bruise. Or bleeding and swelling can occur in the medulla of your bone. This is called an intraosseous bone bruise.  What causes a bone bruise?  Injury of any kind  can cause a bone bruise. Sports injuries, motor vehicle accidents, or falls from a height can cause them. Twisting injuries that cause joint sprains can also cause a bone bruise. Health conditions like arthritis may also lead to a bone bruise. This is because arthritis causes bone surfaces to grind against each other. Child abuse is another cause of bone bruises.  Symptoms of a bone bruise  Symptoms of a bone bruise can include:    Pain and soreness in the injured area    Swelling in the area and soft tissues around it    Change in color of the injured area    Swelling or stiffness of an injured joint  This pain is often more severe and lasts longer than a soft tissue injury. How severe your symptoms are and how long they last depends on how severe the bone bruise is.  Diagnosing a bone bruise  Your healthcare provider will ask you about your medical history and symptoms. He or she will ask how you got your injury. Your provider will examine the injured area to check for pain, bruising, and swelling. After the exam, your health care provider may be able to tell if you have a bone bruise.  A bone bruise doesn t show up on an X-ray. But you may be given an X-ray to rule out a bone fracture. A fracture may need a different kind of treatment. An MRI can confirm a bone bruise. But your healthcare provider will likely only give you an MRI if your symptoms don t get better.  Date Last Reviewed: 4/1/2017 2000-2017 [employer]. [address] [place] [id number]. All rights reserved. This information is not intended as a substitute for professional medical care. Always follow your healthcare professional's instructions.          Bruises (Contusions)    A contusion is a bruise. A bruise happens when a blow to your body doesn't break the skin but does break blood vessels beneath the skin. Blood leaking from the broken vessels causes redness and swelling. As it heals, your bruise is likely to turn colors  like purple, green, and yellow. This is normal. The bruise should fade in 2 or 3 weeks.  Factors that make you more likely to bruise  Almost everyone bruises now and then. Certain people do bruise more easily than others. You're more prone to bruising as you get older. That's because blood vessels become more fragile with age. You're also more likely to bruise if you have a clotting disorder such as hemophilia or take medicines that reduce clotting, including aspirin and coumadin.  When to go to the emergency room (ER)  Bruises almost always heal on their own without special treatment. But for some people, a bad bruise can be serious. Seek medical care if you:    Have a clotting disorder such as hemophilia    Have cirrhosis or other serious liver disease    Take blood-thinning medicines such as warfarin  What to expect in the ER  A doctor will examine your bruise and ask about any health conditions you have. In some cases, you may have a test to check how well your blood clots. Other treatment will depend on your needs.  Follow-up care  Sometimes a bruise gets worse instead of better. It may become larger and more swollen. This can occur when your body walls off a small pool of blood under the skin (hematoma). In very rare cases, your doctor may need to drain extra blood from the area.  Tip:  Apply an ice pack or bag of frozen peas to a bruise. Keep a thin cloth between the ice or frozen peas and your skin. The cold can help reduce redness and swelling.   Date Last Reviewed: 12/1/2016 2000-2017 [employer]. [address] [place] [address], [place] [address]. All rights reserved. This information is not intended as a substitute for professional medical care. Always follow your healthcare professional's instructions.

## 2018-10-30 NOTE — ED AVS SNAPSHOT
[facility]    [address] [address]    Phone:  [phone]                                       Carolyn Mallory   MRN: [id number]    Department:  [facility]   Date of Visit:  10/30/2018           After Visit Summary Signature Page     I have received my discharge instructions, and my questions have been answered. I have discussed any challenges I see with this plan with the nurse or doctor.    ..........................................................................................................................................  Patient/Patient Representative Signature      ..........................................................................................................................................  Patient Representative Print Name and Relationship to Patient    ..................................................               ................................................  Date                                   Time    ..........................................................................................................................................  Reviewed by Signature/Title    ...................................................              ..............................................  Date                                               Time          22EPIC Rev 08/18

## 2018-10-30 NOTE — ED NOTES
"Patient states that at 1300 yesterday, she slipped on her wet deck and slid down 4 steps. Denies LOC. States she fell and hit her elbow and then slid down the rest and her elbow hit the steps on her way down. States she also has pain in her shoulder also but more so with movement and she states her shoulder \"pops\" with movement.  CMS intact. States her wrist and elbow were swollen some but she had ice on the areas and swelling is gone now. Did try some Ibuprofen at 1500 and it did help a little.   "

## 2018-10-30 NOTE — LETTER
October 30, 2018      To Whom It May Concern:      Carolyn Mallory was seen in our Emergency Department today, 10/30/18.  I expect her condition to improve over the next 2 days.  She may return to work/school when improved.    Sincerely,        Augustus Barnes MD

## 2018-10-30 NOTE — ED AVS SNAPSHOT
[facility]    [address] [place] [address]    [place] [address]    Phone:  [phone]                                       Carolyn Mallory   MRN: [id number]    Department:  [facility]   Date of Visit:  10/30/2018           Patient Information     Date Of Birth          1995        Your diagnoses for this visit were:     Contusion of right wrist, initial encounter     Sprain of right elbow, initial encounter     Sprain of right shoulder, unspecified shoulder sprain type, initial encounter        You were seen by Augustus Barnes MD.      Follow-up Information     Schedule an appointment as soon as possible for a visit with Susan Clifford APRN CNP.    Specialty:  Family Practice    Contact information:    [address] [place] [address]  [phone]          Discharge Instructions         Shoulder Sprain  A sprain is a stretching or tearing of the ligaments that hold a joint together. A sprain may take up to 8 weeks to fully heal, depending on how severe it is. Moderate to severe shoulder sprains are treated with a sling or shoulder immobilizer. Minor sprains can be treated without any special support.  Home care  The following guidelines will help you care for your injury at home:    If a sling was given to you, leave it in place for the time advised by your healthcare provider. If you aren t sure how long to wear it, ask for advice. If the sling becomes loose, adjust it so that your forearm is level with the ground. Your shoulder should feel well supported.    Put an ice pack on the injured area for 20 minutes every 1 to 2 hours the first day. You can make your own ice pack by putting ice cubes in a plastic bag. A bag of frozen peas or something similar works well too. Wrap the bag in a thin towel. Continue with ice packs 3 to 4 times a day for the next 2 to 3 days. Then use the pack as needed to ease pain and swelling.    You may use acetaminophen or ibuprofen to control pain,  unless another pain medicine was prescribed. If you have chronic liver or kidney disease, talk with your healthcare provider before using these medicines. Also talk with your provider if you ve had a stomach ulcer or gastrointestinal bleeding.    Shoulder joints become stiff if left in a sling for too long. You should start range of motion exercises about 7 to 10 days after the injury. Talk with your provider to find out what type of exercises to do and how soon to start.  Follow-up care  Follow up with your healthcare provider, or as advised.  Any X-rays you had today don t show any broken bones, breaks, or fractures. Sometimes fractures don t show up on the first X-ray. Bruises and sprains can sometimes hurt as much as a fracture. These injuries can take time to heal completely. If your symptoms don t improve or they get worse, talk with your provider. You may need a repeat X-ray or other treatments.  When to seek medical advice  Call your healthcare provider right away if any of these occur:    Shoulder pain or swelling in your arm that gets worse    Fingers become cold, blue, numb, or tingly    Large amount of bruising of the shoulder or upper arm    Fever or chills  Date Last Reviewed: 8/1/2016 2000-2017 [employer]. [address] [place] [address]. All rights reserved. This information is not intended as a substitute for professional medical care. Always follow your healthcare professional's instructions.          Understanding Bone Bruise (Bone Contusion)  A bone bruise is an injury to a bone that is less severe than a bone fracture. Bone bruises are fairly common. They can happen to people of all ages. Any type of bone in your body can be bruised. Other injuries often happen along with a bone bruise, such as damage to nearby ligaments.  What happens when a bone is bruised?  Bone is made of different kinds of tissue. The periosteum is a thin layer of tissue that covers most of a  bone. Where bones come together, there is usually a layer of cartilage at the edges. The bone here is called subchondral bone. Deep inside the bone is an area called the medulla. It contains the bone marrow and fibrous tissue called trabeculae.  With a bone fracture, all of the trabeculae in a region of bone have broken. But with a bone bruise, an injury only damages some of these trabeculae. An injury might cause blood to build up in the area beneath the periosteum. This causes a subperiosteal hematoma, a type of bone bruise. An injury might also cause bleeding and swelling in the area between your cartilage and the bone beneath it. This causes a subchondral bone bruise. Or bleeding and swelling can occur in the medulla of your bone. This is called an intraosseous bone bruise.  What causes a bone bruise?  Injury of any kind can cause a bone bruise. Sports injuries, motor vehicle accidents, or falls from a height can cause them. Twisting injuries that cause joint sprains can also cause a bone bruise. Health conditions like arthritis may also lead to a bone bruise. This is because arthritis causes bone surfaces to grind against each other. Child abuse is another cause of bone bruises.  Symptoms of a bone bruise  Symptoms of a bone bruise can include:    Pain and soreness in the injured area    Swelling in the area and soft tissues around it    Change in color of the injured area    Swelling or stiffness of an injured joint  This pain is often more severe and lasts longer than a soft tissue injury. How severe your symptoms are and how long they last depends on how severe the bone bruise is.  Diagnosing a bone bruise  Your healthcare provider will ask you about your medical history and symptoms. He or she will ask how you got your injury. Your provider will examine the injured area to check for pain, bruising, and swelling. After the exam, your health care provider may be able to tell if you have a bone bruise.  A bone  bruise doesn t show up on an X-ray. But you may be given an X-ray to rule out a bone fracture. A fracture may need a different kind of treatment. An MRI can confirm a bone bruise. But your healthcare provider will likely only give you an MRI if your symptoms don t get better.  Date Last Reviewed: 4/1/2017 2000-2017 [employer]. [address] [place] [address], [place] [address]. All rights reserved. This information is not intended as a substitute for professional medical care. Always follow your healthcare professional's instructions.          Bruises (Contusions)    A contusion is a bruise. A bruise happens when a blow to your body doesn't break the skin but does break blood vessels beneath the skin. Blood leaking from the broken vessels causes redness and swelling. As it heals, your bruise is likely to turn colors like purple, green, and yellow. This is normal. The bruise should fade in 2 or 3 weeks.  Factors that make you more likely to bruise  Almost everyone bruises now and then. Certain people do bruise more easily than others. You're more prone to bruising as you get older. That's because blood vessels become more fragile with age. You're also more likely to bruise if you have a clotting disorder such as hemophilia or take medicines that reduce clotting, including aspirin and coumadin.  When to go to the emergency room (ER)  Bruises almost always heal on their own without special treatment. But for some people, a bad bruise can be serious. Seek medical care if you:    Have a clotting disorder such as hemophilia    Have cirrhosis or other serious liver disease    Take blood-thinning medicines such as warfarin  What to expect in the ER  A doctor will examine your bruise and ask about any health conditions you have. In some cases, you may have a test to check how well your blood clots. Other treatment will depend on your needs.  Follow-up care  Sometimes a bruise gets worse instead of better. It may  become larger and more swollen. This can occur when your body walls off a small pool of blood under the skin (hematoma). In very rare cases, your doctor may need to drain extra blood from the area.  Tip:  Apply an ice pack or bag of frozen peas to a bruise. Keep a thin cloth between the ice or frozen peas and your skin. The cold can help reduce redness and swelling.   Date Last Reviewed: 12/1/2016 2000-2017 [employer]. [address] [place] [address]. All rights reserved. This information is not intended as a substitute for professional medical care. Always follow your healthcare professional's instructions.             Review of your medicines      Our records show that you are taking the medicines listed below. If these are incorrect, please call your family doctor or clinic.        Dose / Directions Last dose taken    chlorhexidine 0.12 % solution   Commonly known as:  PERIDEX   Dose:  15 mL   Quantity:  300 mL        Swish and spit 15 mLs in mouth 2 times daily   Refills:  0        diphenhydrAMINE 25 MG tablet   Commonly known as:  BENADRYL   Dose:  25 mg        Take 25 mg by mouth   Refills:  0        EPINEPHrine 0.3 MG/0.3ML injection   Commonly known as:  EPIPEN   Dose:  0.3 mg   Quantity:  4 each        Inject 0.3 mLs (0.3 mg) into the muscle once as needed for anaphylaxis   Refills:  6        famotidine 40 MG tablet   Commonly known as:  PEPCID   Dose:  40 mg   Quantity:  90 tablet        Take 1 tablet (40 mg) by mouth daily   Refills:  3        FIRST-JEFFREY MOUTHWASH Susp   Dose:  5-10 mL   Quantity:  237 mL        Swish and swallow 5-10 mLs in mouth every 6 hours as needed   Refills:  1        fluticasone 50 MCG/ACT spray   Commonly known as:  FLONASE   Dose:  1-2 spray   Quantity:  1 Bottle        Spray 1-2 sprays into both nostrils daily   Refills:  11        ibuprofen 400 MG tablet   Commonly known as:  ADVIL/MOTRIN   Dose:  800 mg   Quantity:  60 tablet        Take 2 tablets  (800 mg) by mouth every 6 hours as needed for other (cramping)   Refills:  1        loratadine 10 MG tablet   Commonly known as:  CLARITIN   Dose:  10 mg        Take 10 mg by mouth daily   Refills:  0                Procedures and tests performed during your visit     XR Elbow Right G/E 3 Views    XR Shoulder Right G/E 3 Views    XR Wrist Right G/E 3 Views      Orders Needing Specimen Collection     None      Pending Results     Date and Time Order Name Status Description    10/30/2018 0059 XR Shoulder Right G/E 3 Views In process     10/30/2018 0057 XR Elbow Right G/E 3 Views In process     10/30/2018 0057 XR Wrist Right G/E 3 Views In process             Pending Culture Results     No orders found from 10/28/2018 to 10/31/2018.            Thank you for choosing [facility]       Thank you for choosing [facility] for your care. Our goal is always to provide you with excellent care. Hearing back from our patients is one way we can continue to improve our services. Please take a few minutes to complete the written survey that you may receive in the mail after you visit with us. Thank you!        [facility] Information     [facility] gives you secure access to your electronic health record. If you see a primary care provider, you can also send messages to your care team and make appointments. If you have questions, please call your primary care clinic.  If you do not have a primary care provider, please call [phone] and they will assist you.        Care EveryWhere ID     This is your Care EveryWhere ID. This could be used by other organizations to access your [facility] medical records  [id number]        Equal Access to Services     CRISS CHEN : Hadii stella mccoy Soelisabeth, waaxda luqadaha, qaybta kaalmada vianey olivia. So [facility] [phone].    ATENCIÓN: Si habla español, tiene a solis disposición servicios gratuitos de asistencia lingüística. Llame al [phone].    We comply with  applicable federal civil rights laws and [place] laws. We do not discriminate on the basis of race, color, national origin, age, disability, sex, sexual orientation, or gender identity.            After Visit Summary       This is your record. Keep this with you and show to your community pharmacist(s) and doctor(s) at your next visit.

## 2018-10-31 ENCOUNTER — TELEPHONE (OUTPATIENT)
Dept: FAMILY MEDICINE | Facility: OTHER | Age: 23
End: 2018-10-31

## 2018-10-31 NOTE — TELEPHONE ENCOUNTER
8:21 AM    Reason for Call: Phone Call    Description: Pt called and stated she was seen in the ER yesterday because she fell and sprained her RT shoulder/elbow/wrist. Pt would like to discuss. Please call her back at [phone]    Was an appointment offered for this call? Yes  If yes : Appointment type short              Date  11/06/18    Preferred method for responding to this message: Telephone Call  What is your phone number ?    If we cannot reach you directly, may we leave a detailed response at the number you provided? Yes    Can this message wait until your PCP/provider returns, if available today? Not applicable    Nasreen Wang

## 2018-10-31 NOTE — TELEPHONE ENCOUNTER
Patient called & is still waiting to her from Susan NEFF On her phone call from this morning at *;20.    Number to call patient back @ is: [phone]

## 2018-11-01 ENCOUNTER — OFFICE VISIT (OUTPATIENT)
Dept: FAMILY MEDICINE | Facility: OTHER | Age: 23
End: 2018-11-01
Attending: FAMILY MEDICINE
Payer: MEDICAID

## 2018-11-01 VITALS
WEIGHT: 199 LBS | DIASTOLIC BLOOD PRESSURE: 64 MMHG | BODY MASS INDEX: 36.4 KG/M2 | OXYGEN SATURATION: 98 % | HEART RATE: 76 BPM | SYSTOLIC BLOOD PRESSURE: 108 MMHG | TEMPERATURE: 98.3 F

## 2018-11-01 DIAGNOSIS — S50.01XD CONTUSION OF RIGHT ELBOW, SUBSEQUENT ENCOUNTER: Primary | ICD-10-CM

## 2018-11-01 DIAGNOSIS — S63.501D SPRAIN OF RIGHT WRIST, SUBSEQUENT ENCOUNTER: ICD-10-CM

## 2018-11-01 DIAGNOSIS — Z72.0 TOBACCO ABUSE: ICD-10-CM

## 2018-11-01 DIAGNOSIS — Z71.6 TOBACCO ABUSE COUNSELING: ICD-10-CM

## 2018-11-01 PROCEDURE — 99213 OFFICE O/P EST LOW 20 MIN: CPT | Performed by: FAMILY MEDICINE

## 2018-11-01 PROCEDURE — G0463 HOSPITAL OUTPT CLINIC VISIT: HCPCS | Performed by: FAMILY MEDICINE

## 2018-11-01 RX ORDER — TRAMADOL HYDROCHLORIDE 50 MG/1
50 TABLET ORAL EVERY 6 HOURS PRN
Qty: 20 TABLET | Refills: 0 | Status: SHIPPED | OUTPATIENT
Start: 2018-11-01 | End: 2018-11-20

## 2018-11-01 ASSESSMENT — ANXIETY QUESTIONNAIRES
6. BECOMING EASILY ANNOYED OR IRRITABLE: SEVERAL DAYS
IF YOU CHECKED OFF ANY PROBLEMS ON THIS QUESTIONNAIRE, HOW DIFFICULT HAVE THESE PROBLEMS MADE IT FOR YOU TO DO YOUR WORK, TAKE CARE OF THINGS AT HOME, OR GET ALONG WITH OTHER PEOPLE: SOMEWHAT DIFFICULT
5. BEING SO RESTLESS THAT IT IS HARD TO SIT STILL: NOT AT ALL
7. FEELING AFRAID AS IF SOMETHING AWFUL MIGHT HAPPEN: NOT AT ALL
3. WORRYING TOO MUCH ABOUT DIFFERENT THINGS: SEVERAL DAYS
2. NOT BEING ABLE TO STOP OR CONTROL WORRYING: NOT AT ALL
4. TROUBLE RELAXING: SEVERAL DAYS
GAD7 TOTAL SCORE: 3
1. FEELING NERVOUS, ANXIOUS, OR ON EDGE: NOT AT ALL

## 2018-11-01 ASSESSMENT — PATIENT HEALTH QUESTIONNAIRE - PHQ9: SUM OF ALL RESPONSES TO PHQ QUESTIONS 1-9: 3

## 2018-11-01 ASSESSMENT — PAIN SCALES - GENERAL: PAINLEVEL: MODERATE PAIN (5)

## 2018-11-01 NOTE — TELEPHONE ENCOUNTER
Pt called back to check on status of message. She stated the pain is getting worse. She would like someone to call her back at [phone]. Advised her that PCP is out today, but she would still like call back from someone today if possible

## 2018-11-01 NOTE — PROGRESS NOTES
SUBJECTIVE:                                                    Carolyn Mallory is a 23 year old female who presents to clinic today for the following health issues:        ED/UC Followup:    Facility:  [facility]  Date of visit: 10-30-18  Reason for visit: Contusion of right shoulder and wrist  Current Status: still having pain in elbow and wrist. Cant lay it flat without it hurting.  Fell down 4-5 stairs- right arm got blunt of trauma.  Seen in ER - notes not done- XR negative  Pt in arm sling and wrist splint  Worse pain of right elbow and wrist'  Taking Motrin 4 - 200mg 3 - 4 times a day  Ice and rest  Still in lots of pain   No h/o drug problems or abuse in past.   Has tried tramadol in past after knee surgery - helped with pain   Pt is CNA- need work note                Problem list and histories reviewed & adjusted, as indicated.  Additional history: as documented    Labs reviewed in EPIC    ROS:  CONSTITUTIONAL: NEGATIVE for fever, chills, change in weight  ENT/MOUTH: NEGATIVE for ear, mouth and throat problems  RESP: NEGATIVE for significant cough or SOB  CV: NEGATIVE for chest pain, palpitations or peripheral edema    OBJECTIVE:                                                    /64  Pulse 76  Temp 98.3  F (36.8  C) (Tympanic)  Wt 199 lb (90.3 kg)  SpO2 98%  BMI 36.4 kg/m2  Body mass index is 36.4 kg/(m^2).   GENERAL: healthy, alert, well nourished, well hydrated, no distress  MS: extremities- right arm - stiffness and tender over elbow. - has full ROM.  Wrist. Has full rom but stiff and tender. Some swelling noteded. No bruising no gross deformities noted, no edema         ASSESSMENT/PLAN:                                                    1. Tobacco abuse  Discussed   - Tobacco Cessation - Order to Satisfy Health Maintenance    2. Tobacco abuse counseling  As above     3. Contusion of right elbow, subsequent encounter  Continue motrin and ice. Rest and splint/sling. Add prn tramadol - use SPARINGY     Symptomatic treatment was discussed along when patient should call and/or come back into the clinic or go to ER/Urgent care. All questions answered.   Discussed in length conservative measures of OTC medications for pain, Ice/Heat, elevation and the concept of R.I.C.E.. Continue behavioral changes and proper body mechanics to allow for healing. Follow up as directed.   - traMADol (ULTRAM) 50 MG tablet; Take 1 tablet (50 mg) by mouth every 6 hours as needed for severe pain  Dispense: 20 tablet; Refill: 0    4. Sprain of right wrist, subsequent encounter  As above  - traMADol (ULTRAM) 50 MG tablet; Take 1 tablet (50 mg) by mouth every 6 hours as needed for severe pain  Dispense: 20 tablet; Refill: 0      No work for  A week.  If not improving in a week - ?? Re- xray vs add PT> or both     Yousif Catalan MD  [facility] [place]

## 2018-11-01 NOTE — PATIENT INSTRUCTIONS

## 2018-11-01 NOTE — MR AVS SNAPSHOT
After Visit Summary   11/1/2018    Carolyn Mallory    MRN: [id number]           Patient Information     Date Of Birth          1995        Visit Information        Provider Department      11/1/2018 3:30 PM Yousif Catalan MD [facility]        Today's Diagnoses     Contusion of right elbow, subsequent encounter    -  1    Tobacco abuse        Tobacco abuse counseling        Sprain of right wrist, subsequent encounter          Care Instructions      HOW TO QUIT SMOKING  Smoking is one of the hardest habits to break. About half of all those who have ever smoked have been able to quit, and most of those (about 70%) who still smoke want to quit. Here are some of the best ways to stop smoking.     KEEP TRYING:  It takes most smokers about 8 tries before they are finally able to fully quit. So, the more often you try and fail, the better your chance of quitting the next time! So, don't give up!    GO COLD TURKEY:  Most ex-smokers quit cold turkey. Trying to cut back gradually doesn't seem to work as well, perhaps because it continues the smoking habit. Also, it is possible to fool yourself by inhaling more while smoking fewer cigarettes. This results in the same amount of nicotine in your body!    GET SUPPORT:  Support programs can make an important difference, especially for the heavy smoker. These groups offer lectures, methods to change your behavior and peer support. Call the free national Quitline for more information. 800-QUIT-NOW (354-049-8637). Low-cost or free programs are offered by many hospitals, local chapters of the American Lung Association (577-037-6976) and the American Cancer Society (770-363-1478). Support at home is important too. Non-smokers can help by offering praise and encouragement. If the smoker fails to quit, encourage them to try again!    OVER-THE-COUNTER MEDICINES:  For those who can't quit on their own, Nicotine Replacement Therapy (NRT) may make  quitting much easier. Certain aids such as the nicotine patch, gum and lozenge are available without a prescription. However, it is best to use these under the guidance of your doctor. The skin patch provides a steady supply of nicotine to the body. Nicotine gum and lozenge gives temporary bursts of low levels of nicotine. Both methods take the edge off the craving for cigarettes. WARNING: If you feel symptoms of nicotine overdose, such as nausea, vomiting, dizziness, weakness, or fast heartbeat, stop using these and see your doctor.    PRESCRIPTION MEDICINES:  After evaluating your smoking patterns and prior attempts at quitting, your doctor may offer a prescription medicine such as bupropion (Zyban, Wellbutrin), varenicline (Chantix, Champix), a niocotine inhaler or nasal spray. Each has its unique advantage and side effects which your doctor can review with you.    HEALTH BENEFITS OF QUITTING:  The benefits of quitting start right away and keep improving the longer you go without smokin minutes: blood pressure and pulse return to normal  8 hours: oxygen levels return to normal  2 days: ability to smell and taste begins to improve as damaged nerves start to regrow  2-3 weeks: circulation and lung function improves  1-9 months: decreased cough, congestion and shortness of breath; less tired  1 year: risk of heart attack decreases by half  5 years: risk of lung cancer decreases by half; risk of stroke becomes the same as a non-smoker  For information about how to quit smoking, visit the following links:  National Cancer [place] ,   Clearing the Air, Quit Smoking Today   - an online booklet. http://www.smokefree.gov/pubs/clearing_the_air.pdf  Smokefree.gov http://smokefree.gov/  QuitNet http://www.quitnet.com/    4150-5944 [employer], [address] [place] [address], [place] [address]. All rights reserved. This information is not intended as a substitute for professional medical care. Always follow your  healthcare professional's instructions.    The Benefits of Living Smoke Free  What do you want to gain from quitting? Check off some reasons to quit.  Health Benefits  ___ Reduce my risk of lung cancer, heart disease, chronic lung disease  ___ Have fewer wrinkles and softer skin  ___ Improve my sense of taste and smell  ___ For pregnant women--reduce the risk of having a miscarriage, stillbirth, premature birth, or low-birth-weight baby  Personal Benefits  ___ Feel more in control of my life  ___ Have better-smelling hair, breath, clothes, home, and car  ___ Save time by not having to take smoke breaks, buy cigarettes, or hunt for a light  ___ Have whiter teeth  Family Benefits  ___ Reduce my children s respiratory tract infections  ___ Set a good example for my children  ___ Reduce my family s cancer risk  Financial Benefits  ___ Save hundreds of dollars each year that would be spent on cigarettes  ___ Save money on medical bills  ___ Save on life, health, and car insurance premiums    Those Dollars Add Up!  Cigarettes are expensive, and getting more expensive all the time. Do you realize how much money you are spending on cigarettes per year? What is the average amount you spend on a pack of cigarettes? What is the average number of packs that you smoke per day? Using your answers to these questions, fill in this formula to help you find out:  ($ _____ per pack) ×  ( _____ number of packs per day) × (365 days) =  $ _____ yearly cost of smoking  Besides tobacco, there are other costs, including extra cleaning bills and replacement costs for clothing and furniture; medical expenses for smoking-related illnesses; and higher health, life, and car insurance premiums.    Cigars and Pipes Count Too!  Cigars and pipes are also dangerous. So are smokeless (chewing) tobacco and snuff. All of these products contain nicotine, a highly addictive substance that has harmful effects on your body. Quitting smoking means giving up  all tobacco products.      0447-2882 [employer] [facility], [address] [place] [address], [place] [address]. All rights reserved. This information is not intended as a substitute for professional medical care. Always follow your healthcare professional's instructions.          Follow-ups after your visit        Who to contact     If you have questions or need follow up information about today's clinic visit or your schedule please contact [facility] directly at [phone].  Normal or non-critical lab and imaging results will be communicated to you by AbraRestohart, letter or phone within 4 business days after the clinic has received the results. If you do not hear from us within 7 days, please contact the clinic through [employer] or phone. If you have a critical or abnormal lab result, we will notify you by phone as soon as possible.  Submit refill requests through [employer] or call your pharmacy and they will forward the refill request to us. Please allow 3 business days for your refill to be completed.          Additional Information About Your Visit        [email or web address] Information     [employer] gives you secure access to your electronic health record. If you see a primary care provider, you can also send messages to your care team and make appointments. If you have questions, please call your primary care clinic.  If you do not have a primary care provider, please call [phone] and they will assist you.        Care EveryWhere ID     This is your Care EveryWhere ID. This could be used by other organizations to access your [facility] medical records  [id number]        Your Vitals Were     Pulse Temperature Pulse Oximetry BMI (Body Mass Index)          76 98.3  F (36.8  C) (Tympanic) 98% 36.4 kg/m2         Blood Pressure from Last 3 Encounters:   11/01/18 108/64   10/30/18 128/80   09/14/18 127/74    Weight from Last 3 Encounters:   11/01/18 199 lb (90.3 kg)   09/14/18 184 lb (83.5 kg)   08/10/18 172 lb (78 kg)               We Performed the Following     Tobacco Cessation - Order to Satisfy Health Maintenance          Today's Medication Changes          These changes are accurate as of 11/1/18  4:12 PM.  If you have any questions, ask your nurse or doctor.               Start taking these medicines.        Dose/Directions    traMADol 50 MG tablet   Commonly known as:  ULTRAM   Used for:  Contusion of right elbow, subsequent encounter, Sprain of right wrist, subsequent encounter   Started by:  Yousif Catalan MD        Dose:  50 mg   Take 1 tablet (50 mg) by mouth every 6 hours as needed for severe pain   Quantity:  20 tablet   Refills:  0            Where to get your medicines      Some of these will need a paper prescription and others can be bought over the counter.  Ask your nurse if you have questions.     Bring a paper prescription for each of these medications     traMADol 50 MG tablet               Information about OPIOIDS     PRESCRIPTION OPIOIDS: WHAT YOU NEED TO KNOW   We gave you an opioid (narcotic) pain medicine. It is important to manage your pain, but opioids are not always the best choice. You should first try all the other options your care team gave you. Take this medicine for as short a time (and as few doses) as possible.    Some activities can increase your pain, such as bandage changes or therapy sessions. It may help to take your pain medicine 30 to 60 minutes before these activities. Reduce your stress by getting enough sleep, working on hobbies you enjoy and practicing relaxation or meditation. Talk to your care team about ways to manage your pain beyond prescription opioids.    These medicines have risks:    DO NOT drive when on new or higher doses of pain medicine. These medicines can affect your alertness and reaction times, and you could be arrested for driving under the influence (DUI). If you need to use opioids long-term, talk to your care team about driving.    DO NOT operate heavy  machinery    DO NOT do any other dangerous activities while taking these medicines.    DO NOT drink any alcohol while taking these medicines.     If the opioid prescribed includes acetaminophen, DO NOT take with any other medicines that contain acetaminophen. Read all labels carefully. Look for the word  acetaminophen  or  Tylenol.  Ask your pharmacist if you have questions or are unsure.    You can get addicted to pain medicines, especially if you have a history of addiction (chemical, alcohol or substance dependence). Talk to your care team about ways to reduce this risk.    All opioids tend to cause constipation. Drink plenty of water and eat foods that have a lot of fiber, such as fruits, vegetables, prune juice, apple juice and high-fiber cereal. Take a laxative (Miralax, milk of magnesia, Colace, Senna) if you don t move your bowels at least every other day. Other side effects include upset stomach, sleepiness, dizziness, throwing up, tolerance (needing more of the medicine to have the same effect), physical dependence and slowed breathing.    Store your pills in a secure place, locked if possible. We will not replace any lost or stolen medicine. If you don t finish your medicine, please throw away (dispose) as directed by your pharmacist. The [place] Pollution Control Agency has more information about safe disposal: https://www.pca.[facility].mn.us/living-green/managing-unwanted-medications         Primary Care Provider Office Phone # Fax #    Susan AMELIA Ndiaye [phone]       [address] [facility] [address]        Equal Access to Services     CRISS CHEN : Hadii stella meyers hadasho Soomaali, waaxda luqadaha, qaybta kaalmada adekeoyaflako, vianey sorenson . So [facility] [phone].    ATENCIÓN: Si habla español, tiene a solis disposición servicios gratuitos de asistencia lingüística. Llame al [phone].    We comply with applicable federal civil rights laws and  [place] laws. We do not discriminate on the basis of race, color, national origin, age, disability, sex, sexual orientation, or gender identity.            Thank you!     Thank you for choosing [facility]  for your care. Our goal is always to provide you with excellent care. Hearing back from our patients is one way we can continue to improve our services. Please take a few minutes to complete the written survey that you may receive in the mail after your visit with us. Thank you!             Your Updated Medication List - Protect others around you: Learn how to safely use, store and throw away your medicines at www.disposemymeds.org.          This list is accurate as of 18  4:12 PM.  Always use your most recent med list.                   Brand Name Dispense Instructions for use Diagnosis    diphenhydrAMINE 25 MG tablet    BENADRYL     Take 25 mg by mouth        EPINEPHrine 0.3 MG/0.3ML injection    EPIPEN    4 each    Inject 0.3 mLs (0.3 mg) into the muscle once as needed for anaphylaxis    Allergic rhinitis due to pollen       famotidine 40 MG tablet    PEPCID    90 tablet    Take 1 tablet (40 mg) by mouth daily    Gastroesophageal reflux disease without esophagitis       [facility] MOUTHWASH Susp     237 mL    Swish and swallow 5-10 mLs in mouth every 6 hours as needed        fluticasone 50 MCG/ACT spray    FLONASE    1 Bottle    Spray 1-2 sprays into both nostrils daily        ibuprofen 400 MG tablet    ADVIL/MOTRIN    60 tablet    Take 2 tablets (800 mg) by mouth every 6 hours as needed for other (cramping)     (spontaneous vaginal delivery)       loratadine 10 MG tablet    CLARITIN     Take 10 mg by mouth daily        traMADol 50 MG tablet    ULTRAM    20 tablet    Take 1 tablet (50 mg) by mouth every 6 hours as needed for severe pain    Contusion of right elbow, subsequent encounter, Sprain of right wrist, subsequent encounter

## 2018-11-01 NOTE — TELEPHONE ENCOUNTER
Had a fall other day and went into urgent care but having more pain in elbow and wrist- wrist has more swelling and elbow has a burning pain in it. Has been icing and taking ibuprofen 800mg every 6 hours. Allergy to tylenol. Any suggestions?

## 2018-11-01 NOTE — NURSING NOTE
"Chief Complaint   Patient presents with     UC Follow-Up       Initial /64  Pulse 76  Temp 98.3  F (36.8  C) (Tympanic)  Wt 199 lb (90.3 kg)  SpO2 98%  BMI 36.4 kg/m2 Estimated body mass index is 36.4 kg/(m^2) as calculated from the following:    Height as of 5/3/18: 5' 2\" (1.575 m).    Weight as of this encounter: 199 lb (90.3 kg).  Medication Reconciliation: complete    Lilo Frank MA    "

## 2018-11-01 NOTE — TELEPHONE ENCOUNTER
ER note not done. Can order a sling or / and splint. More than happy to reassess in clinic this afternoon.  XR were negative.

## 2018-11-02 ASSESSMENT — ENCOUNTER SYMPTOMS
BACK PAIN: 0
DIFFICULTY URINATING: 0
ABDOMINAL PAIN: 0
ARTHRALGIAS: 0
SHORTNESS OF BREATH: 0
JOINT SWELLING: 0
EYE REDNESS: 0
COLOR CHANGE: 0
NECK STIFFNESS: 0
NECK PAIN: 0
HEADACHES: 0
CONFUSION: 0
FEVER: 0

## 2018-11-02 ASSESSMENT — ANXIETY QUESTIONNAIRES: GAD7 TOTAL SCORE: 3

## 2018-11-05 ENCOUNTER — TELEPHONE (OUTPATIENT)
Dept: FAMILY MEDICINE | Facility: OTHER | Age: 23
End: 2018-11-05

## 2018-11-05 NOTE — TELEPHONE ENCOUNTER
11:15 AM    Reason for Call: OVERBOOK    Patient is having the following symptoms: arm pain for 1 weeks.    The patient is requesting an appointment for sometime this week with Susan Clifford.    Was an appointment offered for this call? No  If yes : Appointment type              Date    Preferred method for responding to this message: Telephone Call  What is your phone number ?[phone]    If we cannot reach you directly, may we leave a detailed response at the number you provided? Yes    Can this message wait until your PCP/provider returns, if unavailable today? Not applicable, provider is in     Candice [place]

## 2018-11-06 ENCOUNTER — TELEPHONE (OUTPATIENT)
Dept: FAMILY MEDICINE | Facility: OTHER | Age: 23
End: 2018-11-06

## 2018-11-06 NOTE — TELEPHONE ENCOUNTER
Pt called back to check on status of message. She stated she never received a call back. Please call her back at [phone]

## 2018-11-06 NOTE — TELEPHONE ENCOUNTER
Pt still waiting on call back.  States she did not receive a call yesterday and does have VM set up.  Is wondering what to do about work and if she needs to be seen, etc.  Messages sent as overbook but pt states that is only if NM wants to see her.  Please call back.

## 2018-11-07 ENCOUNTER — OFFICE VISIT (OUTPATIENT)
Dept: FAMILY MEDICINE | Facility: OTHER | Age: 23
End: 2018-11-07
Attending: NURSE PRACTITIONER
Payer: MEDICAID

## 2018-11-07 VITALS
TEMPERATURE: 98.5 F | DIASTOLIC BLOOD PRESSURE: 74 MMHG | BODY MASS INDEX: 35.85 KG/M2 | SYSTOLIC BLOOD PRESSURE: 102 MMHG | HEART RATE: 88 BPM | OXYGEN SATURATION: 100 % | WEIGHT: 196 LBS

## 2018-11-07 DIAGNOSIS — S60.211D CONTUSION OF RIGHT WRIST, SUBSEQUENT ENCOUNTER: ICD-10-CM

## 2018-11-07 DIAGNOSIS — M25.511 ACUTE PAIN OF RIGHT SHOULDER: Primary | ICD-10-CM

## 2018-11-07 DIAGNOSIS — S50.01XD CONTUSION OF RIGHT ELBOW, SUBSEQUENT ENCOUNTER: ICD-10-CM

## 2018-11-07 PROCEDURE — 99213 OFFICE O/P EST LOW 20 MIN: CPT | Performed by: NURSE PRACTITIONER

## 2018-11-07 PROCEDURE — G0463 HOSPITAL OUTPT CLINIC VISIT: HCPCS

## 2018-11-07 ASSESSMENT — ANXIETY QUESTIONNAIRES
6. BECOMING EASILY ANNOYED OR IRRITABLE: NOT AT ALL
3. WORRYING TOO MUCH ABOUT DIFFERENT THINGS: NOT AT ALL
GAD7 TOTAL SCORE: 0
5. BEING SO RESTLESS THAT IT IS HARD TO SIT STILL: NOT AT ALL
2. NOT BEING ABLE TO STOP OR CONTROL WORRYING: NOT AT ALL
1. FEELING NERVOUS, ANXIOUS, OR ON EDGE: NOT AT ALL
IF YOU CHECKED OFF ANY PROBLEMS ON THIS QUESTIONNAIRE, HOW DIFFICULT HAVE THESE PROBLEMS MADE IT FOR YOU TO DO YOUR WORK, TAKE CARE OF THINGS AT HOME, OR GET ALONG WITH OTHER PEOPLE: NOT DIFFICULT AT ALL
7. FEELING AFRAID AS IF SOMETHING AWFUL MIGHT HAPPEN: NOT AT ALL
4. TROUBLE RELAXING: NOT AT ALL

## 2018-11-07 ASSESSMENT — PATIENT HEALTH QUESTIONNAIRE - PHQ9: SUM OF ALL RESPONSES TO PHQ QUESTIONS 1-9: 3

## 2018-11-07 ASSESSMENT — PAIN SCALES - GENERAL: PAINLEVEL: MODERATE PAIN (4)

## 2018-11-07 NOTE — PATIENT INSTRUCTIONS
Shoulder Contusion  You have a shoulder injury called a contusion. This causes pain, swelling, and sometimes bruising on the skin. You don t have any broken bones. This injury will take from a few days to several weeks to heal, depending on how severe it is. Moderate to severe shoulder contusions are treated with a sling or shoulder immobilizer. Minor contusions can be treated without any special support.  Home care  Follow these tips when caring for yourself at home:    If you were given a sling to use, leave it in place for the time advised by your healthcare provider. If you aren t sure how long to wear it, ask for advice. If the sling becomes loose, adjust it so that your forearm is level with the ground. Your shoulder should feel well supported.    Put an ice pack on the injured area for 20 minutes every 1 to 2 hours the first day. You can make your own ice pack by putting ice cubes in a plastic bag. Wrap the bag in a thin towel. Continue with ice packs 3 to 4 times a day for the next 2 days. Then use the pack as needed to ease pain and swelling.    You may use acetaminophen or ibuprofen to control pain, unless another pain medicine was prescribed. If you have chronic liver or kidney disease, talk with your healthcare provider before using these medicines. Also talk with your provider if you ve ever had a stomach ulcer or GI bleeding.    Shoulder and elbow joints become stiff if left in a sling for too long. You should start range of motion exercises about 7 to 10 days after the injury. Talk with your provider to find out what type of exercises to do and how soon to start.    Unless your provider told you otherwise, you can take the sling off to shower or bathe.  Follow-up care  Follow up with your healthcare provider if you don t start getting better in the next 5 days.  When to seek medical advice  Call your healthcare provider right away if any of these occur:    Pain or swelling gets worse or continues  for more than a few days    Large amount of bruising on your shoulder or upper arm    Your hand or fingers become cold, blue, numb, or tingly    Difficulty moving your hand or fingers    Weakness in your hand or fingers    Your shoulder becomes stiff    Your shoulder feels like it is popping out    You aren t able to do your daily activities  Date Last Reviewed: 10/1/2016    1585-7928 [employer]. [address] [place] [address]. All rights reserved. This information is not intended as a substitute for professional medical care. Always follow your healthcare professional's instructions.

## 2018-11-07 NOTE — NURSING NOTE
"Chief Complaint   Patient presents with     Musculoskeletal Problem       Initial /74 (BP Location: Left arm, Patient Position: Chair, Cuff Size: Adult Large)  Pulse 88  Temp 98.5  F (36.9  C) (Tympanic)  Wt 196 lb (88.9 kg)  SpO2 100%  BMI 35.85 kg/m2 Estimated body mass index is 35.85 kg/(m^2) as calculated from the following:    Height as of 5/3/18: 5' 2\" (1.575 m).    Weight as of this encounter: 196 lb (88.9 kg).  Medication Reconciliation: complete    ERIN ORNELAS LPN  "

## 2018-11-07 NOTE — LETTER
November 7, 2018      Carolyn BAKER Mabob  [address] [place] [address]        To Whom It May Concern:    Carolyn Mallory  was seen on 11/7/2018.  Please limit use of right arm until evaluated by physical therapy due to injury.        Sincerely,        AMELIA Martin CNP

## 2018-11-07 NOTE — MR AVS SNAPSHOT
After Visit Summary   11/7/2018    Carolyn Mallory    MRN: [id number]           Patient Information     Date Of Birth          1995        Visit Information        Provider Department      11/7/2018 7:20 PM Susan Clifford APRN CNP [facility]        Today's Diagnoses     Acute pain of right shoulder    -  1    Contusion of right elbow, subsequent encounter        Contusion of right wrist, subsequent encounter          Care Instructions      Shoulder Contusion  You have a shoulder injury called a contusion. This causes pain, swelling, and sometimes bruising on the skin. You don t have any broken bones. This injury will take from a few days to several weeks to heal, depending on how severe it is. Moderate to severe shoulder contusions are treated with a sling or shoulder immobilizer. Minor contusions can be treated without any special support.  Home care  Follow these tips when caring for yourself at home:    If you were given a sling to use, leave it in place for the time advised by your healthcare provider. If you aren t sure how long to wear it, ask for advice. If the sling becomes loose, adjust it so that your forearm is level with the ground. Your shoulder should feel well supported.    Put an ice pack on the injured area for 20 minutes every 1 to 2 hours the first day. You can make your own ice pack by putting ice cubes in a plastic bag. Wrap the bag in a thin towel. Continue with ice packs 3 to 4 times a day for the next 2 days. Then use the pack as needed to ease pain and swelling.    You may use acetaminophen or ibuprofen to control pain, unless another pain medicine was prescribed. If you have chronic liver or kidney disease, talk with your healthcare provider before using these medicines. Also talk with your provider if you ve ever had a stomach ulcer or GI bleeding.    Shoulder and elbow joints become stiff if left in a sling for too long. You should start  range of motion exercises about 7 to 10 days after the injury. Talk with your provider to find out what type of exercises to do and how soon to start.    Unless your provider told you otherwise, you can take the sling off to shower or bathe.  Follow-up care  Follow up with your healthcare provider if you don t start getting better in the next 5 days.  When to seek medical advice  Call your healthcare provider right away if any of these occur:    Pain or swelling gets worse or continues for more than a few days    Large amount of bruising on your shoulder or upper arm    Your hand or fingers become cold, blue, numb, or tingly    Difficulty moving your hand or fingers    Weakness in your hand or fingers    Your shoulder becomes stiff    Your shoulder feels like it is popping out    You aren t able to do your daily activities  Date Last Reviewed: 10/1/2016    8422-3456 [employer]. [address] [place] [address]. All rights reserved. This information is not intended as a substitute for professional medical care. Always follow your healthcare professional's instructions.                Follow-ups after your visit        Additional Services     PHYSICAL THERAPY REFERRAL       If you have not heard from the scheduling office within 2 business days, please call [phone] for all locations, with the exception of [place], please call [phone] and [place], please call [phone].    Please be aware that coverage of these services is subject to the terms and limitations of your health insurance plan.  Call member services at your health plan with any benefit or coverage questions.                  Your next 10 appointments already scheduled     Nov 07, 2018  7:20 PM CST   (Arrive by 7:05 PM)   SHORT with AMELIA Pineda CNP   [facility] ([facility] )    [address] [place] [address]   [phone]              Future tests that  were ordered for you today     Open Future Orders        Priority Expected Expires Ordered    PHYSICAL THERAPY REFERRAL Routine  11/7/2019 11/7/2018            Who to contact     If you have questions or need follow up information about today's clinic visit or your schedule please contact [facility] [place] directly at [phone].  Normal or non-critical lab and imaging results will be communicated to you by MyChart, letter or phone within 4 business days after the clinic has received the results. If you do not hear from us within 7 days, please contact the clinic through MyChart or phone. If you have a critical or abnormal lab result, we will notify you by phone as soon as possible.  Submit refill requests through [employer] or call your pharmacy and they will forward the refill request to us. Please allow 3 business days for your refill to be completed.          Additional Information About Your Visit        MyChart Information     [employer] gives you secure access to your electronic health record. If you see a primary care provider, you can also send messages to your care team and make appointments. If you have questions, please call your primary care clinic.  If you do not have a primary care provider, please call [phone] and they will assist you.        Care EveryWhere ID     This is your Care EveryWhere ID. This could be used by other organizations to access your [facility] medical records  [id number]        Your Vitals Were     Pulse Temperature Pulse Oximetry BMI (Body Mass Index)          88 98.5  F (36.9  C) (Tympanic) 100% 35.85 kg/m2         Blood Pressure from Last 3 Encounters:   11/07/18 102/74   11/01/18 108/64   10/30/18 128/80    Weight from Last 3 Encounters:   11/07/18 196 lb (88.9 kg)   11/01/18 199 lb (90.3 kg)   09/14/18 184 lb (83.5 kg)               Primary Care Provider Office Phone # Fax #    AMELIA Pineda [phone]       [facility]  [place] [address]        Equal Access to Services     [facility] Center[facility] : Hadii stella ku haddarriuso Soleonorali, waaxda luqadaha, qaybta kaalmada devoraliyahflako, vianey srivastavaalistairashley guevara. So [facility] [phone].    ATENCIÓN: Si habla español, tiene a solis disposición servicios gratuitos de asistencia lingüística. [facility] [phone].    We comply with applicable federal civil rights laws and [place] laws. We do not discriminate on the basis of race, color, national origin, age, disability, sex, sexual orientation, or gender identity.            Thank you!     Thank you for choosing [facility]  for your care. Our goal is always to provide you with excellent care. Hearing back from our patients is one way we can continue to improve our services. Please take a few minutes to complete the written survey that you may receive in the mail after your visit with us. Thank you!             Your Updated Medication List - Protect others around you: Learn how to safely use, store and throw away your medicines at www.disposemymeds.org.          This list is accurate as of 11/7/18  4:04 PM.  Always use your most recent med list.                   Brand Name Dispense Instructions for use Diagnosis    diphenhydrAMINE 25 MG tablet    BENADRYL     Take 25 mg by mouth        EPINEPHrine 0.3 MG/0.3ML injection    EPIPEN    4 each    Inject 0.3 mLs (0.3 mg) into the muscle once as needed for anaphylaxis    Allergic rhinitis due to pollen       famotidine 40 MG tablet    PEPCID    90 tablet    Take 1 tablet (40 mg) by mouth daily    Gastroesophageal reflux disease without esophagitis       [facility] MOUTHWASH Susp     237 mL    Swish and swallow 5-10 mLs in mouth every 6 hours as needed        fluticasone 50 MCG/ACT spray    FLONASE    1 Bottle    Spray 1-2 sprays into both nostrils daily        ibuprofen 400 MG tablet    ADVIL/MOTRIN    60 tablet    Take 2 tablets (800 mg) by mouth every 6 hours as needed for  other (cramping)     (spontaneous vaginal delivery)       loratadine 10 MG tablet    CLARITIN     Take 10 mg by mouth daily        traMADol 50 MG tablet    ULTRAM    20 tablet    Take 1 tablet (50 mg) by mouth every 6 hours as needed for severe pain    Contusion of right elbow, subsequent encounter, Sprain of right wrist, subsequent encounter

## 2018-11-08 ASSESSMENT — ANXIETY QUESTIONNAIRES: GAD7 TOTAL SCORE: 0

## 2018-11-13 ENCOUNTER — TELEPHONE (OUTPATIENT)
Dept: FAMILY MEDICINE | Facility: OTHER | Age: 23
End: 2018-11-13

## 2018-11-13 DIAGNOSIS — S60.211D CONTUSION OF RIGHT WRIST, SUBSEQUENT ENCOUNTER: Primary | ICD-10-CM

## 2018-11-13 RX ORDER — KETOROLAC TROMETHAMINE 10 MG/1
10 TABLET, FILM COATED ORAL EVERY 6 HOURS PRN
Qty: 20 TABLET | Refills: 0 | Status: SHIPPED | OUTPATIENT
Start: 2018-11-13 | End: 2018-11-26

## 2018-11-13 NOTE — TELEPHONE ENCOUNTER
Pt notifed. Has PT on 11/27 and advised her to follow up with you after. States she tried to remove her wrist brace and it became very painful and swollen

## 2018-11-13 NOTE — TELEPHONE ENCOUNTER
12:27 PM    Reason for Call: Phone Call    Description: Eden is wondering what Susan Clifford would like her to do about pain now that she is off Tramadol. Please call Eden to advise.    Was an appointment offered for this call?   No    Preferred method for responding to this message: [phone]    If we cannot reach you directly, may we leave a detailed response at the number you provided ?   Yes    Janee Peng

## 2018-11-13 NOTE — TELEPHONE ENCOUNTER
She can either use ibuprofen or I can send a script in for toradol.    Susan CISNEROS FNP-BC  Family Nurse Practitioner

## 2018-11-13 NOTE — TELEPHONE ENCOUNTER
Prescription sent. Please remind her she should not take ibuprofen while using toradol    Susan CISNEROS FNP-BC  Family Nurse Practitioner

## 2018-11-13 NOTE — TELEPHONE ENCOUNTER
patient returned call and stated that she would like to try the Toradol medication. Patient also stated that she did try taking the wrist brace off and the pain got worse. patient would overall like to try the Toradol medication.  Shi Rodriguez, CMA

## 2018-11-14 ENCOUNTER — TELEPHONE (OUTPATIENT)
Dept: FAMILY MEDICINE | Facility: OTHER | Age: 23
End: 2018-11-14

## 2018-11-14 NOTE — TELEPHONE ENCOUNTER
Received FMLA forms to be filled out for patient from her employer for her leave of absence. You last seen 11/7 and wrote note for work that she needed to limit the use of her right arm until evaluated by PT. Appears her first appointment with PT is on 11/27/18. Unable to find any documentation that you were taking her out of work. Do you want to fill these out or see her to discuss?  Marisela Tomas LPN

## 2018-11-15 NOTE — TELEPHONE ENCOUNTER
It was not a work related injury. They can decide if she can work or not.  She is continuing to have pain we can fill out paper work for her if she needs.  I will not be back in the office until next Monday.     Susan CISNEROS FNP-BC  Family Nurse Practitioner

## 2018-11-15 NOTE — TELEPHONE ENCOUNTER
Left message for patient to return call or schedule appt to review form completion.  Carly Perez

## 2018-11-19 ENCOUNTER — TELEPHONE (OUTPATIENT)
Dept: FAMILY MEDICINE | Facility: OTHER | Age: 23
End: 2018-11-19

## 2018-11-19 NOTE — TELEPHONE ENCOUNTER
To: Susan Clifford nurse  Patient said she was returning your call, please try calling her back @ [phone].  Thank you

## 2018-11-20 ENCOUNTER — HOSPITAL ENCOUNTER (EMERGENCY)
Facility: HOSPITAL | Age: 23
Discharge: HOME | End: 2018-11-20
Attending: NURSE PRACTITIONER | Admitting: NURSE PRACTITIONER
Payer: MEDICAID

## 2018-11-20 VITALS
HEART RATE: 111 BPM | SYSTOLIC BLOOD PRESSURE: 123 MMHG | DIASTOLIC BLOOD PRESSURE: 83 MMHG | RESPIRATION RATE: 18 BRPM | OXYGEN SATURATION: 100 % | TEMPERATURE: 99.2 F

## 2018-11-20 DIAGNOSIS — L05.91 PILONIDAL CYST: ICD-10-CM

## 2018-11-20 PROCEDURE — 99213 OFFICE O/P EST LOW 20 MIN: CPT | Performed by: NURSE PRACTITIONER

## 2018-11-20 PROCEDURE — G0463 HOSPITAL OUTPT CLINIC VISIT: HCPCS

## 2018-11-20 RX ORDER — SULFAMETHOXAZOLE/TRIMETHOPRIM 800-160 MG
1 TABLET ORAL 2 TIMES DAILY
Qty: 20 TABLET | Refills: 0 | Status: SHIPPED | OUTPATIENT
Start: 2018-11-20 | End: 2018-11-24

## 2018-11-20 ASSESSMENT — ENCOUNTER SYMPTOMS
COLOR CHANGE: 0
ACTIVITY CHANGE: 0
WEAKNESS: 0
FEVER: 0
COUGH: 0
VOMITING: 0
BACK PAIN: 0
ABDOMINAL PAIN: 0
WOUND: 1
PSYCHIATRIC NEGATIVE: 1
APPETITE CHANGE: 0
DYSURIA: 0
CHILLS: 0
NAUSEA: 0
TROUBLE SWALLOWING: 0
DIARRHEA: 0

## 2018-11-20 NOTE — DISCHARGE INSTRUCTIONS
Take antibiotics as ordered.   Eat a yogurt daily while taking antibiotics.   Take Ketorolac as needed as directed for pain. Take with food.   Apply heat to cyst for 20 minutes 4 times a day. Protect skin.   A surgery consult has been placed. They will be calling you.   Follow up with PCP with any increase in symptoms or concerns.   Return to urgent care or emergency department with any increase in symptoms or concerns.

## 2018-11-20 NOTE — ED AVS SNAPSHOT
[facility]    [address]    [place] [address]    Phone:  [phone]                                       Carolyn Mallory   MRN: [id number]    Department:  [facility]   Date of Visit:  11/20/2018           Patient Information     Date Of Birth          1995        Your diagnoses for this visit were:     Pilonidal cyst        You were seen by Debi Becerril NP.      Follow-up Information     Follow up with Susan Clifford APRN CNP.    Specialty:  Family Practice    Why:  As needed, If symptoms worsen    Contact information:    [address]  [place] [address] [phone]          Follow up with [facility].    Specialty:  EMERGENCY MEDICINE    Why:  As needed, If symptoms worsen    Contact information:    [address]  [place] [address] [phone]    Additional information:    From [place]: Take [address]. Turn left at [address]. Turn left at the first stoplight on [address]. At the first stop sign, take a right onto [address]. Take a left into the parking lot and continue through until you reach the North enterance of the building.       From [place]: Take [address]. Take the [address] ramp towards [place]. Turn left onto [address]. Take a slight right onto [address]. Turn left at the first stoplight on [address]. At the first stop sign, take a right onto [address]. Take a left into the parking lot and continue through until you reach the North enterance of the building.       From [place]: Take [address]. Take a right at [address]. At the first stop sign, take a right onto [address]. Take a left into the parking lot and continue through until you reach the North enterance of the building.         Discharge Instructions       Take antibiotics as ordered.   Eat a yogurt daily while taking antibiotics.   Take Ketorolac  as needed as directed for pain. Take with food.   Apply heat to cyst for 20 minutes 4 times a day. Protect skin.   A surgery consult has been placed. They will be calling you.   Follow up with PCP with any increase in symptoms or concerns.   Return to urgent care or emergency department with any increase in symptoms or concerns.     Discharge References/Attachments     PILONIDAL CYST, INFECTED (ANTIBIOTICS) (ENGLISH)      Your next 10 appointments already scheduled     Nov 26, 2018  2:30 PM CST   (Arrive by 2:15 PM)   SHORT with AMELIA Pineda CNP   [facility] )    [address] [address] [address]   [phone]            Nov 27, 2018  3:00 PM CST   (Arrive by 2:45 PM)   Evaluation with Serena Torres, PT   [facility] ([facility] )    [address] [address]   [phone]              ED Discharge Orders     GENERAL SURG ADULT REFERRAL       Your provider has referred you to: GENERAL SURG ADULT REGIONS:[id number]    Please be aware that coverage of these services is subject to the terms and limitations of your health insurance plan.  Call member services at your health plan with any benefit or coverage questions.      Please bring the following with you to your appointment:    (1) Any X-Rays, CTs or MRIs which have been performed.  Contact the facility where they were done to arrange for  prior to your scheduled appointment.   (2) List of current medications   (3) This referral request   (4) Any documents/labs given to you for this referral                     Review of your medicines      START taking        Dose / Directions Last dose taken    sulfamethoxazole-trimethoprim 800-160 MG per tablet   Commonly known as:  BACTRIM DS   Dose:  1 tablet   Quantity:  20 tablet        Take 1 tablet by mouth 2 times daily for 10 days   Refills:  0          Our records show that you are taking the medicines  listed below. If these are incorrect, please call your family doctor or clinic.        Dose / Directions Last dose taken    diphenhydrAMINE 25 MG tablet   Commonly known as:  BENADRYL   Dose:  25 mg        Take 25 mg by mouth   Refills:  0        EPINEPHrine 0.3 MG/0.3ML injection   Commonly known as:  EPIPEN   Dose:  0.3 mg   Quantity:  4 each        Inject 0.3 mLs (0.3 mg) into the muscle once as needed for anaphylaxis   Refills:  6        famotidine 40 MG tablet   Commonly known as:  PEPCID   Dose:  40 mg   Quantity:  90 tablet        Take 1 tablet (40 mg) by mouth daily   Refills:  3        fluticasone 50 MCG/ACT spray   Commonly known as:  FLONASE   Dose:  1-2 spray   Quantity:  1 Bottle        Spray 1-2 sprays into both nostrils daily   Refills:  11        ibuprofen 400 MG tablet   Commonly known as:  ADVIL/MOTRIN   Dose:  800 mg   Quantity:  60 tablet        Take 2 tablets (800 mg) by mouth every 6 hours as needed for other (cramping)   Refills:  1        ketorolac 10 MG tablet   Commonly known as:  TORADOL   Dose:  10 mg   Quantity:  20 tablet        Take 1 tablet (10 mg) by mouth every 6 hours as needed for moderate pain   Refills:  0        loratadine 10 MG tablet   Commonly known as:  CLARITIN   Dose:  10 mg        Take 10 mg by mouth daily   Refills:  0                Prescriptions were sent or printed at these locations (1 Prescription)                   [facility] [address] [place] [address] - [address] AT [facility] OF [facility] [address]   [address], [facility] [address]    Telephone:  [phone]   Fax:  [phone]   Hours:                  E-Prescribed (1 of 1)         sulfamethoxazole-trimethoprim (BACTRIM DS) 800-160 MG per tablet                Orders Needing Specimen Collection     None      Pending Results     No orders found from 11/18/2018 to 11/21/2018.            Pending Culture Results     No orders found from 11/18/2018 to 11/21/2018.            Thank you for choosing [facility]        Thank you for choosing [facility] for your care. Our goal is always to provide you with excellent care. Hearing back from our patients is one way we can continue to improve our services. Please take a few minutes to complete the written survey that you may receive in the mail after you visit with us. Thank you!        [facility] Information     "[employer] gives you secure access to your electronic health record. If you see a primary care provider, you can also send messages to your care team and make appointments. If you have questions, please call your primary care clinic.  If you do not have a primary care provider, please call [phone] and they will assist you.        Care EveryWhere ID     This is your Care EveryWhere ID. This could be used by other organizations to access your [facility] medical records  [id number]        Equal Access to Services     CRISS CHEN : Laxmi Villasenor, milton marie, shiraz olivia, vianey guevara. So [facility] [phone].    ATENCIÓN: Si habla español, tiene a solis disposición servicios gratuitos de asistencia lingüística. Llame al [phone].    We comply with applicable federal civil rights laws and [place] laws. We do not discriminate on the basis of race, color, national origin, age, disability, sex, sexual orientation, or gender identity.            After Visit Summary       This is your record. Keep this with you and show to your community pharmacist(s) and doctor(s) at your next visit.

## 2018-11-20 NOTE — ED PROVIDER NOTES
History     Chief Complaint   Patient presents with     Cyst     coccyx X 3 days     The history is provided by the patient. No  was used.     Carolyn Mallory is a 23 year old female who presents with a cyst on her coccyx that started 3 days ago. Denies fever, chills, or night sweats. Eating and drinking well. Bowel and bladder are working well. No antibiotic use in the past 30 days.     She has a history of pilonidal cysts.  Last cyst was 4/2018. She was treated with Bactrim.     Problem List:    Patient Active Problem List    Diagnosis Date Noted     Allergy to pollen 03/01/2018     Priority: Medium     Obesity, unspecified obesity severity, unspecified obesity type 06/09/2017     Priority: Medium     Tobacco use disorder 06/09/2017     Priority: Medium     Esophageal reflux 06/09/2017     Priority: Medium     BMI 34.0-34.9,adult 01/19/2017     Priority: Medium     Chronic right-sided thoracic back pain 05/24/2016     Priority: Medium     ACP (advance care planning) 04/26/2016     Priority: Medium     Advance Care Planning 4/26/2016: ACP Review of Chart / Resources Provided:  Reviewed chart for advance care plan.  Carolyn Mallory has no plan or code status on file. Discussed available resources and provided with information. .  Added by Lizzette Garcia           Calculus of kidney 12/16/2015     Priority: Medium     Bilateral hydronephrosis, flank pain, 3mm right lower pole non obstructing stone.  Dr. [facility] consult.  Declined stents.  Reconsider if hospitalization and no improvement 2-3 days.         Sacro ilial pain 10/30/2015     Priority: Medium     Patellofemoral syndrome of both knees 01/20/2015     Priority: Medium     Allergic rhinitis 01/17/2014     Priority: Medium     Problem list name updated by automated process. Provider to review       Food allergy 01/17/2014     Priority: Medium     Astigmatism 09/24/2013     Priority: Medium     Overview:   IMO Update       Hypermetropia  09/24/2013     Priority: Medium        Past Medical History:    Past Medical History:   Diagnosis Date     NO ACTIVE PROBLEMS      Pregnancy        Past Surgical History:    Past Surgical History:   Procedure Laterality Date     ARTHROSCOPY KNEE Right 5/4/2015    Procedure: ARTHROSCOPY KNEE;  Surgeon: Hernando Kulkarni MD;  Location: [facility]     AS ESOPHAGOSCOPY, DIAGNOSTIC      upper     LAPAROSCOPIC CHOLECYSTECTOMY N/A 10/10/2015    Procedure: LAPAROSCOPIC CHOLECYSTECTOMY;  Surgeon: Drew Padgett MD;  Location: [facility] OR     none         Family History:    Family History   Problem Relation Age of Onset     Thrombophilia Mother      blood clotting     Lupus Mother      erythematosus     Diabetes Mother      Hypertension Father      Asthma Sister      Diabetes Maternal Grandfather      Hypertension Maternal Grandfather      Lupus Maternal Aunt      erythematosus       Social History:  Marital Status:  Single [1]  Social History   Substance Use Topics     Smoking status: Current Every Day Smoker     Packs/day: 0.25     Years: 1.00     Types: Cigarettes     Start date: 1/20/2014     Smokeless tobacco: Never Used     Alcohol use No        Medications:      famotidine (PEPCID) 40 MG tablet   sulfamethoxazole-trimethoprim (BACTRIM DS) 800-160 MG per tablet   diphenhydrAMINE (BENADRYL) 25 MG tablet   EPINEPHrine (EPIPEN) 0.3 MG/0.3ML injection   fluticasone (FLONASE) 50 MCG/ACT spray   ibuprofen (ADVIL,MOTRIN) 400 MG tablet   ketorolac (TORADOL) 10 MG tablet   loratadine (CLARITIN) 10 MG tablet         Review of Systems   Constitutional: Negative for activity change, appetite change, chills and fever.   HENT: Negative for trouble swallowing.    Respiratory: Negative for cough.    Gastrointestinal: Negative for abdominal pain, diarrhea, nausea and vomiting.   Genitourinary: Negative for dysuria.   Musculoskeletal: Negative for back pain.   Skin: Positive for wound. Negative for color change and rash.        Cyst to coccyx  area.    Neurological: Negative for weakness.   Psychiatric/Behavioral: Negative.        Physical Exam   BP: 123/83  Pulse: 111  Temp: 99.2  F (37.3  C)  Resp: 18  SpO2: 100 %      Physical Exam   Constitutional: She is oriented to person, place, and time. She appears well-developed and well-nourished. No distress.   HENT:   Head: Normocephalic.   Mouth/Throat: Oropharynx is clear and moist.   Neck: Normal range of motion. Neck supple.   Cardiovascular: Normal rate.    Pulmonary/Chest: Effort normal. No respiratory distress.   Abdominal: Soft. She exhibits no distension.   Musculoskeletal: Normal range of motion.   Neurological: She is alert and oriented to person, place, and time. She exhibits normal muscle tone.   Skin: Skin is warm and dry. She is not diaphoretic. There is erythema.   Erythema and dimpling to base of coccyx. No drainage. No fluctuance appreciated. TTP.    Psychiatric: She has a normal mood and affect. Her behavior is normal.   Nursing note and vitals reviewed.      ED Course     ED Course     Procedures      Assessments & Plan (with Medical Decision Making)     General surgery consulted with reoccurrence of pilonidal cysts.     Discussed plan of care. She verbalized understanding. All questions answered.     I have reviewed the nursing notes.    I have reviewed the findings, diagnosis, plan and need for follow up with the patient.  Discharged in stable condition.     New Prescriptions    SULFAMETHOXAZOLE-TRIMETHOPRIM (BACTRIM DS) 800-160 MG PER TABLET    Take 1 tablet by mouth 2 times daily for 10 days       Final diagnoses:   Pilonidal cyst     Take antibiotics as ordered.   Eat a yogurt daily while taking antibiotics.   Take Ketorolac as needed as directed for pain. Take with food.   Apply heat to cyst for 20 minutes 4 times a day. Protect skin.   A surgery consult has been placed. They will be calling you.   Follow up with PCP with any increase in symptoms or concerns.   Return to urgent care  or emergency department with any increase in symptoms or concerns.     RIMA Mcdonald  11/20/2018  2:10 PM  [facility]       Debi Becerril NP  11/22/18 [id number]

## 2018-11-20 NOTE — TELEPHONE ENCOUNTER
Called patient and she is transferred to scheduling, needs follow-up appt to discuss completion of work release paper work.  Carly Perez

## 2018-11-21 NOTE — PROGRESS NOTES
SUBJECTIVE:   Carolyn Mallory is a 23 year old female who presents to clinic today for the following health issues:      Forms for [employer]:      Duration: 10/30/18 (injury date) - until no restrictions    Description (location/character/radiation): right elbow    Intensity:  3/10    Accompanying signs and symptoms: none    History (similar episodes/previous evaluation): None    Precipitating or alleviating factors: movement    Therapies tried and outcome: pain medications, heat ice, immobilization, starting to do some light ROM and removing splint and sling for a few hours a day.    Waiting on physical therapy appointment tomorrow for further evaluation and treatment.     Patient states she need [employer] papers filled out, states they were faxed here by her employer, [employer].           Problem list and histories reviewed & adjusted, as indicated.  Additional history: as documented    Patient Active Problem List   Diagnosis     Allergic rhinitis     Food allergy     Patellofemoral syndrome of both knees     Sacro ilial pain     Calculus of kidney     ACP (advance care planning)     Chronic right-sided thoracic back pain     Obesity, unspecified obesity severity, unspecified obesity type     Tobacco use disorder     Esophageal reflux     Allergy to pollen     Astigmatism     BMI 34.0-34.9,adult     Hypermetropia     Past Surgical History:   Procedure Laterality Date     ARTHROSCOPY KNEE Right 5/4/2015    Procedure: ARTHROSCOPY KNEE;  Surgeon: Hernando Kulkarni MD;  Location: [facility]     AS ESOPHAGOSCOPY, DIAGNOSTIC      upper     LAPAROSCOPIC CHOLECYSTECTOMY N/A 10/10/2015    Procedure: LAPAROSCOPIC CHOLECYSTECTOMY;  Surgeon: Drew Padgett MD;  Location: [facility]     none         Social History   Substance Use Topics     Smoking status: Current Every Day Smoker     Packs/day: 0.25     Years: 1.00     Types: Cigarettes     Start date: 1/20/2014     Smokeless tobacco: Never Used     Alcohol use No     Family History    Problem Relation Age of Onset     Thrombophilia Mother      blood clotting     Lupus Mother      erythematosus     Diabetes Mother      Hypertension Father      Asthma Sister      Diabetes Maternal Grandfather      Hypertension Maternal Grandfather      Lupus Maternal Aunt      erythematosus         Current Outpatient Prescriptions   Medication Sig Dispense Refill     diphenhydrAMINE (BENADRYL) 25 MG tablet Take 25 mg by mouth       EPINEPHrine (EPIPEN) 0.3 MG/0.3ML injection Inject 0.3 mLs (0.3 mg) into the muscle once as needed for anaphylaxis 4 each 6     famotidine (PEPCID) 40 MG tablet Take 1 tablet (40 mg) by mouth daily 90 tablet 3     fluticasone (FLONASE) 50 MCG/ACT spray Spray 1-2 sprays into both nostrils daily 1 Bottle 11     ibuprofen (ADVIL,MOTRIN) 400 MG tablet Take 2 tablets (800 mg) by mouth every 6 hours as needed for other (cramping) 60 tablet 1     loratadine (CLARITIN) 10 MG tablet Take 10 mg by mouth daily       ondansetron (ZOFRAN ODT) 4 MG ODT tab Take 1 tablet (4 mg) by mouth every 8 hours as needed for nausea 10 tablet 0     sulfamethoxazole-trimethoprim (BACTRIM DS) 800-160 MG per tablet Take 1 tablet by mouth 2 times daily for 10 days 20 tablet 0     Allergies   Allergen Reactions     Amoxicillin      Oxycodone Visual Disturbance, Nausea and Vomiting and Rash     Vomiting, hallucinations.     Tylenol [Acetaminophen] Other (See Comments) and Rash     1/06/17: Patient reports seeing spots after taking Tylenol.  Patient reports seeing spots after taking Tylenol.       Reviewed and updated as needed this visit by clinical staff  Tobacco  Allergies  Meds  Med Hx  Surg Hx  Fam Hx  Soc Hx      Reviewed and updated as needed this visit by Provider         ROS:  CONSTITUTIONAL: NEGATIVE for fever, chills, change in weight  INTEGUMENTARY/SKIN: NEGATIVE for worrisome rashes, moles or lesions  MUSCULOSKELETAL: right arm pain increased pain with bending elbow with shooting pain to her  "wrist  NEURO: numbness and tingling into the right elbow    OBJECTIVE:     BP 90/62 (BP Location: Left arm, Patient Position: Sitting, Cuff Size: Adult Large)  Pulse 80  Temp 97.3  F (36.3  C) (Tympanic)  Resp 18  Ht 5' 2\" (1.575 m)  Wt 193 lb (87.5 kg)  SpO2 99%  BMI 35.3 kg/m2  Body mass index is 35.3 kg/(m^2).   GENERAL: healthy, alert and no distress  MS: decreased range of motion right shoulder, elbow and wrist due to pain, no edema, peripheral pulses normal and tenderness to palpation right shoulder, elbow and wrist  SKIN: no suspicious lesions or rashes    Diagnostic Test Results:  Results for orders placed or performed during the hospital encounter of 10/30/18   XR Wrist Right G/E 3 Views    Narrative    PROCEDURE:  XR WRIST RT G/E 3 VW    HISTORY: fall;     COMPARISON:  None.    TECHNIQUE:  4 views of the right wrist were obtained.    FINDINGS:  No fracture or dislocation is identified. The joint spaces  are preserved.        Impression    IMPRESSION: No acute fracture.      EMILY NAVA MD   XR Elbow Right G/E 3 Views    Narrative    PROCEDURE:  XR ELBOW RT G/E 3 VW    HISTORY: fall;     COMPARISON:  None.    TECHNIQUE:  2 views of the right elbow were obtained.    FINDINGS:  No fracture or dislocation is identified. The joint spaces  are preserved.        Impression    IMPRESSION: No acute fracture.      EMILY NAVA MD   XR Shoulder Right G/E 3 Views    Narrative    PROCEDURE:  XR SHOULDER RT G/E 3 VW    HISTORY: fall;     COMPARISON:  None.    TECHNIQUE:  4 views of the right shoulder were obtained.    FINDINGS:  No fracture or dislocation is identified. The joint spaces  are preserved.        Impression    IMPRESSION: No acute fracture.      EMILY NAVA MD         ASSESSMENT/PLAN:     1. Acute pain of right shoulder  Plan for physical therapy. She states she has an appointment tomorrow. Pain has slightly decreased and she is having increased ROM. ROM continue to be limited due to " pain.     2. Sprain of right elbow, subsequent encounter  As above    3. Sprain of right wrist, subsequent encounter  As above    [employer] paperwork completed today. With a goal of being able to return to regular work in a month.       See Patient Instructions    AMELIA Martin [facility] [place]

## 2018-11-23 ENCOUNTER — OFFICE VISIT (OUTPATIENT)
Dept: SURGERY | Facility: OTHER | Age: 23
End: 2018-11-23
Attending: NURSE PRACTITIONER
Payer: MEDICAID

## 2018-11-23 ENCOUNTER — TELEPHONE (OUTPATIENT)
Dept: SURGERY | Facility: OTHER | Age: 23
End: 2018-11-23

## 2018-11-23 VITALS
OXYGEN SATURATION: 98 % | HEART RATE: 127 BPM | WEIGHT: 196.6 LBS | SYSTOLIC BLOOD PRESSURE: 110 MMHG | TEMPERATURE: 100.6 F | DIASTOLIC BLOOD PRESSURE: 74 MMHG | BODY MASS INDEX: 35.96 KG/M2

## 2018-11-23 DIAGNOSIS — L05.91 PILONIDAL CYST: ICD-10-CM

## 2018-11-23 PROCEDURE — 10080 I&D PILONIDAL CYST SIMPLE: CPT | Performed by: SURGERY

## 2018-11-23 PROCEDURE — G0463 HOSPITAL OUTPT CLINIC VISIT: HCPCS

## 2018-11-23 ASSESSMENT — PAIN SCALES - GENERAL: PAINLEVEL: SEVERE PAIN (6)

## 2018-11-23 NOTE — PATIENT INSTRUCTIONS
Thank you for allowing Dr. May and our surgical team to participate in your care. Please call with any scheduling questions or concerns to our health unit coordinator at [phone] or for nursing questions to nurse at [phone].    Remove packing daily. Shower. Let soapy water gently run over wound. Replace packing. Return to clinic in 1 week.

## 2018-11-23 NOTE — NURSING NOTE
"Chief Complaint   Patient presents with     Consult     Pilondial cyst       Initial There were no vitals taken for this visit. Estimated body mass index is 35.85 kg/(m^2) as calculated from the following:    Height as of 5/3/18: 5' 2\" (1.575 m).    Weight as of 11/7/18: 196 lb (88.9 kg).  Medication Reconciliation: complete    Caroline Welch LPN  "

## 2018-11-23 NOTE — PROGRESS NOTES
[facility] Surgery Consultation    CC:  Pilonidal cyst    HPI:  This 23 year old year old female is seen for evaluation of pilonidal cyst.  The history is obtained from the patient, and reviewing the medical record.  She is good medical historian. She has a history of pilonidal cysts and most recently was seen in the urgent care on the 20th. On examination at that time there was no evidence of an abscess and she was prescribe antibiotics with follow up in general surgery clinic. She says that since her urgent care visit she has still been complaining of coccyx pain. She says the erythema has not improved. She has had some chills but no recorded fevers. She has pain with bowel movements because of sitting down on the toilet. She has never had a pilonidal cyst opened and drained.    Past Medical History:   Diagnosis Date     NO ACTIVE PROBLEMS      Pregnancy     LMP 4/2015       Past Surgical History:   Procedure Laterality Date     ARTHROSCOPY KNEE Right 5/4/2015    Procedure: ARTHROSCOPY KNEE;  Surgeon: Hernando Kulkarni MD;  Location: [facility]     AS ESOPHAGOSCOPY, DIAGNOSTIC      upper     LAPAROSCOPIC CHOLECYSTECTOMY N/A 10/10/2015    Procedure: LAPAROSCOPIC CHOLECYSTECTOMY;  Surgeon: Drew Padgett MD;  Location: [facility] OR     none         Pt denied problems with bleeding or anesthesia    Prior to Admission medications    Medication Sig Start Date End Date Taking? Authorizing Provider   diphenhydrAMINE (BENADRYL) 25 MG tablet Take 25 mg by mouth 9/24/13  Yes Reported, Patient   EPINEPHrine (EPIPEN) 0.3 MG/0.3ML injection Inject 0.3 mLs (0.3 mg) into the muscle once as needed for anaphylaxis 1/22/14  Yes Janee Fontaine DO   famotidine (PEPCID) 40 MG tablet Take 1 tablet (40 mg) by mouth daily 10/9/17  Yes Susan Clifford APRN CNP   fluticasone (FLONASE) 50 MCG/ACT spray Spray 1-2 sprays into both nostrils daily 7/21/17  Yes Mari Bonilla NP   ibuprofen (ADVIL,MOTRIN) 400 MG tablet Take 2 tablets  (800 mg) by mouth every 6 hours as needed for other (cramping) 3/12/16  Yes Jose Goldstein MD   ketorolac (TORADOL) 10 MG tablet Take 1 tablet (10 mg) by mouth every 6 hours as needed for moderate pain 11/13/18  Yes Susan Clifford APRN CNP   loratadine (CLARITIN) 10 MG tablet Take 10 mg by mouth daily   Yes Reported, Patient   sulfamethoxazole-trimethoprim (BACTRIM DS) 800-160 MG per tablet Take 1 tablet by mouth 2 times daily for 10 days 11/20/18 11/30/18 Yes Debi Becerril NP          Allergies   Allergen Reactions     Amoxicillin      Oxycodone Visual Disturbance, Nausea and Vomiting and Rash     Vomiting, hallucinations.     Tylenol [Acetaminophen] Other (See Comments) and Rash     1/06/17: Patient reports seeing spots after taking Tylenol.  Patient reports seeing spots after taking Tylenol.         HABITS:    Social History   Substance Use Topics     Smoking status: Current Every Day Smoker     Packs/day: 0.25     Years: 1.00     Types: Cigarettes     Start date: 1/20/2014     Smokeless tobacco: Never Used     Alcohol use No     No mood altering drug use.    Family History   Problem Relation Age of Onset     Thrombophilia Mother      blood clotting     Lupus Mother      erythematosus     Diabetes Mother      Hypertension Father      Asthma Sister      Diabetes Maternal Grandfather      Hypertension Maternal Grandfather      Lupus Maternal Aunt      erythematosus       REVIEW OF SYSTEMS:  Ten point review of systems negative except those mentioned in the HPI.     The patient denies sleep apnea, latex allergies or MRSA    OBJECTIVE:    /74 (BP Location: Right arm, Patient Position: Sitting, Cuff Size: Adult Regular)  Pulse 127  Temp 100.6  F (38.1  C) (Tympanic)  Wt 196 lb 9.6 oz (89.2 kg)  SpO2 98%  BMI 35.96 kg/m2    GENERAL: Generally appears well, in no distress with appropriate affect.  Respiratory:  Lungs clear to ausculation bilaterally with good air excursion  Cardiovascular:   Regular Rate and Rhythm with no murmurs gallops or rubs, normal   :  Coccyx with erythema, induration and fluctuance to the right superior gluteal cleft, no drainage noted.   Psych:  Alert, oriented, affect appropriate with normal decision making ability.      IMPRESSION:  22 yo female with right superior gluteal cleft abscess    PLAN:  I recommend proceeding with an in office incision and drainage. Informed consent was obtained documenting the risks including but not limited to bleeding, infection and damage to surrounding structures. All questions and concern were addressed.    I will have her come back to my clinic in one week for a wound check. She can dress/pack the wound twice daily when at home.    Thank you for allowing me to participate in the care of your patient.       Brendan May MD    11/23/2018  2:44 PM    cc:  Debi Pedroza

## 2018-11-23 NOTE — PROGRESS NOTES
[facility] Surgery Procedure Note    Procedure: Incision and drainage of pilonidal cyst      The Superior gluteal cleft was prepped and draped sterilely.  The timeout pause was observed during which the patient confirmed her correct identity, side, site and nature of procedure.  Local anesthesia was obtained with infiltration of 1% lidocaine with epinephrine.  Over the area of fluctuance on the right superior gluteal cleft an incsion was made. There was extensive purulent fluid which was expressed and removed. The wound was then irrigated with copious amounts of normal saline. The wound was then packed with 1/2 inch plain packing strips and a dressing was applied. The patient was observed in the treatment room for 15 minutes following the procedure without sequelae.  Detailed instructions were provided for wound care and followup appointment.    Brendan May MD  11/23/2018

## 2018-11-23 NOTE — MR AVS SNAPSHOT
After Visit Summary   11/23/2018    Carolyn Mallory    MRN: [id number]           Patient Information     Date Of Birth          1995        Visit Information        Provider Department      11/23/2018 2:15 PM Brendan May MD [facility]        Today's Diagnoses     Pilonidal cyst          Care Instructions    Thank you for allowing Dr. May and our surgical team to participate in your care. Please call with any scheduling questions or concerns to our health unit coordinator at [phone] or for nursing questions to nurse at [phone].    Remove packing daily. Shower. Let soapy water gently run over wound. Replace packing. Return to clinic in 1 week.            Follow-ups after your visit        Your next 10 appointments already scheduled     Nov 26, 2018  2:30 PM CST   (Arrive by 2:15 PM)   [facility] AMELIA Pineda CNP   [facility]    [address] [address]   [phone]            Nov 27, 2018  3:00 PM CST   (Arrive by 2:45 PM)   Evaluation with Serena Torres, PT   [facility] ([facility] )    [address] [address]   [phone]            Nov 29, 2018  2:00 PM CST   (Arrive by 1:45 PM)   [facility]rendan [facility]   [facility]    [address] [address]   [phone]              Who to contact     If you have questions or need follow up information about today's clinic visit or your schedule please contact [facility] directly at [phone].  Normal or non-critical lab and imaging results will be communicated to you by MyChart, letter or phone within 4 business days after the clinic has received the results. If you do not hear from us within 7 days, please contact the clinic through MyChart or phone. If you have a  critical or abnormal lab result, we will notify you by phone as soon as possible.  Submit refill requests through [employer] or call your pharmacy and they will forward the refill request to us. Please allow 3 business days for your refill to be completed.          Additional Information About Your Visit        [employer] Information     [employer] gives you secure access to your electronic health record. If you see a primary care provider, you can also send messages to your care team and make appointments. If you have questions, please call your primary care clinic.  If you do not have a primary care provider, please call [phone] and they will assist you.        Care EveryWhere ID     This is your Care EveryWhere ID. This could be used by other organizations to access your [place] medical records  [id number]        Your Vitals Were     Pulse Temperature Pulse Oximetry BMI (Body Mass Index)          127 100.6  F (38.1  C) (Tympanic) 98% 35.96 kg/m2         Blood Pressure from Last 3 Encounters:   11/23/18 110/74   11/20/18 123/83   11/07/18 102/74    Weight from Last 3 Encounters:   11/23/18 196 lb 9.6 oz (89.2 kg)   11/07/18 196 lb (88.9 kg)   11/01/18 199 lb (90.3 kg)              Today, you had the following     No orders found for display       Primary Care Provider Office Phone # Fax #    Susan Clifford APRKRISTI [phone]       [address]  [place] [address]        Equal Access to Services     [facility] : Hadii aad ku hadasho Soomaali, waaxda luqadaha, qaybta kaalmada adeegyada, waxay delfina sorenson . So [facility] [phone].    ATENCIÓN: Si habla español, tiene a solis disposición servicios gratuitos de asistencia lingüística. Llame al [phone].    We comply with applicable federal civil rights laws and [place] laws. We do not discriminate on the basis of race, color, national origin, age, disability, sex, sexual orientation, or gender identity.             Thank you!     Thank you for choosing [facility]  for your care. Our goal is always to provide you with excellent care. Hearing back from our patients is one way we can continue to improve our services. Please take a few minutes to complete the written survey that you may receive in the mail after your visit with us. Thank you!             Your Updated Medication List - Protect others around you: Learn how to safely use, store and throw away your medicines at www.disposemymeds.org.          This list is accurate as of 18  2:36 PM.  Always use your most recent med list.                   Brand Name Dispense Instructions for use Diagnosis    diphenhydrAMINE 25 MG tablet    BENADRYL     Take 25 mg by mouth        EPINEPHrine 0.3 MG/0.3ML injection    EPIPEN    4 each    Inject 0.3 mLs (0.3 mg) into the muscle once as needed for anaphylaxis    Allergic rhinitis due to pollen       famotidine 40 MG tablet    PEPCID    90 tablet    Take 1 tablet (40 mg) by mouth daily    Gastroesophageal reflux disease without esophagitis       fluticasone 50 MCG/ACT spray    FLONASE    1 Bottle    Spray 1-2 sprays into both nostrils daily        ibuprofen 400 MG tablet    ADVIL/MOTRIN    60 tablet    Take 2 tablets (800 mg) by mouth every 6 hours as needed for other (cramping)     (spontaneous vaginal delivery)       ketorolac 10 MG tablet    TORADOL    20 tablet    Take 1 tablet (10 mg) by mouth every 6 hours as needed for moderate pain    Contusion of right wrist, subsequent encounter       loratadine 10 MG tablet    CLARITIN     Take 10 mg by mouth daily        sulfamethoxazole-trimethoprim 800-160 MG per tablet    BACTRIM DS    20 tablet    Take 1 tablet by mouth 2 times daily for 10 days

## 2018-11-23 NOTE — TELEPHONE ENCOUNTER
Patient called and stated that she already bleed through her gauze. Patient would like a call back from Dr May's nurse regarding this. Patient can be reached at [phone].

## 2018-11-23 NOTE — TELEPHONE ENCOUNTER
Patient states that in about a 15 minute timeframe around 3:15 pm, she bled through her dressing onto her clothes and blood is dripping down her legs. She has not applied pressure. The pain is worse now than when she was in the office. Discussed with Dr. May. Advised her to take Ibuprofen for pain, apply pressure, change the outer dressing. If continues to bleed, she can remove and replace the packing. If bleeding is profuse, she should go to ER.

## 2018-11-24 ENCOUNTER — HOSPITAL ENCOUNTER (EMERGENCY)
Facility: HOSPITAL | Age: 23
Discharge: HOME | End: 2018-11-24
Attending: PHYSICIAN ASSISTANT | Admitting: PHYSICIAN ASSISTANT
Payer: MEDICAID

## 2018-11-24 VITALS
BODY MASS INDEX: 35.3 KG/M2 | OXYGEN SATURATION: 99 % | RESPIRATION RATE: 18 BRPM | WEIGHT: 193 LBS | DIASTOLIC BLOOD PRESSURE: 60 MMHG | SYSTOLIC BLOOD PRESSURE: 123 MMHG | HEART RATE: 116 BPM | TEMPERATURE: 98.4 F

## 2018-11-24 DIAGNOSIS — R11.0 NAUSEA: ICD-10-CM

## 2018-11-24 DIAGNOSIS — G89.18 ACUTE POST-OPERATIVE PAIN: ICD-10-CM

## 2018-11-24 PROCEDURE — G0463 HOSPITAL OUTPT CLINIC VISIT: HCPCS

## 2018-11-24 PROCEDURE — 99213 OFFICE O/P EST LOW 20 MIN: CPT | Performed by: PHYSICIAN ASSISTANT

## 2018-11-24 PROCEDURE — 25000132 ZZH RX MED GY IP 250 OP 250 PS 637: Performed by: PHYSICIAN ASSISTANT

## 2018-11-24 PROCEDURE — 25000131 ZZH RX MED GY IP 250 OP 636 PS 637: Performed by: PHYSICIAN ASSISTANT

## 2018-11-24 RX ORDER — SULFAMETHOXAZOLE/TRIMETHOPRIM 800-160 MG
1 TABLET ORAL ONCE
Status: COMPLETED | OUTPATIENT
Start: 2018-11-24 | End: 2018-11-24

## 2018-11-24 RX ORDER — IBUPROFEN 800 MG/1
800 TABLET, FILM COATED ORAL ONCE
Status: COMPLETED | OUTPATIENT
Start: 2018-11-24 | End: 2018-11-24

## 2018-11-24 RX ORDER — ONDANSETRON 4 MG/1
4 TABLET, ORALLY DISINTEGRATING ORAL ONCE
Status: COMPLETED | OUTPATIENT
Start: 2018-11-24 | End: 2018-11-24

## 2018-11-24 RX ORDER — ONDANSETRON 4 MG/1
4 TABLET, FILM COATED ORAL EVERY 8 HOURS PRN
Qty: 10 TABLET | Refills: 0 | Status: SHIPPED | OUTPATIENT
Start: 2018-11-24 | End: 2018-11-26

## 2018-11-24 RX ORDER — SULFAMETHOXAZOLE/TRIMETHOPRIM 800-160 MG
1 TABLET ORAL 2 TIMES DAILY
Qty: 20 TABLET | Refills: 0 | Status: SHIPPED | OUTPATIENT
Start: 2018-11-24 | End: 2018-11-26

## 2018-11-24 RX ADMIN — ONDANSETRON 4 MG: 4 TABLET, ORALLY DISINTEGRATING ORAL at 20:55

## 2018-11-24 RX ADMIN — IBUPROFEN 800 MG: 800 TABLET ORAL at 20:55

## 2018-11-24 RX ADMIN — Medication 1 TABLET: at 20:54

## 2018-11-24 ASSESSMENT — ENCOUNTER SYMPTOMS
CARDIOVASCULAR NEGATIVE: 1
VOMITING: 0
COUGH: 0
DIZZINESS: 1
FEVER: 0
CONSTITUTIONAL NEGATIVE: 1
NAUSEA: 1
ABDOMINAL PAIN: 0
PSYCHIATRIC NEGATIVE: 1
WOUND: 1

## 2018-11-24 NOTE — ED AVS SNAPSHOT
[facility]    [address] [address]    Phone:  [phone]                                       Carolyn Mallory   MRN: [id number]    Department:  [facility]   Date of Visit:  11/24/2018           After Visit Summary Signature Page     I have received my discharge instructions, and my questions have been answered. I have discussed any challenges I see with this plan with the nurse or doctor.    ..........................................................................................................................................  Patient/Patient Representative Signature      ..........................................................................................................................................  Patient Representative Print Name and Relationship to Patient    ..................................................               ................................................  Date                                   Time    ..........................................................................................................................................  Reviewed by Signature/Title    ...................................................              ..............................................  Date                                               Time          22EPIC Rev 08/18

## 2018-11-24 NOTE — ED AVS SNAPSHOT
[facility]    [address] [address]    Phone:  [phone]                                       Carolyn Mallory   MRN: [id number]    Department:  [facility]   Date of Visit:  11/24/2018           Patient Information     Date Of Birth          1995        Your diagnoses for this visit were:     Acute post-operative pain     Nausea        You were seen by Ludmila Zavala PA.      Follow-up Information     Follow up with Susan Clifford APRN CNP.    Specialty:  Family Practice    Why:  Or Surgeon, If symptoms worsen    Contact information:    [address]  [place] [address] [phone]          Follow up with [facility].    Specialty:  EMERGENCY MEDICINE    Why:  If fever, further concerns develop    Contact information:    [address] [address] [phone]    Additional information:    From [place]: Take [address]. Turn left at [address]. Turn left at the first stoplight on [address]. At the first stop sign, take a right onto [address]. Take a left into the parking lot and continue through until you reach the North enterance of the building.       From [place]: Take [address]. Take the [address] ramp towards [place]. Turn left onto [address]. Take a slight right onto [address]. Turn left at the first stoplight on [address]. At the first stop sign, take a right onto [address]. Take a left into the parking lot and continue through until you reach the North enterance of the building.       From [place]: Take [address]. Take a right at [address]. At the first stop sign, take a right onto [address]. Take a left into the parking lot and continue through until you reach the North enterance of the building.       Discharge References/Attachments     WOUND CARE (ENGLISH)      Your next 10 appointments already  scheduled     Nov 26, 2018  2:30 PM CST   (Arrive by 2:15 PM)   SHORT with AMELIA Pineda CNP   [facility]    [address] [address]   [phone]            Nov 27, 2018  3:00 PM CST   (Arrive by 2:45 PM)   Evaluation with Serena Torres, PT   [facility] ([facility] )    [address] [place] [address]   [phone]            Nov 29, 2018  2:00 PM CST   (Arrive by 1:45 PM)   Return Visit with Brendan May MD   [facility] )    [address] [address]   [phone]                 Review of your medicines      START taking        Dose / Directions Last dose taken    ondansetron 4 MG tablet   Commonly known as:  ZOFRAN   Dose:  4 mg   Quantity:  10 tablet        Take 1 tablet (4 mg) by mouth every 8 hours as needed for nausea   Refills:  0        sulfamethoxazole-trimethoprim 800-160 MG per tablet   Commonly known as:  BACTRIM DS/SEPTRA DS   Dose:  1 tablet   Quantity:  20 tablet        Take 1 tablet by mouth 2 times daily for 10 days   Refills:  0          Our records show that you are taking the medicines listed below. If these are incorrect, please call your family doctor or clinic.        Dose / Directions Last dose taken    diphenhydrAMINE 25 MG tablet   Commonly known as:  BENADRYL   Dose:  25 mg        Take 25 mg by mouth   Refills:  0        EPINEPHrine 0.3 MG/0.3ML injection   Commonly known as:  EPIPEN   Dose:  0.3 mg   Quantity:  4 each        Inject 0.3 mLs (0.3 mg) into the muscle once as needed for anaphylaxis   Refills:  6        famotidine 40 MG tablet   Commonly known as:  PEPCID   Dose:  40 mg   Quantity:  90 tablet        Take 1 tablet (40 mg) by mouth daily   Refills:  3        fluticasone 50 MCG/ACT spray   Commonly known as:  FLONASE   Dose:  1-2 spray   Quantity:  1 Bottle        Spray 1-2 sprays into  both nostrils daily   Refills:  11        ibuprofen 400 MG tablet   Commonly known as:  ADVIL/MOTRIN   Dose:  800 mg   Quantity:  60 tablet        Take 2 tablets (800 mg) by mouth every 6 hours as needed for other (cramping)   Refills:  1        ketorolac 10 MG tablet   Commonly known as:  TORADOL   Dose:  10 mg   Quantity:  20 tablet        Take 1 tablet (10 mg) by mouth every 6 hours as needed for moderate pain   Refills:  0        loratadine 10 MG tablet   Commonly known as:  CLARITIN   Dose:  10 mg        Take 10 mg by mouth daily   Refills:  0                Prescriptions were sent or printed at these locations (2 Prescriptions)                   [id number] - [place], [place] - [address]   [address] [place] [address]    Telephone:  [phone]   Fax:  [phone]   Hours:                  E-Prescribed (2 of 2)         ondansetron (ZOFRAN) 4 MG tablet               sulfamethoxazole-trimethoprim (BACTRIM DS/SEPTRA DS) 800-160 MG per tablet                Orders Needing Specimen Collection     None      Pending Results     No orders found from 11/22/2018 to 11/25/2018.            Pending Culture Results     No orders found from 11/22/2018 to 11/25/2018.            Thank you for choosing [facility]       Thank you for choosing [facility] for your care. Our goal is always to provide you with excellent care. Hearing back from our patients is one way we can continue to improve our services. Please take a few minutes to complete the written survey that you may receive in the mail after you visit with us. Thank you!        [employer] Information     [employer] gives you secure access to your electronic health record. If you see a primary care provider, you can also send messages to your care team and make appointments. If you have questions, please call your primary care clinic.  If you do not have a primary care provider, please call [phone] and they will assist you.        Care EveryWhere ID      This is your Care EveryWhere ID. This could be used by other organizations to access your [place] medical records  [id number]        Equal Access to Services     CRISS CHEN : Laxmi Villasenor, milton marie, shiraz olivia, vianey guevara. So [facility] [phone].    ATENCIÓN: Si habla español, tiene a solis disposición servicios gratuitos de asistencia lingüística. Llame al [phone].    We comply with applicable federal civil rights laws and [place] laws. We do not discriminate on the basis of race, color, national origin, age, disability, sex, sexual orientation, or gender identity.            After Visit Summary       This is your record. Keep this with you and show to your community pharmacist(s) and doctor(s) at your next visit.

## 2018-11-25 RX ORDER — ONDANSETRON 4 MG/1
4 TABLET, ORALLY DISINTEGRATING ORAL EVERY 8 HOURS PRN
Qty: 10 TABLET | Refills: 0 | Status: SHIPPED | OUTPATIENT
Start: 2018-11-25 | End: 2019-02-11

## 2018-11-25 RX ORDER — SULFAMETHOXAZOLE/TRIMETHOPRIM 800-160 MG
1 TABLET ORAL 2 TIMES DAILY
Qty: 20 TABLET | Refills: 0 | Status: SHIPPED | OUTPATIENT
Start: 2018-11-25 | End: 2019-02-11

## 2018-11-25 NOTE — ED TRIAGE NOTES
Pt presents with having gone to the clinic yesterday and having a pilonidal cyst opened and drained by Dr May. Pt is wondering why she continues to have pain, dizziness, nausea and vomiting.

## 2018-11-25 NOTE — ED PROVIDER NOTES
History     Chief Complaint   Patient presents with     Post-op Problem     States pain with dressing change to tailbone cyst drainage. Taking IBU for pain.  Wants to know why she is feeling so much pain.  drinking fluids nauseated.     The history is provided by the patient. No  was used.     Carolyn Mallory is a 23 year old female who is here for post op I&D of pilonidal cyst pain, dizziness and nausea.  The I&D was done in surgery clinic yesterday. No fever. Pt states she is pushing fluids. The cyst was originally examined on 20 Nov 2018.  provider ordered Septra DS and pt still has not picked it up.     Problem List:    Patient Active Problem List    Diagnosis Date Noted     Allergy to pollen 03/01/2018     Priority: Medium     Obesity, unspecified obesity severity, unspecified obesity type 06/09/2017     Priority: Medium     Tobacco use disorder 06/09/2017     Priority: Medium     Esophageal reflux 06/09/2017     Priority: Medium     BMI 34.0-34.9,adult 01/19/2017     Priority: Medium     Chronic right-sided thoracic back pain 05/24/2016     Priority: Medium     ACP (advance care planning) 04/26/2016     Priority: Medium     Advance Care Planning 4/26/2016: ACP Review of Chart / Resources Provided:  Reviewed chart for advance care plan.  Carolyn Mallory has no plan or code status on file. Discussed available resources and provided with information. .  Added by Lizzette Garcia           Calculus of kidney 12/16/2015     Priority: Medium     Bilateral hydronephrosis, flank pain, 3mm right lower pole non obstructing stone.  Dr. Simon [facility] consult.  Declined stents.  Reconsider if hospitalization and no improvement 2-3 days.         Sacro ilial pain 10/30/2015     Priority: Medium     Patellofemoral syndrome of both knees 01/20/2015     Priority: Medium     Allergic rhinitis 01/17/2014     Priority: Medium     Problem list name updated by automated process. Provider to review       Food allergy  01/17/2014     Priority: Medium     Astigmatism 09/24/2013     Priority: Medium     Overview:   IMO Update       Hypermetropia 09/24/2013     Priority: Medium        Past Medical History:    Past Medical History:   Diagnosis Date     NO ACTIVE PROBLEMS      Pregnancy        Past Surgical History:    Past Surgical History:   Procedure Laterality Date     ARTHROSCOPY KNEE Right 5/4/2015    Procedure: ARTHROSCOPY KNEE;  Surgeon: Hernando Kulkarni MD;  Location: [facility]     AS ESOPHAGOSCOPY, DIAGNOSTIC      upper     LAPAROSCOPIC CHOLECYSTECTOMY N/A 10/10/2015    Procedure: LAPAROSCOPIC CHOLECYSTECTOMY;  Surgeon: Drew Padgett MD;  Location: [facility]     none         Family History:    Family History   Problem Relation Age of Onset     Thrombophilia Mother      blood clotting     Lupus Mother      erythematosus     Diabetes Mother      Hypertension Father      Asthma Sister      Diabetes Maternal Grandfather      Hypertension Maternal Grandfather      Lupus Maternal Aunt      erythematosus       Social History:  Marital Status:  Single [1]  Social History   Substance Use Topics     Smoking status: Current Every Day Smoker     Packs/day: 0.25     Years: 1.00     Types: Cigarettes     Start date: 1/20/2014     Smokeless tobacco: Never Used     Alcohol use No        Medications:      ondansetron (ZOFRAN) 4 MG tablet   sulfamethoxazole-trimethoprim (BACTRIM DS/SEPTRA DS) 800-160 MG per tablet   diphenhydrAMINE (BENADRYL) 25 MG tablet   EPINEPHrine (EPIPEN) 0.3 MG/0.3ML injection   famotidine (PEPCID) 40 MG tablet   fluticasone (FLONASE) 50 MCG/ACT spray   ibuprofen (ADVIL,MOTRIN) 400 MG tablet   ketorolac (TORADOL) 10 MG tablet   loratadine (CLARITIN) 10 MG tablet         Review of Systems   Constitutional: Negative.  Negative for fever.   HENT: Negative for congestion.    Respiratory: Negative for cough.    Cardiovascular: Negative.    Gastrointestinal: Positive for nausea. Negative for abdominal pain and vomiting.    Skin: Positive for wound.   Neurological: Positive for dizziness.   Psychiatric/Behavioral: Negative.        Physical Exam   BP: 123/60  Pulse: 116  Temp: 98.4  F (36.9  C)  Resp: 18  Weight: 87.5 kg (193 lb)  SpO2: 99 %      Physical Exam   Constitutional: She is oriented to person, place, and time. She appears well-developed and well-nourished. No distress.   HENT:   Head: Normocephalic and atraumatic.   Neck: Normal range of motion. Neck supple.   Cardiovascular: Regular rhythm and normal heart sounds.    tachycardia   Pulmonary/Chest: Effort normal and breath sounds normal. No respiratory distress.   Neurological: She is alert and oriented to person, place, and time.   Skin: She is not diaphoretic.   Around I&D wound from the piloidal cyst, there is no erythema/edema. Bandage intact.minimal blood on the gauze.   Psychiatric: She has a normal mood and affect.   Nursing note and vitals reviewed.      ED Course     ED Course     Procedures            Medications   sulfamethoxazole-trimethoprim (BACTRIM DS) 800-160 MG 2 tablet ed starter pack 1 tablet (1 tablet Oral Given 11/24/18 2054)   ibuprofen (ADVIL/MOTRIN) tablet 800 mg (800 mg Oral Given 11/24/18 2055)   ondansetron (ZOFRAN-ODT) ODT tab 4 mg (4 mg Oral Given 11/24/18 2055)     Pt observed x 20 minutes. Pt tolerated well.  Assessments & Plan (with Medical Decision Making)     I have reviewed the nursing notes.    I have reviewed the findings, diagnosis, plan and need for follow up with the patient.      Discharge Medication List as of 11/24/2018  8:58 PM      START taking these medications    Details   ondansetron (ZOFRAN) 4 MG tablet Take 1 tablet (4 mg) by mouth every 8 hours as needed for nausea, Disp-10 tablet, R-0, E-Prescribe      sulfamethoxazole-trimethoprim (BACTRIM DS/SEPTRA DS) 800-160 MG per tablet Take 1 tablet by mouth 2 times daily for 10 days, Disp-20 tablet, R-0, E-Prescribe             Final diagnoses:   Acute post-operative pain   Nausea          I got Good Rx coupon for septra and zofran, through [employer].   Change packing daily as directed  Patient verbally educated and given appropriate education sheets for the diagnoses and has no questions.  Take medications as directed.   Follow up with your Primary Care provider if symptoms increase or if further concerns develop, return to the ER  Ludmila Zavala Certified  Physician Assistant  11/24/2018  9:15 PM  URGENT CARE CLINIC      11/24/2018   [facility]     Ludmila Zavala PA  11/24/18 3737

## 2018-11-26 ENCOUNTER — OFFICE VISIT (OUTPATIENT)
Dept: FAMILY MEDICINE | Facility: OTHER | Age: 23
End: 2018-11-26
Attending: NURSE PRACTITIONER
Payer: MEDICAID

## 2018-11-26 ENCOUNTER — HOSPITAL ENCOUNTER (EMERGENCY)
Facility: HOSPITAL | Age: 23
Discharge: HOME | End: 2018-11-26
Attending: FAMILY MEDICINE | Admitting: FAMILY MEDICINE
Payer: MEDICAID

## 2018-11-26 VITALS
HEIGHT: 62 IN | OXYGEN SATURATION: 99 % | RESPIRATION RATE: 18 BRPM | BODY MASS INDEX: 35.51 KG/M2 | WEIGHT: 193 LBS | SYSTOLIC BLOOD PRESSURE: 90 MMHG | HEART RATE: 80 BPM | DIASTOLIC BLOOD PRESSURE: 62 MMHG | TEMPERATURE: 97.3 F

## 2018-11-26 VITALS
RESPIRATION RATE: 16 BRPM | WEIGHT: 193 LBS | OXYGEN SATURATION: 100 % | HEIGHT: 62 IN | SYSTOLIC BLOOD PRESSURE: 124 MMHG | TEMPERATURE: 98.4 F | BODY MASS INDEX: 35.51 KG/M2 | DIASTOLIC BLOOD PRESSURE: 76 MMHG | HEART RATE: 89 BPM

## 2018-11-26 DIAGNOSIS — L05.91 PILONIDAL CYST: ICD-10-CM

## 2018-11-26 DIAGNOSIS — M25.511 ACUTE PAIN OF RIGHT SHOULDER: Primary | ICD-10-CM

## 2018-11-26 DIAGNOSIS — S63.501D SPRAIN OF RIGHT WRIST, SUBSEQUENT ENCOUNTER: ICD-10-CM

## 2018-11-26 DIAGNOSIS — S53.401D SPRAIN OF RIGHT ELBOW, SUBSEQUENT ENCOUNTER: ICD-10-CM

## 2018-11-26 PROCEDURE — G0463 HOSPITAL OUTPT CLINIC VISIT: HCPCS

## 2018-11-26 PROCEDURE — 99213 OFFICE O/P EST LOW 20 MIN: CPT | Performed by: NURSE PRACTITIONER

## 2018-11-26 PROCEDURE — 25000132 ZZH RX MED GY IP 250 OP 250 PS 637: Performed by: FAMILY MEDICINE

## 2018-11-26 PROCEDURE — 99282 EMERGENCY DEPT VISIT SF MDM: CPT | Mod: Z6 | Performed by: FAMILY MEDICINE

## 2018-11-26 PROCEDURE — 99283 EMERGENCY DEPT VISIT LOW MDM: CPT

## 2018-11-26 RX ORDER — IBUPROFEN 800 MG/1
800 TABLET, FILM COATED ORAL ONCE
Status: COMPLETED | OUTPATIENT
Start: 2018-11-26 | End: 2018-11-26

## 2018-11-26 RX ADMIN — IBUPROFEN 800 MG: 800 TABLET ORAL at 01:26

## 2018-11-26 ASSESSMENT — ANXIETY QUESTIONNAIRES
1. FEELING NERVOUS, ANXIOUS, OR ON EDGE: NOT AT ALL
6. BECOMING EASILY ANNOYED OR IRRITABLE: MORE THAN HALF THE DAYS
4. TROUBLE RELAXING: NOT AT ALL
5. BEING SO RESTLESS THAT IT IS HARD TO SIT STILL: NOT AT ALL
IF YOU CHECKED OFF ANY PROBLEMS ON THIS QUESTIONNAIRE, HOW DIFFICULT HAVE THESE PROBLEMS MADE IT FOR YOU TO DO YOUR WORK, TAKE CARE OF THINGS AT HOME, OR GET ALONG WITH OTHER PEOPLE: SOMEWHAT DIFFICULT
3. WORRYING TOO MUCH ABOUT DIFFERENT THINGS: NOT AT ALL
2. NOT BEING ABLE TO STOP OR CONTROL WORRYING: NOT AT ALL
GAD7 TOTAL SCORE: 2
7. FEELING AFRAID AS IF SOMETHING AWFUL MIGHT HAPPEN: NOT AT ALL

## 2018-11-26 ASSESSMENT — PAIN SCALES - GENERAL: PAINLEVEL: MILD PAIN (3)

## 2018-11-26 ASSESSMENT — PATIENT HEALTH QUESTIONNAIRE - PHQ9: SUM OF ALL RESPONSES TO PHQ QUESTIONS 1-9: 7

## 2018-11-26 NOTE — PATIENT INSTRUCTIONS
Self-Care for Strains and Sprains  Most minor strains and sprains can be treated with self-care. Recovering from a strain or sprain may take 6 to 8 weeks. Your self-care goal is to reduce pain and immobilize the injury to speed healing.     A sprain injures ligaments (tissue that connects bones to bones).      A strain injures muscles or tendons (tissue that connects muscles to bones).   Support the injured area  Wrapping the injured area provides support for short, necessary activities. Be careful not to wrap the area too tightly. This could cut off the blood supply.    Support a wrist, elbow, or shoulder with a sling.    Wrap an ankle or knee with an elastic bandage.    Tape a finger or toe to the one next to it.  Use cold and heat  Cold reduces swelling. Both cold and heat reduce pain. Heat should not be used in the initial treatment of the injury. When using cold or heat, always place a thin towel between the pack and your skin.    Apply ice or a cold pack 10 to 15 minutes every hour you re awake for the first 2 days.    After the swelling goes down, use cold or heat to control pain. Don t use heat late in the day, since it can cause swelling when you re not active.  Rest and elevate  Rest and elevation help your injury heal faster.    Raise the injured area above your heart level.    Keep the injured area from moving.    Limit the use of the joint or limb.  Use medicine    Aspirin reduces pain and swelling. (Note: Don t give aspirin to a child 18 or younger unless prescribed by the doctor.)    Non-steroidal anti-inflammatory medicines, such as ibuprofen, may reduce pain and swelling, as well. Ask your healthcare provider for advice.  When to call your healthcare provider  Call your healthcare provider if:    The injured joint won t move, or bones make a grating sound when they move    You can t put weight on the injured area, even after 24 hours    The injured body part is cold, blue, tingling, or numb    The  joint or limb appears bent or crooked.    Pain increases or doesn t improve in 4 days    When pressing along the injured area, you notice a spot that is especially painful   Date Last Reviewed: 5/1/2018 2000-2018 [employer]. [address] [place] [address], [place] [address]. All rights reserved. This information is not intended as a substitute for professional medical care. Always follow your healthcare professional's instructions.        Shoulder Pain with Uncertain Cause  Shoulder pain can have many causes. Pain often comes from the structures that surround the shoulder joint. These are the joint capsule, ligaments, tendons, muscles, and bursa. Pain can also come from cartilage in the joint. Cartilage can become worn out or injured. It s important to know what s causing your pain so the healthcare provider can use the correct treatment. But sometimes it s difficult to find the exact cause of shoulder pain. You may need to see a specialist (orthopedist). You may also need special tests such as a CT scan or MRI. The provider may need to use special tools to look inside the joint (arthroscopy).  Shoulder pain can be treated with a sling or a device that keeps your shoulder from moving. You can take an anti-inflammatory medicine such as ibuprofen to ease pain. You may need to do special shoulder exercises. Follow up with a specialist if the pain is severe or doesn t go away after a few weeks.  Home care  Follow these tips when caring for yourself at home:    If a sling was given to you, leave it in place for the time advised by your healthcare provider. If you aren t sure how long to wear it, ask for advice. If the sling becomes loose, adjust it so that your forearm is level with the ground. Your shoulder should feel well supported.    Put an ice pack on the injured area for 20 minutes every 1 to 2 hours the first day. You can make your own ice pack by putting ice cubes in a plastic bag. Wrap the bag in a  thin towel. Continue with ice packs 3 to 4 times a day for the next 2 days. Then use the pack as needed to ease pain and swelling.    You may use acetaminophen or ibuprofen to control pain, unless another pain medicine was prescribed. If you have chronic liver or kidney disease, talk with your healthcare provider before using these medicines. Also talk with your provider if you ve ever had a stomach ulcer or GI bleeding.    Shoulder pain may seem worse at night, when there is less to distract you from the pain. If you sleep on your side, try to keep weight off your painful shoulder. Propping pillows behind you may stop you from rolling over onto that shoulder during sleep.     Shoulder and elbow joints can become stiff if left in a sling for too long. You should start range of motion exercises about 7 to 10 days after the injury. Talk with your provider to find out what type of exercises to do and how soon to start.    You can take the sling off to shower or bathe.  Follow-up care  Follow up with your healthcare provider if you don t start to get better in the next 5 days.  When to seek medical advice  Call your healthcare provider right away if any of these occur:    Pain or swelling gets worse or continues for more than a few days    Your hand or fingers become cold, blue, numb, or tingly    Large amount of bruising on your shoulder or upper arm    Difficulty moving your hand or fingers    Weakness in your hand or fingers    Your shoulder becomes stiff    It feels like your shoulder is popping out    You are less able to do your daily activities  Date Last Reviewed: 10/1/2016    6401-8839 [employer]. [address] [place] [address], [place] [address]. All rights reserved. This information is not intended as a substitute for professional medical care. Always follow your healthcare professional's instructions.

## 2018-11-26 NOTE — MR AVS SNAPSHOT
After Visit Summary   11/26/2018    Carolyn Mallory    MRN: [id number]           Patient Information     Date Of Birth          1995        Visit Information        Provider Department      11/26/2018 2:30 PM Susan Clifford APRN [facility]        Care Instructions      Self-Care for Strains and Sprains  Most minor strains and sprains can be treated with self-care. Recovering from a strain or sprain may take 6 to 8 weeks. Your self-care goal is to reduce pain and immobilize the injury to speed healing.     A sprain injures ligaments (tissue that connects bones to bones).      A strain injures muscles or tendons (tissue that connects muscles to bones).   Support the injured area  Wrapping the injured area provides support for short, necessary activities. Be careful not to wrap the area too tightly. This could cut off the blood supply.    Support a wrist, elbow, or shoulder with a sling.    Wrap an ankle or knee with an elastic bandage.    Tape a finger or toe to the one next to it.  Use cold and heat  Cold reduces swelling. Both cold and heat reduce pain. Heat should not be used in the initial treatment of the injury. When using cold or heat, always place a thin towel between the pack and your skin.    Apply ice or a cold pack 10 to 15 minutes every hour you re awake for the first 2 days.    After the swelling goes down, use cold or heat to control pain. Don t use heat late in the day, since it can cause swelling when you re not active.  Rest and elevate  Rest and elevation help your injury heal faster.    Raise the injured area above your heart level.    Keep the injured area from moving.    Limit the use of the joint or limb.  Use medicine    Aspirin reduces pain and swelling. (Note: Don t give aspirin to a child 18 or younger unless prescribed by the doctor.)    Non-steroidal anti-inflammatory medicines, such as ibuprofen, may reduce pain and swelling, as well.  Ask your healthcare provider for advice.  When to call your healthcare provider  Call your healthcare provider if:    The injured joint won t move, or bones make a grating sound when they move    You can t put weight on the injured area, even after 24 hours    The injured body part is cold, blue, tingling, or numb    The joint or limb appears bent or crooked.    Pain increases or doesn t improve in 4 days    When pressing along the injured area, you notice a spot that is especially painful   Date Last Reviewed: 5/1/2018 2000-2018 [employer]. [address] [place] [address]. All rights reserved. This information is not intended as a substitute for professional medical care. Always follow your healthcare professional's instructions.        Shoulder Pain with Uncertain Cause  Shoulder pain can have many causes. Pain often comes from the structures that surround the shoulder joint. These are the joint capsule, ligaments, tendons, muscles, and bursa. Pain can also come from cartilage in the joint. Cartilage can become worn out or injured. It s important to know what s causing your pain so the healthcare provider can use the correct treatment. But sometimes it s difficult to find the exact cause of shoulder pain. You may need to see a specialist (orthopedist). You may also need special tests such as a CT scan or MRI. The provider may need to use special tools to look inside the joint (arthroscopy).  Shoulder pain can be treated with a sling or a device that keeps your shoulder from moving. You can take an anti-inflammatory medicine such as ibuprofen to ease pain. You may need to do special shoulder exercises. Follow up with a specialist if the pain is severe or doesn t go away after a few weeks.  Home care  Follow these tips when caring for yourself at home:    If a sling was given to you, leave it in place for the time advised by your healthcare provider. If you aren t sure how long to wear  it, ask for advice. If the sling becomes loose, adjust it so that your forearm is level with the ground. Your shoulder should feel well supported.    Put an ice pack on the injured area for 20 minutes every 1 to 2 hours the first day. You can make your own ice pack by putting ice cubes in a plastic bag. Wrap the bag in a thin towel. Continue with ice packs 3 to 4 times a day for the next 2 days. Then use the pack as needed to ease pain and swelling.    You may use acetaminophen or ibuprofen to control pain, unless another pain medicine was prescribed. If you have chronic liver or kidney disease, talk with your healthcare provider before using these medicines. Also talk with your provider if you ve ever had a stomach ulcer or GI bleeding.    Shoulder pain may seem worse at night, when there is less to distract you from the pain. If you sleep on your side, try to keep weight off your painful shoulder. Propping pillows behind you may stop you from rolling over onto that shoulder during sleep.     Shoulder and elbow joints can become stiff if left in a sling for too long. You should start range of motion exercises about 7 to 10 days after the injury. Talk with your provider to find out what type of exercises to do and how soon to start.    You can take the sling off to shower or bathe.  Follow-up care  Follow up with your healthcare provider if you don t start to get better in the next 5 days.  When to seek medical advice  Call your healthcare provider right away if any of these occur:    Pain or swelling gets worse or continues for more than a few days    Your hand or fingers become cold, blue, numb, or tingly    Large amount of bruising on your shoulder or upper arm    Difficulty moving your hand or fingers    Weakness in your hand or fingers    Your shoulder becomes stiff    It feels like your shoulder is popping out    You are less able to do your daily activities  Date Last Reviewed: 10/1/2016    0358-0215 [employer]. [address] [place] [address]. All rights reserved. This information is not intended as a substitute for professional medical care. Always follow your healthcare professional's instructions.                Follow-ups after your visit        Your next 10 appointments already scheduled     Nov 27, 2018  3:00 PM CST   (Arrive by 2:45 PM)   Evaluation with Serena Torres, PT   [facility] ([facility] )    [address] [id number] [phone]            Nov 29, 2018  2:00 PM CST   (Arrive by 1:45 PM)   [facility]    [address] [facility] [address] [phone]              Who to contact     If you have questions or need follow up information about today's clinic visit or your schedule please contact [facility] directly at [phone].  Normal or non-critical lab and imaging results will be communicated to you by [employer], letter or phone within 4 business days after the clinic has received the results. If you do not hear from us within 7 days, please contact the clinic through [employer] or phone. If you have a critical or abnormal lab result, we will notify you by phone as soon as possible.  Submit refill requests through [employer] or call your pharmacy and they will forward the refill request to us. Please allow 3 business days for your refill to be completed.          Additional Information About Your Visit        [employer] Information     [employer] gives you secure access to your electronic health record. If you see a primary care provider, you can also send messages to your care team and make appointments. If you have questions, please call your primary care clinic.  If you do not have a primary care provider, please call [phone] and they will assist you.        Care EveryWhere ID     This is your Care EveryWhere  "ID. This could be used by other organizations to access your [facility] medical records  [id number]        Your Vitals Were     Pulse Temperature Respirations Height Pulse Oximetry BMI (Body Mass Index)    80 97.3  F (36.3  C) (Tympanic) 18 5' 2\" (1.575 m) 99% 35.3 kg/m2       Blood Pressure from Last 3 Encounters:   11/26/18 90/62   11/26/18 124/76   11/24/18 123/60    Weight from Last 3 Encounters:   11/26/18 193 lb (87.5 kg)   11/26/18 193 lb (87.5 kg)   11/24/18 193 lb (87.5 kg)              Today, you had the following     No orders found for display       Primary Care Provider Office Phone # Fax #    Susan StoutAMELIA Guadalupe [phone]       [address]  [facility] [address]        Equal Access to Services     [facility] : Hadii aad ku hadasho Soelisabeth, waaxda luqadaha, qaybta kaalmada adeegyada, vianey sorenson . So [facility] [phone].    ATENCIÓN: Si habla español, tiene a solis disposición servicios gratuitos de asistencia lingüística. Llame al [phone].    We comply with applicable federal civil rights laws and [place] laws. We do not discriminate on the basis of race, color, national origin, age, disability, sex, sexual orientation, or gender identity.            Thank you!     Thank you for choosing [facility]  for your care. Our goal is always to provide you with excellent care. Hearing back from our patients is one way we can continue to improve our services. Please take a few minutes to complete the written survey that you may receive in the mail after your visit with us. Thank you!             Your Updated Medication List - Protect others around you: Learn how to safely use, store and throw away your medicines at www.disposemymeds.org.          This list is accurate as of 11/26/18  2:56 PM.  Always use your most recent med list.                   Brand Name Dispense Instructions for use Diagnosis    diphenhydrAMINE 25 MG " tablet    BENADRYL     Take 25 mg by mouth        EPINEPHrine 0.3 MG/0.3ML injection    EPIPEN    4 each    Inject 0.3 mLs (0.3 mg) into the muscle once as needed for anaphylaxis    Allergic rhinitis due to pollen       famotidine 40 MG tablet    PEPCID    90 tablet    Take 1 tablet (40 mg) by mouth daily    Gastroesophageal reflux disease without esophagitis       fluticasone 50 MCG/ACT nasal spray    FLONASE    1 Bottle    Spray 1-2 sprays into both nostrils daily        ibuprofen 400 MG tablet    ADVIL/MOTRIN    60 tablet    Take 2 tablets (800 mg) by mouth every 6 hours as needed for other (cramping)     (spontaneous vaginal delivery)       loratadine 10 MG tablet    CLARITIN     Take 10 mg by mouth daily        ondansetron 4 MG ODT tab    ZOFRAN ODT    10 tablet    Take 1 tablet (4 mg) by mouth every 8 hours as needed for nausea        sulfamethoxazole-trimethoprim 800-160 MG per tablet    BACTRIM DS    20 tablet    Take 1 tablet by mouth 2 times daily for 10 days

## 2018-11-26 NOTE — NURSING NOTE
"Chief Complaint   Patient presents with     Forms       Initial BP 90/62 (BP Location: Left arm, Patient Position: Sitting, Cuff Size: Adult Large)  Pulse 80  Temp 97.3  F (36.3  C) (Tympanic)  Resp 18  Ht 5' 2\" (1.575 m)  Wt 193 lb (87.5 kg)  SpO2 99%  BMI 35.3 kg/m2 Estimated body mass index is 35.3 kg/(m^2) as calculated from the following:    Height as of this encounter: 5' 2\" (1.575 m).    Weight as of this encounter: 193 lb (87.5 kg).  Medication Reconciliation: complete    Ashley A. Lechevalier, LPN  "

## 2018-11-26 NOTE — ED PROVIDER NOTES
History     Chief Complaint   Patient presents with     Post-op Problem     had a pilanoidal cyst lanced/packed on Friday with Dr. May. Thinks it is infected.     HPI  Carolyn Mallory is a 23 year old female whose recent notes are reviewed.  She came into the ER because she noticed yellowish fluid on a recent gauze pad from that area.  She thinks the area is infected.  Does not report pain to me.  She is having that area's gauze changed twice daily.  No redness around the incision.    No fevers.  Does not feel otherwise unwell.    Problem List:    Patient Active Problem List    Diagnosis Date Noted     Allergy to pollen 03/01/2018     Priority: Medium     Obesity, unspecified obesity severity, unspecified obesity type 06/09/2017     Priority: Medium     Tobacco use disorder 06/09/2017     Priority: Medium     Esophageal reflux 06/09/2017     Priority: Medium     BMI 34.0-34.9,adult 01/19/2017     Priority: Medium     Chronic right-sided thoracic back pain 05/24/2016     Priority: Medium     ACP (advance care planning) 04/26/2016     Priority: Medium     Advance Care Planning 4/26/2016: ACP Review of Chart / Resources Provided:  Reviewed chart for advance care plan.  Carolyn Mallory has no plan or code status on file. Discussed available resources and provided with information. .  Added by Lizzette Garcia           Calculus of kidney 12/16/2015     Priority: Medium     Bilateral hydronephrosis, flank pain, 3mm right lower pole non obstructing stone.  Dr. [facility] consult.  Declined stents.  Reconsider if hospitalization and no improvement 2-3 days.         Sacro ilial pain 10/30/2015     Priority: Medium     Patellofemoral syndrome of both knees 01/20/2015     Priority: Medium     Allergic rhinitis 01/17/2014     Priority: Medium     Problem list name updated by automated process. Provider to review       Food allergy 01/17/2014     Priority: Medium     Astigmatism 09/24/2013     Priority: Medium     Overview:  "  IMO Update       Hypermetropia 09/24/2013     Priority: Medium        Past Medical History:    Past Medical History:   Diagnosis Date     NO ACTIVE PROBLEMS      Pregnancy        Past Surgical History:    Past Surgical History:   Procedure Laterality Date     ARTHROSCOPY KNEE Right 5/4/2015    Procedure: ARTHROSCOPY KNEE;  Surgeon: Hernando Kulkarni MD;  Location: [facility]     AS ESOPHAGOSCOPY, DIAGNOSTIC      upper     LAPAROSCOPIC CHOLECYSTECTOMY N/A 10/10/2015    Procedure: LAPAROSCOPIC CHOLECYSTECTOMY;  Surgeon: Drew Padgett MD;  Location: [facility]     none         Family History:    Family History   Problem Relation Age of Onset     Thrombophilia Mother      blood clotting     Lupus Mother      erythematosus     Diabetes Mother      Hypertension Father      Asthma Sister      Diabetes Maternal Grandfather      Hypertension Maternal Grandfather      Lupus Maternal Aunt      erythematosus       Social History:  Marital Status:  Single [1]  Social History   Substance Use Topics     Smoking status: Current Every Day Smoker     Packs/day: 0.25     Years: 1.00     Types: Cigarettes     Start date: 1/20/2014     Smokeless tobacco: Never Used     Alcohol use No        Medications:      famotidine (PEPCID) 40 MG tablet   ibuprofen (ADVIL,MOTRIN) 400 MG tablet   loratadine (CLARITIN) 10 MG tablet   ondansetron (ZOFRAN ODT) 4 MG ODT tab   sulfamethoxazole-trimethoprim (BACTRIM DS) 800-160 MG per tablet   diphenhydrAMINE (BENADRYL) 25 MG tablet   EPINEPHrine (EPIPEN) 0.3 MG/0.3ML injection   fluticasone (FLONASE) 50 MCG/ACT spray         Review of Systemshas not been otherwise unwell    Physical Exam   BP: 124/76  Pulse: 89  Temp: 98.4  F (36.9  C)  Resp: 16  Height: 157.5 cm (5' 2\")  Weight: 87.5 kg (193 lb) (stated)  SpO2: 100 %      Physical Exam  Well woman.  No tachycardia or tachypnea noted.  Her incision site looks pristine.  There is no redness, fluctuance, tenderness and tissue immediately inside the incised " area looks healthy.  There is scant yellowish drainage from a gauze removed from the area.  In summary, this is likely a well-healing wound.    ED Course     ED Course     Procedures               Critical Care time:  none               No results found for this or any previous visit (from the past 24 hour(s)).    Medications - No data to display    Assessments & Plan (with Medical Decision Making)     I have reviewed the nursing notes.    I have reviewed the findings, diagnosis, plan and need for follow up with the patient.   recommend no new therapy.  Follow up in surgery clinic this week should there be further concern.    New Prescriptions    No medications on file       Final diagnoses:   Pilonidal cyst       11/26/2018   [facility]     Amaury Sanchez MD  11/26/18 0102

## 2018-11-26 NOTE — ED TRIAGE NOTES
Pt had a pilonidal cyst lanced/packed on Friday with dr. Lucarrelli. Pt concerned it is infected due to the drainage on her packing that she removes BID and repacks. No known fever since having the surgery.

## 2018-11-26 NOTE — ED AVS SNAPSHOT
[facility]    [address] [place] [address]    Phone:  [phone]                                       Carolyn Mallory   MRN: [id number]    Department:  [facility]   Date of Visit:  11/26/2018           Patient Information     Date Of Birth          1995        Your diagnoses for this visit were:     Pilonidal cyst        You were seen by Amaury Sanchez MD.      Follow-up Information     Follow up with [facility].    Specialty:  EMERGENCY MEDICINE    Why:  As needed    Contact information:    [address]  [place] [address] [phone]    Additional information:    From [place]: Take [address]. Turn left at [address]. Turn left at the first stoplight on [address]. At the first stop sign, take a right onto [address]. Take a left into the parking lot and continue through until you reach the North enterance of the building.       From [place]: Take [address]. Take the [address] ramp towards [place]. Turn left onto [address]. Take a slight right onto [address]. Turn left at the first stoplight on [address]. At the first stop sign, take a right onto [address]. Take a left into the parking lot and continue through until you reach the North enterance of the building.       From [place]: Take [address]. Take a right at [address]. At the first stop sign, take a right onto [address]. Take a left into the parking lot and continue through until you reach the North enterance of the building.         Discharge Instructions         Pilonidal Cyst    A pilonidal cyst is found near the base of the spine (tailbone) or top of the buttocks crease. It may look like a pit or small depression. In some cases, it may have a hollow tunnel (sinus tract) that connects it to the surface of the skin. Normally, a pilonidal cyst does not cause symptoms. But if  it becomes infected, it can cause pain and swelling. This sheet tells you more about pilonidal cysts and how they are treated.  What causes a pilonidal cyst and who gets them?  Two main causes are:    Ingrown hairs. This happens when a hair is forced under the skin or when a hair follicle ruptures.    Injury to the area. This can happen from sitting for long periods of time.  These cysts are often diagnosed in people between ages 16 and 26. But people of any age can have a pilonidal cyst. They affect both men and women, but they are more common in men.  Symptoms of a pilonidal cyst infection  A pilonidal cyst does not cause symptoms unless it becomes infected. Once a pilonidal cyst becomes infected, it is called a pilonidal abscess. Infection may cause the following symptoms:    Pain, redness, and swelling of the cyst and area around it    Foul-smelling drainage from the cyst    Fever  Diagnosing a pilonidal cyst  A pilonidal cyst can be diagnosed by how it looks and by its location. Your healthcare provider will examine the suspected cyst to confirm a diagnosis. You will be told if any tests are needed.  Treating a pilonidal cyst infection  Most pilonidal cysts are left alone. But if a cyst becomes infected, treatment is needed. It may include the following:    Incision and drainage. If needed, the cyst is cut open, and pus and other infected material is allowed to drain.    Antibiotic medicines for the infection. Know that medicines do not make the cyst go away, and antibiotics have limited use in treating an abscess. They also won t keep a cyst from becoming infected again.    Hot water soaks. These can help draw out the infection and ease pain and itching.    Surgery to remove the cyst (excision). This may be done if the infection is severe, does not respond to medicine, or keeps coming back. A surgeon cuts and removes the cyst and the tissue around it. Your healthcare provider can tell you more if this is  needed.    Laser hair removalaround the area. This may decrease the frequency of flare-ups.  Preventing infection  A pilonidal cyst can easily become infected. Do the following to help prevent infections:    Keep the cyst and surrounding skin area clean.    Remove hair from the area of the cyst regularly. Ask your healthcare provider about safe hair removal products or procedures.    Avoid sitting in one position for long periods of time. This helps to reduce weight and pressure on your tailbone area. Sitting on a special cushion to relieve pressure on the tailbone may also help. Ask your healthcare provider about where to purchase these cushions.    Avoid tight-fitting clothing to reduce skin irritation around the cyst.  Date Last Reviewed: 2/1/2017 2000-2018 [employer]. [address] [place] [address], [place] [address]. All rights reserved. This information is not intended as a substitute for professional medical care. Always follow your healthcare professional's instructions.          Your next 10 appointments already scheduled     Nov 26, 2018  2:30 PM CST   (Arrive by 2:15 PM)   SHORT with AMELIA Pineda CNP   [facility]    [address] [place] [address]   [phone]            Nov 27, 2018  3:00 PM CST   (Arrive by 2:45 PM)   Evaluation with Serena Torres, PT   [facility] ([facility] )    [address] [place] [address]   [phone]            Nov 29, 2018  2:00 PM CST   (Arrive by 1:45 PM)   Return Visit with Brendan May MD   [facility] ([facility] )    [address] [place] [address]   [phone]                 Review of your medicines      Our records show that you are taking the medicines listed below. If these are incorrect, please call your family doctor or clinic.        Dose / Directions Last dose taken     diphenhydrAMINE 25 MG tablet   Commonly known as:  BENADRYL   Dose:  25 mg        Take 25 mg by mouth   Refills:  0        EPINEPHrine 0.3 MG/0.3ML injection   Commonly known as:  EPIPEN   Dose:  0.3 mg   Quantity:  4 each        Inject 0.3 mLs (0.3 mg) into the muscle once as needed for anaphylaxis   Refills:  6        famotidine 40 MG tablet   Commonly known as:  PEPCID   Dose:  40 mg   Quantity:  90 tablet        Take 1 tablet (40 mg) by mouth daily   Refills:  3        fluticasone 50 MCG/ACT spray   Commonly known as:  FLONASE   Dose:  1-2 spray   Quantity:  1 Bottle        Spray 1-2 sprays into both nostrils daily   Refills:  11        ibuprofen 400 MG tablet   Commonly known as:  ADVIL/MOTRIN   Dose:  800 mg   Quantity:  60 tablet        Take 2 tablets (800 mg) by mouth every 6 hours as needed for other (cramping)   Refills:  1        loratadine 10 MG tablet   Commonly known as:  CLARITIN   Dose:  10 mg        Take 10 mg by mouth daily   Refills:  0        ondansetron 4 MG ODT tab   Commonly known as:  ZOFRAN ODT   Dose:  4 mg   Quantity:  10 tablet        Take 1 tablet (4 mg) by mouth every 8 hours as needed for nausea   Refills:  0        sulfamethoxazole-trimethoprim 800-160 MG per tablet   Commonly known as:  BACTRIM DS   Dose:  1 tablet   Quantity:  20 tablet        Take 1 tablet by mouth 2 times daily for 10 days   Refills:  0                Orders Needing Specimen Collection     None      Pending Results     No orders found from 11/24/2018 to 11/27/2018.            Pending Culture Results     No orders found from 11/24/2018 to 11/27/2018.            Thank you for choosing [facility]       Thank you for choosing [facility] for your care. Our goal is always to provide you with excellent care. Hearing back from our patients is one way we can continue to improve our services. Please take a few minutes to complete the written survey that you may receive in the mail after you visit with us. Thank you!         [employer] Information     [employer] gives you secure access to your electronic health record. If you see a primary care provider, you can also send messages to your care team and make appointments. If you have questions, please call your primary care clinic.  If you do not have a primary care provider, please call [phone] and they will assist you.        Care EveryWhere ID     This is your Care EveryWhere ID. This could be used by other organizations to access your [facility] medical records  [id number]        Equal Access to Services     CRISS CHEN : Hadcarolann oroo Soelisabeth, waaxda luqadaha, qaybta kaalmada ne, vianey guevara. So [facility] [phone].    ATENCIÓN: Si habla español, tiene a solis disposición servicios gratuitos de asistencia lingüística. Llame al [phone].    We comply with applicable federal civil rights laws and [place] laws. We do not discriminate on the basis of race, color, national origin, age, disability, sex, sexual orientation, or gender identity.            After Visit Summary       This is your record. Keep this with you and show to your community pharmacist(s) and doctor(s) at your next visit.

## 2018-11-26 NOTE — DISCHARGE INSTRUCTIONS
Pilonidal Cyst    A pilonidal cyst is found near the base of the spine (tailbone) or top of the buttocks crease. It may look like a pit or small depression. In some cases, it may have a hollow tunnel (sinus tract) that connects it to the surface of the skin. Normally, a pilonidal cyst does not cause symptoms. But if it becomes infected, it can cause pain and swelling. This sheet tells you more about pilonidal cysts and how they are treated.  What causes a pilonidal cyst and who gets them?  Two main causes are:    Ingrown hairs. This happens when a hair is forced under the skin or when a hair follicle ruptures.    Injury to the area. This can happen from sitting for long periods of time.  These cysts are often diagnosed in people between ages 16 and 26. But people of any age can have a pilonidal cyst. They affect both men and women, but they are more common in men.  Symptoms of a pilonidal cyst infection  A pilonidal cyst does not cause symptoms unless it becomes infected. Once a pilonidal cyst becomes infected, it is called a pilonidal abscess. Infection may cause the following symptoms:    Pain, redness, and swelling of the cyst and area around it    Foul-smelling drainage from the cyst    Fever  Diagnosing a pilonidal cyst  A pilonidal cyst can be diagnosed by how it looks and by its location. Your healthcare provider will examine the suspected cyst to confirm a diagnosis. You will be told if any tests are needed.  Treating a pilonidal cyst infection  Most pilonidal cysts are left alone. But if a cyst becomes infected, treatment is needed. It may include the following:    Incision and drainage. If needed, the cyst is cut open, and pus and other infected material is allowed to drain.    Antibiotic medicines for the infection. Know that medicines do not make the cyst go away, and antibiotics have limited use in treating an abscess. They also won t keep a cyst from becoming infected again.    Hot water soaks. These  can help draw out the infection and ease pain and itching.    Surgery to remove the cyst (excision). This may be done if the infection is severe, does not respond to medicine, or keeps coming back. A surgeon cuts and removes the cyst and the tissue around it. Your healthcare provider can tell you more if this is needed.    Laser hair removalaround the area. This may decrease the frequency of flare-ups.  Preventing infection  A pilonidal cyst can easily become infected. Do the following to help prevent infections:    Keep the cyst and surrounding skin area clean.    Remove hair from the area of the cyst regularly. Ask your healthcare provider about safe hair removal products or procedures.    Avoid sitting in one position for long periods of time. This helps to reduce weight and pressure on your tailbone area. Sitting on a special cushion to relieve pressure on the tailbone may also help. Ask your healthcare provider about where to purchase these cushions.    Avoid tight-fitting clothing to reduce skin irritation around the cyst.  Date Last Reviewed: 2/1/2017 2000-2018 [employer]. [address] [place] [address], [place] [address]. All rights reserved. This information is not intended as a substitute for professional medical care. Always follow your healthcare professional's instructions.

## 2018-11-27 ENCOUNTER — HOSPITAL ENCOUNTER (OUTPATIENT)
Dept: PHYSICAL THERAPY | Facility: HOSPITAL | Age: 23
Setting detail: THERAPIES SERIES
End: 2018-11-27
Attending: NURSE PRACTITIONER
Payer: MEDICAID

## 2018-11-27 DIAGNOSIS — S60.211D CONTUSION OF RIGHT WRIST, SUBSEQUENT ENCOUNTER: ICD-10-CM

## 2018-11-27 DIAGNOSIS — M25.511 ACUTE PAIN OF RIGHT SHOULDER: ICD-10-CM

## 2018-11-27 DIAGNOSIS — S50.01XD CONTUSION OF RIGHT ELBOW, SUBSEQUENT ENCOUNTER: ICD-10-CM

## 2018-11-27 PROCEDURE — 97035 APP MDLTY 1+ULTRASOUND EA 15: CPT | Mod: GP

## 2018-11-27 PROCEDURE — 97110 THERAPEUTIC EXERCISES: CPT | Mod: GP

## 2018-11-27 PROCEDURE — 40000718 ZZHC STATISTIC PT DEPARTMENT ORTHO VISIT

## 2018-11-27 PROCEDURE — 97161 PT EVAL LOW COMPLEX 20 MIN: CPT | Mod: GP

## 2018-11-27 ASSESSMENT — ANXIETY QUESTIONNAIRES: GAD7 TOTAL SCORE: 2

## 2018-11-29 NOTE — PROGRESS NOTES
11/27/18 1400   General Information   Type of Visit Initial OP Ortho PT Evaluation   Start of Care Date 11/27/18   Referring Physician Susan Hernández   Patient/Family Goals Statement move my arm again   Orders Evaluate and Treat   Date of Order 11/26/18   Insurance Type MA  (no restrictions )   Medical Diagnosis Acute shld pain, elbow and wrist sprain   Surgical/Medical history reviewed Yes   Precautions/Limitations no known precautions/limitations   Body Part(s)   Body Part(s) Elbow/Wrist   Presentation and Etiology   Pertinent history of current problem (include personal factors and/or comorbidities that impact the POC) This 22 y/o fell down [employer] steps and injured RUE on 10/31/18. Pt is in sling with wrist brace. Wears both all of the time since injury.  Pt xrays were neg for fx. Pt c/o's mainly of elbow pain but has pain in wrist and shld.  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 Has toddler at home to take care of   Impairments A. Pain;D. Decreased ROM;E. Decreased flexibility;F. Decreased strength and endurance;J. Burning;L. Tingling   Functional Limitations perform activities of daily living;perform required work activities;perform desired leisure / sports activities   Symptom Location R Shld, Elbow and wrist   How/Where did it occur With a fall   Onset date of current episode/exacerbation 10/30/18   Chronicity New   Pain rating (0-10 point scale) Best (/10);Worst (/10)   Best (/10) 2   Worst (/10) 4   Pain quality A. Sharp;B. Dull;C. Aching;D. Burning;E. Shooting   Frequency of pain/symptoms A. Constant   Pain/symptoms exacerbated by C. Lifting;D. Carrying;G. Certain positions;K. Home tasks;L. Work tasks;J. ADL   Pain/symptoms eased by J. Braces/supports   Progression of symptoms since onset: Improved   Current / Previous Interventions   Diagnostic Tests: X-ray   X-ray Results unremarkable  (may have MRI done if PT doesnt help)   Prior Level of Function   Prior Level of Function-Mobility  indep- no pain or restrictions   Prior Level of Function-ADLs Difficulty performing all adls- signif other assisting in most everything. Pt also had cyst drained by tailbone this past week and having difficultymoving around and doing adls.    Current Level of Function   Current Community Support Family/friend caregiver   Patient role/employment history A. Employed  (works as cna at [employer])   Employment Comments on [employer]   [facility]   Current equipment-Gait/Locomotion None   Fall Risk Screen   Have you fallen 2 or more times in the past year? No   Have you fallen and had an injury in the past year? Yes   Is patient a fall risk? No   Abuse Risk Screen   QUESTION TO PATIENT:  Has a member of your family or a partner(now or in the past) intimidated, hurt, manipulated, or controlled you in any way? no   QUESTION TO PATIENT: Do you feel safe going back to the place where you are living? yes   OBSERVATION: Is there reason to believe there has been maltreatment of a vulnerable adult (ie. Physical/Sexual/Emotional abuse, self neglect, lack of adequate food, shelter, medical care, or financial exploitation)? no   Functional Scales   Functional Scales (SPADI- 66.92%)   Shoulder Objective Findings   Side (if bilateral, select both right and left) Right   Integumentary  bruising, swelling t/o RUE rian forearm   Posture guarded, fwd shlds, keeps arm adducted and IR at side   Cervical Screen (ROM, quadrant) CROM Is wfl   Shoulder Flexibility Comments pt having poor francheska to AAROM R SHLd. DId not do further tests to R shld   Palpation tender t/o deltoid, no specific point tenderness in bicipital groove or in trap.    Right Shoulder Flexion AROM 100   Right Shoulder Abduction AROM 100   Right Shoulder ER AROM wfl   Right Shoulder IR AROM wfl   Right Shoulder Flexion Strength 3-   Right Shoulder Abduction Strength 3-   Right Shoulder ER Strength 3   Right Shoulder IR Strength 3   Elbow/Wrist Objective Findings    Side (if bilateral, select both right and left) Right   Elbow/Wrist ROM Comments Wrist motions are wfl but pain at end range of flexion   Elbow/Wrist Strength Comments finger strength wfl. Wrist strength 3+/5 for all. Pain with all resisted testing.    Palpation tender at lateral epicondyle, wrist extensors   Accessory Motion/Joint Mobility no signif tingling, numbness or burning in arm   Right Elbow Flexion/Extension AROM R elbow  degrees   Right Wrist Flexion AROM wfl   Right Wrist Extension AROM wfl   Right Wrist Radial Deviation AROM wfl   Right Wrist Ulnar Deviation AROM wfl   Right Wrist Supination AROM wfl- end range pain   Right Wrist Pronation AROM wfl   Right Biceps Brachii Strength 4-   Right Triceps Brachii Strength 4-   Planned Therapy Interventions   Planned Therapy Interventions manual therapy;ROM;strengthening;stretching   Planned Therapy Interventions Comment HEP   Planned Modality Interventions   Planned Modality Interventions Ultrasound;Cryotherapy   Planned Modality Interventions Comments prn   Clinical Impression   Criteria for Skilled Therapeutic Interventions Met yes, treatment indicated   PT Diagnosis R SHld, elbow, and wrist strain/sprain with contusions.  Have not r/o further shld dysfunction   Influenced by the following impairments pain, tightness, guarding, swellingweakness   Functional limitations due to impairments lifting, holding child, adls including wiping self, carrying, cooking  working   Clinical Presentation Evolving/Changing   Clinical Presentation Rationale healing RUE from fall   Clinical Decision Making (Complexity) Low complexity   Therapy Frequency 2 times/Week   Predicted Duration of Therapy Intervention (days/wks) up to 8 weeks   Risk & Benefits of therapy have been explained Yes   Patient, Family & other staff in agreement with plan of care Yes   Clinical Impression Comments Pt will benefit from cont PT to address all impairments in RUE. R shld may need further  diagnostic imaging if pain, stiffness does not decrease. Pt agrees with plan of care.    Education Assessment   Barriers to Learning Financial   ORTHO GOALS   PT Ortho Eval Goals 1;2;3   Ortho Goal 1   Goal Identifier STG 1   Goal Description Pt will demonstrate indep HEP compliance   Target Date 12/20/18   Ortho Goal 2   Goal Identifier STG 2   Goal Description Pt will achieve full elbow AROM with pain under 4/10 to enable improved adl ease   Target Date 12/20/18   Ortho Goal 3   Goal Identifier LTG   Goal Description Return to work without restrictions to enable normal duties including lifting, reaching and carrying with normal strength in R shld, elbow   Target Date 01/24/19   Total Evaluation Time   Total Evaluation Time 30   Therapy Certification   Certification date from 11/29/18   Certification date to 02/21/19   Medical Diagnosis Shoulder pain, elbow and wrist sprain

## 2018-11-30 ENCOUNTER — TELEPHONE (OUTPATIENT)
Dept: SURGERY | Facility: OTHER | Age: 23
End: 2018-11-30

## 2018-11-30 NOTE — TELEPHONE ENCOUNTER
Patient called and needs more dressing and gauze for dressing changes. Patient can be reached at [phone].

## 2018-11-30 NOTE — TELEPHONE ENCOUNTER
Advised patient that clinic is closed and there is not a provider available at this time to sign for a prescription. Advised that she can pick these items up over the counter at a pharmacy. Also scheduled follow-up appointment for pilonidal cyst on Tuesday as the patient cancelled her follow-up this week and did not reschedule. She will request more packing strips, 4x4's, sterile q-tips at next visit if needed. She will go to urgent care over the weekend if assistance needed with packing wound.

## 2018-12-04 ENCOUNTER — OFFICE VISIT (OUTPATIENT)
Dept: SURGERY | Facility: OTHER | Age: 23
End: 2018-12-04
Attending: SURGERY
Payer: COMMERCIAL

## 2018-12-04 VITALS
BODY MASS INDEX: 35.3 KG/M2 | TEMPERATURE: 97.3 F | WEIGHT: 193 LBS | SYSTOLIC BLOOD PRESSURE: 100 MMHG | OXYGEN SATURATION: 99 % | HEART RATE: 94 BPM | DIASTOLIC BLOOD PRESSURE: 70 MMHG

## 2018-12-04 DIAGNOSIS — L05.01 PILONIDAL ABSCESS: Primary | ICD-10-CM

## 2018-12-04 PROCEDURE — 99212 OFFICE O/P EST SF 10 MIN: CPT | Performed by: SURGERY

## 2018-12-04 PROCEDURE — G0463 HOSPITAL OUTPT CLINIC VISIT: HCPCS

## 2018-12-04 ASSESSMENT — PAIN SCALES - GENERAL: PAINLEVEL: MILD PAIN (3)

## 2018-12-04 NOTE — PATIENT INSTRUCTIONS
Thank you for allowing Dr. May and our surgical team to participate in your care. Please call with any scheduling questions or concerns to our health unit coordinator at [phone] or for nursing questions to nurse at [phone].

## 2018-12-04 NOTE — MR AVS SNAPSHOT
After Visit Summary   12/4/2018    Carolyn Mallory    MRN: [id number]           Patient Information     Date Of Birth          1995        Visit Information        Provider Department      12/4/2018 2:15 PM Brendan May MD [facility]        Today's Diagnoses     Pilonidal abscess    -  1      Care Instructions    Thank you for allowing Dr. May and our surgical team to participate in your care. Please call with any scheduling questions or concerns to our health unit coordinator at [phone] or for nursing questions to nurse at [phone].            Follow-ups after your visit        Your next 10 appointments already scheduled     Dec 06, 2018  3:00 PM CST   Ortho Treatment with Laura Tornow, PTA   [facility] )    [address] [address]   [phone]            Dec 11, 2018  3:00 PM CST   Ortho Treatment with Laura Tornow, PTA   [facility] )    [address] [address]   [phone]            Dec 13, 2018  2:30 PM CST   Ortho Treatment with Serena B Brian, PT   [facility] )    [address] [address]   [phone]            Dec 17, 2018  3:00 PM CST   Ortho Treatment with Laura Tornow, PTA   [facility] )    [address] [address]   [phone]            Dec 20, 2018  3:00 PM CST   Ortho Treatment with Serena B Brian, PT   [facility] )    [address] [address]   [phone]              Who to contact     If you have questions or need follow up information about today's clinic visit or your schedule please contact [facility] directly at [phone].  Normal or non-critical lab and imaging results will be communicated to you by MyChart, letter or phone within 4 business days after  the clinic has received the results. If you do not hear from us within 7 days, please contact the clinic through [employer] or phone. If you have a critical or abnormal lab result, we will notify you by phone as soon as possible.  Submit refill requests through [employer] or call your pharmacy and they will forward the refill request to us. Please allow 3 business days for your refill to be completed.          Additional Information About Your Visit        [employer] Information     [employer] gives you secure access to your electronic health record. If you see a primary care provider, you can also send messages to your care team and make appointments. If you have questions, please call your primary care clinic.  If you do not have a primary care provider, please call [phone] and they will assist you.        Care EveryWhere ID     This is your Care EveryWhere ID. This could be used by other organizations to access your [place] medical records  [id number]        Your Vitals Were     Pulse Temperature Pulse Oximetry BMI (Body Mass Index)          94 97.3  F (36.3  C) (Tympanic) 99% 35.3 kg/m2         Blood Pressure from Last 3 Encounters:   12/04/18 100/70   11/26/18 90/62   11/26/18 124/76    Weight from Last 3 Encounters:   12/04/18 193 lb (87.5 kg)   11/26/18 193 lb (87.5 kg)   11/26/18 193 lb (87.5 kg)              Today, you had the following     No orders found for display       Primary Care Provider Office Phone # Fax #    Susan AMELIA Ndiaye [phone]       [address] [place] [address]        Equal Access to Services     [facility]: Hadii aad ku hadasho Soomaali, waaxda luqadaha, qaybta kaalmada adeegyada, vianey sorenson . So [facility] [phone].    ATENCIÓN: Si habla español, tiene a solis disposición servicios gratuitos de asistencia lingüística. Llame al [phone].    We comply with applicable federal civil rights laws and [place] laws. We do not  discriminate on the basis of race, color, national origin, age, disability, sex, sexual orientation, or gender identity.            Thank you!     Thank you for choosing [facility]  for your care. Our goal is always to provide you with excellent care. Hearing back from our patients is one way we can continue to improve our services. Please take a few minutes to complete the written survey that you may receive in the mail after your visit with us. Thank you!             Your Updated Medication List - Protect others around you: Learn how to safely use, store and throw away your medicines at www.disposemymeds.org.          This list is accurate as of 18  8:00 PM.  Always use your most recent med list.                   Brand Name Dispense Instructions for use Diagnosis    diphenhydrAMINE 25 MG tablet    BENADRYL     Take 25 mg by mouth        EPINEPHrine 0.3 MG/0.3ML injection    EPIPEN    4 each    Inject 0.3 mLs (0.3 mg) into the muscle once as needed for anaphylaxis    Allergic rhinitis due to pollen       famotidine 40 MG tablet    PEPCID    90 tablet    Take 1 tablet (40 mg) by mouth daily    Gastroesophageal reflux disease without esophagitis       fluticasone 50 MCG/ACT nasal spray    FLONASE    1 Bottle    Spray 1-2 sprays into both nostrils daily        ibuprofen 400 MG tablet    ADVIL/MOTRIN    60 tablet    Take 2 tablets (800 mg) by mouth every 6 hours as needed for other (cramping)     (spontaneous vaginal delivery)       loratadine 10 MG tablet    CLARITIN     Take 10 mg by mouth daily        sulfamethoxazole-trimethoprim 800-160 MG tablet    BACTRIM DS    20 tablet    Take 1 tablet by mouth 2 times daily for 10 days

## 2018-12-04 NOTE — NURSING NOTE
"Chief Complaint   Patient presents with     RECHECK     f/u i and d pilonidal cyst- 11/23/18       Initial /70 (BP Location: Right arm, Patient Position: Chair, Cuff Size: Adult Large)  Pulse 94  Temp 97.3  F (36.3  C) (Tympanic)  Wt 193 lb (87.5 kg)  SpO2 99%  BMI 35.3 kg/m2 Estimated body mass index is 35.3 kg/(m^2) as calculated from the following:    Height as of 11/26/18: 5' 2\" (1.575 m).    Weight as of this encounter: 193 lb (87.5 kg).  Medication Reconciliation: complete    Minerva Arrington LPN    "

## 2018-12-05 NOTE — PROGRESS NOTES
SUBJECTIVE:  Ms. Mallory presents for evaluation after incision and drainage of a pilonidal abscess. Since the incision and drainage she was seen and evaluated in the ER for ongoing pain and drainage. The pain now has resolved and she has no drainage. She was taking antibiotics and feels much better.     OBJECTIVE:  /70 (BP Location: Right arm, Patient Position: Chair, Cuff Size: Adult Large)  Pulse 94  Temp 97.3  F (36.3  C) (Tympanic)  Wt 193 lb (87.5 kg)  SpO2 99%  BMI 35.3 kg/m2    Gen: AAA, NAD  Coccyx: 1 cm opening with no abiel-wound erythema, there is no purulent drainage, the wound is 2-3 mm deep, good granulation tissue along base    ASSESSMENT/PLAN:  22 yo female s/p incision and drainage of pilonidal abscess    At this point she can keep the wound covered to prevent drainage onto her underwear. As the wound is healing up appropriately there is no need for further surgical follow up. She can call with any questions or concerns.    Brendan May  12/4/2018

## 2018-12-06 ENCOUNTER — HOSPITAL ENCOUNTER (OUTPATIENT)
Dept: PHYSICAL THERAPY | Facility: HOSPITAL | Age: 23
Setting detail: THERAPIES SERIES
End: 2018-12-06
Attending: NURSE PRACTITIONER
Payer: COMMERCIAL

## 2018-12-06 PROCEDURE — 97110 THERAPEUTIC EXERCISES: CPT | Mod: GP

## 2018-12-06 PROCEDURE — 97035 APP MDLTY 1+ULTRASOUND EA 15: CPT | Mod: GP

## 2018-12-06 PROCEDURE — 40000718 ZZHC STATISTIC PT DEPARTMENT ORTHO VISIT

## 2018-12-17 ENCOUNTER — HOSPITAL ENCOUNTER (OUTPATIENT)
Dept: PHYSICAL THERAPY | Facility: HOSPITAL | Age: 23
Setting detail: THERAPIES SERIES
End: 2018-12-17
Attending: NURSE PRACTITIONER
Payer: COMMERCIAL

## 2018-12-17 PROCEDURE — 97110 THERAPEUTIC EXERCISES: CPT | Mod: GP

## 2018-12-17 PROCEDURE — 97035 APP MDLTY 1+ULTRASOUND EA 15: CPT | Mod: GP

## 2018-12-20 ENCOUNTER — HOSPITAL ENCOUNTER (OUTPATIENT)
Dept: PHYSICAL THERAPY | Facility: HOSPITAL | Age: 23
Setting detail: THERAPIES SERIES
End: 2018-12-20
Attending: NURSE PRACTITIONER
Payer: COMMERCIAL

## 2018-12-20 PROCEDURE — 97035 APP MDLTY 1+ULTRASOUND EA 15: CPT | Mod: GP

## 2018-12-20 PROCEDURE — 40000718 ZZHC STATISTIC PT DEPARTMENT ORTHO VISIT

## 2018-12-20 PROCEDURE — 97110 THERAPEUTIC EXERCISES: CPT | Mod: GP

## 2018-12-29 ENCOUNTER — APPOINTMENT (OUTPATIENT)
Dept: ULTRASOUND IMAGING | Facility: HOSPITAL | Age: 23
End: 2018-12-29
Attending: INTERNAL MEDICINE
Payer: COMMERCIAL

## 2018-12-29 ENCOUNTER — HOSPITAL ENCOUNTER (EMERGENCY)
Facility: HOSPITAL | Age: 23
Discharge: HOME | End: 2018-12-29
Attending: INTERNAL MEDICINE | Admitting: INTERNAL MEDICINE
Payer: COMMERCIAL

## 2018-12-29 VITALS
RESPIRATION RATE: 16 BRPM | SYSTOLIC BLOOD PRESSURE: 109 MMHG | TEMPERATURE: 98.2 F | DIASTOLIC BLOOD PRESSURE: 71 MMHG | HEART RATE: 99 BPM | OXYGEN SATURATION: 97 %

## 2018-12-29 DIAGNOSIS — O20.0 ABORTION, THREATENED, EARLY PREGNANCY: ICD-10-CM

## 2018-12-29 LAB
ABO + RH BLD: NORMAL
ABO + RH BLD: NORMAL
ALBUMIN SERPL-MCNC: 3.6 G/DL (ref 3.4–5)
ALP SERPL-CCNC: 68 U/L (ref 40–150)
ALT SERPL W P-5'-P-CCNC: 27 U/L (ref 0–50)
ANION GAP SERPL CALCULATED.3IONS-SCNC: 4 MMOL/L (ref 3–14)
AST SERPL W P-5'-P-CCNC: 15 U/L (ref 0–45)
B-HCG SERPL-ACNC: 8 IU/L (ref 0–5)
BASOPHILS # BLD AUTO: 0 10E9/L (ref 0–0.2)
BASOPHILS NFR BLD AUTO: 0.3 %
BILIRUB SERPL-MCNC: 0.1 MG/DL (ref 0.2–1.3)
BLD GP AB SCN SERPL QL: NORMAL
BLOOD BANK CMNT PATIENT-IMP: NORMAL
BUN SERPL-MCNC: 9 MG/DL (ref 7–30)
CALCIUM SERPL-MCNC: 8 MG/DL (ref 8.5–10.1)
CHLORIDE SERPL-SCNC: 111 MMOL/L (ref 94–109)
CO2 SERPL-SCNC: 25 MMOL/L (ref 20–32)
CREAT SERPL-MCNC: 0.88 MG/DL (ref 0.52–1.04)
DIFFERENTIAL METHOD BLD: NORMAL
EOSINOPHIL # BLD AUTO: 0.2 10E9/L (ref 0–0.7)
EOSINOPHIL NFR BLD AUTO: 3.2 %
ERYTHROCYTE [DISTWIDTH] IN BLOOD BY AUTOMATED COUNT: 13 % (ref 10–15)
GFR SERPL CREATININE-BSD FRML MDRD: >90 ML/MIN/{1.73_M2}
GLUCOSE SERPL-MCNC: 94 MG/DL (ref 70–99)
HCT VFR BLD AUTO: 35.6 % (ref 35–47)
HGB BLD-MCNC: 12.2 G/DL (ref 11.7–15.7)
IMM GRANULOCYTES # BLD: 0 10E9/L (ref 0–0.4)
IMM GRANULOCYTES NFR BLD: 0.1 %
LYMPHOCYTES # BLD AUTO: 3.8 10E9/L (ref 0.8–5.3)
LYMPHOCYTES NFR BLD AUTO: 52.1 %
MCH RBC QN AUTO: 31 PG (ref 26.5–33)
MCHC RBC AUTO-ENTMCNC: 34.3 G/DL (ref 31.5–36.5)
MCV RBC AUTO: 90 FL (ref 78–100)
MONOCYTES # BLD AUTO: 0.4 10E9/L (ref 0–1.3)
MONOCYTES NFR BLD AUTO: 5.5 %
NEUTROPHILS # BLD AUTO: 2.8 10E9/L (ref 1.6–8.3)
NEUTROPHILS NFR BLD AUTO: 38.8 %
NRBC # BLD AUTO: 0 10*3/UL
NRBC BLD AUTO-RTO: 0 /100
PLATELET # BLD AUTO: 303 10E9/L (ref 150–450)
POTASSIUM SERPL-SCNC: 3.6 MMOL/L (ref 3.4–5.3)
PROT SERPL-MCNC: 6.4 G/DL (ref 6.8–8.8)
RBC # BLD AUTO: 3.94 10E12/L (ref 3.8–5.2)
SODIUM SERPL-SCNC: 140 MMOL/L (ref 133–144)
SPECIMEN EXP DATE BLD: NORMAL
WBC # BLD AUTO: 7.2 10E9/L (ref 4–11)

## 2018-12-29 PROCEDURE — 86900 BLOOD TYPING SEROLOGIC ABO: CPT | Performed by: INTERNAL MEDICINE

## 2018-12-29 PROCEDURE — 99284 EMERGENCY DEPT VISIT MOD MDM: CPT | Mod: 25

## 2018-12-29 PROCEDURE — 86901 BLOOD TYPING SEROLOGIC RH(D): CPT | Performed by: INTERNAL MEDICINE

## 2018-12-29 PROCEDURE — 86850 RBC ANTIBODY SCREEN: CPT | Performed by: INTERNAL MEDICINE

## 2018-12-29 PROCEDURE — 85025 COMPLETE CBC W/AUTO DIFF WBC: CPT | Performed by: INTERNAL MEDICINE

## 2018-12-29 PROCEDURE — 84702 CHORIONIC GONADOTROPIN TEST: CPT | Performed by: INTERNAL MEDICINE

## 2018-12-29 PROCEDURE — 76801 OB US < 14 WKS SINGLE FETUS: CPT | Mod: TC

## 2018-12-29 PROCEDURE — 80053 COMPREHEN METABOLIC PANEL: CPT | Performed by: INTERNAL MEDICINE

## 2018-12-29 PROCEDURE — 99283 EMERGENCY DEPT VISIT LOW MDM: CPT | Mod: Z6 | Performed by: INTERNAL MEDICINE

## 2018-12-29 PROCEDURE — 36415 COLL VENOUS BLD VENIPUNCTURE: CPT | Performed by: INTERNAL MEDICINE

## 2018-12-29 ASSESSMENT — ENCOUNTER SYMPTOMS
FLANK PAIN: 0
BACK PAIN: 0
LIGHT-HEADEDNESS: 0
FREQUENCY: 0
HEADACHES: 0
DYSURIA: 0
DIZZINESS: 0
ABDOMINAL PAIN: 0
FEVER: 0
SHORTNESS OF BREATH: 0

## 2018-12-29 NOTE — ED AVS SNAPSHOT
[facility]  [address] [address]  Phone:  [phone]                                    Carolyn Mallory   MRN: [id number]    Department:  [facility]   Date of Visit:  12/29/2018           After Visit Summary Signature Page    I have received my discharge instructions, and my questions have been answered. I have discussed any challenges I see with this plan with the nurse or doctor.    ..........................................................................................................................................  Patient/Patient Representative Signature      ..........................................................................................................................................  Patient Representative Print Name and Relationship to Patient    ..................................................               ................................................  Date                                   Time    ..........................................................................................................................................  Reviewed by Signature/Title    ...................................................              ..............................................  Date                                               Time          22EPIC Rev 08/18

## 2018-12-29 NOTE — ED PROVIDER NOTES
History     Chief Complaint   Patient presents with     Vaginal Bleeding     LMP 11/18/18, took 3 home pregnancy tests and they were positive, denies pain. Blood when wiping     HPI  Carolyn Mallory is a 23 year old female who came vaginal bleeding, mild, only noticed when wiping herself. Pregnancy test positive for 3 days , denies abd pain or any other discomfort.     Problem List:    Patient Active Problem List    Diagnosis Date Noted     Allergy to pollen 03/01/2018     Priority: Medium     Obesity, unspecified obesity severity, unspecified obesity type 06/09/2017     Priority: Medium     Tobacco use disorder 06/09/2017     Priority: Medium     Esophageal reflux 06/09/2017     Priority: Medium     BMI 34.0-34.9,adult 01/19/2017     Priority: Medium     Chronic right-sided thoracic back pain 05/24/2016     Priority: Medium     ACP (advance care planning) 04/26/2016     Priority: Medium     Advance Care Planning 4/26/2016: ACP Review of Chart / Resources Provided:  Reviewed chart for advance care plan.  Carolyn Mallory has no plan or code status on file. Discussed available resources and provided with information. .  Added by Lizzette Garcia           Calculus of kidney 12/16/2015     Priority: Medium     Bilateral hydronephrosis, flank pain, 3mm right lower pole non obstructing stone.  Dr. Simon [facility] consult.  Declined stents.  Reconsider if hospitalization and no improvement 2-3 days.         Sacro ilial pain 10/30/2015     Priority: Medium     Patellofemoral syndrome of both knees 01/20/2015     Priority: Medium     Allergic rhinitis 01/17/2014     Priority: Medium     Problem list name updated by automated process. Provider to review       Food allergy 01/17/2014     Priority: Medium     Astigmatism 09/24/2013     Priority: Medium     Overview:   IMO Update       Hypermetropia 09/24/2013     Priority: Medium        Past Medical History:    Past Medical History:   Diagnosis Date     NO ACTIVE PROBLEMS       Pregnancy        Past Surgical History:    Past Surgical History:   Procedure Laterality Date     ARTHROSCOPY KNEE Right 5/4/2015    Procedure: ARTHROSCOPY KNEE;  Surgeon: Hernando Kulkarni MD;  Location: [facility]     AS ESOPHAGOSCOPY, DIAGNOSTIC      upper     LAPAROSCOPIC CHOLECYSTECTOMY N/A 10/10/2015    Procedure: LAPAROSCOPIC CHOLECYSTECTOMY;  Surgeon: Drew Padgett MD;  Location: [facility]     none         Family History:    Family History   Problem Relation Age of Onset     Thrombophilia Mother         blood clotting     Lupus Mother         erythematosus     Diabetes Mother      Hypertension Father      Asthma Sister      Diabetes Maternal Grandfather      Hypertension Maternal Grandfather      Lupus Maternal Aunt         erythematosus       Social History:  Marital Status:  Single [1]  Social History     Tobacco Use     Smoking status: Current Every Day Smoker     Packs/day: 0.25     Years: 1.00     Pack years: 0.25     Types: Cigarettes     Start date: 1/20/2014     Smokeless tobacco: Never Used   Substance Use Topics     Alcohol use: No     Alcohol/week: 0.0 oz     Drug use: No        Medications:      diphenhydrAMINE (BENADRYL) 25 MG tablet   EPINEPHrine (EPIPEN) 0.3 MG/0.3ML injection   famotidine (PEPCID) 40 MG tablet   fluticasone (FLONASE) 50 MCG/ACT spray   ibuprofen (ADVIL,MOTRIN) 400 MG tablet   loratadine (CLARITIN) 10 MG tablet         Review of Systems   Constitutional: Negative for fever.   Respiratory: Negative for shortness of breath.    Cardiovascular: Negative for chest pain.   Gastrointestinal: Negative for abdominal pain.   Genitourinary: Positive for vaginal bleeding. Negative for decreased urine volume, dysuria, flank pain, frequency, pelvic pain and urgency.   Musculoskeletal: Negative for back pain.   Neurological: Negative for dizziness, syncope, light-headedness and headaches.   All other systems reviewed and are negative.      Physical Exam   BP: 126/82  Pulse: 99  Heart Rate:  79  Temp: 98.2  F (36.8  C)  Resp: 16  SpO2: 100 %      Physical Exam   Constitutional: No distress.   HENT:   Head: Atraumatic.   Mouth/Throat: Oropharynx is clear and moist. No oropharyngeal exudate.   Eyes: Pupils are equal, round, and reactive to light. No scleral icterus.   Cardiovascular: Normal heart sounds and intact distal pulses.   Pulmonary/Chest: Breath sounds normal. No respiratory distress.   Abdominal: Soft. Bowel sounds are normal. There is no tenderness.   Musculoskeletal: She exhibits no edema or tenderness.   Skin: Skin is warm. No rash noted. She is not diaphoretic.       ED Course        Procedures                   Results for orders placed or performed during the hospital encounter of 12/29/18 (from the past 24 hour(s))   CBC with platelets differential   Result Value Ref Range    WBC 7.2 4.0 - 11.0 10e9/L    RBC Count 3.94 3.8 - 5.2 10e12/L    Hemoglobin 12.2 11.7 - 15.7 g/dL    Hematocrit 35.6 35.0 - 47.0 %    MCV 90 78 - 100 fl    MCH 31.0 26.5 - 33.0 pg    MCHC 34.3 31.5 - 36.5 g/dL    RDW 13.0 10.0 - 15.0 %    Platelet Count 303 150 - 450 10e9/L    Diff Method Automated Method     % Neutrophils 38.8 %    % Lymphocytes 52.1 %    % Monocytes 5.5 %    % Eosinophils 3.2 %    % Basophils 0.3 %    % Immature Granulocytes 0.1 %    Nucleated RBCs 0 0 /100    Absolute Neutrophil 2.8 1.6 - 8.3 10e9/L    Absolute Lymphocytes 3.8 0.8 - 5.3 10e9/L    Absolute Monocytes 0.4 0.0 - 1.3 10e9/L    Absolute Eosinophils 0.2 0.0 - 0.7 10e9/L    Absolute Basophils 0.0 0.0 - 0.2 10e9/L    Abs Immature Granulocytes 0.0 0 - 0.4 10e9/L    Absolute Nucleated RBC 0.0    Comprehensive metabolic panel   Result Value Ref Range    Sodium 140 133 - 144 mmol/L    Potassium 3.6 3.4 - 5.3 mmol/L    Chloride 111 (H) 94 - 109 mmol/L    Carbon Dioxide 25 20 - 32 mmol/L    Anion Gap 4 3 - 14 mmol/L    Glucose 94 70 - 99 mg/dL    Urea Nitrogen 9 7 - 30 mg/dL    Creatinine 0.88 0.52 - 1.04 mg/dL    GFR Estimate >90 >60  mL/min/[1.73_m2]    GFR Estimate If Black >90 >60 mL/min/[1.73_m2]    Calcium 8.0 (L) 8.5 - 10.1 mg/dL    Bilirubin Total 0.1 (L) 0.2 - 1.3 mg/dL    Albumin 3.6 3.4 - 5.0 g/dL    Protein Total 6.4 (L) 6.8 - 8.8 g/dL    Alkaline Phosphatase 68 40 - 150 U/L    ALT 27 0 - 50 U/L    AST 15 0 - 45 U/L   HCG quantitative pregnancy   Result Value Ref Range    HCG Quantitative Serum 8 (H) 0 - 5 IU/L   ABO/Rh type and screen   Result Value Ref Range    ABO O     RH(D) Pos     Antibody Screen Neg     Test Valid Only At [facility]        Specimen Expires 2019        Medications - No data to display    Assessments & Plan (with Medical Decision Making)   Mild vaginal bleeding, pregnancy test positive, LMP about 1 mo ago  Labs: HCG 8, barely positive  US Ob, transvaginal : negative   vs early pregnancy, I advised her to follow with Her PCP, ob doctor in 1 wk for repeat pregnancy test and possible US, if had severe vag bleed or abd pain , dizziness return to ER  She understood and agreed  I have reviewed the nursing notes.    I have reviewed the findings, diagnosis, plan and need for follow up with the patient.         Medication List      There are no discharge medications for this visit.         Final diagnoses:   , threatened, early pregnancy       2018   [facility]     Augustus Barnes MD  18 [phone]       Augustus Barnes MD  19 [id number]

## 2018-12-31 ENCOUNTER — OFFICE VISIT (OUTPATIENT)
Dept: FAMILY MEDICINE | Facility: OTHER | Age: 23
End: 2018-12-31
Attending: NURSE PRACTITIONER
Payer: COMMERCIAL

## 2018-12-31 VITALS
SYSTOLIC BLOOD PRESSURE: 102 MMHG | BODY MASS INDEX: 35.51 KG/M2 | WEIGHT: 193 LBS | RESPIRATION RATE: 18 BRPM | DIASTOLIC BLOOD PRESSURE: 62 MMHG | HEIGHT: 62 IN | HEART RATE: 92 BPM | OXYGEN SATURATION: 98 %

## 2018-12-31 DIAGNOSIS — R10.2 ABDOMINAL PAIN, SUPRAPUBIC: ICD-10-CM

## 2018-12-31 DIAGNOSIS — Z71.6 TOBACCO ABUSE COUNSELING: ICD-10-CM

## 2018-12-31 DIAGNOSIS — Z32.00 POSSIBLE PREGNANCY: Primary | ICD-10-CM

## 2018-12-31 DIAGNOSIS — F41.0 PANIC ATTACK: ICD-10-CM

## 2018-12-31 DIAGNOSIS — Z72.0 TOBACCO ABUSE: ICD-10-CM

## 2018-12-31 LAB
ALBUMIN UR-MCNC: 30 MG/DL
APPEARANCE UR: CLEAR
B-HCG SERPL-ACNC: 2 IU/L (ref 0–5)
BACTERIA #/AREA URNS HPF: ABNORMAL /HPF
BASOPHILS # BLD AUTO: 0 10E9/L (ref 0–0.2)
BASOPHILS NFR BLD AUTO: 0.6 %
BILIRUB UR QL STRIP: NEGATIVE
COLOR UR AUTO: ABNORMAL
DIFFERENTIAL METHOD BLD: NORMAL
EOSINOPHIL # BLD AUTO: 0.2 10E9/L (ref 0–0.7)
EOSINOPHIL NFR BLD AUTO: 2.7 %
ERYTHROCYTE [DISTWIDTH] IN BLOOD BY AUTOMATED COUNT: 12.9 % (ref 10–15)
GLUCOSE UR STRIP-MCNC: NEGATIVE MG/DL
HCT VFR BLD AUTO: 39.1 % (ref 35–47)
HGB BLD-MCNC: 13.2 G/DL (ref 11.7–15.7)
HGB UR QL STRIP: ABNORMAL
IMM GRANULOCYTES # BLD: 0 10E9/L (ref 0–0.4)
IMM GRANULOCYTES NFR BLD: 0.1 %
KETONES UR STRIP-MCNC: NEGATIVE MG/DL
LEUKOCYTE ESTERASE UR QL STRIP: ABNORMAL
LYMPHOCYTES # BLD AUTO: 3.6 10E9/L (ref 0.8–5.3)
LYMPHOCYTES NFR BLD AUTO: 51.1 %
MCH RBC QN AUTO: 30.4 PG (ref 26.5–33)
MCHC RBC AUTO-ENTMCNC: 33.8 G/DL (ref 31.5–36.5)
MCV RBC AUTO: 90 FL (ref 78–100)
MONOCYTES # BLD AUTO: 0.5 10E9/L (ref 0–1.3)
MONOCYTES NFR BLD AUTO: 6.5 %
MUCOUS THREADS #/AREA URNS LPF: PRESENT /LPF
NEUTROPHILS # BLD AUTO: 2.8 10E9/L (ref 1.6–8.3)
NEUTROPHILS NFR BLD AUTO: 39 %
NITRATE UR QL: NEGATIVE
NRBC # BLD AUTO: 0 10*3/UL
NRBC BLD AUTO-RTO: 0 /100
PH UR STRIP: 5.5 PH (ref 4.7–8)
PLATELET # BLD AUTO: 352 10E9/L (ref 150–450)
RBC # BLD AUTO: 4.34 10E12/L (ref 3.8–5.2)
RBC #/AREA URNS AUTO: >182 /HPF (ref 0–2)
SOURCE: ABNORMAL
SP GR UR STRIP: 1.02 (ref 1–1.03)
SQUAMOUS #/AREA URNS AUTO: 9 /HPF (ref 0–1)
UROBILINOGEN UR STRIP-MCNC: NORMAL MG/DL (ref 0–2)
WBC # BLD AUTO: 7.1 10E9/L (ref 4–11)
WBC #/AREA URNS AUTO: 7 /HPF (ref 0–5)

## 2018-12-31 PROCEDURE — 36415 COLL VENOUS BLD VENIPUNCTURE: CPT | Mod: ZL | Performed by: NURSE PRACTITIONER

## 2018-12-31 PROCEDURE — 84702 CHORIONIC GONADOTROPIN TEST: CPT | Mod: ZL | Performed by: NURSE PRACTITIONER

## 2018-12-31 PROCEDURE — 85025 COMPLETE CBC W/AUTO DIFF WBC: CPT | Mod: ZL | Performed by: NURSE PRACTITIONER

## 2018-12-31 PROCEDURE — G0463 HOSPITAL OUTPT CLINIC VISIT: HCPCS

## 2018-12-31 PROCEDURE — 99214 OFFICE O/P EST MOD 30 MIN: CPT | Performed by: NURSE PRACTITIONER

## 2018-12-31 PROCEDURE — 81001 URINALYSIS AUTO W/SCOPE: CPT | Mod: ZL | Performed by: NURSE PRACTITIONER

## 2018-12-31 RX ORDER — HYDROXYZINE HYDROCHLORIDE 25 MG/1
25-50 TABLET, FILM COATED ORAL 3 TIMES DAILY PRN
Qty: 20 TABLET | Refills: 0 | Status: SHIPPED | OUTPATIENT
Start: 2018-12-31 | End: 2019-02-11

## 2018-12-31 ASSESSMENT — ANXIETY QUESTIONNAIRES
2. NOT BEING ABLE TO STOP OR CONTROL WORRYING: SEVERAL DAYS
7. FEELING AFRAID AS IF SOMETHING AWFUL MIGHT HAPPEN: MORE THAN HALF THE DAYS
4. TROUBLE RELAXING: MORE THAN HALF THE DAYS
6. BECOMING EASILY ANNOYED OR IRRITABLE: SEVERAL DAYS
1. FEELING NERVOUS, ANXIOUS, OR ON EDGE: MORE THAN HALF THE DAYS
3. WORRYING TOO MUCH ABOUT DIFFERENT THINGS: SEVERAL DAYS
GAD7 TOTAL SCORE: 10
IF YOU CHECKED OFF ANY PROBLEMS ON THIS QUESTIONNAIRE, HOW DIFFICULT HAVE THESE PROBLEMS MADE IT FOR YOU TO DO YOUR WORK, TAKE CARE OF THINGS AT HOME, OR GET ALONG WITH OTHER PEOPLE: SOMEWHAT DIFFICULT
5. BEING SO RESTLESS THAT IT IS HARD TO SIT STILL: SEVERAL DAYS

## 2018-12-31 ASSESSMENT — PATIENT HEALTH QUESTIONNAIRE - PHQ9: SUM OF ALL RESPONSES TO PHQ QUESTIONS 1-9: 5

## 2018-12-31 ASSESSMENT — PAIN SCALES - GENERAL: PAINLEVEL: SEVERE PAIN (6)

## 2018-12-31 ASSESSMENT — MIFFLIN-ST. JEOR: SCORE: 1588.69

## 2018-12-31 NOTE — PROGRESS NOTES
SUBJECTIVE:   Carloyn Mallory is a 23 year old female who presents to clinic today for the following health issues:      Pregnancy Confirmation:      Duration: last period 11-18 thru 24    Description (location/character/radiation): bright red blood, clots, abd cramping started Saturday, seen in ER, with mildly positive pregnancy testing     Intensity:  moderate    Accompanying signs and symptoms: was some breast tenderness, nausea previously    History (similar episodes/previous evaluation): None    Precipitating or alleviating factors: None    Therapies tried and outcome: None     Started to have panic attacks. She states she does relaxation breathing to calm herself down. She denies any counseling at this time. She states 2-3 times a week. Unknown triggers happens at any time day or night        Problem list and histories reviewed & adjusted, as indicated.  Additional history: as documented    Patient Active Problem List   Diagnosis     Allergic rhinitis     Food allergy     Patellofemoral syndrome of both knees     Sacro ilial pain     Calculus of kidney     ACP (advance care planning)     Chronic right-sided thoracic back pain     Obesity, unspecified obesity severity, unspecified obesity type     Tobacco use disorder     Esophageal reflux     Allergy to pollen     Astigmatism     BMI 34.0-34.9,adult     Hypermetropia     Past Surgical History:   Procedure Laterality Date     ARTHROSCOPY KNEE Right 5/4/2015    Procedure: ARTHROSCOPY KNEE;  Surgeon: Hernando Kulkarni MD;  Location: [facility]     AS ESOPHAGOSCOPY, DIAGNOSTIC      upper     LAPAROSCOPIC CHOLECYSTECTOMY N/A 10/10/2015    Procedure: LAPAROSCOPIC CHOLECYSTECTOMY;  Surgeon: Drew Padgett MD;  Location: [facility]     none         Social History     Tobacco Use     Smoking status: Current Every Day Smoker     Packs/day: 0.25     Years: 1.00     Pack years: 0.25     Types: Cigarettes     Start date: 1/20/2014     Smokeless tobacco: Never Used   Substance  Use Topics     Alcohol use: No     Alcohol/week: 0.0 oz     Family History   Problem Relation Age of Onset     Thrombophilia Mother         blood clotting     Lupus Mother         erythematosus     Diabetes Mother      Hypertension Father      Asthma Sister      Diabetes Maternal Grandfather      Hypertension Maternal Grandfather      Lupus Maternal Aunt         erythematosus         Current Outpatient Medications   Medication Sig Dispense Refill     diphenhydrAMINE (BENADRYL) 25 MG tablet Take 25 mg by mouth       EPINEPHrine (EPIPEN) 0.3 MG/0.3ML injection Inject 0.3 mLs (0.3 mg) into the muscle once as needed for anaphylaxis 4 each 6     famotidine (PEPCID) 40 MG tablet Take 1 tablet (40 mg) by mouth daily 90 tablet 3     fluticasone (FLONASE) 50 MCG/ACT spray Spray 1-2 sprays into both nostrils daily 1 Bottle 11     ibuprofen (ADVIL,MOTRIN) 400 MG tablet Take 2 tablets (800 mg) by mouth every 6 hours as needed for other (cramping) 60 tablet 1     loratadine (CLARITIN) 10 MG tablet Take 10 mg by mouth daily       Allergies   Allergen Reactions     Amoxicillin      Oxycodone Visual Disturbance, Nausea and Vomiting and Rash     Vomiting, hallucinations.     Tylenol [Acetaminophen] Other (See Comments) and Rash     1/06/17: Patient reports seeing spots after taking Tylenol.  Patient reports seeing spots after taking Tylenol.       Reviewed and updated as needed this visit by clinical staff       Reviewed and updated as needed this visit by Provider         ROS:  CONSTITUTIONAL: NEGATIVE for fever, chills, change in weight  INTEGUMENTARY/SKIN: NEGATIVE for worrisome rashes, moles or lesions  ENT/MOUTH: NEGATIVE for ear, mouth and throat problems  RESP: NEGATIVE for significant cough or SOB  BREAST: NEGATIVE for masses, tenderness or discharge  CV: NEGATIVE for chest pain, palpitations or peripheral edema  GI: lower abdominal cramping  : denies dysuria, vaginal bleeding with clots   MUSCULOSKELETAL: occasional pain  "into right shoulder and elbow  NEURO: NEGATIVE for weakness, dizziness or paresthesias  ENDOCRINE: NEGATIVE for temperature intolerance, skin/hair changes  PSYCHIATRIC: possible panic attacks     OBJECTIVE:     /62   Pulse 92   Resp 18   Ht 1.575 m (5' 2\")   Wt 87.5 kg (193 lb)   LMP 11/18/2018 (Exact Date)   SpO2 98%   Breastfeeding? No   BMI 35.30 kg/m    Body mass index is 35.3 kg/m .   GENERAL: healthy, alert and no distress  NECK: no adenopathy, no asymmetry, masses, or scars and thyroid normal to palpation  RESP: lungs clear to auscultation - no rales, rhonchi or wheezes  CV: regular rate and rhythm, normal S1 S2, no S3 or S4, no murmur, click or rub, no peripheral edema and peripheral pulses strong  ABDOMEN: tenderness suprapubic and bowel sounds normal  MS: no edema, peripheral pulses normal and tenderness to palpation right anterior shoulder  SKIN: no suspicious lesions or rashes  PSYCH: mentation appears normal    Diagnostic Test Results:  Results for orders placed or performed in visit on 12/31/18 (from the past 24 hour(s))   HCG Quantitative Pregnancy, Blood ([id number])   Result Value Ref Range    HCG Quantitative Serum 2 0 - 5 IU/L   UA reflex to Microscopic and Culture - [place]   Result Value Ref Range    Color Urine Light Red     Appearance Urine Clear     Glucose Urine Negative NEG^Negative mg/dL    Bilirubin Urine Negative NEG^Negative    Ketones Urine Negative NEG^Negative mg/dL    Specific Gravity Urine 1.018 1.003 - 1.035    Blood Urine Large (A) NEG^Negative    pH Urine 5.5 4.7 - 8.0 pH    Protein Albumin Urine 30 (A) NEG^Negative mg/dL    Urobilinogen mg/dL Normal 0.0 - 2.0 mg/dL    Nitrite Urine Negative NEG^Negative    Leukocyte Esterase Urine Small (A) NEG^Negative    Source Midstream Urine     RBC Urine >182 (H) 0 - 2 /HPF    WBC Urine 7 (H) 0 - 5 /HPF    Bacteria Urine Few (A) NEG^Negative /HPF    Squamous Epithelial /HPF Urine 9 (H) 0 - 1 /HPF    Mucous Urine Present (A) " NEG^Negative /LPF   CBC with platelets and differential   Result Value Ref Range    WBC 7.1 4.0 - 11.0 10e9/L    RBC Count 4.34 3.8 - 5.2 10e12/L    Hemoglobin 13.2 11.7 - 15.7 g/dL    Hematocrit 39.1 35.0 - 47.0 %    MCV 90 78 - 100 fl    MCH 30.4 26.5 - 33.0 pg    MCHC 33.8 31.5 - 36.5 g/dL    RDW 12.9 10.0 - 15.0 %    Platelet Count 352 150 - 450 10e9/L    Diff Method Automated Method     % Neutrophils 39.0 %    % Lymphocytes 51.1 %    % Monocytes 6.5 %    % Eosinophils 2.7 %    % Basophils 0.6 %    % Immature Granulocytes 0.1 %    Nucleated RBCs 0 0 /100    Absolute Neutrophil 2.8 1.6 - 8.3 10e9/L    Absolute Lymphocytes 3.6 0.8 - 5.3 10e9/L    Absolute Monocytes 0.5 0.0 - 1.3 10e9/L    Absolute Eosinophils 0.2 0.0 - 0.7 10e9/L    Absolute Basophils 0.0 0.0 - 0.2 10e9/L    Abs Immature Granulocytes 0.0 0 - 0.4 10e9/L    Absolute Nucleated RBC 0.0        ASSESSMENT/PLAN:     1. Possible pregnancy  Quantitative level has decreased to non-pregnant. Reviewed labs. Discussed with [place] when to follow up and when to go to the ER.   - HCG Quantitative Pregnancy, Blood ([id number])  - CBC with platelets and differential    2. Tobacco abuse  Tobacco cessation was strongly encouraged. Every year of smoking cessation offer health benefits.   - Tobacco Cessation - Order to Satisfy Health Maintenance    3. Tobacco abuse counseling  As above    4. Abdominal pain, suprapubic  UA reviewed no treatment at this time. Plan to continue to monitor and she should follow up if no improvement or worsening symptoms.   - UA reflex to Microscopic and Culture - [place]    5. Panic attack  Due to frequency of panic attacks Carolyn should consider counseling. Did provide her with a prescription for hydroxyzine to use as needed. Discussed how medications works and side effects.   - hydrOXYzine (ATARAX) 25 MG tablet; Take 1-2 tablets (25-50 mg) by mouth 3 times daily as needed for anxiety  Dispense: 20 tablet; Refill: 0    Note provided for Carolyn to  return to work without restrictions. She continues with physical therapy, but has improved ROM and decreased pain.     Tobacco Cessation:   reports that she has been smoking cigarettes.  She started smoking about 4 years ago. She has a 0.25 pack-year smoking history. she has never used smokeless tobacco.  Tobacco Cessation Action Plan: Self help information given to patient    Follow up as needed or any concerns or questions.   See Patient Instructions    AMELIA Martin CNP  [facility] - [place]

## 2018-12-31 NOTE — PATIENT INSTRUCTIONS
Patient Education     Abdominal Pain    Abdominal pain is pain in the stomach or belly area. Everyone has this pain from time to time. In many cases it goes away on its own. But abdominal pain can sometimes be due to a serious problem, such as appendicitis. So it s important to know when to seek help.  Causes of abdominal pain  There are many possible causes of abdominal pain. Common causes in adults include:    Constipation, diarrhea, or gas    Stomach acid flowing back up into the esophagus (acid reflux or heartburn)    Severe acid reflux, called GERD (gastroesophageal reflux disease)    A sore in the lining of the stomach or small intestine (peptic ulcer)    Inflammation of the gallbladder, liver, or pancreas    Gallstones or kidney stones    Appendicitis     Intestinal blockage     An internal organ pushing through a muscle or other tissue (hernia)    Urinary tract infections    In women, menstrual cramps, fibroids, or endometriosis    Inflammation or infection of the intestines  Diagnosing the cause of abdominal pain  Your healthcare provider will do a physical exam help find the cause of your pain. If needed, tests will be ordered. Belly pain has many possible causes. So it can be hard to find the reason for your pain. Giving details about your pain can help. Tell your provider where and when you feel the pain, and what makes it better or worse. Also let your provider know if you have other symptoms such as:    Fever    Tiredness    Upset stomach (nausea)    Vomiting    Changes in bathroom habits  Treating abdominal pain  Some causes of pain need emergency medical treatment right away. These include appendicitis or a bowel blockage. Other problems can be treated with rest, fluids, or medicines. Your healthcare provider can give you specific instructions for treatment or self-care based on what is causing your pain.  If you have vomiting or diarrhea, sip water or other clear fluids. When you are ready to eat  solid foods again, start with small amounts of easy-to-digest, low-fat foods. These include apple sauce, toast, or crackers.   When to seek medical care  Call 911 or go to the hospital right away if you:    Can t pass stool and are vomiting    Are vomiting blood or have bloody diarrhea or black, tarry diarrhea    Have chest, neck, or shoulder pain    Feel like you might pass out    Have pain in your shoulder blades with nausea    Have sudden, severe belly pain    Have new, severe pain unlike any you have felt before    Have a belly that is rigid, hard, and tender to touch  Call your healthcare provider if you have:    Pain for more than 5 days    Bloating for more than 2 days    Diarrhea for more than 5 days    A fever of 100.4 F (38 C) or higher, or as directed by your healthcare provider    Pain that gets worse    Weight loss for no reason    Continued lack of appetite    Blood in your stool  How to prevent abdominal pain  Here are some tips to help prevent abdominal pain:    Eat smaller amounts of food at one time.    Avoid greasy, fried, or other high-fat foods.    Avoid foods that give you gas.    Exercise regularly.    Drink plenty of fluids.  To help prevent GERD symptoms:    Quit smoking.    Reduce alcohol and certain foods that increase stomach acid.    Avoid aspirin and over-the-counter pain and fever medicines (NSAIDS or nonsteroidal anti-inflammatory drugs), if possible    Lose extra weight.    Finish eating at least 2 hours before you go to bed or lie down.    Raise the head of your bed.  Date Last Reviewed: 7/1/2016 2000-2018 [employer]. [address] [place] [address], [place] [address]. All rights reserved. This information is not intended as a substitute for professional medical care. Always follow your healthcare professional's instructions.           HOW TO QUIT SMOKING  Smoking is one of the hardest habits to break. About half of all those who have ever smoked have been able to quit, and  most of those (about 70%) who still smoke want to quit. Here are some of the best ways to stop smoking.     KEEP TRYING:  It takes most smokers about 8 tries before they are finally able to fully quit. So, the more often you try and fail, the better your chance of quitting the next time! So, don't give up!    GO COLD TURKEY:  Most ex-smokers quit cold turkey. Trying to cut back gradually doesn't seem to work as well, perhaps because it continues the smoking habit. Also, it is possible to fool yourself by inhaling more while smoking fewer cigarettes. This results in the same amount of nicotine in your body!    GET SUPPORT:  Support programs can make an important difference, especially for the heavy smoker. These groups offer lectures, methods to change your behavior and peer support. Call the free national Quitline for more information. 800-QUIT-NOW (266-457-6940). Low-cost or free programs are offered by many hospitals, local chapters of the American Lung Association (225-191-1250) and the American Cancer Society (920-714-0773). Support at home is important too. Non-smokers can help by offering praise and encouragement. If the smoker fails to quit, encourage them to try again!    OVER-THE-COUNTER MEDICINES:  For those who can't quit on their own, Nicotine Replacement Therapy (NRT) may make quitting much easier. Certain aids such as the nicotine patch, gum and lozenge are available without a prescription. However, it is best to use these under the guidance of your doctor. The skin patch provides a steady supply of nicotine to the body. Nicotine gum and lozenge gives temporary bursts of low levels of nicotine. Both methods take the edge off the craving for cigarettes. WARNING: If you feel symptoms of nicotine overdose, such as nausea, vomiting, dizziness, weakness, or fast heartbeat, stop using these and see your doctor.    PRESCRIPTION MEDICINES:  After evaluating your smoking patterns and prior attempts at quitting,  your doctor may offer a prescription medicine such as bupropion (Zyban, Wellbutrin), varenicline (Chantix, Champix), a niocotine inhaler or nasal spray. Each has its unique advantage and side effects which your doctor can review with you.    HEALTH BENEFITS OF QUITTING:  The benefits of quitting start right away and keep improving the longer you go without smokin minutes: blood pressure and pulse return to normal  8 hours: oxygen levels return to normal  2 days: ability to smell and taste begins to improve as damaged nerves start to regrow  2-3 weeks: circulation and lung function improves  1-9 months: decreased cough, congestion and shortness of breath; less tired  1 year: risk of heart attack decreases by half  5 years: risk of lung cancer decreases by half; risk of stroke becomes the same as a non-smoker  For information about how to quit smoking, visit the following links:  National [employer] ,   Clearing the Air, Quit Smoking Today   - an online booklet. http://www.smokefree.gov/pubs/clearing_the_air.pdf  Smokefree.gov http://smokefree.gov/  QuitNet http://www.quitnet.com/    8612-9953 [employer] [facility], [address] [place] [address]. All rights reserved. This information is not intended as a substitute for professional medical care. Always follow your healthcare professional's instructions.    The Benefits of Living Smoke Free  What do you want to gain from quitting? Check off some reasons to quit.  Health Benefits  ___ Reduce my risk of lung cancer, heart disease, chronic lung disease  ___ Have fewer wrinkles and softer skin  ___ Improve my sense of taste and smell  ___ For pregnant women--reduce the risk of having a miscarriage, stillbirth, premature birth, or low-birth-weight baby  Personal Benefits  ___ Feel more in control of my life  ___ Have better-smelling hair, breath, clothes, home, and car  ___ Save time by not having to take smoke breaks, buy cigarettes, or hunt for a  light  ___ Have whiter teeth  Family Benefits  ___ Reduce my children s respiratory tract infections  ___ Set a good example for my children  ___ Reduce my family s cancer risk  Financial Benefits  ___ Save hundreds of dollars each year that would be spent on cigarettes  ___ Save money on medical bills  ___ Save on life, health, and car insurance premiums    Those Dollars Add Up!  Cigarettes are expensive, and getting more expensive all the time. Do you realize how much money you are spending on cigarettes per year? What is the average amount you spend on a pack of cigarettes? What is the average number of packs that you smoke per day? Using your answers to these questions, fill in this formula to help you find out:  ($ _____ per pack) ×  ( _____ number of packs per day) × (365 days) =  $ _____ yearly cost of smoking  Besides tobacco, there are other costs, including extra cleaning bills and replacement costs for clothing and furniture; medical expenses for smoking-related illnesses; and higher health, life, and car insurance premiums.    Cigars and Pipes Count Too!  Cigars and pipes are also dangerous. So are smokeless (chewing) tobacco and snuff. All of these products contain nicotine, a highly addictive substance that has harmful effects on your body. Quitting smoking means giving up all tobacco products.      2014-9499 [employer], [address] [place] [address], [place] [address]. All rights reserved. This information is not intended as a substitute for professional medical care. Always follow your healthcare professional's instructions.

## 2018-12-31 NOTE — NURSING NOTE
"Chief Complaint   Patient presents with     Pregnancy Test       Initial /62   Pulse 92   Resp 18   Ht 1.575 m (5' 2\")   Wt 87.5 kg (193 lb)   LMP 11/18/2018 (Exact Date)   SpO2 98%   Breastfeeding? No   BMI 35.30 kg/m   Estimated body mass index is 35.3 kg/m  as calculated from the following:    Height as of this encounter: 1.575 m (5' 2\").    Weight as of this encounter: 87.5 kg (193 lb).  Medication Reconciliation: complete    Carly Perez, SYED    "

## 2018-12-31 NOTE — LETTER
December 31, 2018      Carolyn Mallory  [address]   [place] [address]        To Whom It May Concern:    Carolyn Mallory was seen in our clinic. She may return to work without restrictions.      Sincerely,        AMELIA Martin CNP

## 2019-01-01 ASSESSMENT — ANXIETY QUESTIONNAIRES: GAD7 TOTAL SCORE: 10

## 2019-01-21 ENCOUNTER — OFFICE VISIT (OUTPATIENT)
Dept: FAMILY MEDICINE | Facility: OTHER | Age: 24
End: 2019-01-21
Attending: NURSE PRACTITIONER
Payer: COMMERCIAL

## 2019-01-21 VITALS
HEART RATE: 96 BPM | BODY MASS INDEX: 35.33 KG/M2 | HEIGHT: 62 IN | WEIGHT: 192 LBS | OXYGEN SATURATION: 98 % | SYSTOLIC BLOOD PRESSURE: 100 MMHG | TEMPERATURE: 98.9 F | DIASTOLIC BLOOD PRESSURE: 68 MMHG

## 2019-01-21 DIAGNOSIS — Z32.00 POSSIBLE PREGNANCY: Primary | ICD-10-CM

## 2019-01-21 DIAGNOSIS — Z71.6 TOBACCO ABUSE COUNSELING: ICD-10-CM

## 2019-01-21 DIAGNOSIS — Z72.0 TOBACCO ABUSE: ICD-10-CM

## 2019-01-21 LAB — B-HCG SERPL-ACNC: <1 IU/L (ref 0–5)

## 2019-01-21 PROCEDURE — 99213 OFFICE O/P EST LOW 20 MIN: CPT | Performed by: NURSE PRACTITIONER

## 2019-01-21 PROCEDURE — 36415 COLL VENOUS BLD VENIPUNCTURE: CPT | Mod: ZL | Performed by: NURSE PRACTITIONER

## 2019-01-21 PROCEDURE — G0463 HOSPITAL OUTPT CLINIC VISIT: HCPCS

## 2019-01-21 PROCEDURE — 84702 CHORIONIC GONADOTROPIN TEST: CPT | Mod: ZL | Performed by: NURSE PRACTITIONER

## 2019-01-21 RX ORDER — PNV NO.95/FERROUS FUM/FOLIC AC 28MG-0.8MG
1 TABLET ORAL DAILY
Qty: 90 TABLET | Refills: 3 | Status: SHIPPED | OUTPATIENT
Start: 2019-01-21 | End: 2019-11-26

## 2019-01-21 ASSESSMENT — PAIN SCALES - GENERAL: PAINLEVEL: MILD PAIN (2)

## 2019-01-21 ASSESSMENT — MIFFLIN-ST. JEOR: SCORE: 1584.16

## 2019-01-21 NOTE — NURSING NOTE
"Chief Complaint   Patient presents with     Pregnancy Test       Initial /68 (BP Location: Left arm, Patient Position: Sitting, Cuff Size: Adult Regular)   Pulse 96   Temp 98.9  F (37.2  C)   Ht 1.575 m (5' 2\")   Wt 87.1 kg (192 lb)   SpO2 98%   BMI 35.12 kg/m   Estimated body mass index is 35.12 kg/m  as calculated from the following:    Height as of this encounter: 1.575 m (5' 2\").    Weight as of this encounter: 87.1 kg (192 lb).  Medication Reconciliation: complete    Conchis Estrada LPN  "

## 2019-01-21 NOTE — PATIENT INSTRUCTIONS

## 2019-01-21 NOTE — PROGRESS NOTES
SUBJECTIVE:   Carolyn Mallory is a 23 year old female who presents to clinic today for the following health issues:        Possible Pregnancy       Duration: 1 month    Description (location/character/radiation): Had miscarriage 1 month ago, pregnancy symptoms have returned.    Intensity:  mild    Accompanying signs and symptoms: Nausea, low back pain, headaches, vomiting    History (similar episodes/previous evaluation): Recent pregnancy    Precipitating or alleviating factors: None    Therapies tried and outcome: None         Problem list and histories reviewed & adjusted, as indicated.  Additional history: as documented    Patient Active Problem List   Diagnosis     Allergic rhinitis     Food allergy     Patellofemoral syndrome of both knees     Sacro ilial pain     Calculus of kidney     ACP (advance care planning)     Chronic right-sided thoracic back pain     Obesity, unspecified obesity severity, unspecified obesity type     Tobacco use disorder     Esophageal reflux     Allergy to pollen     Astigmatism     BMI 34.0-34.9,adult     Hypermetropia     Past Surgical History:   Procedure Laterality Date     ARTHROSCOPY KNEE Right 5/4/2015    Procedure: ARTHROSCOPY KNEE;  Surgeon: Hernando Kulkarni MD;  Location: [facility]     AS ESOPHAGOSCOPY, DIAGNOSTIC      upper     LAPAROSCOPIC CHOLECYSTECTOMY N/A 10/10/2015    Procedure: LAPAROSCOPIC CHOLECYSTECTOMY;  Surgeon: Drew Padgett MD;  Location: [facility]     none         Social History     Tobacco Use     Smoking status: Current Every Day Smoker     Packs/day: 0.25     Years: 1.00     Pack years: 0.25     Types: Cigarettes     Start date: 1/20/2014     Smokeless tobacco: Never Used   Substance Use Topics     Alcohol use: No     Alcohol/week: 0.0 oz     Family History   Problem Relation Age of Onset     Thrombophilia Mother         blood clotting     Lupus Mother         erythematosus     Diabetes Mother      Hypertension Father      Asthma Sister      Diabetes  Maternal Grandfather      Hypertension Maternal Grandfather      Lupus Maternal Aunt         erythematosus         Current Outpatient Medications   Medication Sig Dispense Refill     diphenhydrAMINE (BENADRYL) 25 MG tablet Take 25 mg by mouth       EPINEPHrine (EPIPEN) 0.3 MG/0.3ML injection Inject 0.3 mLs (0.3 mg) into the muscle once as needed for anaphylaxis 4 each 6     famotidine (PEPCID) 40 MG tablet Take 1 tablet (40 mg) by mouth daily 90 tablet 3     fluticasone (FLONASE) 50 MCG/ACT spray Spray 1-2 sprays into both nostrils daily 1 Bottle 11     ibuprofen (ADVIL,MOTRIN) 400 MG tablet Take 2 tablets (800 mg) by mouth every 6 hours as needed for other (cramping) 60 tablet 1     loratadine (CLARITIN) 10 MG tablet Take 10 mg by mouth daily       Allergies   Allergen Reactions     Amoxicillin      Oxycodone Visual Disturbance, Nausea and Vomiting and Rash     Vomiting, hallucinations.     Tylenol [Acetaminophen] Other (See Comments) and Rash     1/06/17: Patient reports seeing spots after taking Tylenol.  Patient reports seeing spots after taking Tylenol.       Reviewed and updated as needed this visit by clinical staff       Reviewed and updated as needed this visit by Provider         ROS:  CONSTITUTIONAL: NEGATIVE for fever, chills, change in weight  INTEGUMENTARY/SKIN: NEGATIVE for worrisome rashes, moles or lesions  ENT/MOUTH: NEGATIVE for ear, mouth and throat problems  RESP:NEGATIVE for significant cough or SOB  CV: NEGATIVE for chest pain, palpitations or peripheral edema  GI:  constipation, heartburn or reflux and nausea  : denies dysuria, LMS normal about 2 months ago, did have some bleeding and clotting last month  MUSCULOSKELETAL: NEGATIVE for significant arthralgias or myalgia  NEURO: NEGATIVE for weakness, dizziness or paresthesias  PSYCHIATRIC: anxiety     OBJECTIVE:     /68 (BP Location: Left arm, Patient Position: Sitting, Cuff Size: Adult Regular)   Pulse 96   Temp 98.9  F (37.2  C)    "Ht 1.575 m (5' 2\")   Wt 87.1 kg (192 lb)   SpO2 98%   BMI 35.12 kg/m    Body mass index is 35.12 kg/m .   GENERAL: healthy, alert and no distress  NECK: no adenopathy, no asymmetry, masses, or scars and thyroid normal to palpation  RESP: lungs clear to auscultation - no rales, rhonchi or wheezes  CV: regular rate and rhythm, normal S1 S2, no S3 or S4, no murmur, click or rub, no peripheral edema and peripheral pulses strong  ABDOMEN: soft, nontender, no hepatosplenomegaly, no masses and bowel sounds normal  SKIN: no suspicious lesions or rashes  PSYCH: mentation appears normal and affect flat  LYMPH: normal ant/post cervical, supraclavicular nodes    Diagnostic Test Results:  Results for orders placed or performed in visit on 01/21/19 (from the past 24 hour(s))   HCG Quantitative Pregnancy, Blood ([id number])   Result Value Ref Range    HCG Quantitative Serum <1 0 - 5 IU/L       ASSESSMENT/PLAN:     1. Possible pregnancy  Negative pregnancy test today. Carolyn would like to become pregnant. Plan to start prenatal vitamins. If she continues to have symptoms or becomes pregnant can refer to OB/GYN  - HCG Quantitative Pregnancy, Blood ([id number])  - Prenatal Vit-Fe Fumarate-FA (PRENATAL VITAMIN AND MINERAL) 28-0.8 MG TABS; Take 1 tablet by mouth daily  Dispense: 90 tablet; Refill: 3    2. Tobacco abuse  Encouraging smoking cessation. Tobacco cessation was strongly encouraged. Every year of smoking cessation offer health benefits.   - Tobacco Cessation - Order to Satisfy Health Maintenance    3. Tobacco abuse counseling  As above      Tobacco Cessation:   reports that she has been smoking cigarettes.  She started smoking about 5 years ago. She has a 0.25 pack-year smoking history. she has never used smokeless tobacco.  Tobacco Cessation Action Plan: Self help information given to patient    Follow up with any questions or concerns.     See Patient Instructions    AMELIA [facility] - " [place]

## 2019-01-28 ENCOUNTER — MYC MEDICAL ADVICE (OUTPATIENT)
Dept: FAMILY MEDICINE | Facility: OTHER | Age: 24
End: 2019-01-28

## 2019-02-11 ENCOUNTER — OFFICE VISIT (OUTPATIENT)
Dept: FAMILY MEDICINE | Facility: OTHER | Age: 24
End: 2019-02-11
Attending: NURSE PRACTITIONER
Payer: COMMERCIAL

## 2019-02-11 VITALS
HEART RATE: 88 BPM | WEIGHT: 190 LBS | OXYGEN SATURATION: 98 % | TEMPERATURE: 97.8 F | SYSTOLIC BLOOD PRESSURE: 104 MMHG | DIASTOLIC BLOOD PRESSURE: 66 MMHG | BODY MASS INDEX: 34.96 KG/M2 | HEIGHT: 62 IN

## 2019-02-11 DIAGNOSIS — Z71.6 TOBACCO ABUSE COUNSELING: ICD-10-CM

## 2019-02-11 DIAGNOSIS — Z72.0 TOBACCO ABUSE: ICD-10-CM

## 2019-02-11 DIAGNOSIS — Z32.01 POSITIVE URINE PREGNANCY TEST: Primary | ICD-10-CM

## 2019-02-11 LAB — HCG UR QL: POSITIVE

## 2019-02-11 PROCEDURE — 99213 OFFICE O/P EST LOW 20 MIN: CPT | Performed by: NURSE PRACTITIONER

## 2019-02-11 PROCEDURE — 81025 URINE PREGNANCY TEST: CPT | Mod: ZL | Performed by: NURSE PRACTITIONER

## 2019-02-11 PROCEDURE — G0463 HOSPITAL OUTPT CLINIC VISIT: HCPCS

## 2019-02-11 ASSESSMENT — PAIN SCALES - GENERAL: PAINLEVEL: NO PAIN (0)

## 2019-02-11 ASSESSMENT — MIFFLIN-ST. JEOR: SCORE: 1575.08

## 2019-02-11 NOTE — NURSING NOTE
"Chief Complaint   Patient presents with     Pregnancy Test       Initial /66 (BP Location: Right arm, Patient Position: Sitting, Cuff Size: Adult Large)   Pulse 88   Temp 97.8  F (36.6  C) (Tympanic)   Ht 1.575 m (5' 2\")   Wt 86.2 kg (190 lb)   SpO2 98%   BMI 34.75 kg/m   Estimated body mass index is 34.75 kg/m  as calculated from the following:    Height as of this encounter: 1.575 m (5' 2\").    Weight as of this encounter: 86.2 kg (190 lb).  Medication Reconciliation: complete    Conchis Estrada LPN  "

## 2019-02-11 NOTE — PATIENT INSTRUCTIONS
Patient Education     Adapting to Pregnancy: First Trimester    As your body adjusts, you may have to change or limit your daily activities. You ll need more rest. You may also need to use the energy you have more wisely.  Your changing body  Almost every part of your body is affected as you adapt to pregnancy. The uterus and cervix will begin to soften right away. You may not look very pregnant during the first 3 months. But you are likely to have some common signs of early pregnancy:    Nausea    Fatigue    Frequent urination    Mood swings    Bloating of the belly    Constipation    Heartburn    Missed or light periods (first trimester bleeding)    Nipple or breast tenderness and breast swelling  It s not too late to start good habits  What matters most is protecting your baby from this moment on. If you smoke, drink alcohol, or use drugs, now is the time to stop. If you need help, talk with your healthcare provider:    Smoking increases the risk of stillbirth or having a low-birth-weight baby. If you smoke, quit now.    Alcohol and drugs have been linked with miscarriage, birth defects, intellectual disability, and low birth weight. Do not drink alcohol or take drugs.  Tips to relieve nausea  Although nausea can happen at any time of the day, it may be worse in the morning. To help prevent nausea:    Eat small, light meals at frequent intervals.    Drink fluids often.    Get up slowly. Eat a few unsalted crackers before you get out of bed.    Avoid smells that bother you.    Avoid spicy and fatty foods.    Eat an ice pop in your favorite flavor.    Get plenty of rest.    Ask your healthcare provider about taking juana or vitamin B6 for nausea and vomiting.    Talk with your healthcare provider if you take vitamins that upset your stomach.  Work concerns  The end of the first trimester is a good time to discuss working during pregnancy with your employer. Follow your healthcare provider s advice if your job  needs you to stand for a long time, work with hazardous tools, or even sit at a desk all day. Your workspace, workload, or scheduled hours may need to be adjusted. Perhaps you can change body postures more often or take an extra break.  Advice for travel  Talk to your healthcare provider first, but the second trimester may be the best time for any travel. You may be advised to avoid certain trips while you re pregnant. Food and water can be concerns in developing countries. Travel by car is a good choice, as you can stop, get out, and stretch. Bring snacks and water along. Fasten the lap belt below your belly, low over your hips. Also be sure to wear the shoulder harness.  Intimacy  Unless your healthcare provider tells you to, there is no reason to stop having sex while you re pregnant. You or your partner may notice changes in desire. Desire may be less in the first trimester, due to nausea and fatigue. In the second trimester, sex may be very enjoyable. The third trimester can be a challenge comfort-wise. Try different positions and see what s best for you both.  Date Last Reviewed: 10/1/2017    [id number] [employer]. [address] [place] [address], [place] [id number]. All rights reserved. This information is not intended as a substitute for professional medical care. Always follow your healthcare professional's instructions.           HOW TO QUIT SMOKING  Smoking is one of the hardest habits to break. About half of all those who have ever smoked have been able to quit, and most of those (about 70%) who still smoke want to quit. Here are some of the best ways to stop smoking.     KEEP TRYING:  It takes most smokers about 8 tries before they are finally able to fully quit. So, the more often you try and fail, the better your chance of quitting the next time! So, don't give up!    GO COLD TURKEY:  Most ex-smokers quit cold turkey. Trying to cut back gradually doesn't seem to work as well, perhaps because it  continues the smoking habit. Also, it is possible to fool yourself by inhaling more while smoking fewer cigarettes. This results in the same amount of nicotine in your body!    GET SUPPORT:  Support programs can make an important difference, especially for the heavy smoker. These groups offer lectures, methods to change your behavior and peer support. Call the free national Quitline for more information. 800-QUIT-NOW (266-912-5437). Low-cost or free programs are offered by many hospitals, local chapters of the American Lung Association (607-334-5805) and the American Cancer Society (710-384-4554). Support at home is important too. Non-smokers can help by offering praise and encouragement. If the smoker fails to quit, encourage them to try again!    OVER-THE-COUNTER MEDICINES:  For those who can't quit on their own, Nicotine Replacement Therapy (NRT) may make quitting much easier. Certain aids such as the nicotine patch, gum and lozenge are available without a prescription. However, it is best to use these under the guidance of your doctor. The skin patch provides a steady supply of nicotine to the body. Nicotine gum and lozenge gives temporary bursts of low levels of nicotine. Both methods take the edge off the craving for cigarettes. WARNING: If you feel symptoms of nicotine overdose, such as nausea, vomiting, dizziness, weakness, or fast heartbeat, stop using these and see your doctor.    PRESCRIPTION MEDICINES:  After evaluating your smoking patterns and prior attempts at quitting, your doctor may offer a prescription medicine such as bupropion (Zyban, Wellbutrin), varenicline (Chantix, Champix), a niocotine inhaler or nasal spray. Each has its unique advantage and side effects which your doctor can review with you.    HEALTH BENEFITS OF QUITTING:  The benefits of quitting start right away and keep improving the longer you go without smokin minutes: blood pressure and pulse return to normal  8 hours: oxygen  levels return to normal  2 days: ability to smell and taste begins to improve as damaged nerves start to regrow  2-3 weeks: circulation and lung function improves  1-9 months: decreased cough, congestion and shortness of breath; less tired  1 year: risk of heart attack decreases by half  5 years: risk of lung cancer decreases by half; risk of stroke becomes the same as a non-smoker  For information about how to quit smoking, visit the following links:  National Cancer [place] ,   Clearing the Air, Quit Smoking Today   - an online booklet. http://www.smokefree.gov/pubs/clearing_the_air.pdf  Smokefree.gov http://smokefree.gov/  QuitNet http://www.quitnet.com/    1527-8563 [employer], [address] [place] [address], [place] [address]. All rights reserved. This information is not intended as a substitute for professional medical care. Always follow your healthcare professional's instructions.    The Benefits of Living Smoke Free  What do you want to gain from quitting? Check off some reasons to quit.  Health Benefits  ___ Reduce my risk of lung cancer, heart disease, chronic lung disease  ___ Have fewer wrinkles and softer skin  ___ Improve my sense of taste and smell  ___ For pregnant women--reduce the risk of having a miscarriage, stillbirth, premature birth, or low-birth-weight baby  Personal Benefits  ___ Feel more in control of my life  ___ Have better-smelling hair, breath, clothes, home, and car  ___ Save time by not having to take smoke breaks, buy cigarettes, or hunt for a light  ___ Have whiter teeth  Family Benefits  ___ Reduce my children s respiratory tract infections  ___ Set a good example for my children  ___ Reduce my family s cancer risk  Financial Benefits  ___ Save hundreds of dollars each year that would be spent on cigarettes  ___ Save money on medical bills  ___ Save on life, health, and car insurance premiums    Those Dollars Add Up!  Cigarettes are expensive, and getting more expensive all the  time. Do you realize how much money you are spending on cigarettes per year? What is the average amount you spend on a pack of cigarettes? What is the average number of packs that you smoke per day? Using your answers to these questions, fill in this formula to help you find out:  ($ _____ per pack) ×  ( _____ number of packs per day) × (365 days) =  $ _____ yearly cost of smoking  Besides tobacco, there are other costs, including extra cleaning bills and replacement costs for clothing and furniture; medical expenses for smoking-related illnesses; and higher health, life, and car insurance premiums.    Cigars and Pipes Count Too!  Cigars and pipes are also dangerous. So are smokeless (chewing) tobacco and snuff. All of these products contain nicotine, a highly addictive substance that has harmful effects on your body. Quitting smoking means giving up all tobacco products.      5642-0891 [address], [place] [address]. All rights reserved. This information is not intended as a substitute for professional medical care. Always follow your healthcare professional's instructions.

## 2019-02-11 NOTE — PROGRESS NOTES
SUBJECTIVE:   Carolyn Mallory is a 23 year old female who presents to clinic today for the following health issues:        Pregnancy Test      Duration: 1 week    Description (location/character/radiation): sore breast, nausea and vomiting    Intensity:  mild    Accompanying signs and symptoms: none    History (similar episodes/previous evaluation): Has not had period for 2 months, had miscarriage 1.5 months ago    Precipitating or alleviating factors: None    Therapies tried and outcome: None       Carolyn had a possible pregnancy 12/29/2018 with Quantitative slightly elevated at 8. She did was having heavy bleeding/clots and on 12/31/2018 negative pregnancy with value of 2.  She continued to have symptoms on 1/21/19 with a quantitative ang of <1.  She denies any menstrual cycle since that time.     Problem list and histories reviewed & adjusted, as indicated.  Additional history: as documented    Patient Active Problem List   Diagnosis     Allergic rhinitis     Food allergy     Patellofemoral syndrome of both knees     Sacro ilial pain     Calculus of kidney     ACP (advance care planning)     Chronic right-sided thoracic back pain     Obesity, unspecified obesity severity, unspecified obesity type     Tobacco use disorder     Esophageal reflux     Allergy to pollen     Astigmatism     BMI 34.0-34.9,adult     Hypermetropia     Past Surgical History:   Procedure Laterality Date     ARTHROSCOPY KNEE Right 5/4/2015    Procedure: ARTHROSCOPY KNEE;  Surgeon: Hernando Kulkarni MD;  Location: [facility]     AS ESOPHAGOSCOPY, DIAGNOSTIC      upper     LAPAROSCOPIC CHOLECYSTECTOMY N/A 10/10/2015    Procedure: LAPAROSCOPIC CHOLECYSTECTOMY;  Surgeon: Drew Padgett MD;  Location: [facility]     none         Social History     Tobacco Use     Smoking status: Current Every Day Smoker     Packs/day: 0.25     Years: 1.00     Pack years: 0.25     Types: Cigarettes     Start date: 1/20/2014     Smokeless tobacco: Never Used   Substance  Use Topics     Alcohol use: No     Alcohol/week: 0.0 oz     Family History   Problem Relation Age of Onset     Thrombophilia Mother         blood clotting     Lupus Mother         erythematosus     Diabetes Mother      Hypertension Father      Asthma Sister      Diabetes Maternal Grandfather      Hypertension Maternal Grandfather      Lupus Maternal Aunt         erythematosus         Current Outpatient Medications   Medication Sig Dispense Refill     diphenhydrAMINE (BENADRYL) 25 MG tablet Take 25 mg by mouth       EPINEPHrine (EPIPEN) 0.3 MG/0.3ML injection Inject 0.3 mLs (0.3 mg) into the muscle once as needed for anaphylaxis 4 each 6     famotidine (PEPCID) 40 MG tablet Take 1 tablet (40 mg) by mouth daily 90 tablet 3     fluticasone (FLONASE) 50 MCG/ACT spray Spray 1-2 sprays into both nostrils daily 1 Bottle 11     ibuprofen (ADVIL,MOTRIN) 400 MG tablet Take 2 tablets (800 mg) by mouth every 6 hours as needed for other (cramping) 60 tablet 1     loratadine (CLARITIN) 10 MG tablet Take 10 mg by mouth daily       Prenatal Vit-Fe Fumarate-FA (PRENATAL VITAMIN AND MINERAL) 28-0.8 MG TABS Take 1 tablet by mouth daily 90 tablet 3     Allergies   Allergen Reactions     Amoxicillin      Oxycodone Visual Disturbance, Nausea and Vomiting and Rash     Vomiting, hallucinations.     Tylenol [Acetaminophen] Other (See Comments) and Rash     1/06/17: Patient reports seeing spots after taking Tylenol.  Patient reports seeing spots after taking Tylenol.       Reviewed and updated as needed this visit by clinical staff       Reviewed and updated as needed this visit by Provider         ROS:  CONSTITUTIONAL: NEGATIVE for fever, chills, change in weight  INTEGUMENTARY/SKIN: NEGATIVE for worrisome rashes, moles or lesions  RESP: NEGATIVE for significant cough or SOB  BREAST: breast tenderness  CV: NEGATIVE for chest pain, palpitations or peripheral edema    OBJECTIVE:     /66 (BP Location: Right arm, Patient Position:  "Sitting, Cuff Size: Adult Large)   Pulse 88   Temp 97.8  F (36.6  C) (Tympanic)   Ht 1.575 m (5' 2\")   Wt 86.2 kg (190 lb)   SpO2 98%   BMI 34.75 kg/m    Body mass index is 34.75 kg/m .   GENERAL: healthy, alert and no distress  RESP: lungs clear to auscultation - no rales, rhonchi or wheezes  CV: regular rate and rhythm, normal S1 S2, no S3 or S4, no murmur, click or rub, no peripheral edema and peripheral pulses strong  ABDOMEN: soft, nontender, no hepatosplenomegaly, no masses and bowel sounds normal  PSYCH: mentation appears normal, affect normal/bright    Diagnostic Test Results:  Results for orders placed or performed in visit on 02/11/19 (from the past 24 hour(s))   HCG Qual, Urine - [facility] [place]  ([id number])   Result Value Ref Range    HCG Qual Urine Positive (A) NEG^Negative       ASSESSMENT/PLAN:     1. Positive urine pregnancy test  Positive pregnancy test. Carolyn states she has prenatal vitamins she will start taking. Referral placed for OB/GYN.  - HCG Qual, Urine - [facility] [place]  ([id number])  - OB/GYN REFERRAL    2. Tobacco abuse  Tobacco cessation was strongly encouraged. Every year of smoking cessation offer health benefits.   Carolyn states she is working on smoking cessation on her own  - Tobacco Cessation - Order to Satisfy Health Maintenance    3. Tobacco abuse counseling  As above      Tobacco Cessation:   reports that she has been smoking cigarettes.  She started smoking about 5 years ago. She has a 0.25 pack-year smoking history. she has never used smokeless tobacco.  Tobacco Cessation Action Plan: Self help information given to patient      See Patient Instructions    [facility]ELIA [facility] - [place]  "

## 2019-02-13 ENCOUNTER — APPOINTMENT (OUTPATIENT)
Dept: ULTRASOUND IMAGING | Facility: HOSPITAL | Age: 24
End: 2019-02-13
Attending: PHYSICIAN ASSISTANT
Payer: OTHER MISCELLANEOUS

## 2019-02-13 ENCOUNTER — HOSPITAL ENCOUNTER (EMERGENCY)
Facility: HOSPITAL | Age: 24
Discharge: HOME | End: 2019-02-13
Attending: NURSE PRACTITIONER | Admitting: NURSE PRACTITIONER
Payer: OTHER MISCELLANEOUS

## 2019-02-13 VITALS
HEART RATE: 102 BPM | RESPIRATION RATE: 16 BRPM | OXYGEN SATURATION: 100 % | TEMPERATURE: 99.2 F | DIASTOLIC BLOOD PRESSURE: 87 MMHG | SYSTOLIC BLOOD PRESSURE: 126 MMHG

## 2019-02-13 DIAGNOSIS — O20.0 THREATENED MISCARRIAGE IN EARLY PREGNANCY: Primary | ICD-10-CM

## 2019-02-13 DIAGNOSIS — R10.2 PELVIC CRAMPING: ICD-10-CM

## 2019-02-13 DIAGNOSIS — M62.830 SPASM OF MUSCLE OF LOWER BACK: ICD-10-CM
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
## 2019-02-13 PROCEDURE — 99284 EMERGENCY DEPT VISIT MOD MDM: CPT | Mod: 25

## 2019-02-13 PROCEDURE — 99283 EMERGENCY DEPT VISIT LOW MDM: CPT | Mod: Z6 | Performed by: PHYSICIAN ASSISTANT

## 2019-02-13 PROCEDURE — 76801 OB US < 14 WKS SINGLE FETUS: CPT | Mod: TC

## 2019-02-13 ASSESSMENT — ENCOUNTER SYMPTOMS
COUGH: 0
TROUBLE SWALLOWING: 0
ACTIVITY CHANGE: 0
PSYCHIATRIC NEGATIVE: 1
FEVER: 0
APPETITE CHANGE: 0
WEAKNESS: 0

## 2019-02-13 NOTE — ED AVS SNAPSHOT
[facility]  [address] [address]  Phone:  [phone]                                    Carolyn Mallory   MRN: [id number]    Department:  [facility]   Date of Visit:  2/13/2019           After Visit Summary Signature Page    I have received my discharge instructions, and my questions have been answered. I have discussed any challenges I see with this plan with the nurse or doctor.    ..........................................................................................................................................  Patient/Patient Representative Signature      ..........................................................................................................................................  Patient Representative Print Name and Relationship to Patient    ..................................................               ................................................  Date                                   Time    ..........................................................................................................................................  Reviewed by Signature/Title    ...................................................              ..............................................  Date                                               Time          22EPIC Rev 08/18
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
## 2019-02-14 NOTE — DISCHARGE INSTRUCTIONS
Alternate between heat and ice every 5 minutes as needed for pain. No work tomorrow. Return here with new/worsening symptoms.

## 2019-02-16 ASSESSMENT — ENCOUNTER SYMPTOMS
DYSURIA: 0
ABDOMINAL PAIN: 0

## 2019-02-19 DIAGNOSIS — O20.0 THREATENED MISCARRIAGE IN EARLY PREGNANCY: ICD-10-CM

## 2019-02-19 DIAGNOSIS — O20.8 SUBCHORIONIC HEMORRHAGE IN FIRST TRIMESTER: Primary | ICD-10-CM

## 2019-02-19 LAB — B-HCG SERPL-ACNC: 6768 IU/L (ref 0–5)

## 2019-02-19 PROCEDURE — 36415 COLL VENOUS BLD VENIPUNCTURE: CPT | Mod: ZL | Performed by: OBSTETRICS & GYNECOLOGY

## 2019-02-19 PROCEDURE — 84144 ASSAY OF PROGESTERONE: CPT | Mod: ZL,59 | Performed by: OBSTETRICS & GYNECOLOGY

## 2019-02-19 PROCEDURE — 84702 CHORIONIC GONADOTROPIN TEST: CPT | Mod: ZL,59 | Performed by: OBSTETRICS & GYNECOLOGY

## 2019-02-19 PROCEDURE — 99000 SPECIMEN HANDLING OFFICE-LAB: CPT | Performed by: OBSTETRICS & GYNECOLOGY

## 2019-02-19 ASSESSMENT — ENCOUNTER SYMPTOMS
SORE THROAT: 0
ABDOMINAL PAIN: 0
SHORTNESS OF BREATH: 0
BLOOD IN STOOL: 0
HEMATURIA: 0
FREQUENCY: 0
DYSURIA: 0
BACK PAIN: 1
NAUSEA: 0
UNEXPECTED WEIGHT CHANGE: 0
ABDOMINAL DISTENTION: 0
APPETITE CHANGE: 0
DIARRHEA: 0
FLANK PAIN: 0
VOMITING: 0
CHILLS: 0
CONSTIPATION: 0
ANAL BLEEDING: 0
DIFFICULTY URINATING: 0
FEVER: 0
NEUROLOGICAL NEGATIVE: 1
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
## 2019-02-20 LAB — PROGEST SERPL-MCNC: 11.6 NG/ML

## 2019-02-22 ENCOUNTER — HOSPITAL ENCOUNTER (OUTPATIENT)
Dept: ULTRASOUND IMAGING | Facility: HOSPITAL | Age: 24
Discharge: HOME | End: 2019-02-22
Attending: OBSTETRICS & GYNECOLOGY | Admitting: OBSTETRICS & GYNECOLOGY
Payer: COMMERCIAL

## 2019-02-22 DIAGNOSIS — O20.8 SUBCHORIONIC HEMORRHAGE IN FIRST TRIMESTER: ICD-10-CM

## 2019-02-22 PROCEDURE — 76801 OB US < 14 WKS SINGLE FETUS: CPT | Mod: TC

## 2019-03-10 ENCOUNTER — APPOINTMENT (OUTPATIENT)
Dept: ULTRASOUND IMAGING | Facility: HOSPITAL | Age: 24
End: 2019-03-10
Attending: FAMILY MEDICINE
Payer: COMMERCIAL

## 2019-03-10 ENCOUNTER — HOSPITAL ENCOUNTER (EMERGENCY)
Facility: HOSPITAL | Age: 24
Discharge: HOME | End: 2019-03-10
Attending: FAMILY MEDICINE | Admitting: FAMILY MEDICINE
Payer: COMMERCIAL

## 2019-03-10 VITALS
OXYGEN SATURATION: 99 % | DIASTOLIC BLOOD PRESSURE: 68 MMHG | TEMPERATURE: 98.4 F | SYSTOLIC BLOOD PRESSURE: 112 MMHG | RESPIRATION RATE: 12 BRPM

## 2019-03-10 DIAGNOSIS — J20.9 ACUTE BRONCHITIS, UNSPECIFIED ORGANISM: ICD-10-CM

## 2019-03-10 LAB
B-HCG SERPL-ACNC: ABNORMAL IU/L (ref 0–5)
BASOPHILS # BLD AUTO: 0 10E9/L (ref 0–0.2)
BASOPHILS NFR BLD AUTO: 0.2 %
DIFFERENTIAL METHOD BLD: ABNORMAL
EOSINOPHIL # BLD AUTO: 0.2 10E9/L (ref 0–0.7)
EOSINOPHIL NFR BLD AUTO: 1.8 %
ERYTHROCYTE [DISTWIDTH] IN BLOOD BY AUTOMATED COUNT: 12.1 % (ref 10–15)
FLUAV+FLUBV RNA SPEC QL NAA+PROBE: NEGATIVE
FLUAV+FLUBV RNA SPEC QL NAA+PROBE: NEGATIVE
HCT VFR BLD AUTO: 36 % (ref 35–47)
HGB BLD-MCNC: 12.9 G/DL (ref 11.7–15.7)
IMM GRANULOCYTES # BLD: 0 10E9/L (ref 0–0.4)
IMM GRANULOCYTES NFR BLD: 0.3 %
LYMPHOCYTES # BLD AUTO: 3.5 10E9/L (ref 0.8–5.3)
LYMPHOCYTES NFR BLD AUTO: 29.5 %
MCH RBC QN AUTO: 31 PG (ref 26.5–33)
MCHC RBC AUTO-ENTMCNC: 35.8 G/DL (ref 31.5–36.5)
MCV RBC AUTO: 87 FL (ref 78–100)
MONOCYTES # BLD AUTO: 0.8 10E9/L (ref 0–1.3)
MONOCYTES NFR BLD AUTO: 7 %
NEUTROPHILS # BLD AUTO: 7.3 10E9/L (ref 1.6–8.3)
NEUTROPHILS NFR BLD AUTO: 61.2 %
NRBC # BLD AUTO: 0 10*3/UL
NRBC BLD AUTO-RTO: 0 /100
PLATELET # BLD AUTO: 314 10E9/L (ref 150–450)
RBC # BLD AUTO: 4.16 10E12/L (ref 3.8–5.2)
RSV RNA SPEC NAA+PROBE: NEGATIVE
SPECIMEN SOURCE: NORMAL
WBC # BLD AUTO: 11.9 10E9/L (ref 4–11)

## 2019-03-10 PROCEDURE — 99285 EMERGENCY DEPT VISIT HI MDM: CPT | Mod: Z6 | Performed by: FAMILY MEDICINE

## 2019-03-10 PROCEDURE — 87631 RESP VIRUS 3-5 TARGETS: CPT | Performed by: FAMILY MEDICINE

## 2019-03-10 PROCEDURE — 36415 COLL VENOUS BLD VENIPUNCTURE: CPT | Performed by: FAMILY MEDICINE

## 2019-03-10 PROCEDURE — 25000132 ZZH RX MED GY IP 250 OP 250 PS 637: Performed by: FAMILY MEDICINE

## 2019-03-10 PROCEDURE — 85025 COMPLETE CBC W/AUTO DIFF WBC: CPT | Performed by: FAMILY MEDICINE

## 2019-03-10 PROCEDURE — 25000128 H RX IP 250 OP 636: Performed by: FAMILY MEDICINE

## 2019-03-10 PROCEDURE — 99285 EMERGENCY DEPT VISIT HI MDM: CPT | Mod: 25

## 2019-03-10 PROCEDURE — 84702 CHORIONIC GONADOTROPIN TEST: CPT | Performed by: FAMILY MEDICINE

## 2019-03-10 PROCEDURE — 76801 OB US < 14 WKS SINGLE FETUS: CPT | Mod: TC

## 2019-03-10 PROCEDURE — 96361 HYDRATE IV INFUSION ADD-ON: CPT

## 2019-03-10 PROCEDURE — 96374 THER/PROPH/DIAG INJ IV PUSH: CPT

## 2019-03-10 RX ORDER — AZITHROMYCIN 250 MG/1
500 TABLET, FILM COATED ORAL ONCE
Status: COMPLETED | OUTPATIENT
Start: 2019-03-10 | End: 2019-03-10

## 2019-03-10 RX ORDER — AZITHROMYCIN 250 MG/1
250 TABLET, FILM COATED ORAL DAILY
Qty: 4 TABLET | Refills: 0 | Status: SHIPPED | OUTPATIENT
Start: 2019-03-10 | End: 2019-03-25

## 2019-03-10 RX ORDER — GUAIFENESIN 600 MG/1
TABLET, EXTENDED RELEASE ORAL
Status: DISCONTINUED
Start: 2019-03-10 | End: 2019-03-11 | Stop reason: HOSPADM

## 2019-03-10 RX ORDER — ONDANSETRON 2 MG/ML
4 INJECTION INTRAMUSCULAR; INTRAVENOUS ONCE
Status: COMPLETED | OUTPATIENT
Start: 2019-03-10 | End: 2019-03-10

## 2019-03-10 RX ADMIN — ONDANSETRON 4 MG: 2 INJECTION INTRAMUSCULAR; INTRAVENOUS at 21:10

## 2019-03-10 RX ADMIN — SODIUM CHLORIDE 1000 ML: 9 INJECTION, SOLUTION INTRAVENOUS at 21:09

## 2019-03-10 RX ADMIN — AZITHROMYCIN 500 MG: 250 TABLET, FILM COATED ORAL at 22:33

## 2019-03-10 ASSESSMENT — ENCOUNTER SYMPTOMS
PALPITATIONS: 0
EYES NEGATIVE: 1
SHORTNESS OF BREATH: 0
DIFFICULTY URINATING: 0
CHILLS: 0
PSYCHIATRIC NEGATIVE: 1
FEVER: 1
DIAPHORESIS: 0
CHEST TIGHTNESS: 0
ACTIVITY CHANGE: 0
CHOKING: 0
COUGH: 0
FATIGUE: 0
SORE THROAT: 1
ABDOMINAL PAIN: 0
NEUROLOGICAL NEGATIVE: 1
APPETITE CHANGE: 0
MUSCULOSKELETAL NEGATIVE: 1

## 2019-03-10 NOTE — ED AVS SNAPSHOT
[facility]  [address] [address]  Phone:  [phone]                                    Carolyn Mallory   MRN: [id number]    Department:  [facility]   Date of Visit:  3/10/2019           After Visit Summary Signature Page    I have received my discharge instructions, and my questions have been answered. I have discussed any challenges I see with this plan with the nurse or doctor.    ..........................................................................................................................................  Patient/Patient Representative Signature      ..........................................................................................................................................  Patient Representative Print Name and Relationship to Patient    ..................................................               ................................................  Date                                   Time    ..........................................................................................................................................  Reviewed by Signature/Title    ...................................................              ..............................................  Date                                               Time          22EPIC Rev 08/18

## 2019-03-11 NOTE — ED NOTES
Pt is incessantly coughing (non productive). Asked her if she would like something for cough. Cannot have any med with Tylenol in it. Checked with MD and she stated pt could have Mucinex. Obtained Mucinex. Pt is certain it has Tylenol in it and refusing to take it. Returned to [facility]
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
## 2019-03-11 NOTE — DISCHARGE INSTRUCTIONS
You can take Mucinex in early pregnancy. IF you prefer not, then I would try honey and/or lemon to see if that helps with the cough.   Take Zithromax once daily x 4 days.   Push fluids the next couple of days.

## 2019-03-14 ENCOUNTER — PRENATAL OFFICE VISIT (OUTPATIENT)
Dept: OBGYN | Facility: OTHER | Age: 24
End: 2019-03-14
Attending: OBSTETRICS & GYNECOLOGY
Payer: COMMERCIAL

## 2019-03-14 VITALS
DIASTOLIC BLOOD PRESSURE: 78 MMHG | HEIGHT: 62 IN | BODY MASS INDEX: 34.6 KG/M2 | WEIGHT: 188 LBS | SYSTOLIC BLOOD PRESSURE: 118 MMHG | OXYGEN SATURATION: 97 % | HEART RATE: 84 BPM

## 2019-03-14 DIAGNOSIS — O41.8X10 SUBCHORIONIC HEMATOMA IN FIRST TRIMESTER, SINGLE OR UNSPECIFIED FETUS: ICD-10-CM

## 2019-03-14 DIAGNOSIS — N89.8 VAGINAL IRRITATION: Primary | ICD-10-CM

## 2019-03-14 DIAGNOSIS — Z32.01 POSITIVE URINE PREGNANCY TEST: ICD-10-CM

## 2019-03-14 DIAGNOSIS — Z34.80 PREGNANCY IN MULTIGRAVIDA: ICD-10-CM

## 2019-03-14 DIAGNOSIS — Z34.81 ENCOUNTER FOR SUPERVISION OF OTHER NORMAL PREGNANCY IN FIRST TRIMESTER: ICD-10-CM

## 2019-03-14 DIAGNOSIS — O46.8X1 SUBCHORIONIC HEMATOMA IN FIRST TRIMESTER, SINGLE OR UNSPECIFIED FETUS: ICD-10-CM

## 2019-03-14 LAB
ABO + RH BLD: NORMAL
ABO + RH BLD: NORMAL
ALBUMIN UR-MCNC: 10 MG/DL
AMPHETAMINES UR QL SCN: NEGATIVE
APPEARANCE UR: CLEAR
BACTERIA #/AREA URNS HPF: ABNORMAL /HPF
BARBITURATES UR QL: NEGATIVE
BENZODIAZ UR QL: NEGATIVE
BILIRUB UR QL STRIP: NEGATIVE
BLD GP AB SCN SERPL QL: NORMAL
BLOOD BANK CMNT PATIENT-IMP: NORMAL
C TRACH DNA SPEC QL PROBE+SIG AMP: NOT DETECTED
CANNABINOIDS UR QL SCN: NEGATIVE
COCAINE UR QL: NEGATIVE
COLOR UR AUTO: YELLOW
ERYTHROCYTE [DISTWIDTH] IN BLOOD BY AUTOMATED COUNT: 12.1 % (ref 10–15)
GLUCOSE UR STRIP-MCNC: NEGATIVE MG/DL
HCT VFR BLD AUTO: 36.6 % (ref 35–47)
HGB BLD-MCNC: 12.9 G/DL (ref 11.7–15.7)
HGB UR QL STRIP: NEGATIVE
KETONES UR STRIP-MCNC: NEGATIVE MG/DL
LEUKOCYTE ESTERASE UR QL STRIP: ABNORMAL
MCH RBC QN AUTO: 30.9 PG (ref 26.5–33)
MCHC RBC AUTO-ENTMCNC: 35.2 G/DL (ref 31.5–36.5)
MCV RBC AUTO: 88 FL (ref 78–100)
METHADONE UR QL SCN: NEGATIVE
MUCOUS THREADS #/AREA URNS LPF: PRESENT /LPF
N GONORRHOEA DNA SPEC QL PROBE+SIG AMP: NOT DETECTED
NITRATE UR QL: NEGATIVE
OPIATES UR QL SCN: NEGATIVE
PCP UR QL SCN: NEGATIVE
PH UR STRIP: 5.5 PH (ref 4.7–8)
PLATELET # BLD AUTO: 337 10E9/L (ref 150–450)
RBC # BLD AUTO: 4.17 10E12/L (ref 3.8–5.2)
RBC #/AREA URNS AUTO: 1 /HPF (ref 0–2)
SOURCE: ABNORMAL
SP GR UR STRIP: 1.03 (ref 1–1.03)
SPECIMEN EXP DATE BLD: NORMAL
SPECIMEN SOURCE: NORMAL
SPECIMEN SOURCE: NORMAL
SQUAMOUS #/AREA URNS AUTO: 2 /HPF (ref 0–1)
UROBILINOGEN UR STRIP-MCNC: NORMAL MG/DL (ref 0–2)
WBC # BLD AUTO: 8.7 10E9/L (ref 4–11)
WBC #/AREA URNS AUTO: 1 /HPF (ref 0–5)
WET PREP SPEC: NORMAL

## 2019-03-14 PROCEDURE — 87210 SMEAR WET MOUNT SALINE/INK: CPT | Mod: ZL | Performed by: OBSTETRICS & GYNECOLOGY

## 2019-03-14 PROCEDURE — G0463 HOSPITAL OUTPT CLINIC VISIT: HCPCS | Mod: 25

## 2019-03-14 PROCEDURE — 86900 BLOOD TYPING SEROLOGIC ABO: CPT | Mod: ZL | Performed by: OBSTETRICS & GYNECOLOGY

## 2019-03-14 PROCEDURE — 36415 COLL VENOUS BLD VENIPUNCTURE: CPT | Mod: ZL | Performed by: OBSTETRICS & GYNECOLOGY

## 2019-03-14 PROCEDURE — 0064U ANTB TP TOTAL&RPR IA QUAL: CPT | Mod: ZL | Performed by: OBSTETRICS & GYNECOLOGY

## 2019-03-14 PROCEDURE — 99207 ZZC FIRST OB VISIT: CPT | Mod: 25 | Performed by: OBSTETRICS & GYNECOLOGY

## 2019-03-14 PROCEDURE — 87086 URINE CULTURE/COLONY COUNT: CPT | Mod: ZL | Performed by: OBSTETRICS & GYNECOLOGY

## 2019-03-14 PROCEDURE — 87491 CHLMYD TRACH DNA AMP PROBE: CPT | Mod: ZL | Performed by: OBSTETRICS & GYNECOLOGY

## 2019-03-14 PROCEDURE — 86850 RBC ANTIBODY SCREEN: CPT | Mod: ZL | Performed by: OBSTETRICS & GYNECOLOGY

## 2019-03-14 PROCEDURE — 86762 RUBELLA ANTIBODY: CPT | Mod: ZL | Performed by: OBSTETRICS & GYNECOLOGY

## 2019-03-14 PROCEDURE — 99000 SPECIMEN HANDLING OFFICE-LAB: CPT | Performed by: OBSTETRICS & GYNECOLOGY

## 2019-03-14 PROCEDURE — 81001 URINALYSIS AUTO W/SCOPE: CPT | Mod: ZL,59 | Performed by: OBSTETRICS & GYNECOLOGY

## 2019-03-14 PROCEDURE — 86901 BLOOD TYPING SEROLOGIC RH(D): CPT | Mod: ZL | Performed by: OBSTETRICS & GYNECOLOGY

## 2019-03-14 PROCEDURE — 80307 DRUG TEST PRSMV CHEM ANLYZR: CPT | Mod: ZL | Performed by: OBSTETRICS & GYNECOLOGY

## 2019-03-14 PROCEDURE — 87591 N.GONORRHOEAE DNA AMP PROB: CPT | Mod: ZL | Performed by: OBSTETRICS & GYNECOLOGY

## 2019-03-14 PROCEDURE — 87340 HEPATITIS B SURFACE AG IA: CPT | Mod: ZL | Performed by: OBSTETRICS & GYNECOLOGY

## 2019-03-14 PROCEDURE — 85027 COMPLETE CBC AUTOMATED: CPT | Mod: ZL | Performed by: OBSTETRICS & GYNECOLOGY

## 2019-03-14 PROCEDURE — 87389 HIV-1 AG W/HIV-1&-2 AB AG IA: CPT | Mod: ZL | Performed by: OBSTETRICS & GYNECOLOGY

## 2019-03-14 PROCEDURE — 76817 TRANSVAGINAL US OBSTETRIC: CPT | Mod: TC | Performed by: OBSTETRICS & GYNECOLOGY

## 2019-03-14 ASSESSMENT — PAIN SCALES - GENERAL: PAINLEVEL: NO PAIN (0)

## 2019-03-14 ASSESSMENT — MIFFLIN-ST. JEOR: SCORE: 1566.01

## 2019-03-14 NOTE — NURSING NOTE
"Chief Complaint   Patient presents with     Prenatal Care     Pre new LMP- unknown missed AB on 11/29/18 US done in ER with [facility]- 10/17/19 GA- 9w       Initial /78 (BP Location: Left arm, Cuff Size: Adult Large)   Pulse 84   Ht 1.575 m (5' 2\")   Wt 85.3 kg (188 lb)   LMP 11/28/2018   SpO2 97%   BMI 34.39 kg/m   Estimated body mass index is 34.39 kg/m  as calculated from the following:    Height as of this encounter: 1.575 m (5' 2\").    Weight as of this encounter: 85.3 kg (188 lb).  Medication Reconciliation: complete    Nancy Elder LPN  "

## 2019-03-14 NOTE — LETTER
March 19, 2019          Carolyn Mallory  [address] [place] [id number]        Dear Carolyn,               Your New OB labs are all normal. If you have any questions please call [phone].    Results for orders placed or performed in visit on 03/14/19   HIV Antigen Antibody Combo   Result Value Ref Range    HIV Antigen Antibody Combo Nonreactive NR^Nonreactive       Rubella Antibody IgG Quantitative   Result Value Ref Range    Rubella Antibody IgG Quantitative 24 IU/mL   Hepatitis B surface antigen   Result Value Ref Range    Hep B Surface Agn Nonreactive NR^Nonreactive   Treponema Abs w Reflex to RPR and Titer   Result Value Ref Range    Treponema Antibodies Nonreactive NR^Nonreactive   UA with Microscopic   Result Value Ref Range    Color Urine Yellow     Appearance Urine Clear     Glucose Urine Negative NEG^Negative mg/dL    Bilirubin Urine Negative NEG^Negative    Ketones Urine Negative NEG^Negative mg/dL    Specific Gravity Urine 1.029 1.003 - 1.035    Blood Urine Negative NEG^Negative    pH Urine 5.5 4.7 - 8.0 pH    Protein Albumin Urine 10 (A) NEG^Negative mg/dL    Urobilinogen mg/dL Normal 0.0 - 2.0 mg/dL    Nitrite Urine Negative NEG^Negative    Leukocyte Esterase Urine Small (A) NEG^Negative    Source Midstream Urine     WBC Urine 1 0 - 5 /HPF    RBC Urine 1 0 - 2 /HPF    Bacteria Urine None (A) NEG^Negative /HPF    Squamous Epithelial /HPF Urine 2 (H) 0 - 1 /HPF    Mucous Urine Present (A) NEG^Negative /LPF   CBC with platelets   Result Value Ref Range    WBC 8.7 4.0 - 11.0 10e9/L    RBC Count 4.17 3.8 - 5.2 10e12/L    Hemoglobin 12.9 11.7 - 15.7 g/dL    Hematocrit 36.6 35.0 - 47.0 %    MCV 88 78 - 100 fl    MCH 30.9 26.5 - 33.0 pg    MCHC 35.2 31.5 - 36.5 g/dL    RDW 12.1 10.0 - 15.0 %    Platelet Count 337 150 - 450 10e9/L   Drug Screen Urine (Range)   Result Value Ref Range    Amphetamine Qual Urine Negative NEG^Negative    Barbiturates Qual Urine Negative NEG^Negative    Benzodiazepine Qual Urine  Negative NEG^Negative    Cannabinoids Qual Urine Negative NEG^Negative    Cocaine Qual Urine Negative NEG^Negative    Opiates Qualitative Urine Negative NEG^Negative    Methadone Qual Urine Negative NEG^Negative    PCP Qual Urine Negative NEG^Negative   ABO/Rh type and screen   Result Value Ref Range    ABO O     RH(D) Pos     Antibody Screen Neg     Test Valid Only At [facility]        Specimen Expires 03/17/2019    GC/Chlamydia by PCR - [facility]   Result Value Ref Range    Specimen Source Cervix     Neisseria gonorrhoreae PCR Not Detected NDET^Not Detected    Chlamydia Trachomatis PCR Not Detected NDET^Not Detected   Wet prep   Result Value Ref Range    Specimen Description Vagina     Wet Prep No Trichomonas seen     Wet Prep No clue cells seen     Wet Prep No yeast seen     Wet Prep Few  WBC'S seen      Urine Culture Aerobic Bacterial   Result Value Ref Range    Specimen Description Midstream Urine     Culture Micro       50,000 to 100,000 colonies/mL  mixed urogenital dia  No further identification or sensitivity done           Sincerely,        Jose Goldstein MD/ Linda LYNCH

## 2019-03-14 NOTE — PROGRESS NOTES
"  HPI:  Carolyn Mallory is a 23 year old female (vag) Patient's last menstrual period was 2018. at 9w0d, Estimated Date of Delivery: Oct 17, 2019.  She denies vaginal bleeding, nausea  and vomiting.   Some yellowish vagina discharge. No other c/o.  Intermittent RLQ pain and constipation.  Was seen in ED for URI last week and US performed showing viable IUP with EDC 10/19/19 and small DOV.  H/o Pre-E and kidney stones last pregnancy.      Past Medical History:   Diagnosis Date     NO ACTIVE PROBLEMS      Pregnancy     LMP 2015       Past Surgical History:   Procedure Laterality Date     ARTHROSCOPY KNEE Right 2015    Procedure: ARTHROSCOPY KNEE;  Surgeon: Hernando Kulkarni MD;  Location: [facility]     AS ESOPHAGOSCOPY, DIAGNOSTIC      upper     LAPAROSCOPIC CHOLECYSTECTOMY N/A 10/10/2015    Procedure: LAPAROSCOPIC CHOLECYSTECTOMY;  Surgeon: Drew Padgett MD;  Location: [facility] OR     none         Allergies: Amoxicillin; Oxycodone; and Tylenol [acetaminophen]     ROS:   Denies fever, wt loss   Neg /GI other than per HPI      EXAM:  Blood pressure 118/78, pulse 84, height 1.575 m (5' 2\"), weight 85.3 kg (188 lb), last menstrual period 2018, SpO2 97 %, not currently breastfeeding.   BMI= Body mass index is 34.39 kg/m .  General - pleasant female in no acute distress.  Abdomen - soft, nontender, nondistended, no hepatosplenomegaly.  Pelvic - EG: normal adult female, BUS: within normal limits,Rectovaginal - deferred.  Wet prep and cervical cx done.  Pap UTD.     US:    Transvaginal:Yes  Yolk sac present:Yes  CRL:  24mm  FCA present:Yes  EGA 9w 1d  NINOSKA:cwd  Small DOV 1.5 x 0.7cmlower uterine segment.          ASSESSMENT/PLAN:   Vaginal DC  Plan: GC/Chlamydia by PCR - [facility], Wet prep         (Z34.81) Encounter for supervision of other normal pregnancy in first trimester  Comment: Viable IUP with concordant dates  Plan: ABO/Rh type and screen, HIV Antigen Antibody         Combo, Rubella Antibody IgG " Quantitative,         Hepatitis B surface antigen, Treponema Abs w         Reflex to RPR and Titer, Urine Culture Aerobic         Bacterial, UA with Microscopic, CBC with         platelets, Drug Screen Urine (Range)       H/o Pre-E:  Baby asa second trimester.     DOV.  Small, stable.  Pelvic rest until f/u appt.      1st Trimester precautions and testing discussed.  New OB Labs ordered and exam scheduled.  Aneuploidy testing options discussed.  Patient has my card and phone number to call prn if problems in interim.    Jose Goldstein

## 2019-03-15 LAB
BACTERIA SPEC CULT: NORMAL
HBV SURFACE AG SERPL QL IA: NONREACTIVE
HIV 1+2 AB+HIV1 P24 AG SERPL QL IA: NONREACTIVE
RUBV IGG SERPL IA-ACNC: 24 IU/ML
SPECIMEN SOURCE: NORMAL
T PALLIDUM AB SER QL: NONREACTIVE

## 2019-03-25 ENCOUNTER — OFFICE VISIT (OUTPATIENT)
Dept: OBGYN | Facility: OTHER | Age: 24
End: 2019-03-25
Attending: OBSTETRICS & GYNECOLOGY
Payer: COMMERCIAL

## 2019-03-25 ENCOUNTER — APPOINTMENT (OUTPATIENT)
Dept: ULTRASOUND IMAGING | Facility: HOSPITAL | Age: 24
End: 2019-03-25
Attending: EMERGENCY MEDICINE
Payer: COMMERCIAL

## 2019-03-25 ENCOUNTER — HOSPITAL ENCOUNTER (EMERGENCY)
Facility: HOSPITAL | Age: 24
Discharge: HOME | End: 2019-03-25
Attending: EMERGENCY MEDICINE | Admitting: EMERGENCY MEDICINE
Payer: COMMERCIAL

## 2019-03-25 VITALS
TEMPERATURE: 99.7 F | DIASTOLIC BLOOD PRESSURE: 60 MMHG | BODY MASS INDEX: 34.04 KG/M2 | HEIGHT: 62 IN | WEIGHT: 185 LBS | SYSTOLIC BLOOD PRESSURE: 110 MMHG

## 2019-03-25 VITALS
RESPIRATION RATE: 20 BRPM | DIASTOLIC BLOOD PRESSURE: 69 MMHG | SYSTOLIC BLOOD PRESSURE: 120 MMHG | OXYGEN SATURATION: 100 % | HEART RATE: 91 BPM | TEMPERATURE: 98.9 F

## 2019-03-25 DIAGNOSIS — R10.31 RIGHT LOWER QUADRANT PAIN: Primary | ICD-10-CM

## 2019-03-25 DIAGNOSIS — Z34.80 PREGNANCY IN MULTIGRAVIDA: ICD-10-CM

## 2019-03-25 DIAGNOSIS — O21.9 NAUSEA AND VOMITING IN PREGNANCY PRIOR TO 22 WEEKS GESTATION: ICD-10-CM

## 2019-03-25 DIAGNOSIS — R10.31 RLQ ABDOMINAL PAIN: Primary | ICD-10-CM

## 2019-03-25 DIAGNOSIS — N39.0 URINARY TRACT INFECTION WITHOUT HEMATURIA, SITE UNSPECIFIED: ICD-10-CM

## 2019-03-25 LAB
ALBUMIN SERPL-MCNC: 3.6 G/DL (ref 3.4–5)
ALBUMIN UR-MCNC: 10 MG/DL
ALP SERPL-CCNC: 68 U/L (ref 40–150)
ALT SERPL W P-5'-P-CCNC: 23 U/L (ref 0–50)
ANION GAP SERPL CALCULATED.3IONS-SCNC: 8 MMOL/L (ref 3–14)
APPEARANCE UR: CLEAR
AST SERPL W P-5'-P-CCNC: 13 U/L (ref 0–45)
B-HCG SERPL-ACNC: ABNORMAL IU/L (ref 0–5)
BACTERIA #/AREA URNS HPF: ABNORMAL /HPF
BASOPHILS # BLD AUTO: 0 10E9/L (ref 0–0.2)
BASOPHILS NFR BLD AUTO: 0.4 %
BILIRUB SERPL-MCNC: 0.3 MG/DL (ref 0.2–1.3)
BILIRUB UR QL STRIP: NEGATIVE
BUN SERPL-MCNC: 6 MG/DL (ref 7–30)
CALCIUM SERPL-MCNC: 8.6 MG/DL (ref 8.5–10.1)
CHLORIDE SERPL-SCNC: 108 MMOL/L (ref 94–109)
CO2 SERPL-SCNC: 22 MMOL/L (ref 20–32)
COLOR UR AUTO: YELLOW
CREAT SERPL-MCNC: 0.72 MG/DL (ref 0.52–1.04)
DIFFERENTIAL METHOD BLD: ABNORMAL
EOSINOPHIL # BLD AUTO: 0.1 10E9/L (ref 0–0.7)
EOSINOPHIL NFR BLD AUTO: 0.9 %
ERYTHROCYTE [DISTWIDTH] IN BLOOD BY AUTOMATED COUNT: 12.6 % (ref 10–15)
GFR SERPL CREATININE-BSD FRML MDRD: >90 ML/MIN/{1.73_M2}
GLUCOSE SERPL-MCNC: 91 MG/DL (ref 70–99)
GLUCOSE UR STRIP-MCNC: NEGATIVE MG/DL
HCG UR QL: POSITIVE
HCT VFR BLD AUTO: 34.8 % (ref 35–47)
HGB BLD-MCNC: 12.1 G/DL (ref 11.7–15.7)
HGB UR QL STRIP: NEGATIVE
IMM GRANULOCYTES # BLD: 0 10E9/L (ref 0–0.4)
IMM GRANULOCYTES NFR BLD: 0.2 %
KETONES UR STRIP-MCNC: NEGATIVE MG/DL
LEUKOCYTE ESTERASE UR QL STRIP: ABNORMAL
LYMPHOCYTES # BLD AUTO: 2.8 10E9/L (ref 0.8–5.3)
LYMPHOCYTES NFR BLD AUTO: 34.5 %
MCH RBC QN AUTO: 30.9 PG (ref 26.5–33)
MCHC RBC AUTO-ENTMCNC: 34.8 G/DL (ref 31.5–36.5)
MCV RBC AUTO: 89 FL (ref 78–100)
MONOCYTES # BLD AUTO: 0.4 10E9/L (ref 0–1.3)
MONOCYTES NFR BLD AUTO: 4.5 %
MUCOUS THREADS #/AREA URNS LPF: PRESENT /LPF
NEUTROPHILS # BLD AUTO: 4.9 10E9/L (ref 1.6–8.3)
NEUTROPHILS NFR BLD AUTO: 59.5 %
NITRATE UR QL: NEGATIVE
NRBC # BLD AUTO: 0 10*3/UL
NRBC BLD AUTO-RTO: 0 /100
PH UR STRIP: 6 PH (ref 4.7–8)
PLATELET # BLD AUTO: 261 10E9/L (ref 150–450)
POTASSIUM SERPL-SCNC: 3.4 MMOL/L (ref 3.4–5.3)
PROT SERPL-MCNC: 6.6 G/DL (ref 6.8–8.8)
RBC # BLD AUTO: 3.92 10E12/L (ref 3.8–5.2)
RBC #/AREA URNS AUTO: 0 /HPF (ref 0–2)
SODIUM SERPL-SCNC: 138 MMOL/L (ref 133–144)
SOURCE: ABNORMAL
SP GR UR STRIP: 1.02 (ref 1–1.03)
SQUAMOUS #/AREA URNS AUTO: 3 /HPF (ref 0–1)
UROBILINOGEN UR STRIP-MCNC: NORMAL MG/DL (ref 0–2)
WBC # BLD AUTO: 8.2 10E9/L (ref 4–11)
WBC #/AREA URNS AUTO: 1 /HPF (ref 0–5)

## 2019-03-25 PROCEDURE — G0463 HOSPITAL OUTPT CLINIC VISIT: HCPCS

## 2019-03-25 PROCEDURE — 99284 EMERGENCY DEPT VISIT MOD MDM: CPT | Mod: Z6 | Performed by: EMERGENCY MEDICINE

## 2019-03-25 PROCEDURE — 99213 OFFICE O/P EST LOW 20 MIN: CPT | Performed by: OBSTETRICS & GYNECOLOGY

## 2019-03-25 PROCEDURE — 25000131 ZZH RX MED GY IP 250 OP 636 PS 637: Performed by: EMERGENCY MEDICINE

## 2019-03-25 PROCEDURE — 36415 COLL VENOUS BLD VENIPUNCTURE: CPT | Performed by: EMERGENCY MEDICINE

## 2019-03-25 PROCEDURE — 80053 COMPREHEN METABOLIC PANEL: CPT | Performed by: EMERGENCY MEDICINE

## 2019-03-25 PROCEDURE — 81001 URINALYSIS AUTO W/SCOPE: CPT | Performed by: FAMILY MEDICINE

## 2019-03-25 PROCEDURE — G0463 HOSPITAL OUTPT CLINIC VISIT: HCPCS | Mod: 25

## 2019-03-25 PROCEDURE — 85025 COMPLETE CBC W/AUTO DIFF WBC: CPT | Performed by: EMERGENCY MEDICINE

## 2019-03-25 PROCEDURE — 81025 URINE PREGNANCY TEST: CPT | Performed by: FAMILY MEDICINE

## 2019-03-25 PROCEDURE — 99284 EMERGENCY DEPT VISIT MOD MDM: CPT | Mod: 25,27

## 2019-03-25 PROCEDURE — 76705 ECHO EXAM OF ABDOMEN: CPT | Mod: TC

## 2019-03-25 PROCEDURE — 84702 CHORIONIC GONADOTROPIN TEST: CPT | Performed by: EMERGENCY MEDICINE

## 2019-03-25 RX ORDER — ONDANSETRON 4 MG/1
4 TABLET, ORALLY DISINTEGRATING ORAL ONCE
Status: COMPLETED | OUTPATIENT
Start: 2019-03-25 | End: 2019-03-25

## 2019-03-25 RX ORDER — NITROFURANTOIN 25; 75 MG/1; MG/1
100 CAPSULE ORAL 2 TIMES DAILY WITH MEALS
Qty: 20 CAPSULE | Refills: 0 | Status: SHIPPED | OUTPATIENT
Start: 2019-03-25 | End: 2019-04-17

## 2019-03-25 RX ORDER — DOXYLAMINE SUCCINATE AND PYRIDOXINE HYDROCHLORIDE, DELAYED RELEASE TABLETS 10 MG/10 MG 10; 10 MG/1; MG/1
2 TABLET, DELAYED RELEASE ORAL EVERY 12 HOURS PRN
Qty: 20 TABLET | Refills: 0 | Status: SHIPPED | OUTPATIENT
Start: 2019-03-25 | End: 2019-04-27

## 2019-03-25 RX ADMIN — ONDANSETRON 4 MG: 4 TABLET, ORALLY DISINTEGRATING ORAL at 11:19

## 2019-03-25 RX ADMIN — ONDANSETRON 4 MG: 4 TABLET, ORALLY DISINTEGRATING ORAL at 12:24

## 2019-03-25 ASSESSMENT — PAIN SCALES - GENERAL: PAINLEVEL: SEVERE PAIN (6)

## 2019-03-25 ASSESSMENT — ENCOUNTER SYMPTOMS
FEVER: 0
SHORTNESS OF BREATH: 0
ABDOMINAL PAIN: 0

## 2019-03-25 ASSESSMENT — MIFFLIN-ST. JEOR: SCORE: 1552.4

## 2019-03-25 NOTE — ED AVS SNAPSHOT
[facility]  [address] [address]  Phone:  [phone]                                    Carolyn Mallory   MRN: [id number]    Department:  [facility]   Date of Visit:  3/25/2019           After Visit Summary Signature Page    I have received my discharge instructions, and my questions have been answered. I have discussed any challenges I see with this plan with the nurse or doctor.    ..........................................................................................................................................  Patient/Patient Representative Signature      ..........................................................................................................................................  Patient Representative Print Name and Relationship to Patient    ..................................................               ................................................  Date                                   Time    ..........................................................................................................................................  Reviewed by Signature/Title    ...................................................              ..............................................  Date                                               Time          22EPIC Rev 08/18

## 2019-03-25 NOTE — PROGRESS NOTES
S:   RLQ pain.  Seen in ER today.  Still has the RLQ pain, but no fever, chills.  ER evaluation R/O appendix.  Denies any nausea or vomiting    O:   VITALS:  Stable         EXAM:   Abdomen shows RLQ tender,                      but no peritoneal signs.                       US shows good FCA and FM            LAB:   UA shows UTI features    A:   UTI without hematura        RLQ pain due to  UTI and round ligament    P:   Macrobid BID x 10 days         UTI diet guidelines         Keep LIBIA  April 4th         ASA OK temporarily since allergic to Tylenol.         Precautions given

## 2019-03-25 NOTE — ED PROVIDER NOTES
History     Chief Complaint   Patient presents with     Abdominal Pain     HPI  Carolyn Mallory is a 23 year old female who presents to the emergency department with complaints of right lower quadrant abdominal pain.  Pain started yesterday and was initially intermittent but are now constant.  Rated as 5/10, does not radiate, no known aggravating or relieving factors.  Patient feels nauseated but has not vomited.  Denies any vaginal discharge or bleeding.  Patient is currently pregnant at 10 weeks.  No diarrhea, fever, abdominal trauma, of sexually transmitted disease.    Allergies:  Allergies   Allergen Reactions     Amoxicillin      Oxycodone Visual Disturbance, Nausea and Vomiting and Rash     Vomiting, hallucinations.     Tylenol [Acetaminophen] Other (See Comments) and Rash     1/06/17: Patient reports seeing spots after taking Tylenol.  Patient reports seeing spots after taking Tylenol.       Problem List:    Patient Active Problem List    Diagnosis Date Noted     Right lower quadrant pain, evaluated ER with neg findings--3/25/2019 03/25/2019     Priority: Medium     Urinary tract infection without hematuria, site unspecified, Macrobid---3/25/2019 03/25/2019     Priority: Medium     Pregnancy in multigravida 03/14/2019     Priority: Medium     H/o pre-E.   Baby ASA second trimester.         Allergy to pollen 03/01/2018     Priority: Medium     Obesity, unspecified obesity severity, unspecified obesity type 06/09/2017     Priority: Medium     Tobacco use disorder 06/09/2017     Priority: Medium     Esophageal reflux 06/09/2017     Priority: Medium     BMI 34.0-34.9,adult 01/19/2017     Priority: Medium     Chronic right-sided thoracic back pain 05/24/2016     Priority: Medium     ACP (advance care planning) 04/26/2016     Priority: Medium     Advance Care Planning 4/26/2016: ACP Review of Chart / Resources Provided:  Reviewed chart for advance care plan.  Carolyn aMllory has no plan or code status on file.  Discussed available resources and provided with information. .  Added by Lizzette Garcia           Calculus of kidney 12/16/2015     Priority: Medium     Bilateral hydronephrosis, flank pain, 3mm right lower pole non obstructing stone.  Dr. Simon Urology consult.  Declined stents.  Reconsider if hospitalization and no improvement 2-3 days.         Sacro ilial pain 10/30/2015     Priority: Medium     Patellofemoral syndrome of both knees 01/20/2015     Priority: Medium     Allergic rhinitis 01/17/2014     Priority: Medium     Problem list name updated by automated process. Provider to review       Food allergy 01/17/2014     Priority: Medium     Astigmatism 09/24/2013     Priority: Medium     Overview:   IMO Update       Hypermetropia 09/24/2013     Priority: Medium        Past Medical History:    Past Medical History:   Diagnosis Date     NO ACTIVE PROBLEMS      Pregnancy        Past Surgical History:    Past Surgical History:   Procedure Laterality Date     ARTHROSCOPY KNEE Right 5/4/2015    Procedure: ARTHROSCOPY KNEE;  Surgeon: Hernando Kulkarni MD;  Location: [facility]     AS ESOPHAGOSCOPY, DIAGNOSTIC      upper     LAPAROSCOPIC CHOLECYSTECTOMY N/A 10/10/2015    Procedure: LAPAROSCOPIC CHOLECYSTECTOMY;  Surgeon: Drew Padgett MD;  Location: [facility]     none         Family History:    Family History   Problem Relation Age of Onset     Thrombophilia Mother         blood clotting     Lupus Mother         erythematosus     Diabetes Mother      Hypertension Father      Asthma Sister      Diabetes Maternal Grandfather      Hypertension Maternal Grandfather      Lupus Maternal Aunt         erythematosus       Social History:  Marital Status:  Single [1]  Social History     Tobacco Use     Smoking status: Current Every Day Smoker     Packs/day: 0.25     Years: 1.00     Pack years: 0.25     Types: Cigarettes     Start date: 1/20/2014     Smokeless tobacco: Never Used   Substance Use Topics     Alcohol use: No      Alcohol/week: 0.0 oz     Drug use: No        Medications:      Doxylamine-Pyridoxine (DICLEGIS) 10-10 MG TBEC   Prenatal Vit-Fe Fumarate-FA (PRENATAL VITAMIN AND MINERAL) 28-0.8 MG TABS   diphenhydrAMINE (BENADRYL) 25 MG tablet   EPINEPHrine (EPIPEN) 0.3 MG/0.3ML injection   famotidine (PEPCID) 40 MG tablet   fluticasone (FLONASE) 50 MCG/ACT spray   loratadine (CLARITIN) 10 MG tablet   nitroFURantoin macrocrystal-monohydrate (MACROBID) 100 MG capsule         Review of Systems   Constitutional: Negative for fever.   Respiratory: Negative for shortness of breath.    Cardiovascular: Negative for chest pain.   Gastrointestinal: Negative for abdominal pain.   All other systems reviewed and are negative.      Physical Exam   BP: 120/69  Pulse: 91  Temp: 98.9  F (37.2  C)  Resp: 20  SpO2: 100 %      Physical Exam   Constitutional: She appears well-developed and well-nourished. No distress.   HENT:   Head: Atraumatic.   Mouth/Throat: Oropharynx is clear and moist. No oropharyngeal exudate.   Eyes: Pupils are equal, round, and reactive to light. No scleral icterus.   Cardiovascular: Normal rate, regular rhythm, normal heart sounds and intact distal pulses.   Pulmonary/Chest: Breath sounds normal. No respiratory distress.   Abdominal: Soft. Bowel sounds are normal. There is no tenderness.   Musculoskeletal: She exhibits no edema or tenderness.   Skin: Skin is warm. No rash noted. She is not diaphoretic.   Nursing note and vitals reviewed.      ED Course        Procedures      Results for orders placed or performed during the hospital encounter of 03/25/19 (from the past 24 hour(s))   UA with Microscopic   Result Value Ref Range    Color Urine Yellow     Appearance Urine Clear     Glucose Urine Negative NEG^Negative mg/dL    Bilirubin Urine Negative NEG^Negative    Ketones Urine Negative NEG^Negative mg/dL    Specific Gravity Urine 1.025 1.003 - 1.035    Blood Urine Negative NEG^Negative    pH Urine 6.0 4.7 - 8.0 pH    Protein  Albumin Urine 10 (A) NEG^Negative mg/dL    Urobilinogen mg/dL Normal 0.0 - 2.0 mg/dL    Nitrite Urine Negative NEG^Negative    Leukocyte Esterase Urine Small (A) NEG^Negative    Source Midstream Urine     WBC Urine 1 0 - 5 /HPF    RBC Urine 0 0 - 2 /HPF    Bacteria Urine Few (A) NEG^Negative /HPF    Squamous Epithelial /HPF Urine 3 (H) 0 - 1 /HPF    Mucous Urine Present (A) NEG^Negative /LPF   HCG qualitative urine (UPT)   Result Value Ref Range    HCG Qual Urine Positive (A) NEG^Negative   CBC with platelets differential   Result Value Ref Range    WBC 8.2 4.0 - 11.0 10e9/L    RBC Count 3.92 3.8 - 5.2 10e12/L    Hemoglobin 12.1 11.7 - 15.7 g/dL    Hematocrit 34.8 (L) 35.0 - 47.0 %    MCV 89 78 - 100 fl    MCH 30.9 26.5 - 33.0 pg    MCHC 34.8 31.5 - 36.5 g/dL    RDW 12.6 10.0 - 15.0 %    Platelet Count 261 150 - 450 10e9/L    Diff Method Automated Method     % Neutrophils 59.5 %    % Lymphocytes 34.5 %    % Monocytes 4.5 %    % Eosinophils 0.9 %    % Basophils 0.4 %    % Immature Granulocytes 0.2 %    Nucleated RBCs 0 0 /100    Absolute Neutrophil 4.9 1.6 - 8.3 10e9/L    Absolute Lymphocytes 2.8 0.8 - 5.3 10e9/L    Absolute Monocytes 0.4 0.0 - 1.3 10e9/L    Absolute Eosinophils 0.1 0.0 - 0.7 10e9/L    Absolute Basophils 0.0 0.0 - 0.2 10e9/L    Abs Immature Granulocytes 0.0 0 - 0.4 10e9/L    Absolute Nucleated RBC 0.0    Comprehensive metabolic panel   Result Value Ref Range    Sodium 138 133 - 144 mmol/L    Potassium 3.4 3.4 - 5.3 mmol/L    Chloride 108 94 - 109 mmol/L    Carbon Dioxide 22 20 - 32 mmol/L    Anion Gap 8 3 - 14 mmol/L    Glucose 91 70 - 99 mg/dL    Urea Nitrogen 6 (L) 7 - 30 mg/dL    Creatinine 0.72 0.52 - 1.04 mg/dL    GFR Estimate >90 >60 mL/min/[1.73_m2]    GFR Estimate If Black >90 >60 mL/min/[1.73_m2]    Calcium 8.6 8.5 - 10.1 mg/dL    Bilirubin Total 0.3 0.2 - 1.3 mg/dL    Albumin 3.6 3.4 - 5.0 g/dL    Protein Total 6.6 (L) 6.8 - 8.8 g/dL    Alkaline Phosphatase 68 40 - 150 U/L    ALT 23 0 - 50  U/L    AST 13 0 - 45 U/L   HCG quantitative pregnancy   Result Value Ref Range    HCG Quantitative Serum 33,685 (H) 0 - 5 IU/L   US Appendix Only (RLQ)    Narrative    Ultrasound of the right lower quadrant    HISTORY: Right lower quadrant pain    FINDINGS: Ultrasound of the right lower quadrant reveals  nonvisualization of the appendix. No right lower quadrant fluid  collections or masses are seen.      Impression    IMPRESSION: Nonvisualization of the appendix    EMILY NAVA MD       Medications   ondansetron (ZOFRAN-ODT) ODT tab 4 mg (4 mg Oral Given 3/25/19 1119)   ondansetron (ZOFRAN-ODT) ODT tab 4 mg (4 mg Oral Given 3/25/19 1224)       Assessments & Plan (with Medical Decision Making)   Right lower quadrant abdominal pain: Right lower quadrant abdominal pain in pregnancy at 22 weeks.  Patient evaluated at the ED and all laboratory tests done were normal including CBC, CMP.  Right lower quadrant abdominal ultrasound did not reveal any abnormalities.  Patient had recent OB ultrasound that showed intrauterine pregnancy.  These findings were discussed with the patient and obstetrician gynecologist on call Dr. Lopez who recommended patient be discharged home and follow-up with OB/GYN as outpatient.  Unfortunately patient is allergic to Tylenol which is the safe pain medication in pregnancy.  Could not give any pain medication in the ED because of this.  Will discuss further management of his pain with his primary care physician and obstetrician gynecologist.  Discharged home in stable condition.    I have reviewed the nursing notes.    I have reviewed the findings, diagnosis, plan and need for follow up with the patient.       Medication List      Started    Doxylamine-Pyridoxine 10-10 MG Tbec  Commonly known as:  DICLEGIS  2 tablets, Oral, EVERY 12 HOURS PRN            Final diagnoses:   RLQ abdominal pain   Nausea and vomiting in pregnancy prior to 22 weeks gestation       3/25/2019   [facility]     Ronn Morrow MD  03/25/19 [id number]

## 2019-03-25 NOTE — ED TRIAGE NOTES
Pt in for complaints of sharp LLQ abdominal pain that was initially intermittent but is now constant. Onset yesterday. Pt 10wks pregnant. No bleeding or discharge.

## 2019-03-25 NOTE — PATIENT INSTRUCTIONS
UTI diet guidelines  Can't take tylenol, so ASA would be OK temporarily  Keep regular LIBIA visit the 4th

## 2019-04-04 ENCOUNTER — PRENATAL OFFICE VISIT (OUTPATIENT)
Dept: OBGYN | Facility: OTHER | Age: 24
End: 2019-04-04
Attending: NURSE PRACTITIONER
Payer: COMMERCIAL

## 2019-04-04 VITALS
WEIGHT: 190 LBS | HEART RATE: 86 BPM | DIASTOLIC BLOOD PRESSURE: 58 MMHG | SYSTOLIC BLOOD PRESSURE: 106 MMHG | BODY MASS INDEX: 34.96 KG/M2 | OXYGEN SATURATION: 99 % | HEIGHT: 62 IN

## 2019-04-04 DIAGNOSIS — Z34.80 PREGNANCY IN MULTIGRAVIDA: ICD-10-CM

## 2019-04-04 PROCEDURE — G0463 HOSPITAL OUTPT CLINIC VISIT: HCPCS | Mod: 25 | Performed by: COUNSELOR

## 2019-04-04 PROCEDURE — G0463 HOSPITAL OUTPT CLINIC VISIT: HCPCS | Performed by: COUNSELOR

## 2019-04-04 PROCEDURE — 99207 ZZC PRENATAL VISIT: CPT | Performed by: NURSE PRACTITIONER

## 2019-04-04 ASSESSMENT — PAIN SCALES - GENERAL: PAINLEVEL: NO PAIN (0)

## 2019-04-04 ASSESSMENT — PATIENT HEALTH QUESTIONNAIRE - PHQ9: SUM OF ALL RESPONSES TO PHQ QUESTIONS 1-9: 3

## 2019-04-04 ASSESSMENT — MIFFLIN-ST. JEOR: SCORE: 1575.08

## 2019-04-04 NOTE — PROGRESS NOTES
"  HPI:  Carolyn Mallory is a 23 year old female Patient's last menstrual period was 11/28/2018. at 12w0d, Estimated Date of Delivery: Oct 17, 2019.  She denies vaginal bleeding, nausea  and vomiting. Occasional RLP.  Having frequent headaches.    No other c/o.    Past Medical History:   Diagnosis Date     NO ACTIVE PROBLEMS      Pregnancy     LMP 4/2015       Past Surgical History:   Procedure Laterality Date     ARTHROSCOPY KNEE Right 5/4/2015    Procedure: ARTHROSCOPY KNEE;  Surgeon: Hernando Kulkarni MD;  Location: [facility]     AS ESOPHAGOSCOPY, DIAGNOSTIC      upper     LAPAROSCOPIC CHOLECYSTECTOMY N/A 10/10/2015    Procedure: LAPAROSCOPIC CHOLECYSTECTOMY;  Surgeon: Drew Padgett MD;  Location: [facility]     none         Allergies: Amoxicillin; Oxycodone; and Tylenol [acetaminophen]     EXAM:  Blood pressure 106/58, pulse 86, height 1.575 m (5' 2\"), weight 86.2 kg (190 lb), last menstrual period 11/28/2018, SpO2 99 %, not currently breastfeeding.   BMI= Body mass index is 34.75 kg/m .  General - pleasant female in no acute distress.  Neck - supple without lymphadenopathy or thyromegaly.  Lungs - clear to auscultation bilaterally.  Heart - regular rate and rhythm without murmur.  Abdomen - soft, nontender, nondistended, no hepatosplenomegaly.  Pelvic - previously completed  Rectovaginal - deferred.  Musculoskeletal - no gross deformities.  Neurological - normal strength, sensation, and mental status.    Doptones were 165    ASSESSMENT/PLAN:    (Z34.80) Pregnancy in multigravida  Comment: new ob exam and teaching completed  Plan: RTC in 4 weeks.       Weight gain and exercise during pregnancy was discussed at today's visit.  The patient will return to clinic in 4 weeks for continued prenatal care.  "

## 2019-04-04 NOTE — NURSING NOTE
"Chief Complaint   Patient presents with     Prenatal Care     New OB 12 weeks 0 days       Initial /58 (BP Location: Right arm, Patient Position: Sitting, Cuff Size: Adult Regular)   Pulse 86   Ht 1.575 m (5' 2\")   Wt 86.2 kg (190 lb)   LMP 11/28/2018   SpO2 99%   BMI 34.75 kg/m   Estimated body mass index is 34.75 kg/m  as calculated from the following:    Height as of this encounter: 1.575 m (5' 2\").    Weight as of this encounter: 86.2 kg (190 lb).  Medication Reconciliation: complete     12 Week Visit    Patient education provided on the following:  Benefits of breastfeeding  Importance of early skin-to-skin contact    We reviewed the [place] [employer] Prenatal Toolkit in the [facility] Resource Book.  Patient questions and concerns addressed and reviewed. Support and encouragement provided.        Constanza Yang LPN    "

## 2019-04-09 ENCOUNTER — TELEPHONE (OUTPATIENT)
Dept: OBGYN | Facility: OTHER | Age: 24
End: 2019-04-09

## 2019-04-09 NOTE — TELEPHONE ENCOUNTER
Pt is 12 weeks pregnant and complaints of right sided pelvic pain and nausea. Denies vomiting, constipation or diarrhea. No vaginal bleeding or discharge. Denies fever. Please advise

## 2019-04-09 NOTE — TELEPHONE ENCOUNTER
Any urinary symptoms?  Is it related to activity?  Second trimester patients do get round ligament in the wow abdomen/radiating  radiating to groin sometimes. Can be severe and related to activity and relieved by heat/rest and is not harmful. .   If any urinary symptoms have her come in for UA.  If worsening pain, fever, vomiting would have her come into ED to r/o appendicitis or other other serious problems.

## 2019-04-15 ENCOUNTER — HOSPITAL ENCOUNTER (EMERGENCY)
Facility: HOSPITAL | Age: 24
Discharge: HOME | End: 2019-04-15
Attending: EMERGENCY MEDICINE | Admitting: EMERGENCY MEDICINE
Payer: COMMERCIAL

## 2019-04-15 VITALS
SYSTOLIC BLOOD PRESSURE: 123 MMHG | OXYGEN SATURATION: 100 % | HEART RATE: 106 BPM | TEMPERATURE: 99.6 F | DIASTOLIC BLOOD PRESSURE: 79 MMHG | RESPIRATION RATE: 18 BRPM

## 2019-04-15 DIAGNOSIS — R51.9 NONINTRACTABLE HEADACHE, UNSPECIFIED CHRONICITY PATTERN, UNSPECIFIED HEADACHE TYPE: ICD-10-CM

## 2019-04-15 DIAGNOSIS — R07.81 RIB PAIN: ICD-10-CM

## 2019-04-15 LAB
ALBUMIN UR-MCNC: NEGATIVE MG/DL
ANION GAP SERPL CALCULATED.3IONS-SCNC: 8 MMOL/L (ref 3–14)
APPEARANCE UR: CLEAR
BASOPHILS # BLD AUTO: 0 10E9/L (ref 0–0.2)
BASOPHILS NFR BLD AUTO: 0.3 %
BILIRUB UR QL STRIP: NEGATIVE
BUN SERPL-MCNC: 7 MG/DL (ref 7–30)
CALCIUM SERPL-MCNC: 9.4 MG/DL (ref 8.5–10.1)
CHLORIDE SERPL-SCNC: 106 MMOL/L (ref 94–109)
CO2 SERPL-SCNC: 24 MMOL/L (ref 20–32)
COLOR UR AUTO: NORMAL
CREAT SERPL-MCNC: 0.69 MG/DL (ref 0.52–1.04)
D DIMER PPP DDU-MCNC: <200 NG/ML D-DU (ref 0–300)
DIFFERENTIAL METHOD BLD: ABNORMAL
EOSINOPHIL # BLD AUTO: 0.2 10E9/L (ref 0–0.7)
EOSINOPHIL NFR BLD AUTO: 1.6 %
ERYTHROCYTE [DISTWIDTH] IN BLOOD BY AUTOMATED COUNT: 12.9 % (ref 10–15)
GFR SERPL CREATININE-BSD FRML MDRD: >90 ML/MIN/{1.73_M2}
GLUCOSE SERPL-MCNC: 89 MG/DL (ref 70–99)
GLUCOSE UR STRIP-MCNC: NEGATIVE MG/DL
HCT VFR BLD AUTO: 36.9 % (ref 35–47)
HGB BLD-MCNC: 12.9 G/DL (ref 11.7–15.7)
HGB UR QL STRIP: NEGATIVE
IMM GRANULOCYTES # BLD: 0 10E9/L (ref 0–0.4)
IMM GRANULOCYTES NFR BLD: 0.3 %
KETONES UR STRIP-MCNC: NEGATIVE MG/DL
LEUKOCYTE ESTERASE UR QL STRIP: NEGATIVE
LYMPHOCYTES # BLD AUTO: 3.7 10E9/L (ref 0.8–5.3)
LYMPHOCYTES NFR BLD AUTO: 33.6 %
MCH RBC QN AUTO: 30.9 PG (ref 26.5–33)
MCHC RBC AUTO-ENTMCNC: 35 G/DL (ref 31.5–36.5)
MCV RBC AUTO: 89 FL (ref 78–100)
MONOCYTES # BLD AUTO: 0.6 10E9/L (ref 0–1.3)
MONOCYTES NFR BLD AUTO: 5.2 %
NEUTROPHILS # BLD AUTO: 6.5 10E9/L (ref 1.6–8.3)
NEUTROPHILS NFR BLD AUTO: 59 %
NITRATE UR QL: NEGATIVE
NRBC # BLD AUTO: 0 10*3/UL
NRBC BLD AUTO-RTO: 0 /100
PH UR STRIP: 6.5 PH (ref 4.7–8)
PLATELET # BLD AUTO: 297 10E9/L (ref 150–450)
POTASSIUM SERPL-SCNC: 3.5 MMOL/L (ref 3.4–5.3)
RBC # BLD AUTO: 4.17 10E12/L (ref 3.8–5.2)
SODIUM SERPL-SCNC: 138 MMOL/L (ref 133–144)
SOURCE: NORMAL
SP GR UR STRIP: 1.02 (ref 1–1.03)
UROBILINOGEN UR STRIP-MCNC: NORMAL MG/DL (ref 0–2)
WBC # BLD AUTO: 11.1 10E9/L (ref 4–11)

## 2019-04-15 PROCEDURE — 80048 BASIC METABOLIC PNL TOTAL CA: CPT | Performed by: EMERGENCY MEDICINE

## 2019-04-15 PROCEDURE — 96374 THER/PROPH/DIAG INJ IV PUSH: CPT

## 2019-04-15 PROCEDURE — 85025 COMPLETE CBC W/AUTO DIFF WBC: CPT | Performed by: EMERGENCY MEDICINE

## 2019-04-15 PROCEDURE — 36415 COLL VENOUS BLD VENIPUNCTURE: CPT | Performed by: EMERGENCY MEDICINE

## 2019-04-15 PROCEDURE — 85379 FIBRIN DEGRADATION QUANT: CPT | Performed by: EMERGENCY MEDICINE

## 2019-04-15 PROCEDURE — 99284 EMERGENCY DEPT VISIT MOD MDM: CPT | Mod: 25

## 2019-04-15 PROCEDURE — 25000128 H RX IP 250 OP 636: Performed by: EMERGENCY MEDICINE

## 2019-04-15 PROCEDURE — 99284 EMERGENCY DEPT VISIT MOD MDM: CPT | Mod: Z6 | Performed by: EMERGENCY MEDICINE

## 2019-04-15 PROCEDURE — 81003 URINALYSIS AUTO W/O SCOPE: CPT | Performed by: EMERGENCY MEDICINE

## 2019-04-15 RX ORDER — PROCHLORPERAZINE MALEATE 10 MG
10 TABLET ORAL EVERY 6 HOURS PRN
Qty: 20 TABLET | Refills: 0 | Status: SHIPPED | OUTPATIENT
Start: 2019-04-15 | End: 2019-04-27

## 2019-04-15 RX ADMIN — PROCHLORPERAZINE EDISYLATE 10 MG: 5 INJECTION INTRAMUSCULAR; INTRAVENOUS at 22:17

## 2019-04-15 ASSESSMENT — ENCOUNTER SYMPTOMS
MYALGIAS: 0
PALPITATIONS: 0
BACK PAIN: 0
SHORTNESS OF BREATH: 1
DIZZINESS: 1
WHEEZING: 0
HEADACHES: 1

## 2019-04-15 NOTE — ED AVS SNAPSHOT
[facility]  [address] [address]  Phone:  [phone]                                    Carolyn Mallory   MRN: [id number]    Department:  [facility]   Date of Visit:  4/15/2019           After Visit Summary Signature Page    I have received my discharge instructions, and my questions have been answered. I have discussed any challenges I see with this plan with the nurse or doctor.    ..........................................................................................................................................  Patient/Patient Representative Signature      ..........................................................................................................................................  Patient Representative Print Name and Relationship to Patient    ..................................................               ................................................  Date                                   Time    ..........................................................................................................................................  Reviewed by Signature/Title    ...................................................              ..............................................  Date                                               Time          22EPIC Rev 08/18

## 2019-04-16 NOTE — ED PROVIDER NOTES
"  History     Chief Complaint   Patient presents with     Rib Pain     Intermittent rib pain at the base of the rib cage that goes under the rib, \"like a sharp pain that grabs her\" and then it subsides. She states the pain every half hour. Reports 13.5 weeks pregnancy.     Dizziness     HPI  Carolyn Mallory is a 23 year old female  with an estimated estimated gestational age of 13.5 weeks.  Patient presents to the emergency department with a chief complaint of rib pain as well as dizziness.  Patient describes rib pain she states it is bilateral she states that she is also experiencing exertional dyspnea.  Patient has had recent long distance travel to the [place] within the last month.  She denies hemoptysis denies productive cough denies fevers or chills.  Regards to her dizziness she states it is accompanied by a headache.  She was evaluated on the fourth of this month and advised to take OTC medications.  Patient denies diplopia and denies nausea or vomiting.  Finally patient denies pain in her legs.  Allergies:  Allergies   Allergen Reactions     Amoxicillin      Oxycodone Visual Disturbance, Nausea and Vomiting and Rash     Vomiting, hallucinations.     Tylenol [Acetaminophen] Other (See Comments) and Rash     17: Patient reports seeing spots after taking Tylenol.  Patient reports seeing spots after taking Tylenol.       Problem List:    Patient Active Problem List    Diagnosis Date Noted     Right lower quadrant pain, evaluated ER with neg findings--3/25/2019 2019     Priority: Medium     Urinary tract infection without hematuria, site unspecified, Macrobid---3/25/2019 2019     Priority: Medium     Pregnancy in multigravida 2019     Priority: Medium     H/o pre-E.   Baby ASA second trimester.  Wants flu shot  Plan quad screen  Plan breastfeeding  Dr. Saravia         Allergy to pollen 2018     Priority: Medium     Obesity, unspecified obesity severity, unspecified obesity type " 06/09/2017     Priority: Medium     Tobacco use disorder 06/09/2017     Priority: Medium     Esophageal reflux 06/09/2017     Priority: Medium     BMI 34.0-34.9,adult 01/19/2017     Priority: Medium     Chronic right-sided thoracic back pain 05/24/2016     Priority: Medium     ACP (advance care planning) 04/26/2016     Priority: Medium     Advance Care Planning 4/26/2016: ACP Review of Chart / Resources Provided:  Reviewed chart for advance care plan.  Carolny Mallory has no plan or code status on file. Discussed available resources and provided with information. .  Added by Lizzette Garcia           Calculus of kidney 12/16/2015     Priority: Medium     Bilateral hydronephrosis, flank pain, 3mm right lower pole non obstructing stone.  Dr. [facility] consult.  Declined stents.  Reconsider if hospitalization and no improvement 2-3 days.         Sacro ilial pain 10/30/2015     Priority: Medium     Patellofemoral syndrome of both knees 01/20/2015     Priority: Medium     Allergic rhinitis 01/17/2014     Priority: Medium     Problem list name updated by automated process. Provider to review       Food allergy 01/17/2014     Priority: Medium     Astigmatism 09/24/2013     Priority: Medium     Overview:   IMO Update       Hypermetropia 09/24/2013     Priority: Medium        Past Medical History:    Past Medical History:   Diagnosis Date     NO ACTIVE PROBLEMS      Pregnancy        Past Surgical History:    Past Surgical History:   Procedure Laterality Date     ARTHROSCOPY KNEE Right 5/4/2015    Procedure: ARTHROSCOPY KNEE;  Surgeon: Hernando Kulkarni MD;  Location: [facility]     AS ESOPHAGOSCOPY, DIAGNOSTIC      upper     LAPAROSCOPIC CHOLECYSTECTOMY N/A 10/10/2015    Procedure: LAPAROSCOPIC CHOLECYSTECTOMY;  Surgeon: Drew Padgett MD;  Location: [facility] OR     none         Family History:    Family History   Problem Relation Age of Onset     Thrombophilia Mother         blood clotting     Lupus Mother         erythematosus      Diabetes Mother      Hypertension Father      Asthma Sister      Diabetes Maternal Grandfather      Hypertension Maternal Grandfather      Lupus Maternal Aunt         erythematosus       Social History:  Marital Status:  Single [1]  Social History     Tobacco Use     Smoking status: Current Every Day Smoker     Packs/day: 0.25     Years: 1.00     Pack years: 0.25     Types: Cigarettes     Start date: 1/20/2014     Smokeless tobacco: Never Used   Substance Use Topics     Alcohol use: No     Alcohol/week: 0.0 oz     Drug use: No        Medications:      prochlorperazine (COMPAZINE) 10 MG tablet   diphenhydrAMINE (BENADRYL) 25 MG tablet   Doxylamine-Pyridoxine (DICLEGIS) 10-10 MG TBEC   EPINEPHrine (EPIPEN) 0.3 MG/0.3ML injection   famotidine (PEPCID) 40 MG tablet   fluticasone (FLONASE) 50 MCG/ACT spray   loratadine (CLARITIN) 10 MG tablet   nitroFURantoin macrocrystal-monohydrate (MACROBID) 100 MG capsule   Prenatal Vit-Fe Fumarate-FA (PRENATAL VITAMIN AND MINERAL) 28-0.8 MG TABS         Review of Systems   Respiratory: Positive for shortness of breath. Negative for wheezing.    Cardiovascular: Negative for chest pain, palpitations and leg swelling.   Musculoskeletal: Negative for back pain and myalgias.   Neurological: Positive for dizziness and headaches.   All other systems reviewed and are negative.      Physical Exam   BP: 123/79  Pulse: 106  Temp: 99.6  F (37.6  C)  Resp: 18  SpO2: 100 %      Physical Exam   Constitutional: She is oriented to person, place, and time. She appears well-developed and well-nourished.   HENT:   Head: Normocephalic and atraumatic.   Eyes: Pupils are equal, round, and reactive to light.   Neck: Normal range of motion.   Cardiovascular: Normal heart sounds.   No murmur heard.  tachycardic   Pulmonary/Chest: No respiratory distress.   Abdominal: Soft.   Neurological: She is alert and oriented to person, place, and time.   Skin: Skin is warm and dry.       ED Course         Procedures           Results for orders placed or performed during the hospital encounter of 04/15/19 (from the past 24 hour(s))   UA reflex to Microscopic and Culture   Result Value Ref Range    Color Urine Light Yellow     Appearance Urine Clear     Glucose Urine Negative NEG^Negative mg/dL    Bilirubin Urine Negative NEG^Negative    Ketones Urine Negative NEG^Negative mg/dL    Specific Gravity Urine 1.017 1.003 - 1.035    Blood Urine Negative NEG^Negative    pH Urine 6.5 4.7 - 8.0 pH    Protein Albumin Urine Negative NEG^Negative mg/dL    Urobilinogen mg/dL Normal 0.0 - 2.0 mg/dL    Nitrite Urine Negative NEG^Negative    Leukocyte Esterase Urine Negative NEG^Negative    Source Midstream Urine    CBC with platelets differential   Result Value Ref Range    WBC 11.1 (H) 4.0 - 11.0 10e9/L    RBC Count 4.17 3.8 - 5.2 10e12/L    Hemoglobin 12.9 11.7 - 15.7 g/dL    Hematocrit 36.9 35.0 - 47.0 %    MCV 89 78 - 100 fl    MCH 30.9 26.5 - 33.0 pg    MCHC 35.0 31.5 - 36.5 g/dL    RDW 12.9 10.0 - 15.0 %    Platelet Count 297 150 - 450 10e9/L    Diff Method Automated Method     % Neutrophils 59.0 %    % Lymphocytes 33.6 %    % Monocytes 5.2 %    % Eosinophils 1.6 %    % Basophils 0.3 %    % Immature Granulocytes 0.3 %    Nucleated RBCs 0 0 /100    Absolute Neutrophil 6.5 1.6 - 8.3 10e9/L    Absolute Lymphocytes 3.7 0.8 - 5.3 10e9/L    Absolute Monocytes 0.6 0.0 - 1.3 10e9/L    Absolute Eosinophils 0.2 0.0 - 0.7 10e9/L    Absolute Basophils 0.0 0.0 - 0.2 10e9/L    Abs Immature Granulocytes 0.0 0 - 0.4 10e9/L    Absolute Nucleated RBC 0.0    Basic metabolic panel   Result Value Ref Range    Sodium 138 133 - 144 mmol/L    Potassium 3.5 3.4 - 5.3 mmol/L    Chloride 106 94 - 109 mmol/L    Carbon Dioxide 24 20 - 32 mmol/L    Anion Gap 8 3 - 14 mmol/L    Glucose 89 70 - 99 mg/dL    Urea Nitrogen 7 7 - 30 mg/dL    Creatinine 0.69 0.52 - 1.04 mg/dL    GFR Estimate >90 >60 mL/min/[1.73_m2]    GFR Estimate If Black >90 >60  mL/min/[1.73_m2]    Calcium 9.4 8.5 - 10.1 mg/dL   D-Dimer ([facility],)   Result Value Ref Range    D-Dimer ng/mL <200 0 - 300 ng/ml D-DU       Medications   0.9% sodium chloride BOLUS (has no administration in time range)   prochlorperazine (COMPAZINE) injection 10 mg (has no administration in time range)       Assessments & Plan (with Medical Decision Making)     I have reviewed the nursing notes.    I have reviewed the findings, diagnosis, plan and need for follow up with the patient.         Medication List      Started    prochlorperazine 10 MG tablet  Commonly known as:  COMPAZINE  10 mg, Oral, EVERY 6 HOURS PRN          This is a pleasant  13.5w male presenting to the ER with a chief complaint of headaches dizziness and bilateral rib pain.  D-dimer is not elevated making PE unlikely.  Patient does continue to smoke during this pregnancy which may be playing a role in her chest pain and discomfort.  Her vital signs are within normal limits neurologically there is no findings on exam that are concerning or warrant further workup.  Patient was given Compazine in the ER as well as 1 L of saline felt marginally improved.  We will send her home with a prescription for Compazine to manage her headaches I would like her to follow-up with her PCP as well as her obstetrician for further cares.      Final diagnoses:   Nonintractable headache, unspecified chronicity pattern, unspecified headache type   Rib pain       4/15/2019   [facility]     Drew Gerardo MD  04/15/19 6816

## 2019-04-16 NOTE — ED NOTES
Patient states that for the past week, she has developed intermittent pain under bilateral ribs. States pain comes and goes and will last for about 15 minutes and then go away on its own. She also states she has a headache which started recently. Also has complaint of dizziness for a couple days and states that this increases with the rib pain, but never goes away completely. She states rib pain increases when she is walking. Does have reproducible pain with palpation on the right, but not left. States she did call her primary and was seen last week. Called again today and was told to come here.

## 2019-04-17 ENCOUNTER — PRENATAL OFFICE VISIT (OUTPATIENT)
Dept: OBGYN | Facility: OTHER | Age: 24
End: 2019-04-17
Attending: OBSTETRICS & GYNECOLOGY
Payer: COMMERCIAL

## 2019-04-17 VITALS — WEIGHT: 185 LBS | BODY MASS INDEX: 33.84 KG/M2 | SYSTOLIC BLOOD PRESSURE: 100 MMHG | DIASTOLIC BLOOD PRESSURE: 56 MMHG

## 2019-04-17 DIAGNOSIS — N39.0 URINARY TRACT INFECTION WITHOUT HEMATURIA, SITE UNSPECIFIED: ICD-10-CM

## 2019-04-17 DIAGNOSIS — R51.9 NONINTRACTABLE HEADACHE, UNSPECIFIED CHRONICITY PATTERN, UNSPECIFIED HEADACHE TYPE: Primary | ICD-10-CM

## 2019-04-17 DIAGNOSIS — R10.31 RIGHT LOWER QUADRANT PAIN: ICD-10-CM

## 2019-04-17 DIAGNOSIS — Z34.80 PREGNANCY IN MULTIGRAVIDA: ICD-10-CM

## 2019-04-17 PROCEDURE — G0463 HOSPITAL OUTPT CLINIC VISIT: HCPCS

## 2019-04-17 PROCEDURE — 99207 ZZC PRENATAL VISIT: CPT | Performed by: OBSTETRICS & GYNECOLOGY

## 2019-04-17 RX ORDER — BUTALBITAL, ASPIRIN, AND CAFFEINE 325; 50; 40 MG/1; MG/1; MG/1
1 CAPSULE ORAL EVERY 4 HOURS PRN
Qty: 12 CAPSULE | Refills: 0 | Status: SHIPPED | OUTPATIENT
Start: 2019-04-17 | End: 2019-08-22

## 2019-04-17 ASSESSMENT — PAIN SCALES - GENERAL: PAINLEVEL: NO PAIN (0)

## 2019-04-17 NOTE — PROGRESS NOTES
"Pt recently seen in the ED for rib pain. Pain can occur on either side \"wraps around\" and under ribs. Occurs most when walking. No significant cough. Heat makes it worse.  She also reports frequent, light sensitive head aches.    Rib pain - discussed comfort measures  Head aches - states she is well hydrated. Will try Fiorinal (pt will substitute for baby aspirin on days with SHARMA)    ZULEIMA LEE MD    "

## 2019-04-27 ENCOUNTER — HOSPITAL ENCOUNTER (EMERGENCY)
Facility: HOSPITAL | Age: 24
Discharge: HOME | End: 2019-04-27
Attending: EMERGENCY MEDICINE | Admitting: EMERGENCY MEDICINE
Payer: COMMERCIAL

## 2019-04-27 ENCOUNTER — APPOINTMENT (OUTPATIENT)
Dept: ULTRASOUND IMAGING | Facility: HOSPITAL | Age: 24
End: 2019-04-27
Attending: EMERGENCY MEDICINE
Payer: COMMERCIAL

## 2019-04-27 VITALS
TEMPERATURE: 98.6 F | SYSTOLIC BLOOD PRESSURE: 109 MMHG | DIASTOLIC BLOOD PRESSURE: 68 MMHG | HEART RATE: 95 BPM | RESPIRATION RATE: 16 BRPM | OXYGEN SATURATION: 100 %

## 2019-04-27 DIAGNOSIS — N30.01 ACUTE CYSTITIS WITH HEMATURIA: Primary | ICD-10-CM

## 2019-04-27 DIAGNOSIS — R10.9 LEFT FLANK PAIN: ICD-10-CM

## 2019-04-27 LAB
ALBUMIN SERPL-MCNC: 3.5 G/DL (ref 3.4–5)
ALBUMIN UR-MCNC: NEGATIVE MG/DL
ALP SERPL-CCNC: 82 U/L (ref 40–150)
ALT SERPL W P-5'-P-CCNC: 29 U/L (ref 0–50)
ANION GAP SERPL CALCULATED.3IONS-SCNC: 7 MMOL/L (ref 3–14)
APPEARANCE UR: ABNORMAL
AST SERPL W P-5'-P-CCNC: 15 U/L (ref 0–45)
BACTERIA #/AREA URNS HPF: ABNORMAL /HPF
BASOPHILS # BLD AUTO: 0 10E9/L (ref 0–0.2)
BASOPHILS NFR BLD AUTO: 0.3 %
BILIRUB SERPL-MCNC: 0.2 MG/DL (ref 0.2–1.3)
BILIRUB UR QL STRIP: NEGATIVE
BUN SERPL-MCNC: 4 MG/DL (ref 7–30)
CALCIUM SERPL-MCNC: 8.8 MG/DL (ref 8.5–10.1)
CHLORIDE SERPL-SCNC: 108 MMOL/L (ref 94–109)
CO2 SERPL-SCNC: 23 MMOL/L (ref 20–32)
COLOR UR AUTO: ABNORMAL
CREAT SERPL-MCNC: 0.66 MG/DL (ref 0.52–1.04)
DIFFERENTIAL METHOD BLD: ABNORMAL
EOSINOPHIL # BLD AUTO: 0.2 10E9/L (ref 0–0.7)
EOSINOPHIL NFR BLD AUTO: 1.5 %
ERYTHROCYTE [DISTWIDTH] IN BLOOD BY AUTOMATED COUNT: 13 % (ref 10–15)
GFR SERPL CREATININE-BSD FRML MDRD: >90 ML/MIN/{1.73_M2}
GLUCOSE SERPL-MCNC: 87 MG/DL (ref 70–99)
GLUCOSE UR STRIP-MCNC: NEGATIVE MG/DL
HCT VFR BLD AUTO: 37.1 % (ref 35–47)
HGB BLD-MCNC: 13.1 G/DL (ref 11.7–15.7)
HGB UR QL STRIP: NEGATIVE
IMM GRANULOCYTES # BLD: 0 10E9/L (ref 0–0.4)
IMM GRANULOCYTES NFR BLD: 0.3 %
KETONES UR STRIP-MCNC: NEGATIVE MG/DL
LACTATE BLD-SCNC: 1.2 MMOL/L (ref 0.7–2)
LEUKOCYTE ESTERASE UR QL STRIP: ABNORMAL
LYMPHOCYTES # BLD AUTO: 3.7 10E9/L (ref 0.8–5.3)
LYMPHOCYTES NFR BLD AUTO: 32.5 %
MCH RBC QN AUTO: 31.5 PG (ref 26.5–33)
MCHC RBC AUTO-ENTMCNC: 35.3 G/DL (ref 31.5–36.5)
MCV RBC AUTO: 89 FL (ref 78–100)
MONOCYTES # BLD AUTO: 0.6 10E9/L (ref 0–1.3)
MONOCYTES NFR BLD AUTO: 5.3 %
MUCOUS THREADS #/AREA URNS LPF: PRESENT /LPF
NEUTROPHILS # BLD AUTO: 6.9 10E9/L (ref 1.6–8.3)
NEUTROPHILS NFR BLD AUTO: 60.1 %
NITRATE UR QL: NEGATIVE
NRBC # BLD AUTO: 0 10*3/UL
NRBC BLD AUTO-RTO: 0 /100
PH UR STRIP: 6.5 PH (ref 4.7–8)
PLATELET # BLD AUTO: 272 10E9/L (ref 150–450)
POTASSIUM SERPL-SCNC: 3.5 MMOL/L (ref 3.4–5.3)
PROT SERPL-MCNC: 7.1 G/DL (ref 6.8–8.8)
RBC # BLD AUTO: 4.16 10E12/L (ref 3.8–5.2)
RBC #/AREA URNS AUTO: 4 /HPF (ref 0–2)
SODIUM SERPL-SCNC: 138 MMOL/L (ref 133–144)
SOURCE: ABNORMAL
SP GR UR STRIP: 1.01 (ref 1–1.03)
SQUAMOUS #/AREA URNS AUTO: 6 /HPF (ref 0–1)
UROBILINOGEN UR STRIP-MCNC: NORMAL MG/DL (ref 0–2)
WBC # BLD AUTO: 11.5 10E9/L (ref 4–11)
WBC #/AREA URNS AUTO: 9 /HPF (ref 0–5)

## 2019-04-27 PROCEDURE — 87086 URINE CULTURE/COLONY COUNT: CPT | Performed by: EMERGENCY MEDICINE

## 2019-04-27 PROCEDURE — 99284 EMERGENCY DEPT VISIT MOD MDM: CPT | Mod: 25

## 2019-04-27 PROCEDURE — 99284 EMERGENCY DEPT VISIT MOD MDM: CPT | Mod: Z6 | Performed by: EMERGENCY MEDICINE

## 2019-04-27 PROCEDURE — 76705 ECHO EXAM OF ABDOMEN: CPT | Mod: TC

## 2019-04-27 PROCEDURE — 81001 URINALYSIS AUTO W/SCOPE: CPT | Performed by: EMERGENCY MEDICINE

## 2019-04-27 PROCEDURE — 25000132 ZZH RX MED GY IP 250 OP 250 PS 637: Performed by: EMERGENCY MEDICINE

## 2019-04-27 PROCEDURE — 80053 COMPREHEN METABOLIC PANEL: CPT | Performed by: FAMILY MEDICINE

## 2019-04-27 PROCEDURE — 85025 COMPLETE CBC W/AUTO DIFF WBC: CPT | Performed by: FAMILY MEDICINE

## 2019-04-27 PROCEDURE — 83605 ASSAY OF LACTIC ACID: CPT | Performed by: FAMILY MEDICINE

## 2019-04-27 PROCEDURE — 36415 COLL VENOUS BLD VENIPUNCTURE: CPT | Performed by: FAMILY MEDICINE

## 2019-04-27 RX ORDER — NITROFURANTOIN 25; 75 MG/1; MG/1
100 CAPSULE ORAL ONCE
Status: COMPLETED | OUTPATIENT
Start: 2019-04-27 | End: 2019-04-27

## 2019-04-27 RX ORDER — NITROFURANTOIN 25; 75 MG/1; MG/1
100 CAPSULE ORAL 2 TIMES DAILY
Qty: 14 CAPSULE | Refills: 0 | Status: SHIPPED | OUTPATIENT
Start: 2019-04-27 | End: 2019-05-02

## 2019-04-27 RX ADMIN — NITROFURANTOIN (MONOHYDRATE/MACROCRYSTALS) 100 MG: 75; 25 CAPSULE ORAL at 22:31

## 2019-04-27 ASSESSMENT — ENCOUNTER SYMPTOMS
EYE REDNESS: 0
HEADACHES: 0
COLOR CHANGE: 0
ABDOMINAL PAIN: 0
DYSURIA: 1
DIFFICULTY URINATING: 0
ARTHRALGIAS: 0
NECK STIFFNESS: 0
FEVER: 0
SHORTNESS OF BREATH: 0
FLANK PAIN: 1
CONFUSION: 0

## 2019-04-27 NOTE — ED AVS SNAPSHOT
[facility]  [address] [address]  Phone:  [phone]                                    Carolyn Mallory   MRN: [id number]    Department:  [facility]   Date of Visit:  4/27/2019           After Visit Summary Signature Page    I have received my discharge instructions, and my questions have been answered. I have discussed any challenges I see with this plan with the nurse or doctor.    ..........................................................................................................................................  Patient/Patient Representative Signature      ..........................................................................................................................................  Patient Representative Print Name and Relationship to Patient    ..................................................               ................................................  Date                                   Time    ..........................................................................................................................................  Reviewed by Signature/Title    ...................................................              ..............................................  Date                                               Time          22EPIC Rev 08/18

## 2019-04-28 NOTE — ED NOTES
Condition stable, understands discharge instructions.  Work note given to patient.  Prescription x 1 e-scribed to Pharmacy of choice, discharged home.

## 2019-04-28 NOTE — ED NOTES
"\"Here for having left mid to lower back pain that wraps around to the flank area since last night.  Is 15 weeks pregnant.  \"   "

## 2019-04-28 NOTE — PLAN OF CARE
Here to assess FHT's. FHT's difficult to find. Briefly heard FHT's in the 150's. Primary RN and ED doctor notified. Ultrasound being ordered for further assessment.

## 2019-04-28 NOTE — ED PROVIDER NOTES
History     Chief Complaint   Patient presents with     Flank Pain     Left flank pain since last night. History of kidney stones. 15 week pregnancy.     HPI  Carolyn Mallory is a 23 year old female who presents to the ED with complaints of left flank pain since last night.  She also has dysuria but no fever chills.  Patient is pregnant at 15 weeks.  He has completed antibiotics for acute cystitis 2 weeks ago.  Had kidney stones in prior pregnancies and was advised that she is prone to recurrence of the stones with subsequent pregnancies.  No vomiting, diarrhea, fever or chills.    Allergies:  Allergies   Allergen Reactions     Amoxicillin      Oxycodone Visual Disturbance, Nausea and Vomiting and Rash     Vomiting, hallucinations.     Tylenol [Acetaminophen] Other (See Comments) and Rash     1/06/17: Patient reports seeing spots after taking Tylenol.  Patient reports seeing spots after taking Tylenol.       Problem List:    Patient Active Problem List    Diagnosis Date Noted     Right lower quadrant pain, evaluated ER with neg findings--3/25/2019 03/25/2019     Priority: Medium     Urinary tract infection without hematuria, site unspecified, Macrobid---3/25/2019 03/25/2019     Priority: Medium     Pregnancy in multigravida 03/14/2019     Priority: Medium     H/o pre-E.   Baby ASA second trimester.  Wants flu shot  Plan quad screen  Plan breastfeeding  Dr. Saravia         Allergy to pollen 03/01/2018     Priority: Medium     Obesity, unspecified obesity severity, unspecified obesity type 06/09/2017     Priority: Medium     Tobacco use disorder 06/09/2017     Priority: Medium     Esophageal reflux 06/09/2017     Priority: Medium     BMI 34.0-34.9,adult 01/19/2017     Priority: Medium     Chronic right-sided thoracic back pain 05/24/2016     Priority: Medium     ACP (advance care planning) 04/26/2016     Priority: Medium     Advance Care Planning 4/26/2016: ACP Review of Chart / Resources Provided:  Reviewed chart for  advance care plan.  Carolyn Mallory has no plan or code status on file. Discussed available resources and provided with information. .  Added by Lizzette Garcia           Calculus of kidney 12/16/2015     Priority: Medium     Bilateral hydronephrosis, flank pain, 3mm right lower pole non obstructing stone.  Dr. [facility] consult.  Declined stents.  Reconsider if hospitalization and no improvement 2-3 days.         Sacro ilial pain 10/30/2015     Priority: Medium     Patellofemoral syndrome of both knees 01/20/2015     Priority: Medium     Allergic rhinitis 01/17/2014     Priority: Medium     Problem list name updated by automated process. Provider to review       Food allergy 01/17/2014     Priority: Medium     Astigmatism 09/24/2013     Priority: Medium     Overview:   IMO Update       Hypermetropia 09/24/2013     Priority: Medium        Past Medical History:    Past Medical History:   Diagnosis Date     NO ACTIVE PROBLEMS      Pregnancy        Past Surgical History:    Past Surgical History:   Procedure Laterality Date     ARTHROSCOPY KNEE Right 5/4/2015    Procedure: ARTHROSCOPY KNEE;  Surgeon: Hernando Kulkarni MD;  Location: [facility]     AS ESOPHAGOSCOPY, DIAGNOSTIC      upper     LAPAROSCOPIC CHOLECYSTECTOMY N/A 10/10/2015    Procedure: LAPAROSCOPIC CHOLECYSTECTOMY;  Surgeon: Drew Padgett MD;  Location: [facility]     none         Family History:    Family History   Problem Relation Age of Onset     Thrombophilia Mother         blood clotting     Lupus Mother         erythematosus     Diabetes Mother      Hypertension Father      Asthma Sister      Diabetes Maternal Grandfather      Hypertension Maternal Grandfather      Lupus Maternal Aunt         erythematosus       Social History:  Marital Status:  Single [1]  Social History     Tobacco Use     Smoking status: Current Every Day Smoker     Packs/day: 0.25     Years: 1.00     Pack years: 0.25     Types: Cigarettes     Start date: 1/20/2014     Smokeless  tobacco: Never Used   Substance Use Topics     Alcohol use: No     Alcohol/week: 0.0 oz     Drug use: No        Medications:      ASPIRIN 81 PO   butalbital-aspirin-caffeine (FIORINAL) -40 MG capsule   EPINEPHrine (EPIPEN) 0.3 MG/0.3ML injection   fluticasone (FLONASE) 50 MCG/ACT spray   loratadine (CLARITIN) 10 MG tablet   nitroFURantoin macrocrystal-monohydrate (MACROBID) 100 MG capsule   Prenatal Vit-Fe Fumarate-FA (PRENATAL VITAMIN AND MINERAL) 28-0.8 MG TABS         Review of Systems   Constitutional: Negative for fever.   HENT: Negative for congestion.    Eyes: Negative for redness.   Respiratory: Negative for shortness of breath.    Cardiovascular: Negative for chest pain.   Gastrointestinal: Negative for abdominal pain.   Genitourinary: Positive for dysuria and flank pain. Negative for difficulty urinating.   Musculoskeletal: Negative for arthralgias and neck stiffness.   Skin: Negative for color change.   Neurological: Negative for headaches.   Psychiatric/Behavioral: Negative for confusion.   All other systems reviewed and are negative.      Physical Exam   BP: 135/74  Pulse: 95  Heart Rate: 79  Temp: 98.9  F (37.2  C)  Resp: 16  SpO2: 100 %      Physical Exam   Constitutional: She appears well-developed and well-nourished. No distress.   HENT:   Head: Normocephalic and atraumatic.   Eyes: Pupils are equal, round, and reactive to light. EOM are normal.   Cardiovascular: Normal rate, regular rhythm, normal heart sounds and intact distal pulses.   Pulmonary/Chest: Breath sounds normal. No respiratory distress.   Abdominal: Soft. Bowel sounds are normal. She exhibits no distension. There is tenderness.   Musculoskeletal: She exhibits no edema or tenderness.        Back:    Skin: Skin is warm. No rash noted. She is not diaphoretic.   Nursing note and vitals reviewed.      ED Course        Procedures    Results for orders placed or performed during the hospital encounter of 04/27/19 (from the past 24  hour(s))   CBC with platelets differential   Result Value Ref Range    WBC 11.5 (H) 4.0 - 11.0 10e9/L    RBC Count 4.16 3.8 - 5.2 10e12/L    Hemoglobin 13.1 11.7 - 15.7 g/dL    Hematocrit 37.1 35.0 - 47.0 %    MCV 89 78 - 100 fl    MCH 31.5 26.5 - 33.0 pg    MCHC 35.3 31.5 - 36.5 g/dL    RDW 13.0 10.0 - 15.0 %    Platelet Count 272 150 - 450 10e9/L    Diff Method Automated Method     % Neutrophils 60.1 %    % Lymphocytes 32.5 %    % Monocytes 5.3 %    % Eosinophils 1.5 %    % Basophils 0.3 %    % Immature Granulocytes 0.3 %    Nucleated RBCs 0 0 /100    Absolute Neutrophil 6.9 1.6 - 8.3 10e9/L    Absolute Lymphocytes 3.7 0.8 - 5.3 10e9/L    Absolute Monocytes 0.6 0.0 - 1.3 10e9/L    Absolute Eosinophils 0.2 0.0 - 0.7 10e9/L    Absolute Basophils 0.0 0.0 - 0.2 10e9/L    Abs Immature Granulocytes 0.0 0 - 0.4 10e9/L    Absolute Nucleated RBC 0.0    Comprehensive metabolic panel   Result Value Ref Range    Sodium 138 133 - 144 mmol/L    Potassium 3.5 3.4 - 5.3 mmol/L    Chloride 108 94 - 109 mmol/L    Carbon Dioxide 23 20 - 32 mmol/L    Anion Gap 7 3 - 14 mmol/L    Glucose 87 70 - 99 mg/dL    Urea Nitrogen 4 (L) 7 - 30 mg/dL    Creatinine 0.66 0.52 - 1.04 mg/dL    GFR Estimate >90 >60 mL/min/[1.73_m2]    GFR Estimate If Black >90 >60 mL/min/[1.73_m2]    Calcium 8.8 8.5 - 10.1 mg/dL    Bilirubin Total 0.2 0.2 - 1.3 mg/dL    Albumin 3.5 3.4 - 5.0 g/dL    Protein Total 7.1 6.8 - 8.8 g/dL    Alkaline Phosphatase 82 40 - 150 U/L    ALT 29 0 - 50 U/L    AST 15 0 - 45 U/L   Lactic acid whole blood   Result Value Ref Range    Lactic Acid 1.2 0.7 - 2.0 mmol/L   UA reflex to Microscopic and Culture   Result Value Ref Range    Color Urine Light Yellow     Appearance Urine Slightly Cloudy     Glucose Urine Negative NEG^Negative mg/dL    Bilirubin Urine Negative NEG^Negative    Ketones Urine Negative NEG^Negative mg/dL    Specific Gravity Urine 1.006 1.003 - 1.035    Blood Urine Negative NEG^Negative    pH Urine 6.5 4.7 - 8.0 pH     Protein Albumin Urine Negative NEG^Negative mg/dL    Urobilinogen mg/dL Normal 0.0 - 2.0 mg/dL    Nitrite Urine Negative NEG^Negative    Leukocyte Esterase Urine Large (A) NEG^Negative    Source Midstream Urine     RBC Urine 4 (H) 0 - 2 /HPF    WBC Urine 9 (H) 0 - 5 /HPF    Bacteria Urine Many (A) NEG^Negative /HPF    Squamous Epithelial /HPF Urine 6 (H) 0 - 1 /HPF    Mucous Urine Present (A) NEG^Negative /LPF   Urine Culture Aerobic Bacterial   Result Value Ref Range    Specimen Description Midstream Urine     Culture Micro Culture in progress    US Abdomen Limited Portable    Narrative    EXAM:US ABDOMEN LIMITED PORTABLE     CLINICAL HISTORY: Patient Age  23 years Additional clinical info: left  flank pain at 15 weeks pregnancy, eval Left kidney    COMPARISON: None      TECHNIQUE: Standard sonographic technique    FINDINGS: Left kidney measures 10.4 x 4.6 x 4.8 cm. Cortical thickness  1 cm. Normal echogenicity. No hydronephrosis.           Impression    IMPRESSION: Normal appearance of the left kidney.    IZZY FLORENCE MD       Medications   nitroFURantoin macrocrystal-monohydrate (MACROBID) capsule 100 mg (100 mg Oral Given 4/27/19 2231)       Assessments & Plan (with Medical Decision Making)   Acute cystitis with hematuria: Acute cystitis with hematuria and pregnancy at 15 weeks.  Presented to the ED with complaints of left flank pain.  Normal ultrasound of the left flank.  Normal CBC and CMP and a urinalysis consistent with acute cystitis.  Started on Macrobid at the ED and discharged home on the same.  Advised follow-up with PCP in 1 week.  Return to ED if condition worsens.  Discharged home in stable condition.    I have reviewed the nursing notes.    I have reviewed the findings, diagnosis, plan and need for follow up with the patient.       Medication List      Started    nitroFURantoin macrocrystal-monohydrate 100 MG capsule  Commonly known as:  MACROBID  100 mg, Oral, 2 TIMES DAILY            Final  diagnoses:   Left flank pain   Acute cystitis with hematuria       4/27/2019   [facility]     Ronn Morrow MD  04/28/19 [id number]

## 2019-04-29 ENCOUNTER — PRENATAL OFFICE VISIT (OUTPATIENT)
Dept: OBGYN | Facility: OTHER | Age: 24
End: 2019-04-29
Attending: OBSTETRICS & GYNECOLOGY
Payer: COMMERCIAL

## 2019-04-29 VITALS
DIASTOLIC BLOOD PRESSURE: 68 MMHG | SYSTOLIC BLOOD PRESSURE: 112 MMHG | TEMPERATURE: 99.3 F | BODY MASS INDEX: 33.47 KG/M2 | WEIGHT: 183 LBS

## 2019-04-29 DIAGNOSIS — R10.9 FLANK PAIN: Primary | ICD-10-CM

## 2019-04-29 DIAGNOSIS — N39.0 URINARY TRACT INFECTION WITHOUT HEMATURIA, SITE UNSPECIFIED: ICD-10-CM

## 2019-04-29 LAB
BACTERIA SPEC CULT: NORMAL
SPECIMEN SOURCE: NORMAL

## 2019-04-29 PROCEDURE — 99212 OFFICE O/P EST SF 10 MIN: CPT | Performed by: OBSTETRICS & GYNECOLOGY

## 2019-04-29 PROCEDURE — G0463 HOSPITAL OUTPT CLINIC VISIT: HCPCS

## 2019-04-29 RX ORDER — CEPHALEXIN 500 MG/1
500 CAPSULE ORAL
Qty: 30 CAPSULE | Refills: 0 | Status: ON HOLD | OUTPATIENT
Start: 2019-04-29 | End: 2019-05-02

## 2019-04-29 RX ORDER — PROMETHAZINE HYDROCHLORIDE 25 MG/1
25 TABLET ORAL EVERY 6 HOURS PRN
Qty: 40 TABLET | Refills: 0 | Status: ON HOLD | OUTPATIENT
Start: 2019-04-29 | End: 2019-05-02

## 2019-04-29 RX ORDER — TRAMADOL HYDROCHLORIDE 50 MG/1
50 TABLET ORAL EVERY 6 HOURS PRN
Qty: 25 TABLET | Refills: 0 | Status: SHIPPED | OUTPATIENT
Start: 2019-04-29 | End: 2019-05-30

## 2019-04-29 ASSESSMENT — PAIN SCALES - GENERAL: PAINLEVEL: SEVERE PAIN (6)

## 2019-04-29 NOTE — PROGRESS NOTES
S:   Complaints of right and left flank pain.  Seen in ER 4/27/2019 and placed on Macrobid.  Rx'd with Macrobid in March 2019 for UTI.   Having frequency, urgency, dysuria.  Has nausea.    O:   FHT's  150's         ABD soft, NOT tender         FLANK  Bilaterally tender  R>L         UA noted from 4/29/2019         Culture mixed growth    A:   UTI with ascending flank pain    P:   phenergen 25mg q6h # 40         Change to Keflex 500mg TID x 10 days         allergic to tylenol, so will use Tramadol for pain.  Off work until Thursday.  UTI diet guidelines

## 2019-05-02 ENCOUNTER — HOSPITAL ENCOUNTER (OUTPATIENT)
Facility: HOSPITAL | Age: 24
Discharge: HOME | End: 2019-05-02
Attending: OBSTETRICS & GYNECOLOGY | Admitting: OBSTETRICS & GYNECOLOGY
Payer: COMMERCIAL

## 2019-05-02 ENCOUNTER — PRENATAL OFFICE VISIT (OUTPATIENT)
Dept: OBGYN | Facility: OTHER | Age: 24
End: 2019-05-02
Attending: OBSTETRICS & GYNECOLOGY
Payer: COMMERCIAL

## 2019-05-02 ENCOUNTER — APPOINTMENT (OUTPATIENT)
Dept: ULTRASOUND IMAGING | Facility: HOSPITAL | Age: 24
End: 2019-05-02
Attending: OBSTETRICS & GYNECOLOGY
Payer: COMMERCIAL

## 2019-05-02 VITALS
HEART RATE: 115 BPM | OXYGEN SATURATION: 98 % | DIASTOLIC BLOOD PRESSURE: 62 MMHG | BODY MASS INDEX: 34.36 KG/M2 | WEIGHT: 182 LBS | SYSTOLIC BLOOD PRESSURE: 100 MMHG | HEIGHT: 61 IN

## 2019-05-02 VITALS
RESPIRATION RATE: 16 BRPM | OXYGEN SATURATION: 98 % | DIASTOLIC BLOOD PRESSURE: 69 MMHG | SYSTOLIC BLOOD PRESSURE: 112 MMHG | TEMPERATURE: 99.4 F

## 2019-05-02 DIAGNOSIS — Z34.80 PREGNANCY IN MULTIGRAVIDA: ICD-10-CM

## 2019-05-02 DIAGNOSIS — R10.9 FLANK PAIN: ICD-10-CM

## 2019-05-02 DIAGNOSIS — R11.15 INTRACTABLE CYCLICAL VOMITING WITH NAUSEA: Primary | ICD-10-CM

## 2019-05-02 DIAGNOSIS — N39.0 URINARY TRACT INFECTION WITHOUT HEMATURIA, SITE UNSPECIFIED: Primary | ICD-10-CM

## 2019-05-02 DIAGNOSIS — N39.0 URINARY TRACT INFECTION WITHOUT HEMATURIA, SITE UNSPECIFIED: ICD-10-CM

## 2019-05-02 DIAGNOSIS — E86.0 DEHYDRATION: ICD-10-CM

## 2019-05-02 LAB
ALBUMIN UR-MCNC: 100 MG/DL
ALBUMIN UR-MCNC: NEGATIVE MG/DL
ANION GAP SERPL CALCULATED.3IONS-SCNC: 6 MMOL/L (ref 3–14)
APPEARANCE UR: ABNORMAL
APPEARANCE UR: CLEAR
BACTERIA #/AREA URNS HPF: ABNORMAL /HPF
BILIRUB UR QL STRIP: NEGATIVE
BILIRUB UR QL STRIP: NEGATIVE
BUN SERPL-MCNC: 6 MG/DL (ref 7–30)
CALCIUM SERPL-MCNC: 8.4 MG/DL (ref 8.5–10.1)
CHLORIDE SERPL-SCNC: 109 MMOL/L (ref 94–109)
CO2 SERPL-SCNC: 23 MMOL/L (ref 20–32)
COLOR UR AUTO: ABNORMAL
COLOR UR AUTO: YELLOW
CREAT SERPL-MCNC: 0.74 MG/DL (ref 0.52–1.04)
ERYTHROCYTE [DISTWIDTH] IN BLOOD BY AUTOMATED COUNT: 13.1 % (ref 10–15)
GFR SERPL CREATININE-BSD FRML MDRD: >90 ML/MIN/{1.73_M2}
GLUCOSE SERPL-MCNC: 75 MG/DL (ref 70–99)
GLUCOSE UR STRIP-MCNC: NEGATIVE MG/DL
GLUCOSE UR STRIP-MCNC: NEGATIVE MG/DL
HCT VFR BLD AUTO: 31.3 % (ref 35–47)
HGB BLD-MCNC: 11.1 G/DL (ref 11.7–15.7)
HGB UR QL STRIP: ABNORMAL
HGB UR QL STRIP: NEGATIVE
KETONES UR STRIP-MCNC: 10 MG/DL
KETONES UR STRIP-MCNC: 5 MG/DL
LEUKOCYTE ESTERASE UR QL STRIP: ABNORMAL
LEUKOCYTE ESTERASE UR QL STRIP: NEGATIVE
MCH RBC QN AUTO: 31.6 PG (ref 26.5–33)
MCHC RBC AUTO-ENTMCNC: 35.5 G/DL (ref 31.5–36.5)
MCV RBC AUTO: 89 FL (ref 78–100)
MUCOUS THREADS #/AREA URNS LPF: PRESENT /LPF
NITRATE UR QL: NEGATIVE
NITRATE UR QL: NEGATIVE
PH UR STRIP: 6 PH (ref 4.7–8)
PH UR STRIP: 8 PH (ref 4.7–8)
PLATELET # BLD AUTO: 226 10E9/L (ref 150–450)
POTASSIUM SERPL-SCNC: 3.9 MMOL/L (ref 3.4–5.3)
RBC # BLD AUTO: 3.51 10E12/L (ref 3.8–5.2)
RBC #/AREA URNS AUTO: 6 /HPF (ref 0–2)
SODIUM SERPL-SCNC: 138 MMOL/L (ref 133–144)
SOURCE: ABNORMAL
SOURCE: ABNORMAL
SP GR UR STRIP: 1.01 (ref 1–1.03)
SP GR UR STRIP: 1.03 (ref 1–1.03)
SQUAMOUS #/AREA URNS AUTO: 21 /HPF (ref 0–1)
UROBILINOGEN UR STRIP-MCNC: 2 MG/DL (ref 0–2)
UROBILINOGEN UR STRIP-MCNC: NORMAL MG/DL (ref 0–2)
WBC # BLD AUTO: 9.5 10E9/L (ref 4–11)
WBC #/AREA URNS AUTO: 5 /HPF (ref 0–5)

## 2019-05-02 PROCEDURE — 96365 THER/PROPH/DIAG IV INF INIT: CPT

## 2019-05-02 PROCEDURE — 81003 URINALYSIS AUTO W/O SCOPE: CPT | Performed by: OBSTETRICS & GYNECOLOGY

## 2019-05-02 PROCEDURE — G0463 HOSPITAL OUTPT CLINIC VISIT: HCPCS | Mod: 25

## 2019-05-02 PROCEDURE — 99000 SPECIMEN HANDLING OFFICE-LAB: CPT | Performed by: NURSE PRACTITIONER

## 2019-05-02 PROCEDURE — 36415 COLL VENOUS BLD VENIPUNCTURE: CPT | Performed by: OBSTETRICS & GYNECOLOGY

## 2019-05-02 PROCEDURE — 76770 US EXAM ABDO BACK WALL COMP: CPT | Mod: TC

## 2019-05-02 PROCEDURE — 85027 COMPLETE CBC AUTOMATED: CPT | Performed by: OBSTETRICS & GYNECOLOGY

## 2019-05-02 PROCEDURE — 81001 URINALYSIS AUTO W/SCOPE: CPT | Performed by: OBSTETRICS & GYNECOLOGY

## 2019-05-02 PROCEDURE — 99207 ZZC COMPLICATED OB VISIT: CPT | Performed by: OBSTETRICS & GYNECOLOGY

## 2019-05-02 PROCEDURE — 25800025 ZZH RX 258: Performed by: OBSTETRICS & GYNECOLOGY

## 2019-05-02 PROCEDURE — 80048 BASIC METABOLIC PNL TOTAL CA: CPT | Performed by: OBSTETRICS & GYNECOLOGY

## 2019-05-02 PROCEDURE — 96367 TX/PROPH/DG ADDL SEQ IV INF: CPT

## 2019-05-02 PROCEDURE — G0463 HOSPITAL OUTPT CLINIC VISIT: HCPCS

## 2019-05-02 PROCEDURE — 25000128 H RX IP 250 OP 636: Performed by: OBSTETRICS & GYNECOLOGY

## 2019-05-02 PROCEDURE — 36415 COLL VENOUS BLD VENIPUNCTURE: CPT | Mod: ZL | Performed by: NURSE PRACTITIONER

## 2019-05-02 PROCEDURE — 96361 HYDRATE IV INFUSION ADD-ON: CPT

## 2019-05-02 PROCEDURE — 96375 TX/PRO/DX INJ NEW DRUG ADDON: CPT

## 2019-05-02 PROCEDURE — 25800030 ZZH RX IP 258 OP 636: Performed by: OBSTETRICS & GYNECOLOGY

## 2019-05-02 PROCEDURE — G0463 HOSPITAL OUTPT CLINIC VISIT: HCPCS | Mod: 25,27

## 2019-05-02 PROCEDURE — 81511 FTL CGEN ABNOR FOUR ANAL: CPT | Mod: ZL | Performed by: NURSE PRACTITIONER

## 2019-05-02 RX ORDER — PROCHLORPERAZINE MALEATE 10 MG
10 TABLET ORAL EVERY 8 HOURS PRN
Qty: 30 TABLET | Refills: 0 | Status: SHIPPED | OUTPATIENT
Start: 2019-05-02 | End: 2019-05-30

## 2019-05-02 RX ORDER — CEFTRIAXONE SODIUM 1 G/50ML
1 INJECTION, SOLUTION INTRAVENOUS EVERY 24 HOURS
Status: DISCONTINUED | OUTPATIENT
Start: 2019-05-02 | End: 2019-05-02 | Stop reason: HOSPADM

## 2019-05-02 RX ORDER — SODIUM CHLORIDE, SODIUM LACTATE, POTASSIUM CHLORIDE, CALCIUM CHLORIDE 600; 310; 30; 20 MG/100ML; MG/100ML; MG/100ML; MG/100ML
INJECTION, SOLUTION INTRAVENOUS CONTINUOUS
Status: DISCONTINUED | OUTPATIENT
Start: 2019-05-02 | End: 2019-05-02 | Stop reason: HOSPADM

## 2019-05-02 RX ORDER — ONDANSETRON 2 MG/ML
4 INJECTION INTRAMUSCULAR; INTRAVENOUS EVERY 4 HOURS PRN
Status: DISCONTINUED | OUTPATIENT
Start: 2019-05-02 | End: 2019-05-02 | Stop reason: HOSPADM

## 2019-05-02 RX ORDER — SULFAMETHOXAZOLE/TRIMETHOPRIM 800-160 MG
1 TABLET ORAL 2 TIMES DAILY
Qty: 20 TABLET | Refills: 0 | Status: SHIPPED | OUTPATIENT
Start: 2019-05-02 | End: 2019-05-30

## 2019-05-02 RX ADMIN — FAMOTIDINE 20 MG: 20 INJECTION, SOLUTION INTRAVENOUS at 15:54

## 2019-05-02 RX ADMIN — SODIUM CHLORIDE, POTASSIUM CHLORIDE, SODIUM LACTATE AND CALCIUM CHLORIDE: 600; 310; 30; 20 INJECTION, SOLUTION INTRAVENOUS at 11:48

## 2019-05-02 RX ADMIN — CEFTRIAXONE SODIUM 1 G: 1 INJECTION, SOLUTION INTRAVENOUS at 14:11

## 2019-05-02 RX ADMIN — ONDANSETRON 4 MG: 2 INJECTION INTRAMUSCULAR; INTRAVENOUS at 11:03

## 2019-05-02 RX ADMIN — PROCHLORPERAZINE EDISYLATE 10 MG: 5 INJECTION INTRAMUSCULAR; INTRAVENOUS at 15:05

## 2019-05-02 RX ADMIN — SODIUM CHLORIDE, POTASSIUM CHLORIDE, SODIUM LACTATE AND CALCIUM CHLORIDE 1000 ML: 600; 310; 30; 20 INJECTION, SOLUTION INTRAVENOUS at 10:50

## 2019-05-02 RX ADMIN — DEXTROSE AND SODIUM CHLORIDE: 5; 450 INJECTION, SOLUTION INTRAVENOUS at 15:51

## 2019-05-02 ASSESSMENT — PAIN SCALES - GENERAL: PAINLEVEL: MODERATE PAIN (5)

## 2019-05-02 ASSESSMENT — MIFFLIN-ST. JEOR: SCORE: 1522.93

## 2019-05-02 NOTE — NURSING NOTE
"Chief Complaint   Patient presents with     Prenatal Care     16w       Initial /62 (BP Location: Left arm, Cuff Size: Adult Large)   Pulse 115   Ht 1.549 m (5' 1\")   Wt 82.6 kg (182 lb)   LMP 11/28/2018   SpO2 98%   BMI 34.39 kg/m   Estimated body mass index is 34.39 kg/m  as calculated from the following:    Height as of this encounter: 1.549 m (5' 1\").    Weight as of this encounter: 82.6 kg (182 lb).  Medication Reconciliation: complete    Nancy Elder LPN  "

## 2019-05-02 NOTE — DISCHARGE INSTRUCTIONS
Severe Morning Sickness (Hyperemesis Gravidarum)    Nausea and vomiting are common during pregnancy. It is often called  morning sickness,  but it can happen at any time of day. But severe nausea and vomiting that doesn t let up is not normal. Dehydration and weight loss can result. This can be dangerous for the mother and baby. Hyperemesis gravidarum (HEG) is the medical term for severe nausea and vomiting during pregnancy, and it results in weight loss. If you have it, your healthcare provider can take steps to keep you and your baby safe. He or she can also help you find relief.   What are the symptoms of severe morning sickness?  Call your healthcare provider right away if you suspect that you have severe morning sickness. The symptoms include:    Inability to keep down liquids    Nausea that is severe and lasts beyond the first few months    Inability to empty the bladder     Urine that is dark and concentrated     Fainting spells  What causes severe morning sickness?  Nausea and vomiting during pregnancy are thought to be caused by an increase in certain hormone levels. It is not clear what causes severe morning sickness, but it may be more likely in women carrying twins or more. Your healthcare provider will do some tests to rule out certain health conditions that may lead to severe nausea and vomiting.  Getting relief from morning sickness  To help combat nausea, eat small amounts often. This helps prevent the stomach from being empty, which can make nausea worse. Choose dry foods such as crackers. Try sipping cold, clear drinks. And ask your healthcare provider about taking vitamin B-6 or juana to help ease nausea. Your provider may recommend that you try vitamin B-6 and a medicine called doxylamine to relieve the nausea. In some cases, alternative treatments such as acupuncture are effective in helping managing nausea during pregnancy.  Treating severe morning sickness  The focus of treatment for severe  morning sickness is to relieve symptoms and prevent weight loss and dehydration. If you are dehydrated or losing weight, steps are needed to protect you and your baby. You will most likely be admitted to the hospital for at least a short time. There, you can be given IV fluids to rehydrate you. You may also be prescribed medicines that relieve nausea. In very severe cases, a longer hospitalization may be needed. IV nutrition or tube feeding will then be used. If this becomes necessary, your healthcare provider can tell you more.  Recovery and follow-up  With treatment, severe morning sickness can be managed. Follow up with your healthcare provider to be sure you are keeping down fluids and gaining a healthy amount of weight.  When to seek medical care  Contact your healthcare provider right away if you have any of the following:    Signs of dehydration, including extreme thirst, headache, little urine, very dark urine, or a dry, sticky mouth.    Weight loss    Dizziness or fainting    Racing or pounding heart    Blood in your vomit   Date Last Reviewed: 5/1/2016 2000-2018 [employer]. [address] [place] [address], [place] [address]. All rights reserved. This information is not intended as a substitute for professional medical care. Always follow your healthcare professional's instructions.

## 2019-05-02 NOTE — PLAN OF CARE
"Pt arrived for vomiting and dehydration.  Pt had 2 L LR administered and zofran and compazine administered for nausea which was helpful.  Pepcid and rocephin administered as ordered.  Pt able to hold down water and states \"I feel much better\".  Pt discharged home with new discharge medication and will call back or come in with any concerns.  AVS signed and patient discharged via private vehicle.    "

## 2019-05-03 ENCOUNTER — MYC MEDICAL ADVICE (OUTPATIENT)
Dept: OBGYN | Facility: OTHER | Age: 24
End: 2019-05-03

## 2019-05-03 DIAGNOSIS — Z34.80 PREGNANCY IN MULTIGRAVIDA: ICD-10-CM

## 2019-05-03 DIAGNOSIS — Z34.80 PREGNANCY IN MULTIGRAVIDA: Primary | ICD-10-CM

## 2019-05-03 NOTE — TELEPHONE ENCOUNTER
Yes no repetitive lifting greater than 30 lbs.  US at 20 weeks to look at anatomy (sander please order).  Radiology will call.  We can discuss birth control/getting tube tied at next visit.

## 2019-05-03 NOTE — PROGRESS NOTES
"SUBJECTIVE: Carolyn Mallory is a 23 year old female   at 16weeks gestation.  She has 5d h/o flank pain, initially left now both sides.  + N/V.  Comes and goes.  Was started on Keflex for suspected UTI but culture came back contaminated.  Denies hematuria.  Denies constipation, fever, abdominal pain, VB.    +dysuria.  She does have h/o both UTI and stones and had similar problems last pregnancy.  On tramadol for pain.  See prior eval by ED/Dr. Lopez.     Past Medical History:   Diagnosis Date     NO ACTIVE PROBLEMS      Pregnancy     LMP 2015       Past Surgical History:   Procedure Laterality Date     ARTHROSCOPY KNEE Right 2015    Procedure: ARTHROSCOPY KNEE;  Surgeon: Hernando Kulkarni MD;  Location: [facility] OR     AS ESOPHAGOSCOPY, DIAGNOSTIC      upper     LAPAROSCOPIC CHOLECYSTECTOMY N/A 10/10/2015    Procedure: LAPAROSCOPIC CHOLECYSTECTOMY;  Surgeon: Drew Padgett MD;  Location: [facility] OR     none         Allergies: Amoxicillin; Oxycodone; and Tylenol [acetaminophen]     EXAM:  Blood pressure 100/62, pulse 115, height 1.549 m (5' 1\"), weight 82.6 kg (182 lb), last menstrual period 2018, SpO2 98 %, not currently breastfeeding.   BMI= Body mass index is 34.39 kg/m .  General - pleasant female in no acute distress.  Mucous membranes dry  Abdomen - soft, nontender, nondistended, no hepatosplenomegaly.  + bilateral flank/CVA pain  Pelvic - Rectovaginal - deferred.  Ext neg.         ASSESSMENT:     Flank pain (possible UTI/ereteral stones)  N/V with dehydration    Plan L and D eval for labwork, kidney US, IVF rehydration and antiemetics.  Pt agrees with POC.  Quad screen today.  Pt agrees with POC.  25  minutes were spent with the patient with greater than 50% of the visit spent in face-to-face counseling and coordination of care.        PLAN:   Jose Goldstein        "

## 2019-05-04 LAB
# FETUSES US: NORMAL
# FETUSES: NORMAL
AFP ADJ MOM AMN: 1.01
AFP SERPL-MCNC: 28 NG/ML
AGE - REPORTED: 23.2 YR
CURRENT SMOKER: NO
CURRENT SMOKER: NO
DIABETES STATUS PATIENT: NO
FAMILY MEMBER DISEASES HX: NO
FAMILY MEMBER DISEASES HX: NO
GA METHOD: NORMAL
GA METHOD: NORMAL
GA: NORMAL WK
HCG MOM SERPL: 0.56
HCG SERPL-ACNC: NORMAL IU/L
HX OF HEREDITARY DISORDERS: NO
IDDM PATIENT QL: NO
INHIBIN A MOM SERPL: 1.95
INHIBIN A SERPL-MCNC: 302 PG/ML
INTEGRATED SCN PATIENT-IMP: NORMAL
IVF PREGNANCY: NO
LMP START DATE: NORMAL
MONOCHORIONIC TWINS: NO
PATHOLOGY STUDY: NORMAL
PREV FETUS DEFECT: NO
SERVICE CMNT-IMP: NO
SPECIMEN DRAWN SERPL: NORMAL
U ESTRIOL MOM SERPL: 1.05
U ESTRIOL SERPL-MCNC: 0.85 NG/ML
VALPROIC/CARBAMAZEPINE STATUS: NO
WEIGHT UNITS: NORMAL

## 2019-05-08 ENCOUNTER — TELEPHONE (OUTPATIENT)
Dept: OBGYN | Facility: OTHER | Age: 24
End: 2019-05-08

## 2019-05-08 DIAGNOSIS — M54.9 BACK PAIN IN PREGNANCY: ICD-10-CM

## 2019-05-08 DIAGNOSIS — M25.552 HIP PAIN, BILATERAL: Primary | ICD-10-CM

## 2019-05-08 DIAGNOSIS — M25.551 HIP PAIN, BILATERAL: Primary | ICD-10-CM

## 2019-05-08 DIAGNOSIS — M54.9 BACK PAIN: ICD-10-CM

## 2019-05-08 DIAGNOSIS — O99.891 BACK PAIN IN PREGNANCY: ICD-10-CM

## 2019-05-08 NOTE — TELEPHONE ENCOUNTER
Hip/back pain is common in pregnancy.  Any fever, weakness, numbness or other symptoms?  Generally recommend physical therapy of could see her PCM for further eval.  Initiate PT referral if desired.

## 2019-05-08 NOTE — TELEPHONE ENCOUNTER
Pt is 16 weeks pregnant and states she is having severe hip pain and having a difficult time walking. No vaginal bleeding or discharge. Has not felt baby move yet. Please advise

## 2019-05-30 ENCOUNTER — PRENATAL OFFICE VISIT (OUTPATIENT)
Dept: OBGYN | Facility: OTHER | Age: 24
End: 2019-05-30
Attending: NURSE PRACTITIONER
Payer: COMMERCIAL

## 2019-05-30 VITALS — BODY MASS INDEX: 34.01 KG/M2 | DIASTOLIC BLOOD PRESSURE: 62 MMHG | SYSTOLIC BLOOD PRESSURE: 100 MMHG | WEIGHT: 180 LBS

## 2019-05-30 DIAGNOSIS — F41.9 ANXIETY: Primary | ICD-10-CM

## 2019-05-30 DIAGNOSIS — O99.340 ANXIETY IN PREGNANCY, ANTEPARTUM: ICD-10-CM

## 2019-05-30 DIAGNOSIS — F41.9 ANXIETY IN PREGNANCY, ANTEPARTUM: ICD-10-CM

## 2019-05-30 DIAGNOSIS — Z34.80 PREGNANCY IN MULTIGRAVIDA: ICD-10-CM

## 2019-05-30 PROCEDURE — G0463 HOSPITAL OUTPT CLINIC VISIT: HCPCS

## 2019-05-30 PROCEDURE — 99207 ZZC PRENATAL VISIT: CPT | Performed by: NURSE PRACTITIONER

## 2019-05-30 ASSESSMENT — PAIN SCALES - GENERAL: PAINLEVEL: NO PAIN (0)

## 2019-05-30 NOTE — PROGRESS NOTES
No cramping or bleeding.  Baby active.  US next week. Experiencing increased anxiety.  Has vistaril from previous rx.  Counseling referral placed for management skills.  Mid pregnancy changes and comfort measures reviewed.  RTC in 4 weeks.

## 2019-05-30 NOTE — PATIENT INSTRUCTIONS
Patient Education     Treating Anxiety Disorders with Therapy    If you have an anxiety disorder, you don t have to suffer anymore. Treatment is available. Therapy (also called counseling) is often a helpful treatment for anxiety disorders. With therapy, a specially trained professional (therapist) helps you face and learn to manage your anxiety. Therapy can be short-term or long-term depending on your needs. In some cases, medicine may also be prescribed with therapy. It may take time before you notice how much therapy is helping, but stick with it. With therapy, you can feel better.  Cognitive behavioral therapy (CBT)  Cognitive behavioral therapy (CBT) teaches you to manage anxiety. It does this by helping you understand how you think and act when you re anxious. Research has shown CBT to be a very effective treatment for anxiety disorders. How CBT is run is almost like a class. It involves homework and activities to build skills that teach you to cope with anxiety step by step. It can be done in a group or one-on-one, and often takes place for a set number of sessions. CBT has two main parts:    Cognitive therapy helps you identify the negative, irrational thoughts that occur with your anxiety. You ll learn to replace these with more positive, realistic thoughts.    Behavioral therapy helps you change how you react to anxiety. You ll learn coping skills and methods for relaxing to help you better deal with anxiety.  Other forms of therapy  Other therapy methods may work better for you than CBT. Or, you may move from CBT to another form of therapy as your treatment needs change. This may mean meeting with a therapist by yourself or in a group. Therapy can also help you work through problems in your life, such as drug or alcohol dependence, that may be making your anxiety worse.  Getting better takes time  Therapy will help you feel better and teach you skills to help manage anxiety long term. But change doesn t  happen right away. It takes a commitment from you. And treatment only works if you learn to face the causes of your anxiety. So, you might feel worse before you feel better. This can sometimes make it hard to stick with it. But remember: Therapy is a very effective treatment. The results will be well worth it.  Helping yourself  If anxiety is wearing you down, here are some things you can do to cope:    Check with your doctor and rule out any physical problems that may be causing the anxiety symptoms.    If an anxiety disorder is diagnosed, seek mental healthcare. This is an illness and it can respond to treatment. Most types of anxiety disorders will respond to talk therapy and medicine.    Educate yourself about anxiety disorders. Keep track of helpful online resources and books you can use during stressful periods.    Try stress management techniques such as meditation.    Consider online or in-person support groups.    Don t fight your feelings. Anxiety feeds itself. The more you worry about it, the worse it gets. Instead, try to identify what might have triggered your anxiety. Then try to put this threat in perspective.    Keep in mind that you can t control everything about a situation. Change what you can and let the rest take its course.    Exercise -- it s a great way to relieve tension and help your body feel relaxed.    Examine your life for stress, and try to find ways to reduce it.    Avoid caffeine and nicotine, which can make anxiety symptoms worse.    Fight the temptation to turn to alcohol or unprescribed drugs for relief. They only make things worse in the long run.   Date Last Reviewed: 1/1/2017 2000-2018 [employer]. [address] [place] [address], [place] [address]. All rights reserved. This information is not intended as a substitute for professional medical care. Always follow your healthcare professional's instructions.           Patient Education     Understanding Anxiety  Disorders    Almost everyone gets nervous now and then. It s normal to have knots in your stomach before a test, or for your heart to race on a first date. But an anxiety disorder is much more than a case of nerves. In fact, its symptoms may be overwhelming. But treatment can relieve many of these symptoms. Talking to your healthcare provider is the first step.  What are anxiety disorders?  An anxiety disorder causes intense feelings of panic and fear. These feelings may arise for no apparent reason. And they tend to recur again and again. They may prevent you from coping with life and cause you great distress. As a result, you may avoid anything that triggers your fear. In extreme cases, you may never leave the house. Anxiety disorders may cause other symptoms, such as:    Obsessive thoughts you can t control    Constant nightmares or painful thoughts of the past    Nausea, sweating, and muscle tension    Trouble sleeping or concentrating  What causes anxiety disorders?  Anxiety disorders tend to run in families. For some people, childhood abuse or neglect may play a role. For others, stressful life events or trauma may trigger anxiety disorders. Anxiety can trigger low self-esteem and poor coping skills.  Common anxiety disorders    Panic disorder. This causes an intense fear of being in danger.    Phobias. These are extreme fears of certain objects, places, or events.    Obsessive-compulsive disorder. This causes you to have unwanted thoughts and urges. You also may perform certain actions over and over.    Posttraumatic stress disorder. This occurs in people who have survived a terrible ordeal. It can cause nightmares and flashbacks about the event.    Generalized anxiety disorder. This causes constant worry that can greatly disrupt your life.   Getting better  You may believe that nothing can help you. Or, you might fear what others may think. But most anxiety symptoms can be eased. Having an anxiety disorder  is nothing to be ashamed of. Most people do best with treatment that combines medicine and therapy. These aren t cures. But they can help you live a healthier life.  Date Last Reviewed: 2/1/2017 2000-2018 [employer]. [address] [place] [address], [place] [address]. All rights reserved. This information is not intended as a substitute for professional medical care. Always follow your healthcare professional's instructions.

## 2019-06-03 ENCOUNTER — HOSPITAL ENCOUNTER (OUTPATIENT)
Dept: ULTRASOUND IMAGING | Facility: HOSPITAL | Age: 24
Discharge: HOME | End: 2019-06-03
Attending: OBSTETRICS & GYNECOLOGY | Admitting: OBSTETRICS & GYNECOLOGY
Payer: COMMERCIAL

## 2019-06-03 DIAGNOSIS — Z34.80 PREGNANCY IN MULTIGRAVIDA: ICD-10-CM

## 2019-06-03 PROCEDURE — 76805 OB US >/= 14 WKS SNGL FETUS: CPT | Mod: TC

## 2019-06-20 ENCOUNTER — PRENATAL OFFICE VISIT (OUTPATIENT)
Dept: OBGYN | Facility: OTHER | Age: 24
End: 2019-06-20
Attending: NURSE PRACTITIONER
Payer: COMMERCIAL

## 2019-06-20 VITALS
HEIGHT: 61 IN | DIASTOLIC BLOOD PRESSURE: 60 MMHG | HEART RATE: 87 BPM | WEIGHT: 182 LBS | OXYGEN SATURATION: 98 % | SYSTOLIC BLOOD PRESSURE: 98 MMHG | BODY MASS INDEX: 34.36 KG/M2

## 2019-06-20 DIAGNOSIS — R10.9 FLANK PAIN: ICD-10-CM

## 2019-06-20 DIAGNOSIS — Z34.80 PREGNANCY IN MULTIGRAVIDA: ICD-10-CM

## 2019-06-20 DIAGNOSIS — N39.0 URINARY TRACT INFECTION WITHOUT HEMATURIA, SITE UNSPECIFIED: ICD-10-CM

## 2019-06-20 DIAGNOSIS — R10.31 RIGHT LOWER QUADRANT PAIN: ICD-10-CM

## 2019-06-20 DIAGNOSIS — N30.00 ACUTE CYSTITIS WITHOUT HEMATURIA: Primary | ICD-10-CM

## 2019-06-20 LAB
ALBUMIN UR-MCNC: 30 MG/DL
ANION GAP SERPL CALCULATED.3IONS-SCNC: 11 MMOL/L (ref 3–14)
APPEARANCE UR: ABNORMAL
BACTERIA #/AREA URNS HPF: ABNORMAL /HPF
BILIRUB UR QL STRIP: NEGATIVE
BUN SERPL-MCNC: 5 MG/DL (ref 7–30)
CALCIUM SERPL-MCNC: 8.8 MG/DL (ref 8.5–10.1)
CHLORIDE SERPL-SCNC: 108 MMOL/L (ref 94–109)
CO2 SERPL-SCNC: 20 MMOL/L (ref 20–32)
COLOR UR AUTO: YELLOW
CREAT SERPL-MCNC: 0.72 MG/DL (ref 0.52–1.04)
ERYTHROCYTE [DISTWIDTH] IN BLOOD BY AUTOMATED COUNT: 13.2 % (ref 10–15)
GFR SERPL CREATININE-BSD FRML MDRD: >90 ML/MIN/{1.73_M2}
GLUCOSE SERPL-MCNC: 101 MG/DL (ref 70–99)
GLUCOSE UR STRIP-MCNC: NEGATIVE MG/DL
HCT VFR BLD AUTO: 34.8 % (ref 35–47)
HGB BLD-MCNC: 12.3 G/DL (ref 11.7–15.7)
HGB UR QL STRIP: ABNORMAL
KETONES UR STRIP-MCNC: NEGATIVE MG/DL
LEUKOCYTE ESTERASE UR QL STRIP: ABNORMAL
MCH RBC QN AUTO: 31.5 PG (ref 26.5–33)
MCHC RBC AUTO-ENTMCNC: 35.3 G/DL (ref 31.5–36.5)
MCV RBC AUTO: 89 FL (ref 78–100)
MUCOUS THREADS #/AREA URNS LPF: PRESENT /LPF
NITRATE UR QL: NEGATIVE
PH UR STRIP: 6 PH (ref 4.7–8)
PLATELET # BLD AUTO: 288 10E9/L (ref 150–450)
POTASSIUM SERPL-SCNC: 3.6 MMOL/L (ref 3.4–5.3)
RBC # BLD AUTO: 3.9 10E12/L (ref 3.8–5.2)
RBC #/AREA URNS AUTO: 0 /HPF (ref 0–2)
SODIUM SERPL-SCNC: 139 MMOL/L (ref 133–144)
SOURCE: ABNORMAL
SP GR UR STRIP: 1.03 (ref 1–1.03)
SPECIMEN SOURCE: NORMAL
SQUAMOUS #/AREA URNS AUTO: 16 /HPF (ref 0–1)
UROBILINOGEN UR STRIP-MCNC: NORMAL MG/DL (ref 0–2)
WBC # BLD AUTO: 14.1 10E9/L (ref 4–11)
WBC #/AREA URNS AUTO: 56 /HPF (ref 0–5)
WET PREP SPEC: NORMAL

## 2019-06-20 PROCEDURE — 36415 COLL VENOUS BLD VENIPUNCTURE: CPT | Mod: ZL | Performed by: NURSE PRACTITIONER

## 2019-06-20 PROCEDURE — 99207 ZZC PRENATAL VISIT: CPT | Performed by: NURSE PRACTITIONER

## 2019-06-20 PROCEDURE — 85027 COMPLETE CBC AUTOMATED: CPT | Mod: ZL | Performed by: NURSE PRACTITIONER

## 2019-06-20 PROCEDURE — G0463 HOSPITAL OUTPT CLINIC VISIT: HCPCS | Performed by: NURSE PRACTITIONER

## 2019-06-20 PROCEDURE — 87210 SMEAR WET MOUNT SALINE/INK: CPT | Mod: ZL | Performed by: NURSE PRACTITIONER

## 2019-06-20 PROCEDURE — 81001 URINALYSIS AUTO W/SCOPE: CPT | Mod: ZL | Performed by: NURSE PRACTITIONER

## 2019-06-20 PROCEDURE — 80048 BASIC METABOLIC PNL TOTAL CA: CPT | Mod: ZL | Performed by: NURSE PRACTITIONER

## 2019-06-20 RX ORDER — NITROFURANTOIN MACROCRYSTAL 100 MG
100 CAPSULE ORAL 2 TIMES DAILY
Qty: 14 CAPSULE | Refills: 0 | Status: SHIPPED | OUTPATIENT
Start: 2019-06-20 | End: 2019-07-25

## 2019-06-20 ASSESSMENT — PAIN SCALES - GENERAL: PAINLEVEL: NO PAIN (0)

## 2019-06-20 ASSESSMENT — MIFFLIN-ST. JEOR: SCORE: 1522.93

## 2019-06-20 NOTE — PROGRESS NOTES
"C/o cramping and dizziness with activity.  \"feeling awful\"  Denies headache, epigastric pain, dysuria.  Baby active.  High sugar intake and hx of GDM last pregnancy.  Wet prep, UA, CBC, and BMP today.  Instructed to begin previous diabetic diet.  Signs of PTL reviewed.  Plan to keep OB appointment next week.    "

## 2019-06-20 NOTE — NURSING NOTE
"Chief Complaint   Patient presents with     Prenatal Care     23w0d. Pt has c/o cramping in her lower stomach while walking.       Initial BP 98/60   Pulse 87   Ht 1.549 m (5' 1\")   Wt 82.6 kg (182 lb)   LMP 11/28/2018   SpO2 98%   BMI 34.39 kg/m   Estimated body mass index is 34.39 kg/m  as calculated from the following:    Height as of this encounter: 1.549 m (5' 1\").    Weight as of this encounter: 82.6 kg (182 lb).  Medication Reconciliation: complete      "

## 2019-06-27 ENCOUNTER — PRENATAL OFFICE VISIT (OUTPATIENT)
Dept: OBGYN | Facility: OTHER | Age: 24
End: 2019-06-27
Attending: NURSE PRACTITIONER
Payer: COMMERCIAL

## 2019-06-27 VITALS
OXYGEN SATURATION: 98 % | WEIGHT: 185.4 LBS | DIASTOLIC BLOOD PRESSURE: 64 MMHG | BODY MASS INDEX: 35.01 KG/M2 | HEART RATE: 88 BPM | SYSTOLIC BLOOD PRESSURE: 102 MMHG | HEIGHT: 61 IN

## 2019-06-27 DIAGNOSIS — Z34.80 PREGNANCY IN MULTIGRAVIDA: ICD-10-CM

## 2019-06-27 DIAGNOSIS — K21.9 GASTROESOPHAGEAL REFLUX IN PREGNANCY: Primary | ICD-10-CM

## 2019-06-27 DIAGNOSIS — O99.619 GASTROESOPHAGEAL REFLUX IN PREGNANCY: Primary | ICD-10-CM

## 2019-06-27 PROCEDURE — 99207 ZZC PRENATAL VISIT: CPT | Performed by: NURSE PRACTITIONER

## 2019-06-27 PROCEDURE — G0463 HOSPITAL OUTPT CLINIC VISIT: HCPCS | Performed by: COUNSELOR

## 2019-06-27 RX ORDER — FAMOTIDINE 10 MG
10 TABLET ORAL 2 TIMES DAILY PRN
Qty: 120 TABLET | Refills: 0 | Status: SHIPPED | OUTPATIENT
Start: 2019-06-27 | End: 2019-08-22

## 2019-06-27 ASSESSMENT — MIFFLIN-ST. JEOR: SCORE: 1538.35

## 2019-06-27 ASSESSMENT — PAIN SCALES - GENERAL: PAINLEVEL: NO PAIN (0)

## 2019-06-27 NOTE — PROGRESS NOTES
Doing well.  Feeling better with dietary changes.  Baby active.  28 week labs and Tdap next visit. Scheduled with Dr Goldstein to discuss sterilization.  RTC in 4 weeks.

## 2019-06-27 NOTE — NURSING NOTE
"Chief Complaint   Patient presents with     Prenatal Care     24 weeks 0 days       Initial /64 (BP Location: Left arm, Patient Position: Sitting, Cuff Size: Adult Regular)   Pulse 88   Ht 1.549 m (5' 1\")   Wt 84.1 kg (185 lb 6.4 oz)   LMP 11/28/2018   SpO2 98%   BMI 35.03 kg/m   Estimated body mass index is 35.03 kg/m  as calculated from the following:    Height as of this encounter: 1.549 m (5' 1\").    Weight as of this encounter: 84.1 kg (185 lb 6.4 oz).  Medication Reconciliation: complete     Constanza Yang      "

## 2019-06-27 NOTE — PATIENT INSTRUCTIONS
"  Patient Education   Best Practices in Breastfeeding   Q: What are \"best practices\" in breastfeeding?   A: The best hospitals strive to follow \"best practices.\" Research shows that human milk offers the best nutrition for babies. Best practices in breastfeeding means the staff cares for moms and babies in a way that promotes successful breastfeeding. Breastfeeding moms succeed more often when the care team:    Teaches all pregnant women about the benefits of breastfeeding.    Helps mothers start breastfeeding within one hour of birth.    Shows mothers how to breastfeed and how to keep their milk supply up, even if they are apart from their babies.    Gives only human milk to  babies. (No other food or drink unless there's a medical need.)    Helps mothers and babies stay together 24 hours a day. (This is called \"rooming in.\")    Encourages frequent breastfeeding, so moms can make plenty of milk.    Does not give pacifiers or bottles to babies who are breastfeeding.    Explains who mothers should call if they need help breastfeeding after they leave the hospital or clinic.  The hospital must also train all members of the care team in the skills they need to follow this policy.   Q: Do all of the [facility] follow these practices?  A: All of our hospitals are adopting best practices in breastfeeding. The steps listed will soon be in place in all hospitals.   Q: Why is breastfeeding so important?  A: Breastfeeding is healthy for both babies and mothers.    Your breast milk is the perfect food for your baby. It has all the nutrients your baby needs, plus it has antibodies to help your baby fight common infections (like ear infections and pneumonia).    It is convenient and does not cost any money.    It helps protect moms from some kinds of cancer.    It helps prevent some childhood diseases like diabetes and allergies. It can also help protect your baby from Sudden Infant Death Syndrome (SIDS).    It " helps moms lose their pregnancy weight faster.  If you are unable to produce enough milk in the hospital, your care team may recommend donor human milk for your baby.  Q: Will I be forced to breastfeed, even if I don't want to?   A: Absolutely not! Not all moms wish to breastfeed, and a very few cannot breastfeed for some reason. We will respect and support your choices.   We will discuss the ways in which breastfeeding is the perfect food for your baby: Any amount of breast milk is great--even just one feeding. But if you still prefer to use formula, we will provide the formula and teach you how to feed your baby.   If you breastfeed, it is important to give only human milk and not bottles of formula. This way, your body will make enough milk, and you and your baby will get lots of practice. If you would like to breastfeed but want to try a bottle of formula in the hospital, we will discuss the reasons we do not recommend this .   Q: What else might I notice about best practices in breastfeeding?  A: You will notice the following:    Soon after birth, if you and your baby are able, we will place your baby on your chest for skin-to-skin contact. This helps your baby stay warm and adjust to life outside the womb. If you plan to breastfeed, we will help you and your baby breastfeed within the first hour after birth.    The care team will care for you and your baby in your room, except during medical tests and treatments. This is a critical time for getting to know your baby and learning how to care for him or her. It is important to have your baby with you while there are plenty of people around to help you.  Staying close to your baby night and day will help you make more milk. Also, studies show that both moms and babies sleep better if they sleep in the same room. Your nurses will help you get the extra rest you need.    We will not give your baby a pacifier unless there is a medical need. Instead, we will teach  you many other ways to comfort your baby.   When learning to breastfeed, it is best for babies to satisfy their sucking needs at the breast for at least 2 to 4 weeks after birth. This teaches your baby the correct way to latch onto the breast, and it helps you build a good milk supply.    You'll get a lot of support for breastfeeding. We will also tell you who to call for support after you get home.  Q: What can I do to make breastfeeding a success?  A: Try the ideas below.    Get good information about breastfeeding. Call [employer] at [phone] for details about breastfeeding classes. Some sites offer financial support if you need it.    Tell friends and family about your decision to breastfeed. Ask for their support.    Plan ahead to get help with other tasks after your baby is born. This way, you can focus on breastfeeding, resting and caring for yourself.  For more information, go to www.babyfriendlyusa.org, call your clinic's care team, or speak with a childbirth or breastfeeding educator.  For informational purposes only. Not to replace the advice of your health care provider.   Copyright   2010 [employer]. All rights reserved. [employer] 261194 - REV 07/17.       Patient Education     Tips to Control Acid Reflux    To control acid reflux, you ll need to make some basic diet and lifestyle changes. The simple steps outlined below may be all you ll need to ease discomfort.  Watch what you eat    Avoid fatty foods and spicy foods.    Eat fewer acidic foods, such as citrus and tomato-based foods. These can increase symptoms.    Limit drinking alcohol, caffeine, and fizzy beverages. All increase acid reflux.    Try limiting chocolate, peppermint, and spearmint. These can worsen acid reflux in some people.  Watch when you eat    Avoid lying down for 3 hours after eating.    Do not snack before going to bed.  Raise your head  Raising your head and upper body by 4 to 6 inches helps  limit reflux when you re lying down. Put blocks under the head of your bed frame to raise it.  Other changes    Lose weight, if you need to    Don t exercise near bedtime    Avoid tight-fitting clothes    Limit aspirin and ibuprofen    Stop smoking   Date Last Reviewed: 7/1/2016 2000-2018 [employer]. [address] [place] [address], [place] [address]. All rights reserved. This information is not intended as a substitute for professional medical care. Always follow your healthcare professional's instructions.

## 2019-07-21 ENCOUNTER — TELEPHONE (OUTPATIENT)
Dept: OBGYN | Facility: OTHER | Age: 24
End: 2019-07-21

## 2019-07-21 NOTE — TELEPHONE ENCOUNTER
OBSTETRIC PHONE TRIAGE      2019 12:27 AM  Carolyn BAKER [place] 1995 [phone] (home)    Telephone Information:   Mobile [phone]                 No data on file. Last office visit/Cervix exam unknown   EDC 10-17-19 Gestational Age 27 3/7 weeks    Spoke with patient  Patient lives 15 minutes away     Reason for call per pt: Pt. reports having some intermittent abdominal cramping throughout the day. States they last like 35-40 seconds. Reports being up on her feet a lot today. Hasn't tried drinking fluids and resting on her left side yet.    Are you having contractions? Yes, intermittent cramping  Are you having any bloody show/Bleeding? No  Are you leaking any fluids/has your water broken? No  Is your baby moving normally? Yes  Did you have any complications with this or a previous pregnancy? (Pre-term labor, C/S, Previa, pre-eclampsia, diabetes) Frequent UTI's    Backache: no  Pressure: no  Vaginal Discharge: no  Cervical Status: unknown      On tocolytic (if yes dose, frequency, last dose, pulse): no    Has patient palpated for contractions? yes  Has patient spoken with MD/CNM? no    Patient Advised:To lay on left side and drink 4-5 glasses of water for one hour and then call back at 0125. Call back sooner if symptoms worsen. Also advised That she is welcome to come to the hospital and be checked PRN    Prenatal reviewed: yes    Call taken by: IGOR Knutson RN

## 2019-07-25 ENCOUNTER — PRENATAL OFFICE VISIT (OUTPATIENT)
Dept: OBGYN | Facility: OTHER | Age: 24
End: 2019-07-25
Attending: OBSTETRICS & GYNECOLOGY
Payer: COMMERCIAL

## 2019-07-25 VITALS — WEIGHT: 188 LBS | BODY MASS INDEX: 35.52 KG/M2 | SYSTOLIC BLOOD PRESSURE: 100 MMHG | DIASTOLIC BLOOD PRESSURE: 68 MMHG

## 2019-07-25 DIAGNOSIS — R82.90 ABNORMAL URINE FINDINGS: ICD-10-CM

## 2019-07-25 DIAGNOSIS — Z34.80 PREGNANCY IN MULTIGRAVIDA: ICD-10-CM

## 2019-07-25 DIAGNOSIS — R10.9 CRAMP, ABDOMINAL: Primary | ICD-10-CM

## 2019-07-25 LAB
ALBUMIN UR-MCNC: NEGATIVE MG/DL
APPEARANCE UR: CLEAR
BACTERIA #/AREA URNS HPF: ABNORMAL /HPF
BILIRUB UR QL STRIP: NEGATIVE
BLD GP AB SCN SERPL QL: NORMAL
COLOR UR AUTO: ABNORMAL
ERYTHROCYTE [DISTWIDTH] IN BLOOD BY AUTOMATED COUNT: 12.9 % (ref 10–15)
GLUCOSE 1H P 50 G GLC PO SERPL-MCNC: 134 MG/DL (ref 60–129)
GLUCOSE UR STRIP-MCNC: NEGATIVE MG/DL
HCT VFR BLD AUTO: 33.4 % (ref 35–47)
HGB BLD-MCNC: 11.5 G/DL (ref 11.7–15.7)
HGB UR QL STRIP: NEGATIVE
KETONES UR STRIP-MCNC: NEGATIVE MG/DL
LEUKOCYTE ESTERASE UR QL STRIP: ABNORMAL
MCH RBC QN AUTO: 31.6 PG (ref 26.5–33)
MCHC RBC AUTO-ENTMCNC: 34.4 G/DL (ref 31.5–36.5)
MCV RBC AUTO: 92 FL (ref 78–100)
MUCOUS THREADS #/AREA URNS LPF: PRESENT /LPF
NITRATE UR QL: NEGATIVE
PH UR STRIP: 6.5 PH (ref 4.7–8)
PLATELET # BLD AUTO: 253 10E9/L (ref 150–450)
RBC # BLD AUTO: 3.64 10E12/L (ref 3.8–5.2)
RBC #/AREA URNS AUTO: <1 /HPF (ref 0–2)
SOURCE: ABNORMAL
SP GR UR STRIP: 1.02 (ref 1–1.03)
SQUAMOUS #/AREA URNS AUTO: 1 /HPF (ref 0–1)
UROBILINOGEN UR STRIP-MCNC: NORMAL MG/DL (ref 0–2)
WBC # BLD AUTO: 14.3 10E9/L (ref 4–11)
WBC #/AREA URNS AUTO: 4 /HPF (ref 0–5)

## 2019-07-25 PROCEDURE — G0463 HOSPITAL OUTPT CLINIC VISIT: HCPCS

## 2019-07-25 PROCEDURE — 87086 URINE CULTURE/COLONY COUNT: CPT | Mod: ZL | Performed by: NURSE PRACTITIONER

## 2019-07-25 PROCEDURE — 36415 COLL VENOUS BLD VENIPUNCTURE: CPT | Mod: ZL | Performed by: NURSE PRACTITIONER

## 2019-07-25 PROCEDURE — 90715 TDAP VACCINE 7 YRS/> IM: CPT | Performed by: COUNSELOR

## 2019-07-25 PROCEDURE — 81001 URINALYSIS AUTO W/SCOPE: CPT | Mod: ZL | Performed by: NURSE PRACTITIONER

## 2019-07-25 PROCEDURE — 82950 GLUCOSE TEST: CPT | Mod: ZL | Performed by: NURSE PRACTITIONER

## 2019-07-25 PROCEDURE — 90471 IMMUNIZATION ADMIN: CPT | Performed by: NURSE PRACTITIONER

## 2019-07-25 PROCEDURE — 85027 COMPLETE CBC AUTOMATED: CPT | Mod: ZL | Performed by: NURSE PRACTITIONER

## 2019-07-25 PROCEDURE — 99207 ZZC PRENATAL VISIT: CPT | Performed by: NURSE PRACTITIONER

## 2019-07-25 PROCEDURE — 86850 RBC ANTIBODY SCREEN: CPT | Mod: ZL | Performed by: NURSE PRACTITIONER

## 2019-07-25 PROCEDURE — 86780 TREPONEMA PALLIDUM: CPT | Mod: ZL | Performed by: NURSE PRACTITIONER

## 2019-07-25 PROCEDURE — 99000 SPECIMEN HANDLING OFFICE-LAB: CPT | Performed by: NURSE PRACTITIONER

## 2019-07-25 PROCEDURE — G0463 HOSPITAL OUTPT CLINIC VISIT: HCPCS | Mod: 25

## 2019-07-25 ASSESSMENT — PAIN SCALES - GENERAL: PAINLEVEL: NO PAIN (0)

## 2019-07-25 NOTE — PATIENT INSTRUCTIONS
Patient Education     Understanding Blood Sugar During Pregnancy    Gestational diabetes causes high blood sugar levels during pregnancy. You are at risk of developing, or perhaps have already developed, gestational diabetes. Controlling your blood sugar can help prevent problems for you and your baby.  Your body turns food into blood sugar  As food is digested, it turns into sugar (glucose), a fuel that feeds your body. This sugar goes into your bloodstream. Your body then releases a substance called insulin to help your body use blood sugar properly.  Blood sugar goes to your baby  The placenta is where nutrients in your blood are exchanged with your baby s blood. Your blood sugar goes to your baby from the placenta through the umbilical cord. Your baby uses this sugar to grow.  Too much blood sugar affects you and your baby  During pregnancy, the placenta makes hormones that can disrupt the way your body uses insulin. If your body can t use insulin properly, your blood sugar level gets too high. Then too much blood sugar goes to your baby. This can cause problems for both you and your baby.  Controlling your blood sugar helps prevent problems  You can lower your blood sugar by eating right, exercising, and taking medicines that your healthcare provider prescribes to control your blood sugar. If you keep your blood sugar in control, the risks to you and your baby are the same as those for a normal pregnancy.  Date Last Reviewed: 2/1/2018 2000-2018 [employer] [address] [place] [address], [place] [address]. All rights reserved. This information is not intended as a substitute for professional medical care. Always follow your healthcare professional's instructions.           Patient Education     Adapting to Pregnancy: Third Trimester    Although common during pregnancy, some discomforts may seem worse in the final weeks. Simple lifestyle changes can help. Take care of yourself. And ask your partner  to help out with small tasks.  Limiting leg problems  Ways to combat leg issues:    Wear support hose all day.    Avoid snug shoes and clothes that bind, like tight pants and socks with elastic tops.    Sit with your feet and legs raised often.  Caring for your breasts  Tips to follow include:    Wash with plain water. Avoid using harsh soaps or rubbing alcohol. They may cause dryness.    Wear a nursing bra for extra support. It can also hide any leaks from your nipples.  Controlling hemorrhoids  Ways to avoid hemorrhoids include:    Eat foods that are high in fiber. Also, exercise and drink enough fluids. This will reduce constipation and hemorrhoids.    Sleep and nap on your side. This limits pressure on the veins of your rectum.    Try not to stand or sit for long periods.  Controlling back pain  As your body changes during pregnancy, your back must work in new ways. Back pain is due to many causes. Physical changes in your body can strain your back and its supporting muscles. Also, hormones (chemicals that carry messages throughout the body) increase during pregnancy. This can affect how your muscles and joints work together. All of these changes can lead to pain. Pain may be felt in the upper or lower back. Pain is also common in the pelvis. Some pregnant women have sciatica. This is pain caused by pressure on the sciatic nerve running down the back of the leg. Ask your healthcare provider for specific tips and exercises to help control your back pain.  Tips to help you rest  Good rest and sleep will help you feel better. Here are some ideas:    Ask your partner to massage your shoulders, neck, or back.    Limit the errands you do each day.    Lie down in the afternoon or after work for a few minutes.    Take a warm bath before you go to sleep.    Drink warm milk or teas without caffeine.    Avoid coffee, black tea, and cola.  Stopping heartburn    Avoid spicy, greasy, fried, or acidic foods.    Eat small  amounts more often. Eat slowly.   Wait 2 hours after eating before lying down.    Sleep with your upper body raised 6 inches.   Managing mood swings  Ways to manage mood swings include:    Know that mood changes are normal.    Exercise often, but get plenty of rest.    Address any concerns and limit stress. Talking to your partner, other women, or your healthcare provider may help.  Dealing with urinary frequency  Tips to deal with having to urinate often include:    Drink plenty of water all day. If you drink a lot in the evening, though, you may have to get up more in the night.    Limit coffee, black tea, and cola.  Date Last Reviewed: 2/1/2018 2000-2018 [employer]. [address] [place] [address], [place] [address]. All rights reserved. This information is not intended as a substitute for professional medical care. Always follow your healthcare professional's instructions.

## 2019-07-25 NOTE — PROGRESS NOTES
Baby active.  Notes increased cramping and BH for the past 3 days. No bleeding or LOF.  UA today with 28 week labs and Tdap.  Baby Friendly material covered.  Sterilization paperwork completed.  She is sure she has completed child bearing.  Plan to discuss again with /dr. Goldstein in 4 weeks to discuss options further. Signs of PTL reviewed and encouraged to call / come in to [facility] with any concerns.

## 2019-07-26 LAB
BACTERIA SPEC CULT: NORMAL
SPECIMEN SOURCE: NORMAL

## 2019-07-27 LAB — T PALLIDUM AB SER QL: NONREACTIVE

## 2019-08-08 ENCOUNTER — PRENATAL OFFICE VISIT (OUTPATIENT)
Dept: OBGYN | Facility: OTHER | Age: 24
End: 2019-08-08
Attending: NURSE PRACTITIONER
Payer: COMMERCIAL

## 2019-08-08 VITALS
BODY MASS INDEX: 34.55 KG/M2 | HEIGHT: 61 IN | WEIGHT: 183 LBS | SYSTOLIC BLOOD PRESSURE: 103 MMHG | OXYGEN SATURATION: 98 % | DIASTOLIC BLOOD PRESSURE: 61 MMHG | HEART RATE: 78 BPM

## 2019-08-08 DIAGNOSIS — Z34.80 PREGNANCY IN MULTIGRAVIDA: ICD-10-CM

## 2019-08-08 PROCEDURE — G0463 HOSPITAL OUTPT CLINIC VISIT: HCPCS

## 2019-08-08 PROCEDURE — 99207 ZZC PRENATAL VISIT: CPT | Performed by: NURSE PRACTITIONER

## 2019-08-08 ASSESSMENT — MIFFLIN-ST. JEOR: SCORE: 1527.46

## 2019-08-08 ASSESSMENT — PAIN SCALES - GENERAL: PAINLEVEL: NO PAIN (0)

## 2019-08-08 NOTE — NURSING NOTE
"Chief Complaint   Patient presents with     Prenatal Care     30w       Initial /61 (BP Location: Left arm, Cuff Size: Adult Regular)   Pulse 78   Ht 1.549 m (5' 1\")   Wt 83 kg (183 lb)   LMP 11/28/2018   SpO2 98%   BMI 34.58 kg/m   Estimated body mass index is 34.58 kg/m  as calculated from the following:    Height as of this encounter: 1.549 m (5' 1\").    Weight as of this encounter: 83 kg (183 lb).  Medication Reconciliation: complete     Nancy Elder    "

## 2019-08-08 NOTE — PATIENT INSTRUCTIONS
Patient Education     Adapting to Pregnancy: Third Trimester    Although common during pregnancy, some discomforts may seem worse in the final weeks. Simple lifestyle changes can help. Take care of yourself. And ask your partner to help out with small tasks.  Limiting leg problems  Ways to combat leg issues:    Wear support hose all day.    Avoid snug shoes and clothes that bind, like tight pants and socks with elastic tops.    Sit with your feet and legs raised often.  Caring for your breasts  Tips to follow include:    Wash with plain water. Avoid using harsh soaps or rubbing alcohol. They may cause dryness.    Wear a nursing bra for extra support. It can also hide any leaks from your nipples.  Controlling hemorrhoids  Ways to avoid hemorrhoids include:    Eat foods that are high in fiber. Also, exercise and drink enough fluids. This will reduce constipation and hemorrhoids.    Sleep and nap on your side. This limits pressure on the veins of your rectum.    Try not to stand or sit for long periods.  Controlling back pain  As your body changes during pregnancy, your back must work in new ways. Back pain is due to many causes. Physical changes in your body can strain your back and its supporting muscles. Also, hormones (chemicals that carry messages throughout the body) increase during pregnancy. This can affect how your muscles and joints work together. All of these changes can lead to pain. Pain may be felt in the upper or lower back. Pain is also common in the pelvis. Some pregnant women have sciatica. This is pain caused by pressure on the sciatic nerve running down the back of the leg. Ask your healthcare provider for specific tips and exercises to help control your back pain.  Tips to help you rest  Good rest and sleep will help you feel better. Here are some ideas:    Ask your partner to massage your shoulders, neck, or back.    Limit the errands you do each day.    Lie down in the afternoon or after work  for a few minutes.    Take a warm bath before you go to sleep.    Drink warm milk or teas without caffeine.    Avoid coffee, black tea, and cola.  Stopping heartburn    Avoid spicy, greasy, fried, or acidic foods.    Eat small amounts more often. Eat slowly.   Wait 2 hours after eating before lying down.    Sleep with your upper body raised 6 inches.   Managing mood swings  Ways to manage mood swings include:    Know that mood changes are normal.    Exercise often, but get plenty of rest.    Address any concerns and limit stress. Talking to your partner, other women, or your healthcare provider may help.  Dealing with urinary frequency  Tips to deal with having to urinate often include:    Drink plenty of water all day. If you drink a lot in the evening, though, you may have to get up more in the night.    Limit coffee, black tea, and cola.  Date Last Reviewed: 2/1/2018 2000-2018 [email or web address]. [address] [place] [address], [place] [address]. All rights reserved. This information is not intended as a substitute for professional medical care. Always follow your healthcare professional's instructions.

## 2019-08-08 NOTE — PROGRESS NOTES
Doing well. Baby active.  Continues to follow diabetic diet and has been experiencing periods of dizziness with headaches.  Patient also has complaints of seasonal allergies.  Pt plans to switch to Zyrtec from Claritin to see if dizziness/headaches improve.  If symptoms don't improve, patient will attempt to eat 1/2 peanut butter sandwich when symptoms start and see if it helps.  Patient has lost approximately 5 lbs since starting diabetic diet and strictly follows it.  Patient has no other issues or concerns today.  Reviewed diet, PTL, LOF, vaginal bleeding, and kick counts.  Patient educated on when to call/come in.  Pt to RTC 2 Weeks.

## 2019-08-22 ENCOUNTER — PRENATAL OFFICE VISIT (OUTPATIENT)
Dept: OBGYN | Facility: OTHER | Age: 24
End: 2019-08-22
Attending: OBSTETRICS & GYNECOLOGY
Payer: COMMERCIAL

## 2019-08-22 VITALS — SYSTOLIC BLOOD PRESSURE: 104 MMHG | DIASTOLIC BLOOD PRESSURE: 74 MMHG | BODY MASS INDEX: 34.96 KG/M2 | WEIGHT: 185 LBS

## 2019-08-22 DIAGNOSIS — O99.619 GASTROESOPHAGEAL REFLUX IN PREGNANCY: ICD-10-CM

## 2019-08-22 DIAGNOSIS — Z30.2 ENCOUNTER FOR STERILIZATION: Primary | ICD-10-CM

## 2019-08-22 DIAGNOSIS — K21.9 GASTROESOPHAGEAL REFLUX IN PREGNANCY: ICD-10-CM

## 2019-08-22 DIAGNOSIS — Z34.80 PREGNANCY IN MULTIGRAVIDA: ICD-10-CM

## 2019-08-22 PROCEDURE — 99207 ZZC COMPLICATED OB VISIT: CPT | Performed by: OBSTETRICS & GYNECOLOGY

## 2019-08-22 PROCEDURE — G0463 HOSPITAL OUTPT CLINIC VISIT: HCPCS

## 2019-08-22 RX ORDER — FAMOTIDINE 10 MG
10 TABLET ORAL 2 TIMES DAILY PRN
Qty: 120 TABLET | Refills: 0 | Status: SHIPPED | OUTPATIENT
Start: 2019-08-22 | End: 2019-11-26

## 2019-08-22 ASSESSMENT — PAIN SCALES - GENERAL: PAINLEVEL: NO PAIN (0)

## 2019-08-22 NOTE — PROGRESS NOTES
Doing well.  No concerns today.  Prenatal flowsheet information is reviewed.  Discussed PTL, PROM, and when to call or come in.  Discussed kick counts and fetal movement.  RTC in 2 weeks  We reviewed the risks and benefits of pp tubal ligation including risks of bleeding, infection, damage to other organs. Risks of tubal failure, and possibility of ectopic pregnancy were also explained. If she felt she was pregnant in the future or had a positive pregnancy test,  she understands the need to contact a physician immediately. We discussed alternative forms of birth control including Ocp's, Depo-Provera,  condoms, diaphragm, patch vasectomy, Nexplanon, and IUD.   We also discussed a laparoscopic tubal slapingectomy.   We reviewed risks of laparoscopy, specifically risk of bleeding, infection, damage to nerves, blood vessels, bowel and bladder. Discussed recovery period and expected discomfort. She desires to proceed with a laparoscopic bilateral salpingectomy (interval) and it will be scheduled accordingly after delivery.      Denies PTL juan, lópez, radha Goldstein MD  8/22/2019

## 2019-08-23 ENCOUNTER — HOSPITAL ENCOUNTER (OUTPATIENT)
Facility: HOSPITAL | Age: 24
Discharge: HOME | End: 2019-08-23
Attending: OBSTETRICS & GYNECOLOGY | Admitting: OBSTETRICS & GYNECOLOGY
Payer: COMMERCIAL

## 2019-08-23 VITALS
SYSTOLIC BLOOD PRESSURE: 105 MMHG | DIASTOLIC BLOOD PRESSURE: 58 MMHG | RESPIRATION RATE: 16 BRPM | OXYGEN SATURATION: 99 % | BODY MASS INDEX: 34.04 KG/M2 | WEIGHT: 185 LBS | HEART RATE: 83 BPM | HEIGHT: 62 IN | TEMPERATURE: 98.2 F

## 2019-08-23 DIAGNOSIS — Z36.89 ENCOUNTER FOR TRIAGE IN PREGNANT PATIENT: Primary | ICD-10-CM

## 2019-08-23 LAB
ALBUMIN SERPL-MCNC: 2.8 G/DL (ref 3.4–5)
ALBUMIN UR-MCNC: NEGATIVE MG/DL
ALP SERPL-CCNC: 154 U/L (ref 40–150)
ALT SERPL W P-5'-P-CCNC: 38 U/L (ref 0–50)
AMPHETAMINES UR QL: NOT DETECTED NG/ML
APPEARANCE UR: CLEAR
AST SERPL W P-5'-P-CCNC: 17 U/L (ref 0–45)
BACTERIA #/AREA URNS HPF: ABNORMAL /HPF
BARBITURATES UR QL SCN: NOT DETECTED NG/ML
BASOPHILS # BLD AUTO: 0 10E9/L (ref 0–0.2)
BASOPHILS NFR BLD AUTO: 0.2 %
BENZODIAZ UR QL SCN: NOT DETECTED NG/ML
BILIRUB DIRECT SERPL-MCNC: 0.1 MG/DL (ref 0–0.2)
BILIRUB SERPL-MCNC: 0.3 MG/DL (ref 0.2–1.3)
BILIRUB UR QL STRIP: NEGATIVE
BUPRENORPHINE UR QL: NOT DETECTED NG/ML
CANNABINOIDS UR QL: NOT DETECTED NG/ML
CAOX CRY #/AREA URNS HPF: ABNORMAL /HPF
COCAINE UR QL SCN: NOT DETECTED NG/ML
COLOR UR AUTO: ABNORMAL
D-METHAMPHET UR QL: NOT DETECTED NG/ML
DIFFERENTIAL METHOD BLD: ABNORMAL
EOSINOPHIL # BLD AUTO: 0.1 10E9/L (ref 0–0.7)
EOSINOPHIL NFR BLD AUTO: 0.8 %
ERYTHROCYTE [DISTWIDTH] IN BLOOD BY AUTOMATED COUNT: 12.6 % (ref 10–15)
GLUCOSE UR STRIP-MCNC: NEGATIVE MG/DL
HCT VFR BLD AUTO: 32.3 % (ref 35–47)
HGB BLD-MCNC: 11.2 G/DL (ref 11.7–15.7)
HGB UR QL STRIP: ABNORMAL
IMM GRANULOCYTES # BLD: 0 10E9/L (ref 0–0.4)
IMM GRANULOCYTES NFR BLD: 0.3 %
KETONES UR STRIP-MCNC: NEGATIVE MG/DL
LEUKOCYTE ESTERASE UR QL STRIP: ABNORMAL
LYMPHOCYTES # BLD AUTO: 3.1 10E9/L (ref 0.8–5.3)
LYMPHOCYTES NFR BLD AUTO: 23.9 %
MCH RBC QN AUTO: 31.2 PG (ref 26.5–33)
MCHC RBC AUTO-ENTMCNC: 34.7 G/DL (ref 31.5–36.5)
MCV RBC AUTO: 90 FL (ref 78–100)
METHADONE UR QL SCN: NOT DETECTED NG/ML
MONOCYTES # BLD AUTO: 0.6 10E9/L (ref 0–1.3)
MONOCYTES NFR BLD AUTO: 4.9 %
MUCOUS THREADS #/AREA URNS LPF: PRESENT /LPF
NEUTROPHILS # BLD AUTO: 9 10E9/L (ref 1.6–8.3)
NEUTROPHILS NFR BLD AUTO: 69.9 %
NITRATE UR QL: NEGATIVE
NRBC # BLD AUTO: 0 10*3/UL
NRBC BLD AUTO-RTO: 0 /100
OPIATES UR QL SCN: NOT DETECTED NG/ML
OXYCODONE UR QL SCN: NOT DETECTED NG/ML
PCP UR QL SCN: NOT DETECTED NG/ML
PH UR STRIP: 6 PH (ref 4.7–8)
PLATELET # BLD AUTO: 230 10E9/L (ref 150–450)
PROPOXYPH UR QL: NOT DETECTED NG/ML
PROT SERPL-MCNC: 6.5 G/DL (ref 6.8–8.8)
RBC # BLD AUTO: 3.59 10E12/L (ref 3.8–5.2)
RBC #/AREA URNS AUTO: 1 /HPF (ref 0–2)
SOURCE: ABNORMAL
SP GR UR STRIP: 1.01 (ref 1–1.03)
SQUAMOUS #/AREA URNS AUTO: 2 /HPF (ref 0–1)
TRICYCLICS UR QL SCN: NOT DETECTED NG/ML
UROBILINOGEN UR STRIP-MCNC: NORMAL MG/DL (ref 0–2)
WBC # BLD AUTO: 12.9 10E9/L (ref 4–11)
WBC #/AREA URNS AUTO: 3 /HPF (ref 0–5)

## 2019-08-23 PROCEDURE — 80306 DRUG TEST PRSMV INSTRMNT: CPT | Performed by: OBSTETRICS & GYNECOLOGY

## 2019-08-23 PROCEDURE — 96360 HYDRATION IV INFUSION INIT: CPT

## 2019-08-23 PROCEDURE — 81001 URINALYSIS AUTO W/SCOPE: CPT | Performed by: OBSTETRICS & GYNECOLOGY

## 2019-08-23 PROCEDURE — 85025 COMPLETE CBC W/AUTO DIFF WBC: CPT | Performed by: OBSTETRICS & GYNECOLOGY

## 2019-08-23 PROCEDURE — 80076 HEPATIC FUNCTION PANEL: CPT | Performed by: OBSTETRICS & GYNECOLOGY

## 2019-08-23 PROCEDURE — 59025 FETAL NON-STRESS TEST: CPT

## 2019-08-23 PROCEDURE — 25000128 H RX IP 250 OP 636: Performed by: OBSTETRICS & GYNECOLOGY

## 2019-08-23 PROCEDURE — 96372 THER/PROPH/DIAG INJ SC/IM: CPT | Mod: 59

## 2019-08-23 PROCEDURE — 59025 FETAL NON-STRESS TEST: CPT | Mod: 26 | Performed by: OBSTETRICS & GYNECOLOGY

## 2019-08-23 PROCEDURE — 25000128 H RX IP 250 OP 636

## 2019-08-23 PROCEDURE — 36415 COLL VENOUS BLD VENIPUNCTURE: CPT | Performed by: OBSTETRICS & GYNECOLOGY

## 2019-08-23 PROCEDURE — 25000132 ZZH RX MED GY IP 250 OP 250 PS 637: Performed by: OBSTETRICS & GYNECOLOGY

## 2019-08-23 PROCEDURE — 96361 HYDRATE IV INFUSION ADD-ON: CPT

## 2019-08-23 PROCEDURE — G0463 HOSPITAL OUTPT CLINIC VISIT: HCPCS | Mod: 25

## 2019-08-23 RX ORDER — HYDROXYZINE HYDROCHLORIDE 50 MG/ML
INJECTION, SOLUTION INTRAMUSCULAR
Status: COMPLETED
Start: 2019-08-23 | End: 2019-08-23

## 2019-08-23 RX ORDER — NIFEDIPINE 10 MG/1
10 CAPSULE ORAL
Status: DISCONTINUED | OUTPATIENT
Start: 2019-08-23 | End: 2019-08-24 | Stop reason: HOSPADM

## 2019-08-23 RX ORDER — TERBUTALINE SULFATE 1 MG/ML
0.25 INJECTION, SOLUTION SUBCUTANEOUS ONCE
Status: COMPLETED | OUTPATIENT
Start: 2019-08-23 | End: 2019-08-23

## 2019-08-23 RX ORDER — NIFEDIPINE 10 MG/1
CAPSULE ORAL
Qty: 50 CAPSULE | Refills: 1 | Status: SHIPPED | OUTPATIENT
Start: 2019-08-23 | End: 2019-10-01

## 2019-08-23 RX ORDER — HYDROXYZINE HYDROCHLORIDE 50 MG/ML
75 INJECTION, SOLUTION INTRAMUSCULAR ONCE
Status: COMPLETED | OUTPATIENT
Start: 2019-08-23 | End: 2019-08-23

## 2019-08-23 RX ADMIN — HYDROXYZINE HYDROCHLORIDE 75 MG: 50 INJECTION, SOLUTION INTRAMUSCULAR at 22:08

## 2019-08-23 RX ADMIN — NIFEDIPINE 10 MG: 10 CAPSULE ORAL at 21:52

## 2019-08-23 RX ADMIN — NIFEDIPINE 10 MG: 10 CAPSULE ORAL at 22:12

## 2019-08-23 RX ADMIN — SODIUM CHLORIDE 1000 ML: 9 INJECTION, SOLUTION INTRAVENOUS at 20:00

## 2019-08-23 RX ADMIN — NIFEDIPINE 10 MG: 10 CAPSULE ORAL at 22:37

## 2019-08-23 RX ADMIN — SODIUM CHLORIDE 1000 ML: 9 INJECTION, SOLUTION INTRAVENOUS at 20:58

## 2019-08-23 RX ADMIN — TERBUTALINE SULFATE 0.25 MG: 1 INJECTION SUBCUTANEOUS at 20:58

## 2019-08-23 RX ADMIN — TERBUTALINE SULFATE 0.25 MG: 1 INJECTION SUBCUTANEOUS at 19:56

## 2019-08-23 ASSESSMENT — MIFFLIN-ST. JEOR: SCORE: 1539.46

## 2019-08-24 NOTE — PLAN OF CARE
Update to . Client still c/o back to lower abd cramping q 2-4 min. New order for procardia immediate release 10mg now et up to 6 doses q 20 min PRN for the cramping. With read back

## 2019-08-24 NOTE — PLAN OF CARE
Updated  on labs,  clinet still having 5/10 pain radiating from the back to front lower abd. 2nd dose of erb ordered et 1000 ml of ns bolus with readback

## 2019-08-24 NOTE — DISCHARGE INSTRUCTIONS

## 2019-08-24 NOTE — PROGRESS NOTES
S:   Complaints of cramping and low back ache.  Last sex month ago.  Denies bleeding, LOF, fever, chills, GI- symptoms.    O:  lab reviewed.        Cervix is closed and posterior        Uterus is soft and NON tender         Strip is reactive         No contractions seen or palpated.    A:    cramping    P:   Fluids, terb given and helped.  Will give  Procardia 10mg q 20 minutes up to 6 doses. Patient agrees to POA.          Patient feeling fine now at 2230.  Will send home with script for Procardia 10mg q 6 hours prn.  Will cut down milk, citrus, and carbonated beverages and drink more water.  Increase rest left side. Keep regular appointments.  PTL precautions given.

## 2019-08-24 NOTE — PLAN OF CARE
"Carolyn Mallory is a 24 year old  and 32w1d patient came in complaining of Contractions    Patient Active Problem List   Diagnosis     Allergic rhinitis     Food allergy     Patellofemoral syndrome of both knees     Sacro ilial pain     Calculus of kidney     Flank pain--LEFT & RIGHT--UTI----2019     ACP (advance care planning)     Chronic right-sided thoracic back pain     Obesity, unspecified obesity severity, unspecified obesity type     Tobacco use disorder     Esophageal reflux     Allergy to pollen     Astigmatism     BMI 34.0-34.9,adult     Hypermetropia     Pregnancy in multigravida     Right lower quadrant pain, evaluated ER with neg findings--3/25/2019     Urinary tract infection without hematuria, site unspecified, Macrobid---3/25/2019,  Keflex--2019     Anxiety in pregnancy, antepartum     Encounter for triage in pregnant patient       Pt discharged to home at 10:41 PM and encouraged to rest and drink plenty of fluids.  Pt told to call/return if bleeding more than spotting, water breaks, contractions 3-5 minutes apart that she has to breath through.     Nursing education on labor  provided. Self-management instructions reviewed. New procardia medications and therapies reviewed. AVS given and signed. All questions answered and patient verbalizes understanding.    /58   Pulse 83   Temp 98.2  F (36.8  C) (Oral)   Resp 16   Ht 1.562 m (5' 1.5\")   Wt 83.9 kg (185 lb)   LMP 2018   SpO2 99%   BMI 34.39 kg/m      Cervical status: posterior  Fetal Assessment: Reactive    Discharge support:  Family/Friend Support    OB History    Para Term  AB Living   3 1 1 0 1 0   SAB TAB Ectopic Multiple Live Births   1 0 0 0 0      # Outcome Date GA Lbr Marcial/2nd Weight Sex Delivery Anes PTL Lv   3 Current            2 SAB 2018           1 Term 03/10/16 39w4d 17:19 / 02:04 2.778 kg (6 lb 2 oz) M Vag-Spont EPI        Apgar1: 6  Apgar5: 7       Mara Barkley RN    "

## 2019-08-24 NOTE — PLAN OF CARE
to floor at 2200 assessed client new order for visteral 75 mg IM once stat given. continue to monitor second dose of procardia given client reporting less intense pain

## 2019-08-24 NOTE — PLAN OF CARE
turb given fluid bolus started. Client of left tilt with serial BPs going. BP remain WDL. Client reports back cramping getting more intense. 5/10 pain. Will continue to monitor

## 2019-08-24 NOTE — PLAN OF CARE
Telephone order with read back from  for 1 Liter NS bouls labs as ordered et turb as ordered. Call back when labs are complete

## 2019-08-24 NOTE — PLAN OF CARE
OB Triage Note  Carolyn Mallory  MRN: [id number]  Gestational Age: 32w1d      Carolyn Mallory presents for cramping  (sign/symptom/concern).      States tammy since all day getting worse .  Rates pain at 4/10.  Bleeding: none.  Denies LOF.       notified of arrival and condition.  Oriented patient to surroundings. Call light within reach.     FHT: 135  NST Start Time:  NST Stop Time:  NST: reactive   Uterine Assessment: client reports cramping ever 4 min on  Monitor. Small contractions shown on monitor at time when reported. fetal movent increasing to WDL   Plan:  -Initial NST, then fetal/uterine monitoring per MD/patient plan.  -Sterile vaginal exam/sterile speculum exam.  -Nursing education on labor  provided.        NST RESULTS:    NST  Reactive  Strip CAT I      Go Lopez MD, FACOG

## 2019-09-05 ENCOUNTER — PRENATAL OFFICE VISIT (OUTPATIENT)
Dept: OBGYN | Facility: OTHER | Age: 24
End: 2019-09-05
Attending: NURSE PRACTITIONER
Payer: COMMERCIAL

## 2019-09-05 VITALS
WEIGHT: 187.3 LBS | DIASTOLIC BLOOD PRESSURE: 64 MMHG | BODY MASS INDEX: 35.36 KG/M2 | HEIGHT: 61 IN | OXYGEN SATURATION: 99 % | HEART RATE: 73 BPM | SYSTOLIC BLOOD PRESSURE: 106 MMHG

## 2019-09-05 DIAGNOSIS — O09.293 HISTORY OF PRE-ECLAMPSIA IN PRIOR PREGNANCY, CURRENTLY PREGNANT IN THIRD TRIMESTER: Primary | ICD-10-CM

## 2019-09-05 DIAGNOSIS — G44.011 INTRACTABLE EPISODIC CLUSTER HEADACHE: ICD-10-CM

## 2019-09-05 DIAGNOSIS — R42 DIZZINESS: ICD-10-CM

## 2019-09-05 LAB
ALBUMIN SERPL-MCNC: 2.8 G/DL (ref 3.4–5)
ALBUMIN UR-MCNC: 10 MG/DL
ALP SERPL-CCNC: 163 U/L (ref 40–150)
ALT SERPL W P-5'-P-CCNC: 24 U/L (ref 0–50)
APPEARANCE UR: CLEAR
AST SERPL W P-5'-P-CCNC: 12 U/L (ref 0–45)
BACTERIA #/AREA URNS HPF: ABNORMAL /HPF
BILIRUB DIRECT SERPL-MCNC: 0.1 MG/DL (ref 0–0.2)
BILIRUB SERPL-MCNC: 0.3 MG/DL (ref 0.2–1.3)
BILIRUB UR QL STRIP: NEGATIVE
COLOR UR AUTO: YELLOW
GLUCOSE UR STRIP-MCNC: NEGATIVE MG/DL
HGB UR QL STRIP: ABNORMAL
KETONES UR STRIP-MCNC: NEGATIVE MG/DL
LEUKOCYTE ESTERASE UR QL STRIP: ABNORMAL
MUCOUS THREADS #/AREA URNS LPF: PRESENT /LPF
NITRATE UR QL: NEGATIVE
PH UR STRIP: 6.5 PH (ref 4.7–8)
PROT SERPL-MCNC: 6.8 G/DL (ref 6.8–8.8)
RBC #/AREA URNS AUTO: 41 /HPF (ref 0–2)
SOURCE: ABNORMAL
SP GR UR STRIP: 1.02 (ref 1–1.03)
SPERM #/AREA URNS HPF: PRESENT /HPF
SQUAMOUS #/AREA URNS AUTO: 11 /HPF (ref 0–1)
UROBILINOGEN UR STRIP-MCNC: NORMAL MG/DL (ref 0–2)
WBC #/AREA URNS AUTO: 14 /HPF (ref 0–5)

## 2019-09-05 PROCEDURE — 36415 COLL VENOUS BLD VENIPUNCTURE: CPT | Mod: ZL | Performed by: NURSE PRACTITIONER

## 2019-09-05 PROCEDURE — G0463 HOSPITAL OUTPT CLINIC VISIT: HCPCS | Performed by: COUNSELOR

## 2019-09-05 PROCEDURE — 99207 ZZC PRENATAL VISIT: CPT | Performed by: NURSE PRACTITIONER

## 2019-09-05 PROCEDURE — 80076 HEPATIC FUNCTION PANEL: CPT | Mod: ZL | Performed by: NURSE PRACTITIONER

## 2019-09-05 PROCEDURE — 81001 URINALYSIS AUTO W/SCOPE: CPT | Mod: ZL | Performed by: NURSE PRACTITIONER

## 2019-09-05 ASSESSMENT — MIFFLIN-ST. JEOR: SCORE: 1541.97

## 2019-09-05 ASSESSMENT — PAIN SCALES - GENERAL: PAINLEVEL: NO PAIN (0)

## 2019-09-05 NOTE — PATIENT INSTRUCTIONS
Patient Education     Tailor Sit, Trunk Turn for Back Pain During Pregnancy  Before trying these exercises, talk to your healthcare provider to make sure they are safe for you. Ask your healthcare provider how many times to do each exercise.      Tailor sit Trunk turns   Tailor sit  This exercise makes your thigh, pelvic, and hip muscles more flexible.  1. Sit on the floor with the soles of your feet together. Your back should be straight.  2. Gently lean forward until you feel a mild stretch in your hip and thigh muscles. Your back should remain straight. Don t push down on your legs with your hands.  3. Hold and count to 5, then relax.  Trunk turns  This helps make your trunk (from your shoulders to your hips) more flexible.  1. Sit on the floor with your legs crossed. Your back should be straight.  2. Put your left hand on your right knee. Rest your right hand on the floor to support yourself and help you balance.  3. Slowly twist right. To do this, turn your head, shoulders, and chest as far right as you comfortably can. Keep your hips, knees, and feet in place.  4. Hold for 5 counts. Then change sides and slowly twist left.  Date Last Reviewed: 2/1/2018 2000-2018 [employer]. [address] [place] [address]. All rights reserved. This information is not intended as a substitute for professional medical care. Always follow your healthcare professional's instructions.           Patient Education     Relieving Back Pain During Pregnancy: Wall Stretch, Body Bend  Before trying these exercises, talk to your healthcare provider to make sure they are safe for you. Ask your healthcare provider how many times to do each exercise.      Wall stretch Body bend   Wall stretch  This strengthens and loosens the muscles in your upper back:  1. Lean against a wall with a firm pillow or rolled towel under your shoulder blades. Your feet should be about 12 inches from the wall and shoulder-width apart.  Point your chin down.  2. Breathe in. Push your shoulders, neck, and head against the wall. You will feel a stretch in your shoulders.  3. Hold for 5 counts. Then breathe out, and relax your shoulders and neck.  Body bend  This strengthens your back and buttocks muscles:  1. Stand with your legs shoulder-width apart. Put your hands on your upper thighs and bend your knees slightly.  2. Slowly bend forward at the hips. Push your hips back and keep your shoulders up. Make sure your back is straight. You ll feel a stretch in your upper thighs. You ll also feel your back muscles holding you in position.  3. Hold for 5 counts, then straighten.  Date Last Reviewed: 2/1/2018 2000-2018 [employer]. [address] [place] [address], [place] [address]. All rights reserved. This information is not intended as a substitute for professional medical care. Always follow your healthcare professional's instructions.

## 2019-09-05 NOTE — NURSING NOTE
"Chief Complaint   Patient presents with     Prenatal Care     34 weeks 0 days       Initial /64 (BP Location: Right arm, Patient Position: Sitting, Cuff Size: Adult Regular)   Pulse 73   Ht 1.549 m (5' 1\")   Wt 85 kg (187 lb 4.8 oz)   LMP 11/28/2018   SpO2 99%   BMI 35.39 kg/m   Estimated body mass index is 35.39 kg/m  as calculated from the following:    Height as of this encounter: 1.549 m (5' 1\").    Weight as of this encounter: 85 kg (187 lb 4.8 oz).  Medication Reconciliation: complete     Constanza Yang, SYED      "

## 2019-09-05 NOTE — PROGRESS NOTES
Baby active and denies vaginal bleeding or LOF.  Patient continues to have headaches with aura as she is seeing spots during the headaches.  She is allergic to Tylenol so has not been taking anything for the headaches, just waits until they resolve.  Patient will attempt to drink some coffee (caffeine) when noticing the onset of a headache.  She was seen on 8/23/2019 on [facility] with Dr. Lopez who sent her home with a prescription for PRN procardia which she takes when her contractions/cramping is strong.  Educated on hydration and when to come into [facility].  Will follow up with a UA and hepatic panel to compare to 8/23/2019 results.  Patient has no edema, /64  and denies epigastric pain.  RTC 2 weeks or sooner with any questions or worsening in symptoms.

## 2019-09-20 ENCOUNTER — PRENATAL OFFICE VISIT (OUTPATIENT)
Dept: OBGYN | Facility: OTHER | Age: 24
End: 2019-09-20
Attending: NURSE PRACTITIONER
Payer: COMMERCIAL

## 2019-09-20 VITALS — DIASTOLIC BLOOD PRESSURE: 64 MMHG | SYSTOLIC BLOOD PRESSURE: 112 MMHG | WEIGHT: 189 LBS | BODY MASS INDEX: 35.71 KG/M2

## 2019-09-20 DIAGNOSIS — R10.9 FLANK PAIN: ICD-10-CM

## 2019-09-20 DIAGNOSIS — N39.0 URINARY TRACT INFECTION WITHOUT HEMATURIA, SITE UNSPECIFIED: ICD-10-CM

## 2019-09-20 DIAGNOSIS — Z34.80 PREGNANCY IN MULTIGRAVIDA: Primary | ICD-10-CM

## 2019-09-20 DIAGNOSIS — F41.9 ANXIETY IN PREGNANCY, ANTEPARTUM: ICD-10-CM

## 2019-09-20 DIAGNOSIS — R10.31 RIGHT LOWER QUADRANT PAIN: ICD-10-CM

## 2019-09-20 DIAGNOSIS — O99.340 ANXIETY IN PREGNANCY, ANTEPARTUM: ICD-10-CM

## 2019-09-20 PROCEDURE — 87653 STREP B DNA AMP PROBE: CPT | Mod: ZL | Performed by: OBSTETRICS & GYNECOLOGY

## 2019-09-20 PROCEDURE — 99207 ZZC PRENATAL VISIT: CPT | Performed by: OBSTETRICS & GYNECOLOGY

## 2019-09-20 PROCEDURE — G0463 HOSPITAL OUTPT CLINIC VISIT: HCPCS

## 2019-09-20 ASSESSMENT — PAIN SCALES - GENERAL: PAINLEVEL: NO PAIN (0)

## 2019-09-20 NOTE — PROGRESS NOTES
No complaints, feeling well  Good FM and FCC  Denies contractions, bleeding, LOF    GBS done    PLAN:   Labor precautions, daily FCC and FM               LIBIA 1 week

## 2019-09-21 LAB
GP B STREP DNA SPEC QL NAA+PROBE: NEGATIVE
SPECIMEN SOURCE: NORMAL

## 2019-09-25 ENCOUNTER — HOSPITAL ENCOUNTER (OUTPATIENT)
Facility: HOSPITAL | Age: 24
Discharge: HOME | End: 2019-09-25
Attending: OBSTETRICS & GYNECOLOGY | Admitting: OBSTETRICS & GYNECOLOGY
Payer: COMMERCIAL

## 2019-09-25 ENCOUNTER — TELEPHONE (OUTPATIENT)
Dept: OBGYN | Facility: HOSPITAL | Age: 24
End: 2019-09-25

## 2019-09-25 VITALS
HEIGHT: 62 IN | DIASTOLIC BLOOD PRESSURE: 90 MMHG | TEMPERATURE: 98 F | BODY MASS INDEX: 33.68 KG/M2 | SYSTOLIC BLOOD PRESSURE: 132 MMHG | HEART RATE: 99 BPM | OXYGEN SATURATION: 98 % | WEIGHT: 183 LBS | RESPIRATION RATE: 16 BRPM

## 2019-09-25 DIAGNOSIS — N39.0 URINARY TRACT INFECTION WITHOUT HEMATURIA, SITE UNSPECIFIED: Primary | ICD-10-CM

## 2019-09-25 LAB
ALBUMIN UR-MCNC: 30 MG/DL
APPEARANCE UR: ABNORMAL
BACTERIA #/AREA URNS HPF: ABNORMAL /HPF
BILIRUB UR QL STRIP: NEGATIVE
COLOR UR AUTO: YELLOW
GLUCOSE UR STRIP-MCNC: NEGATIVE MG/DL
HGB UR QL STRIP: ABNORMAL
KETONES UR STRIP-MCNC: 5 MG/DL
LEUKOCYTE ESTERASE UR QL STRIP: ABNORMAL
MUCOUS THREADS #/AREA URNS LPF: PRESENT /LPF
NITRATE UR QL: NEGATIVE
PH UR STRIP: 6 PH (ref 4.7–8)
RBC #/AREA URNS AUTO: 21 /HPF (ref 0–2)
SOURCE: ABNORMAL
SP GR UR STRIP: 1.03 (ref 1–1.03)
SQUAMOUS #/AREA URNS AUTO: 14 /HPF (ref 0–1)
UROBILINOGEN UR STRIP-MCNC: NORMAL MG/DL (ref 0–2)
WBC #/AREA URNS AUTO: 109 /HPF (ref 0–5)

## 2019-09-25 PROCEDURE — 59025 FETAL NON-STRESS TEST: CPT | Mod: 26 | Performed by: OBSTETRICS & GYNECOLOGY

## 2019-09-25 PROCEDURE — 59025 FETAL NON-STRESS TEST: CPT

## 2019-09-25 PROCEDURE — 81001 URINALYSIS AUTO W/SCOPE: CPT | Performed by: OBSTETRICS & GYNECOLOGY

## 2019-09-25 PROCEDURE — 25000132 ZZH RX MED GY IP 250 OP 250 PS 637: Performed by: OBSTETRICS & GYNECOLOGY

## 2019-09-25 PROCEDURE — G0463 HOSPITAL OUTPT CLINIC VISIT: HCPCS | Mod: 25

## 2019-09-25 RX ORDER — CEPHALEXIN 500 MG/1
500 CAPSULE ORAL 3 TIMES DAILY
Qty: 30 CAPSULE | Refills: 0 | Status: SHIPPED | OUTPATIENT
Start: 2019-09-25 | End: 2019-10-09

## 2019-09-25 RX ORDER — CEPHALEXIN 500 MG/1
500 CAPSULE ORAL ONCE
Status: COMPLETED | OUTPATIENT
Start: 2019-09-25 | End: 2019-09-25

## 2019-09-25 RX ADMIN — CEPHALEXIN 500 MG: 500 CAPSULE ORAL at 03:40

## 2019-09-25 ASSESSMENT — MIFFLIN-ST. JEOR: SCORE: 1538.33

## 2019-09-25 NOTE — PLAN OF CARE
"Carolyn Mallory is a 24 year old  and 36w6d patient came in complaining of Contractions    Patient Active Problem List   Diagnosis     Allergic rhinitis     Food allergy     Patellofemoral syndrome of both knees     Sacro ilial pain     Calculus of kidney     Flank pain--LEFT & RIGHT--UTI----2019     ACP (advance care planning)     Chronic right-sided thoracic back pain     Obesity, unspecified obesity severity, unspecified obesity type     Tobacco use disorder     Esophageal reflux     Allergy to pollen     Astigmatism     BMI 34.0-34.9,adult     Hypermetropia     Pregnancy in multigravida     Right lower quadrant pain, evaluated ER with neg findings--3/25/2019     Urinary tract infection without hematuria, site unspecified, Macrobid---3/25/2019,  Keflex--2019     Anxiety in pregnancy, antepartum     Encounter for triage in pregnant patient       Pt discharged to home at 3:44 AM and encouraged to rest and drink plenty of fluids.  Pt told to call/return if bleeding more than spotting, water breaks, contractions 3-5 minutes apart that she has to breath through.     Nursing education on uti provided. Self-management instructions reviewed. New keflex medications and therapies reviewed. AVS given and signed. All questions answered and patient verbalizes understanding.    /90 (BP Location: Right arm)   Pulse 99   Temp 98  F (36.7  C) (Oral)   Resp 16   Ht 1.575 m (5' 2\")   Wt 83 kg (183 lb)   LMP 2018   SpO2 98%   BMI 33.47 kg/m      Cervical status: closed  Fetal Assessment: Reactive    Discharge support:  Family/Friend Support    OB History    Para Term  AB Living   3 1 1 0 1 0   SAB TAB Ectopic Multiple Live Births   1 0 0 0 0      # Outcome Date GA Lbr Marcial/2nd Weight Sex Delivery Anes PTL Lv   3 Current            2 SAB 2018           1 Term 03/10/16 39w4d 17:19 / 02:04 2.778 kg (6 lb 2 oz) M Vag-Spont EPI        Apgar1: 6  Apgar5: 7       Constanza Avilez RN      "

## 2019-09-25 NOTE — PROGRESS NOTES
Carolyn Mallory        NST:  reactive  Start:0220  Stop:0240      Physician: Dr. Lopez  Reason For Test: Contractions  EDC:10/17/19  Gestational Age: 36 6    Comments:  UTI present discharge on antibiotics      Constanza Avilez RN          NST RESULTS:    NST:        Reactive  STRIP:     CAT I      Go Lopez MD, FACOG

## 2019-09-25 NOTE — DISCHARGE INSTRUCTIONS

## 2019-09-25 NOTE — TELEPHONE ENCOUNTER
Obstetric Phone Triage- HI    **REMEMBER: Review patient's prenatal record including complications with pregnancy and medications patient may be on (insulins, tocolytic, etc.)**      2019 1:19 AM  Carolyn Mallory 1995   Home Phone [phone]   Mobile [phone]                       Last office visit/Cervix exam:   seen on 19    Gestational Age 36 weeks    Spoke with  client  Patient lives [place] min miles away    Reason for call per pt: contractions     Is your provider aware of this concern? no    Did you have any complications with this or a previous pregnancy? (Pre-term labor, C/S, Previa, pre-eclampsia, diabetes)  Many complications     Are you having contractions? Yes   -If yes: Contractions frequency q 3 minutes and Duration 30-450sec seconds    When did they start? 2330    Have you palpated your contractions? Yes    Describe contraction discomfort (ex: having to breath through    contractions, stop what you are doing, etc.):  Whole abd hardens     Vaginal/rectal pressure: yes,  Pressure for the last week     What types of activity have you done in the past 24 hours?  nothing    Have you had intercourse/anything in the vagina in the last 48 hours?   No    Are you being treated for any vaginal infections? no    How much fluids have you been drinking?  Yes plenty     Are you having any bloody show/Bleeding? No       Are you having any new vaginal discharge or are you leaking fluids/has your water broken? no     Is your baby moving normally? Yes   -If no: Have you monitored your baby's movements? yes    When did you notice this decrease in baby movement? n/a      Additional Concerns (abd pain, headache, swelling, visual changes, etc): none    Patient Advised:  Please come in and be eventuated for labor     Call taken by: winter baltazar

## 2019-09-25 NOTE — PLAN OF CARE
OB Triage Note  Carolyn Mallory  MRN: [id number]  Gestational Age: 36w6d      Carolyn Mallory presents for cx (sign/symptom/concern).      States tammy since 2300.  Rates pain at 5/10.  Denies bleeding or leaking of fluid at this time.   Dr. Lopez notified of arrival and condition.  Oriented patient to surroundings. Call light within reach.       Plan:  -Initial NST, then fetal/uterine monitoring per MD/patient plan.  -Sterile vaginal exam/sterile speculum exam.  -Nursing education provided.

## 2019-09-26 ENCOUNTER — PRENATAL OFFICE VISIT (OUTPATIENT)
Dept: OBGYN | Facility: OTHER | Age: 24
End: 2019-09-26
Attending: OBSTETRICS & GYNECOLOGY
Payer: COMMERCIAL

## 2019-09-26 VITALS
OXYGEN SATURATION: 98 % | SYSTOLIC BLOOD PRESSURE: 114 MMHG | HEART RATE: 100 BPM | WEIGHT: 190 LBS | DIASTOLIC BLOOD PRESSURE: 73 MMHG | BODY MASS INDEX: 34.96 KG/M2 | HEIGHT: 62 IN

## 2019-09-26 DIAGNOSIS — Z34.80 PREGNANCY IN MULTIGRAVIDA: ICD-10-CM

## 2019-09-26 DIAGNOSIS — G43.709 CHRONIC MIGRAINE WITHOUT AURA WITHOUT STATUS MIGRAINOSUS, NOT INTRACTABLE: Primary | ICD-10-CM

## 2019-09-26 PROCEDURE — G0463 HOSPITAL OUTPT CLINIC VISIT: HCPCS

## 2019-09-26 PROCEDURE — 99207 ZZC PRENATAL VISIT: CPT | Performed by: OBSTETRICS & GYNECOLOGY

## 2019-09-26 RX ORDER — METOCLOPRAMIDE 5 MG/1
5 TABLET ORAL 3 TIMES DAILY PRN
Qty: 20 TABLET | Refills: 1 | Status: SHIPPED | OUTPATIENT
Start: 2019-09-26 | End: 2019-11-26

## 2019-09-26 ASSESSMENT — MIFFLIN-ST. JEOR: SCORE: 1570.08

## 2019-09-26 ASSESSMENT — PAIN SCALES - GENERAL: PAINLEVEL: NO PAIN (0)

## 2019-09-26 NOTE — NURSING NOTE
"Chief Complaint   Patient presents with     Prenatal Care     37w       Initial /73 (BP Location: Left arm, Cuff Size: Adult Large)   Pulse 100   Ht 1.575 m (5' 2\")   Wt 86.2 kg (190 lb)   LMP 11/28/2018   SpO2 98%   BMI 34.75 kg/m   Estimated body mass index is 34.75 kg/m  as calculated from the following:    Height as of this encounter: 1.575 m (5' 2\").    Weight as of this encounter: 86.2 kg (190 lb).  Medication Reconciliation: complete  Nancy Elder LPN  "

## 2019-09-26 NOTE — PROGRESS NOTES
Doing well.  No concerns today except frequent  HA's (longstanding).  Allergy to tylenol.  Reglan Rx.   Discussed signs of labor and when to call or come in.  Please call if persistent HA, blurred vision, or swelling  RTC in 1 week  Denies regular contractions, vaginal bleeding, LOF  Jose Goldstein MD  9/26/2019

## 2019-10-01 ENCOUNTER — PRENATAL OFFICE VISIT (OUTPATIENT)
Dept: OBGYN | Facility: OTHER | Age: 24
End: 2019-10-01
Attending: NURSE PRACTITIONER
Payer: COMMERCIAL

## 2019-10-01 VITALS
DIASTOLIC BLOOD PRESSURE: 74 MMHG | OXYGEN SATURATION: 99 % | WEIGHT: 189.3 LBS | SYSTOLIC BLOOD PRESSURE: 120 MMHG | BODY MASS INDEX: 34.83 KG/M2 | HEART RATE: 72 BPM | HEIGHT: 62 IN

## 2019-10-01 DIAGNOSIS — Z34.80 PREGNANCY IN MULTIGRAVIDA: ICD-10-CM

## 2019-10-01 PROCEDURE — 99207 ZZC PRENATAL VISIT: CPT | Performed by: NURSE PRACTITIONER

## 2019-10-01 PROCEDURE — G0463 HOSPITAL OUTPT CLINIC VISIT: HCPCS | Performed by: COUNSELOR

## 2019-10-01 ASSESSMENT — MIFFLIN-ST. JEOR: SCORE: 1566.91

## 2019-10-01 ASSESSMENT — PAIN SCALES - GENERAL: PAINLEVEL: MILD PAIN (3)

## 2019-10-01 NOTE — NURSING NOTE
"Chief Complaint   Patient presents with     Prenatal Care     37 weeks 5 days       Initial /74 (BP Location: Right arm, Patient Position: Sitting, Cuff Size: Adult Regular)   Pulse 72   Ht 1.575 m (5' 2\")   Wt 85.9 kg (189 lb 4.8 oz)   LMP 11/28/2018   SpO2 99%   BMI 34.62 kg/m   Estimated body mass index is 34.62 kg/m  as calculated from the following:    Height as of this encounter: 1.575 m (5' 2\").    Weight as of this encounter: 85.9 kg (189 lb 4.8 oz).  Medication Reconciliation: complete     Constanza Yang LPN    "

## 2019-10-01 NOTE — PATIENT INSTRUCTIONS
Patient Education     Adapting to Pregnancy: Third Trimester    Although common during pregnancy, some discomforts may seem worse in the final weeks. Simple lifestyle changes can help. Take care of yourself. And ask your partner to help out with small tasks.  Limiting leg problems  Ways to combat leg issues:    Wear support hose all day.    Avoid snug shoes and clothes that bind, like tight pants and socks with elastic tops.    Sit with your feet and legs raised often.  Caring for your breasts  Tips to follow include:    Wash with plain water. Avoid using harsh soaps or rubbing alcohol. They may cause dryness.    Wear a nursing bra for extra support. It can also hide any leaks from your nipples.  Controlling hemorrhoids  Ways to avoid hemorrhoids include:    Eat foods that are high in fiber. Also, exercise and drink enough fluids. This will reduce constipation and hemorrhoids.    Sleep and nap on your side. This limits pressure on the veins of your rectum.    Try not to stand or sit for long periods.  Controlling back pain  As your body changes during pregnancy, your back must work in new ways. Back pain is due to many causes. Physical changes in your body can strain your back and its supporting muscles. Also, hormones (chemicals that carry messages throughout the body) increase during pregnancy. This can affect how your muscles and joints work together. All of these changes can lead to pain. Pain may be felt in the upper or lower back. Pain is also common in the pelvis. Some pregnant women have sciatica. This is pain caused by pressure on the sciatic nerve running down the back of the leg. Ask your healthcare provider for specific tips and exercises to help control your back pain.  Tips to help you rest  Good rest and sleep will help you feel better. Here are some ideas:    Ask your partner to massage your shoulders, neck, or back.    Limit the errands you do each day.    Lie down in the afternoon or after work  for a few minutes.    Take a warm bath before you go to sleep.    Drink warm milk or teas without caffeine.    Avoid coffee, black tea, and cola.  Stopping heartburn    Avoid spicy, greasy, fried, or acidic foods.    Eat small amounts more often. Eat slowly.   Wait 2 hours after eating before lying down.    Sleep with your upper body raised 6 inches.   Managing mood swings  Ways to manage mood swings include:    Know that mood changes are normal.    Exercise often, but get plenty of rest.    Address any concerns and limit stress. Talking to your partner, other women, or your healthcare provider may help.  Dealing with urinary frequency  Tips to deal with having to urinate often include:    Drink plenty of water all day. If you drink a lot in the evening, though, you may have to get up more in the night.    Limit coffee, black tea, and cola.  Date Last Reviewed: 2/1/2018 2000-2018 [employer]. [address] [place] [address]. All rights reserved. This information is not intended as a substitute for professional medical care. Always follow your healthcare professional's instructions.           Patient Education     Pregnancy: Your Third Trimester Changes  As the baby grows, your body changes too. You may also see signs that your body is getting ready for labor. Be patient. Within a few more weeks, your baby will be born.  How you are changing  Your body is preparing for the birth of your baby. Some of the most common changes are listed below. If you have any questions or concerns, ask your healthcare provider:    You ll gain more weight from fluids, extra blood, and fat deposits.    Your breasts will grow as your body gets ready to feed the baby. They may be more tender. You may also notice a slight yellow or white discharge from the nipples.    Discharge from your vagina may increase. This is normal.    You might see some skin color changes on your forehead, cheeks, or nose. Most of these  will go away after you deliver.  How your baby is growing       Month 7  Your baby can open and close his or her eyes and weighs around 4 pounds. If born prematurely (too early), your baby would likely survive with special care. Month 8  Your baby is building up body fat and weighs around 6 pounds. Month 9  Your baby weighs nearly 7 pounds and is about 19 to 21 inches long. In other words, any day now...   Date Last Reviewed: 2/1/2018 2000-2018 [employer]. [address] [place] [address], [place] [address]. All rights reserved. This information is not intended as a substitute for professional medical care. Always follow your healthcare professional's instructions.

## 2019-10-01 NOTE — PROGRESS NOTES
Notes increased cramping and some tammy. No bleeding or LOF.  Baby active. Signs of labor and late pregnancy warning signs reviewed.  RTC in 1 week.

## 2019-10-03 ENCOUNTER — TELEPHONE (OUTPATIENT)
Dept: OBGYN | Facility: HOSPITAL | Age: 24
End: 2019-10-03

## 2019-10-03 ENCOUNTER — HOSPITAL ENCOUNTER (OUTPATIENT)
Facility: HOSPITAL | Age: 24
Discharge: HOME | End: 2019-10-04
Attending: OBSTETRICS & GYNECOLOGY | Admitting: OBSTETRICS & GYNECOLOGY
Payer: COMMERCIAL

## 2019-10-03 VITALS
SYSTOLIC BLOOD PRESSURE: 123 MMHG | TEMPERATURE: 98.2 F | DIASTOLIC BLOOD PRESSURE: 86 MMHG | RESPIRATION RATE: 16 BRPM | HEART RATE: 94 BPM | OXYGEN SATURATION: 98 %

## 2019-10-03 LAB
ALBUMIN UR-MCNC: 30 MG/DL
ALBUMIN UR-MCNC: 30 MG/DL
APPEARANCE UR: ABNORMAL
APPEARANCE UR: CLEAR
BACTERIA #/AREA URNS HPF: ABNORMAL /HPF
BACTERIA #/AREA URNS HPF: ABNORMAL /HPF
BILIRUB UR QL STRIP: NEGATIVE
BILIRUB UR QL STRIP: NEGATIVE
COLOR UR AUTO: YELLOW
COLOR UR AUTO: YELLOW
GLUCOSE UR STRIP-MCNC: NEGATIVE MG/DL
GLUCOSE UR STRIP-MCNC: NEGATIVE MG/DL
HGB UR QL STRIP: ABNORMAL
HGB UR QL STRIP: NEGATIVE
KETONES UR STRIP-MCNC: 5 MG/DL
KETONES UR STRIP-MCNC: 5 MG/DL
LEUKOCYTE ESTERASE UR QL STRIP: ABNORMAL
LEUKOCYTE ESTERASE UR QL STRIP: ABNORMAL
MUCOUS THREADS #/AREA URNS LPF: PRESENT /LPF
MUCOUS THREADS #/AREA URNS LPF: PRESENT /LPF
NITRATE UR QL: NEGATIVE
NITRATE UR QL: NEGATIVE
PH UR STRIP: 6 PH (ref 4.7–8)
PH UR STRIP: 6.5 PH (ref 4.7–8)
RBC #/AREA URNS AUTO: 20 /HPF (ref 0–2)
RBC #/AREA URNS AUTO: 7 /HPF (ref 0–2)
RUPTURE OF FETAL MEMBRANES BY ROM PLUS: NEGATIVE
SOURCE: ABNORMAL
SOURCE: ABNORMAL
SP GR UR STRIP: 1.03 (ref 1–1.03)
SP GR UR STRIP: 1.03 (ref 1–1.03)
SQUAMOUS #/AREA URNS AUTO: 2 /HPF (ref 0–1)
SQUAMOUS #/AREA URNS AUTO: 25 /HPF (ref 0–1)
TRANS CELLS #/AREA URNS HPF: 2 /HPF (ref 0–1)
UROBILINOGEN UR STRIP-MCNC: NORMAL MG/DL (ref 0–2)
UROBILINOGEN UR STRIP-MCNC: NORMAL MG/DL (ref 0–2)
WBC #/AREA URNS AUTO: 3 /HPF (ref 0–5)
WBC #/AREA URNS AUTO: 97 /HPF (ref 0–5)
WBC CLUMPS #/AREA URNS HPF: PRESENT /HPF

## 2019-10-03 PROCEDURE — 81001 URINALYSIS AUTO W/SCOPE: CPT | Mod: 91 | Performed by: OBSTETRICS & GYNECOLOGY

## 2019-10-03 PROCEDURE — G0463 HOSPITAL OUTPT CLINIC VISIT: HCPCS | Mod: 25

## 2019-10-03 PROCEDURE — 25800030 ZZH RX IP 258 OP 636: Performed by: OBSTETRICS & GYNECOLOGY

## 2019-10-03 PROCEDURE — 25000128 H RX IP 250 OP 636: Performed by: OBSTETRICS & GYNECOLOGY

## 2019-10-03 PROCEDURE — 84112 EVAL AMNIOTIC FLUID PROTEIN: CPT | Performed by: OBSTETRICS & GYNECOLOGY

## 2019-10-03 RX ORDER — HYDROMORPHONE HYDROCHLORIDE 1 MG/ML
0.2 INJECTION, SOLUTION INTRAMUSCULAR; INTRAVENOUS; SUBCUTANEOUS ONCE
Status: COMPLETED | OUTPATIENT
Start: 2019-10-03 | End: 2019-10-03

## 2019-10-03 RX ORDER — HYDROMORPHONE HYDROCHLORIDE 1 MG/ML
0.2 SOLUTION ORAL ONCE
Status: DISCONTINUED | OUTPATIENT
Start: 2019-10-03 | End: 2019-10-03 | Stop reason: CLARIF

## 2019-10-03 RX ADMIN — HYDROMORPHONE HYDROCHLORIDE 0.2 MG: 1 INJECTION, SOLUTION INTRAMUSCULAR; INTRAVENOUS; SUBCUTANEOUS at 22:36

## 2019-10-03 RX ADMIN — SODIUM CHLORIDE, POTASSIUM CHLORIDE, SODIUM LACTATE AND CALCIUM CHLORIDE 1000 ML: 600; 310; 30; 20 INJECTION, SOLUTION INTRAVENOUS at 22:36

## 2019-10-04 VITALS
BODY MASS INDEX: 35.68 KG/M2 | TEMPERATURE: 98 F | RESPIRATION RATE: 18 BRPM | SYSTOLIC BLOOD PRESSURE: 118 MMHG | HEART RATE: 73 BPM | HEIGHT: 61 IN | DIASTOLIC BLOOD PRESSURE: 90 MMHG | WEIGHT: 189 LBS | OXYGEN SATURATION: 97 %

## 2019-10-04 LAB
ALBUMIN UR-MCNC: 30 MG/DL
APPEARANCE UR: ABNORMAL
BACTERIA #/AREA URNS HPF: ABNORMAL /HPF
BILIRUB UR QL STRIP: NEGATIVE
COLOR UR AUTO: YELLOW
GLUCOSE UR STRIP-MCNC: NEGATIVE MG/DL
HGB UR QL STRIP: NEGATIVE
KETONES UR STRIP-MCNC: 5 MG/DL
LEUKOCYTE ESTERASE UR QL STRIP: ABNORMAL
MUCOUS THREADS #/AREA URNS LPF: PRESENT /LPF
NITRATE UR QL: NEGATIVE
PH UR STRIP: 7 PH (ref 4.7–8)
RBC #/AREA URNS AUTO: 3 /HPF (ref 0–2)
SOURCE: ABNORMAL
SP GR UR STRIP: 1.02 (ref 1–1.03)
SQUAMOUS #/AREA URNS AUTO: 10 /HPF (ref 0–1)
UROBILINOGEN UR STRIP-MCNC: 2 MG/DL (ref 0–2)
WBC #/AREA URNS AUTO: 27 /HPF (ref 0–5)

## 2019-10-04 PROCEDURE — 87086 URINE CULTURE/COLONY COUNT: CPT | Performed by: OBSTETRICS & GYNECOLOGY

## 2019-10-04 PROCEDURE — G0463 HOSPITAL OUTPT CLINIC VISIT: HCPCS | Mod: 25

## 2019-10-04 PROCEDURE — 96365 THER/PROPH/DIAG IV INF INIT: CPT

## 2019-10-04 PROCEDURE — 59025 FETAL NON-STRESS TEST: CPT

## 2019-10-04 PROCEDURE — 81001 URINALYSIS AUTO W/SCOPE: CPT | Performed by: OBSTETRICS & GYNECOLOGY

## 2019-10-04 PROCEDURE — 59025 FETAL NON-STRESS TEST: CPT | Mod: 26 | Performed by: OBSTETRICS & GYNECOLOGY

## 2019-10-04 ASSESSMENT — MIFFLIN-ST. JEOR: SCORE: 1549.68

## 2019-10-04 NOTE — PLAN OF CARE
Carolyn Mallory is a 24 year old  and 38w1d patient came in complaining of Hyperemesis (vomiting/headache/visual disturbance)    Patient Active Problem List   Diagnosis     Allergic rhinitis     Food allergy     Patellofemoral syndrome of both knees     Sacro ilial pain     Calculus of kidney     Flank pain--LEFT & RIGHT--UTI----2019     ACP (advance care planning)     Chronic right-sided thoracic back pain     Obesity, unspecified obesity severity, unspecified obesity type     Tobacco use disorder     Esophageal reflux     Allergy to pollen     Astigmatism     BMI 34.0-34.9,adult     Hypermetropia     Pregnancy in multigravida     Right lower quadrant pain, evaluated ER with neg findings--3/25/2019     Urinary tract infection without hematuria, site unspecified, Macrobid---3/25/2019,  Keflex--2019     Anxiety in pregnancy, antepartum     Encounter for triage in pregnant patient       Pt discharged to home at 0010 and encouraged to rest and drink plenty of fluids.  Pt told to call/return if bleeding more than spotting, water breaks, contractions 3-5 minutes apart that she has to breath through.     Nursing education on pre eclampsia symptoms provided. Self-management instructions reviewed.  AVS given and signed. All questions answered and patient verbalizes understanding.    /86   Pulse 94   Temp 98.2  F (36.8  C) (Oral)   Resp 16   LMP 2018   SpO2 98%       Fetal Assessment: Reactive    Discharge support:  Family/Friend Support - significant other     OB History    Para Term  AB Living   3 1 1 0 1 0   SAB TAB Ectopic Multiple Live Births   1 0 0 0 0      # Outcome Date GA Lbr Marcial/2nd Weight Sex Delivery Anes PTL Lv   3 Current            2 SAB 2018           1 Term 03/10/16 39w4d 17:19 / 02:04 2.778 kg (6 lb 2 oz) M Vag-Spont EPI        Apgar1: 6  Apgar5: 7       Joselyn Bach, RN

## 2019-10-04 NOTE — PLAN OF CARE
Pt is to get LR bolus, pain management. Pt is okay to be discharged home if fetal strip is reactive, and pt is feeling better. Serial BPs completed and WDL.

## 2019-10-04 NOTE — DISCHARGE INSTRUCTIONS

## 2019-10-04 NOTE — TELEPHONE ENCOUNTER
10/3/2019 7:30 PM  Carolyn Mallory 1995 [phone] (home)   Pt of Dr: No data on file.   Estimated Date of Delivery: Oct 17, 2019 38w0d    Patient Active Problem List   Diagnosis     Allergic rhinitis     Food allergy     Patellofemoral syndrome of both knees     Sacro ilial pain     Calculus of kidney     Flank pain--LEFT & RIGHT--UTI----2019     ACP (advance care planning)     Chronic right-sided thoracic back pain     Obesity, unspecified obesity severity, unspecified obesity type     Tobacco use disorder     Esophageal reflux     Allergy to pollen     Astigmatism     BMI 34.0-34.9,adult     Hypermetropia     Pregnancy in multigravida     Right lower quadrant pain, evaluated ER with neg findings--3/25/2019     Urinary tract infection without hematuria, site unspecified, Macrobid---3/25/2019,  Keflex--2019     Anxiety in pregnancy, antepartum     Encounter for triage in pregnant patient         Spoke with Carolyn Mallory   Pt lives 10 miles away.  Reason for call per pt  Pt has been nausea and vomiting and has been feeling dizzy since yesterday morning.  Also reports a headache since yesterday am.    Are you having contractions? Migue gomez  Are you having any bloody show: No  Are you leaking any fluids/ has your water broken? No  Is your baby moving normally: Yes  Did you have any complications with this or a previous pregnancy? Yes: pre-eclampsia with her first pregnancy.     Patient advised: To come to [facility] to be evaluated.   Call taken by Nasreen Ordonez RN

## 2019-10-04 NOTE — PLAN OF CARE
"Carolyn Mallory is a 24 year old  and 38w1d patient came in complaining of Fall    Patient Active Problem List   Diagnosis     Allergic rhinitis     Food allergy     Patellofemoral syndrome of both knees     Sacro ilial pain     Calculus of kidney     Flank pain--LEFT & RIGHT--UTI----2019     ACP (advance care planning)     Chronic right-sided thoracic back pain     Obesity, unspecified obesity severity, unspecified obesity type     Tobacco use disorder     Esophageal reflux     Allergy to pollen     Astigmatism     BMI 34.0-34.9,adult     Hypermetropia     Pregnancy in multigravida     Right lower quadrant pain, evaluated ER with neg findings--3/25/2019     Urinary tract infection without hematuria, site unspecified, Macrobid---3/25/2019,  Keflex--2019     Anxiety in pregnancy, antepartum     Encounter for triage in pregnant patient       Pt discharged to home at 3:41 PM and encouraged to rest and drink plenty of fluids.  Pt told to call/return if bleeding more than spotting, water breaks, contractions 3-5 minutes apart that she has to breath through.     Nursing education on when to return to the hospital provided. Self-management instructions reviewed. No  New medications and therapies reviewed. AVS given and signed. All questions answered and patient verbalizes understanding.    /77 (BP Location: Left arm)   Pulse 82   Temp 98  F (36.7  C) (Oral)   Resp 18   Ht 1.549 m (5' 1\")   Wt 85.7 kg (189 lb)   LMP 2018   SpO2 99%   BMI 35.71 kg/m      Cervical status: posterior 1 cm  Fetal Assessment: Reactive    Discharge support:  Independent-Alone    OB History    Para Term  AB Living   3 1 1 0 1 0   SAB TAB Ectopic Multiple Live Births   1 0 0 0 0      # Outcome Date GA Lbr Marcial/2nd Weight Sex Delivery Anes PTL Lv   3 Current            2 SAB 2018           1 Term 03/10/16 39w4d 17:19 / 02:04 2.778 kg (6 lb 2 oz) M Vag-Spont EPI        Apgar1: 6  Apgar5: 7       Rita " ALETA Scott, RN

## 2019-10-06 ENCOUNTER — TELEPHONE (OUTPATIENT)
Dept: OBGYN | Facility: HOSPITAL | Age: 24
End: 2019-10-06

## 2019-10-06 LAB
BACTERIA SPEC CULT: NORMAL
SPECIMEN SOURCE: NORMAL

## 2019-10-06 NOTE — TELEPHONE ENCOUNTER
Obstetric Phone Triage- [facility]    **REMEMBER: Review patient's prenatal record including complications with pregnancy and medications patient may be on (insulins, tocolytic, etc.)**      2019 6:49 AM  Carolyn Mallory 1995   Home Phone [phone]   Mobile [phone]                       Last office visit/Cervix exam:  At hospital a few days ago. 1cm     Gestational Age 38 3/7 weeks    Spoke with  client    Reason for call per pt: contractions    Is your provider aware of this concern? no    Did you have any complications with this or a previous pregnancy? (Pre-term labor, C/S, Previa, pre-eclampsia, diabetes) preeclampsia with previous labor     Are you having contractions? Yes   -If yes: Contractions frequency q 1 minutes, Strength 6/10 and Duration 38-45 seconds    When did they start? 330am    Have you palpated your contractions? Yes    Describe contraction discomfort (ex: having to breath through    contractions, stop what you are doing, etc.): pain full    Vaginal/rectal pressure: no    What types of activity have you done in the past 24 hours? nothing    Have you had intercourse/anything in the vagina in the last 48 hours?   No    Are you being treated for any vaginal infections? no    How much fluids have you been drinking? plenty    Are you having any bloody show/Bleeding? No     [place]/anything vaginally in the last 24-48 hours? No        Are you having any new vaginal discharge or are you leaking fluids/has your water broken? no     Is your baby moving normally? Yes   -If no: Have you monitored your baby's movements? yes    When did you notice this decrease in baby movement?       Additional Concerns (abd pain, headache, swelling, visual changes, etc): nothing    Patient Advised: You are always more than welcome to come in and be evaluated. If you decide to stay home, drink plenty of fluids and call back in one hour and update staff.  Client was having questions about early labor  and reported the timming of contractions. Education on how to properly monitor contractions was given.     Call taken by: winter

## 2019-10-09 ENCOUNTER — HOSPITAL ENCOUNTER (EMERGENCY)
Facility: HOSPITAL | Age: 24
Discharge: HOME | End: 2019-10-09
Attending: INTERNAL MEDICINE | Admitting: INTERNAL MEDICINE
Payer: COMMERCIAL

## 2019-10-09 VITALS
SYSTOLIC BLOOD PRESSURE: 111 MMHG | TEMPERATURE: 97.5 F | RESPIRATION RATE: 16 BRPM | DIASTOLIC BLOOD PRESSURE: 79 MMHG | OXYGEN SATURATION: 97 %

## 2019-10-09 DIAGNOSIS — G43.809 OTHER MIGRAINE WITHOUT STATUS MIGRAINOSUS, NOT INTRACTABLE: ICD-10-CM

## 2019-10-09 LAB
ALBUMIN SERPL-MCNC: 2.6 G/DL (ref 3.4–5)
ALBUMIN UR-MCNC: NEGATIVE MG/DL
ALP SERPL-CCNC: 180 U/L (ref 40–150)
ALT SERPL W P-5'-P-CCNC: 41 U/L (ref 0–50)
AMORPH CRY #/AREA URNS HPF: ABNORMAL /HPF
ANION GAP SERPL CALCULATED.3IONS-SCNC: 9 MMOL/L (ref 3–14)
APPEARANCE UR: ABNORMAL
AST SERPL W P-5'-P-CCNC: 21 U/L (ref 0–45)
BACTERIA #/AREA URNS HPF: ABNORMAL /HPF
BASOPHILS # BLD AUTO: 0 10E9/L (ref 0–0.2)
BASOPHILS NFR BLD AUTO: 0.3 %
BILIRUB SERPL-MCNC: 0.3 MG/DL (ref 0.2–1.3)
BILIRUB UR QL STRIP: NEGATIVE
BUN SERPL-MCNC: 8 MG/DL (ref 7–30)
CALCIUM SERPL-MCNC: 9.2 MG/DL (ref 8.5–10.1)
CHLORIDE SERPL-SCNC: 108 MMOL/L (ref 94–109)
CO2 SERPL-SCNC: 21 MMOL/L (ref 20–32)
COLOR UR AUTO: YELLOW
CREAT SERPL-MCNC: 0.68 MG/DL (ref 0.52–1.04)
DIFFERENTIAL METHOD BLD: ABNORMAL
EOSINOPHIL # BLD AUTO: 0.1 10E9/L (ref 0–0.7)
EOSINOPHIL NFR BLD AUTO: 0.7 %
ERYTHROCYTE [DISTWIDTH] IN BLOOD BY AUTOMATED COUNT: 12.7 % (ref 10–15)
GFR SERPL CREATININE-BSD FRML MDRD: >90 ML/MIN/{1.73_M2}
GLUCOSE SERPL-MCNC: 90 MG/DL (ref 70–99)
GLUCOSE UR STRIP-MCNC: NEGATIVE MG/DL
HCT VFR BLD AUTO: 36.2 % (ref 35–47)
HGB BLD-MCNC: 12.7 G/DL (ref 11.7–15.7)
HGB UR QL STRIP: NEGATIVE
HYALINE CASTS #/AREA URNS LPF: 1 /LPF
IMM GRANULOCYTES # BLD: 0.1 10E9/L (ref 0–0.4)
IMM GRANULOCYTES NFR BLD: 0.4 %
KETONES UR STRIP-MCNC: NEGATIVE MG/DL
LEUKOCYTE ESTERASE UR QL STRIP: ABNORMAL
LYMPHOCYTES # BLD AUTO: 3.8 10E9/L (ref 0.8–5.3)
LYMPHOCYTES NFR BLD AUTO: 31.8 %
MCH RBC QN AUTO: 31 PG (ref 26.5–33)
MCHC RBC AUTO-ENTMCNC: 35.1 G/DL (ref 31.5–36.5)
MCV RBC AUTO: 88 FL (ref 78–100)
MONOCYTES # BLD AUTO: 0.5 10E9/L (ref 0–1.3)
MONOCYTES NFR BLD AUTO: 4.5 %
MUCOUS THREADS #/AREA URNS LPF: PRESENT /LPF
NEUTROPHILS # BLD AUTO: 7.4 10E9/L (ref 1.6–8.3)
NEUTROPHILS NFR BLD AUTO: 62.3 %
NITRATE UR QL: NEGATIVE
NRBC # BLD AUTO: 0 10*3/UL
NRBC BLD AUTO-RTO: 0 /100
PH UR STRIP: 7.5 PH (ref 4.7–8)
PLATELET # BLD AUTO: 257 10E9/L (ref 150–450)
POTASSIUM SERPL-SCNC: 3.5 MMOL/L (ref 3.4–5.3)
PROT SERPL-MCNC: 6.7 G/DL (ref 6.8–8.8)
RBC # BLD AUTO: 4.1 10E12/L (ref 3.8–5.2)
RBC #/AREA URNS AUTO: 1 /HPF (ref 0–2)
SODIUM SERPL-SCNC: 138 MMOL/L (ref 133–144)
SOURCE: ABNORMAL
SP GR UR STRIP: 1.02 (ref 1–1.03)
SQUAMOUS #/AREA URNS AUTO: 3 /HPF (ref 0–1)
UROBILINOGEN UR STRIP-MCNC: NORMAL MG/DL (ref 0–2)
WBC # BLD AUTO: 11.9 10E9/L (ref 4–11)
WBC #/AREA URNS AUTO: 7 /HPF (ref 0–5)

## 2019-10-09 PROCEDURE — 99284 EMERGENCY DEPT VISIT MOD MDM: CPT | Mod: Z6 | Performed by: INTERNAL MEDICINE

## 2019-10-09 PROCEDURE — 25000128 H RX IP 250 OP 636: Performed by: INTERNAL MEDICINE

## 2019-10-09 PROCEDURE — 80053 COMPREHEN METABOLIC PANEL: CPT | Performed by: INTERNAL MEDICINE

## 2019-10-09 PROCEDURE — 36415 COLL VENOUS BLD VENIPUNCTURE: CPT | Performed by: INTERNAL MEDICINE

## 2019-10-09 PROCEDURE — 81001 URINALYSIS AUTO W/SCOPE: CPT | Performed by: INTERNAL MEDICINE

## 2019-10-09 PROCEDURE — 99284 EMERGENCY DEPT VISIT MOD MDM: CPT | Mod: 25

## 2019-10-09 PROCEDURE — 96374 THER/PROPH/DIAG INJ IV PUSH: CPT

## 2019-10-09 PROCEDURE — 87086 URINE CULTURE/COLONY COUNT: CPT | Performed by: INTERNAL MEDICINE

## 2019-10-09 PROCEDURE — 85025 COMPLETE CBC W/AUTO DIFF WBC: CPT | Performed by: INTERNAL MEDICINE

## 2019-10-09 PROCEDURE — 96361 HYDRATE IV INFUSION ADD-ON: CPT

## 2019-10-09 RX ORDER — METOCLOPRAMIDE HYDROCHLORIDE 5 MG/ML
10 INJECTION INTRAMUSCULAR; INTRAVENOUS ONCE
Status: COMPLETED | OUTPATIENT
Start: 2019-10-09 | End: 2019-10-09

## 2019-10-09 RX ADMIN — METOCLOPRAMIDE 10 MG: 5 INJECTION, SOLUTION INTRAMUSCULAR; INTRAVENOUS at 01:49

## 2019-10-09 RX ADMIN — SODIUM CHLORIDE 1000 ML: 9 INJECTION, SOLUTION INTRAVENOUS at 01:49

## 2019-10-09 NOTE — ED NOTES
Patient states she can feel good movement with the baby. [facility] RN in to do fetal heart tones.

## 2019-10-09 NOTE — ED TRIAGE NOTES
Headache has been since 0900. 38 weeks pregnant and high risk for preeclampsia. Feels dizzy, seeing spots

## 2019-10-09 NOTE — ED AVS SNAPSHOT
[facility]  [address] [address]  Phone:  [phone]                                    Carolyn Mallory   MRN: [id number]    Department:  [facility]   Date of Visit:  10/9/2019           After Visit Summary Signature Page    I have received my discharge instructions, and my questions have been answered. I have discussed any challenges I see with this plan with the nurse or doctor.    ..........................................................................................................................................  Patient/Patient Representative Signature      ..........................................................................................................................................  Patient Representative Print Name and Relationship to Patient    ..................................................               ................................................  Date                                   Time    ..........................................................................................................................................  Reviewed by Signature/Title    ...................................................              ..............................................  Date                                               Time          22EPIC Rev 08/18

## 2019-10-10 ENCOUNTER — PRENATAL OFFICE VISIT (OUTPATIENT)
Dept: OBGYN | Facility: OTHER | Age: 24
End: 2019-10-10
Attending: OBSTETRICS & GYNECOLOGY
Payer: COMMERCIAL

## 2019-10-10 VITALS
DIASTOLIC BLOOD PRESSURE: 71 MMHG | OXYGEN SATURATION: 98 % | HEART RATE: 74 BPM | HEIGHT: 61 IN | BODY MASS INDEX: 35.68 KG/M2 | WEIGHT: 189 LBS | SYSTOLIC BLOOD PRESSURE: 109 MMHG

## 2019-10-10 DIAGNOSIS — Z34.80 PREGNANCY IN MULTIGRAVIDA: ICD-10-CM

## 2019-10-10 LAB
BACTERIA SPEC CULT: NORMAL
SPECIMEN SOURCE: NORMAL

## 2019-10-10 PROCEDURE — 99207 ZZC PRENATAL VISIT: CPT | Performed by: OBSTETRICS & GYNECOLOGY

## 2019-10-10 PROCEDURE — G0463 HOSPITAL OUTPT CLINIC VISIT: HCPCS

## 2019-10-10 PROCEDURE — G0463 HOSPITAL OUTPT CLINIC VISIT: HCPCS | Mod: 25

## 2019-10-10 RX ORDER — METHYLERGONOVINE MALEATE 0.2 MG/ML
200 INJECTION INTRAVENOUS
Status: CANCELLED | OUTPATIENT
Start: 2019-10-10 | End: 2019-10-10

## 2019-10-10 RX ORDER — OXYTOCIN/0.9 % SODIUM CHLORIDE 30/500 ML
100-340 PLASTIC BAG, INJECTION (ML) INTRAVENOUS CONTINUOUS PRN
Status: CANCELLED | OUTPATIENT
Start: 2019-10-10 | End: 2019-10-10

## 2019-10-10 RX ORDER — OXYTOCIN 10 [USP'U]/ML
10 INJECTION, SOLUTION INTRAMUSCULAR; INTRAVENOUS
Status: CANCELLED | OUTPATIENT
Start: 2019-10-10 | End: 2019-10-10

## 2019-10-10 RX ORDER — IBUPROFEN 400 MG/1
800 TABLET, FILM COATED ORAL
Status: CANCELLED | OUTPATIENT
Start: 2019-10-10 | End: 2019-10-10

## 2019-10-10 RX ORDER — NALOXONE HYDROCHLORIDE 0.4 MG/ML
.1-.4 INJECTION, SOLUTION INTRAMUSCULAR; INTRAVENOUS; SUBCUTANEOUS
Status: CANCELLED | OUTPATIENT
Start: 2019-10-10

## 2019-10-10 RX ORDER — CARBOPROST TROMETHAMINE 250 UG/ML
250 INJECTION, SOLUTION INTRAMUSCULAR
Status: CANCELLED | OUTPATIENT
Start: 2019-10-10 | End: 2019-10-10

## 2019-10-10 RX ORDER — METOCLOPRAMIDE HYDROCHLORIDE 5 MG/ML
10 INJECTION INTRAMUSCULAR; INTRAVENOUS EVERY 6 HOURS PRN
Status: CANCELLED | OUTPATIENT
Start: 2019-10-10

## 2019-10-10 RX ORDER — MISOPROSTOL 200 UG/1
200 TABLET ORAL
Status: CANCELLED | OUTPATIENT
Start: 2019-10-10

## 2019-10-10 RX ORDER — FENTANYL CITRATE 50 UG/ML
50-100 INJECTION, SOLUTION INTRAMUSCULAR; INTRAVENOUS
Status: CANCELLED | OUTPATIENT
Start: 2019-10-10

## 2019-10-10 RX ORDER — ONDANSETRON 2 MG/ML
4 INJECTION INTRAMUSCULAR; INTRAVENOUS EVERY 6 HOURS PRN
Status: CANCELLED | OUTPATIENT
Start: 2019-10-10

## 2019-10-10 RX ORDER — LIDOCAINE 40 MG/G
CREAM TOPICAL
Status: CANCELLED | OUTPATIENT
Start: 2019-10-10

## 2019-10-10 ASSESSMENT — MIFFLIN-ST. JEOR: SCORE: 1549.68

## 2019-10-10 ASSESSMENT — PAIN SCALES - GENERAL: PAINLEVEL: NO PAIN (0)

## 2019-10-10 NOTE — PROGRESS NOTES
Fetal Non-Stress Test Results    NST Ordered By: Dr. Goldstein       NST Start & Stop Times  NST Start Time: 2006  NST Stop Time: 2110                            NST Results  Fetus A   Baseline Rate: Normal  Accelerations: Present  Decelerations: None  Interpretation: reactive

## 2019-10-10 NOTE — PROGRESS NOTES
Doing well.  No concerns today except intermittent migraine HA's. .  Discussed signs of labor and when to call or come in.  Discussed kick counts and fetal movement.  JASON martinez discussed  Patient is aware of the risks of an induction including medications used, risk, benefits and alternatives.  She is willing to take these risks and would like to proceed and was scheduled for 10/15/19 if no labor  Denies regular contractions, vaginal bleeding, SAMMI Goldstein MD  10/10/2019

## 2019-10-10 NOTE — NURSING NOTE
"Chief Complaint   Patient presents with     Prenatal Care     39w       Initial /71 (BP Location: Left arm, Cuff Size: Adult Regular)   Pulse 74   Ht 1.549 m (5' 1\")   Wt 85.7 kg (189 lb)   LMP 11/28/2018   SpO2 98%   BMI 35.71 kg/m   Estimated body mass index is 35.71 kg/m  as calculated from the following:    Height as of this encounter: 1.549 m (5' 1\").    Weight as of this encounter: 85.7 kg (189 lb).  Medication Reconciliation: complete  Nancy Elder LPN  "

## 2019-10-11 ASSESSMENT — ENCOUNTER SYMPTOMS
FEVER: 0
ARTHRALGIAS: 0
CONFUSION: 0
HEADACHES: 1
DIFFICULTY URINATING: 0
NAUSEA: 1
EYE REDNESS: 0
ABDOMINAL PAIN: 0
NECK STIFFNESS: 0
COLOR CHANGE: 0
SHORTNESS OF BREATH: 0

## 2019-10-11 NOTE — PROGRESS NOTES
Fetal Non-Stress Test Results    NST Ordered By: Dr. Goldstein  NST Medical Indication: fall    NST Start & Stop Times  NST Start Time: 1408  NST Stop Time: 1430                            NST Results  Fetus A   Baseline Rate: Normal  Accelerations: Present  Decelerations: None  Interpretation: reactive

## 2019-10-11 NOTE — ED PROVIDER NOTES
History     Chief Complaint   Patient presents with     Headache     The history is provided by the patient.   Headache   Pain location:  Generalized  Quality:  Stabbing  Radiates to:  Does not radiate  Severity currently:  8/10  Timing:  Constant  Progression:  Worsening  Chronicity:  Recurrent  Associated symptoms: nausea    Associated symptoms: no abdominal pain, no congestion, no fever and no neck stiffness          Allergies:  Allergies   Allergen Reactions     Amoxicillin      Oxycodone Visual Disturbance, Nausea and Vomiting and Rash     Vomiting, hallucinations.     Tylenol [Acetaminophen] Other (See Comments) and Rash     1/06/17: Patient reports seeing spots after taking Tylenol.  Patient reports seeing spots after taking Tylenol.       Problem List:    Patient Active Problem List    Diagnosis Date Noted     Encounter for triage in pregnant patient 08/23/2019     Priority: Medium     Anxiety in pregnancy, antepartum 05/30/2019     Priority: Medium     Vistaril prn  Counseling referral placed       Right lower quadrant pain, evaluated ER with neg findings--3/25/2019 03/25/2019     Priority: Medium     Urinary tract infection without hematuria, site unspecified, Macrobid---3/25/2019,  Keflex--4/29/2019 03/25/2019     Priority: Medium     Pregnancy in multigravida 03/14/2019     Priority: Medium     H/o pre-E.   Baby ASA second trimester.  Quad screen nml  Plan breastfeeding  Dr. Saravia  BC:  Desiring sterilization.  Counseling done and federal sterilization forms signed.  Plan interval laparoscopic salpingectomy.   TDAP done         Allergy to pollen 03/01/2018     Priority: Medium     Obesity, unspecified obesity severity, unspecified obesity type 06/09/2017     Priority: Medium     Tobacco use disorder 06/09/2017     Priority: Medium     Esophageal reflux 06/09/2017     Priority: Medium     BMI 34.0-34.9,adult 01/19/2017     Priority: Medium     Chronic right-sided thoracic back pain 05/24/2016      Priority: Medium     ACP (advance care planning) 04/26/2016     Priority: Medium     Advance Care Planning 4/26/2016: ACP Review of Chart / Resources Provided:  Reviewed chart for advance care plan.  Carolyn Mallory has no plan or code status on file. Discussed available resources and provided with information. .  Added by Lizzette Garcia           Flank pain--LEFT & RIGHT--UTI----4/29/2019 12/20/2015     Priority: Medium     Calculus of kidney 12/16/2015     Priority: Medium     Bilateral hydronephrosis, flank pain, 3mm right lower pole non obstructing stone.  Dr. Simon [facility]rology consult.  Declined stents.  Reconsider if hospitalization and no improvement 2-3 days.         Sacro ilial pain 10/30/2015     Priority: Medium     Patellofemoral syndrome of both knees 01/20/2015     Priority: Medium     Allergic rhinitis 01/17/2014     Priority: Medium     Problem list name updated by automated process. Provider to review       Food allergy 01/17/2014     Priority: Medium     Astigmatism 09/24/2013     Priority: Medium     Overview:   IMO Update       Hypermetropia 09/24/2013     Priority: Medium        Past Medical History:    Past Medical History:   Diagnosis Date     NO ACTIVE PROBLEMS      Pregnancy        Past Surgical History:    Past Surgical History:   Procedure Laterality Date     ARTHROSCOPY KNEE Right 5/4/2015    Procedure: ARTHROSCOPY KNEE;  Surgeon: Hernando Kulkarni MD;  Location: [facility] OR     AS ESOPHAGOSCOPY, DIAGNOSTIC      upper     LAPAROSCOPIC CHOLECYSTECTOMY N/A 10/10/2015    Procedure: LAPAROSCOPIC CHOLECYSTECTOMY;  Surgeon: Drew Padgett MD;  Location: [facility] OR     none         Family History:    Family History   Problem Relation Age of Onset     Thrombophilia Mother         blood clotting     Lupus Mother         erythematosus     Diabetes Mother      Hypertension Father      Asthma Sister      Diabetes Maternal Grandfather      Hypertension Maternal Grandfather      Lupus Maternal Aunt          erythematosus       Social History:  Marital Status:  Single [1]  Social History     Tobacco Use     Smoking status: Former Smoker     Packs/day: 0.25     Years: 1.00     Pack years: 0.25     Types: Cigarettes     Start date: 1/20/2014     Smokeless tobacco: Never Used   Substance Use Topics     Alcohol use: No     Alcohol/week: 0.0 standard drinks     Drug use: No        Medications:    famotidine (PEPCID) 10 MG tablet  fluticasone (FLONASE) 50 MCG/ACT spray  loratadine (CLARITIN) 10 MG tablet  metoclopramide (REGLAN) 5 MG tablet  Prenatal Vit-Fe Fumarate-FA (PRENATAL VITAMIN AND MINERAL) 28-0.8 MG TABS  EPINEPHrine (EPIPEN) 0.3 MG/0.3ML injection          Review of Systems   Constitutional: Negative for fever.   HENT: Negative for congestion.    Eyes: Negative for redness.   Respiratory: Negative for shortness of breath.    Cardiovascular: Negative for chest pain.   Gastrointestinal: Positive for nausea. Negative for abdominal pain.   Genitourinary: Negative for difficulty urinating.   Musculoskeletal: Negative for arthralgias and neck stiffness.   Skin: Negative for color change.   Neurological: Positive for headaches.   Psychiatric/Behavioral: Negative for confusion.   All other systems reviewed and are negative.      Physical Exam   BP: 123/84  Heart Rate: 90  Temp: 97.6  F (36.4  C)  Resp: 16  SpO2: 99 %      Physical Exam  Constitutional:       General: She is not in acute distress.     Appearance: She is not diaphoretic.   HENT:      Head: Atraumatic.      Mouth/Throat:      Pharynx: No oropharyngeal exudate.   Eyes:      General: No scleral icterus.     Pupils: Pupils are equal, round, and reactive to light.   Cardiovascular:      Heart sounds: Normal heart sounds.   Pulmonary:      Effort: No respiratory distress.      Breath sounds: Normal breath sounds.   Abdominal:      General: Bowel sounds are normal.      Palpations: Abdomen is soft.      Tenderness: There is no tenderness.   Musculoskeletal:          General: No tenderness.   Skin:     General: Skin is warm.      Findings: No rash.         ED Course        Procedures                 No results found for this or any previous visit (from the past 24 hour(s)).    Medications   metoclopramide (REGLAN) injection 10 mg (10 mg Intravenous Given 10/9/19 0149)   0.9% sodium chloride BOLUS (0 mLs Intravenous Stopped 10/9/19 0302)       Assessments & Plan (with Medical Decision Making)   Migraine headache, responded to reglan IV and IVF  ,   Denies any abd pain or vaginal bleeding or discharge  Fetal movement normal as per mother  Her headache completley gone  D C home  I have reviewed the nursing notes.    I have reviewed the findings, diagnosis, plan and need for follow up with the patient.      Discharge Medication List as of 10/9/2019  3:02 AM          Final diagnoses:   Other migraine without status migrainosus, not intractable       10/9/2019   [facility]     Augustus Barnes MD  10/11/19 2589       Augustus Barnes MD  10/15/19 5334

## 2019-10-12 ENCOUNTER — ANESTHESIA (OUTPATIENT)
Dept: OBGYN | Facility: HOSPITAL | Age: 24
End: 2019-10-12
Payer: COMMERCIAL

## 2019-10-12 ENCOUNTER — ANESTHESIA EVENT (OUTPATIENT)
Dept: OBGYN | Facility: HOSPITAL | Age: 24
End: 2019-10-12
Payer: COMMERCIAL

## 2019-10-12 ENCOUNTER — HOSPITAL ENCOUNTER (INPATIENT)
Facility: HOSPITAL | Age: 24
LOS: 2 days | Discharge: HOME | End: 2019-10-14
Attending: OBSTETRICS & GYNECOLOGY | Admitting: OBSTETRICS & GYNECOLOGY
Payer: COMMERCIAL

## 2019-10-12 ENCOUNTER — TELEPHONE (OUTPATIENT)
Dept: OBGYN | Facility: HOSPITAL | Age: 24
End: 2019-10-12

## 2019-10-12 ENCOUNTER — ANESTHESIA EVENT (OUTPATIENT)
Dept: OBGYN | Facility: HOSPITAL | Age: 24
End: 2019-10-12

## 2019-10-12 LAB
ABO + RH BLD: NORMAL
ABO + RH BLD: NORMAL
ALBUMIN UR-MCNC: NEGATIVE MG/DL
AMORPH CRY #/AREA URNS HPF: ABNORMAL /HPF
APPEARANCE UR: ABNORMAL
BACTERIA #/AREA URNS HPF: ABNORMAL /HPF
BASOPHILS # BLD AUTO: 0 10E9/L (ref 0–0.2)
BASOPHILS NFR BLD AUTO: 0.2 %
BILIRUB UR QL STRIP: NEGATIVE
BLD GP AB SCN SERPL QL: NORMAL
BLOOD BANK CMNT PATIENT-IMP: NORMAL
COLOR UR AUTO: ABNORMAL
DIFFERENTIAL METHOD BLD: ABNORMAL
EOSINOPHIL # BLD AUTO: 0 10E9/L (ref 0–0.7)
EOSINOPHIL NFR BLD AUTO: 0.3 %
ERYTHROCYTE [DISTWIDTH] IN BLOOD BY AUTOMATED COUNT: 12.6 % (ref 10–15)
GLUCOSE UR STRIP-MCNC: NEGATIVE MG/DL
HCT VFR BLD AUTO: 36.6 % (ref 35–47)
HGB BLD-MCNC: 13.1 G/DL (ref 11.7–15.7)
HGB UR QL STRIP: NEGATIVE
IMM GRANULOCYTES # BLD: 0.1 10E9/L (ref 0–0.4)
IMM GRANULOCYTES NFR BLD: 0.3 %
KETONES UR STRIP-MCNC: NEGATIVE MG/DL
LEUKOCYTE ESTERASE UR QL STRIP: ABNORMAL
LYMPHOCYTES # BLD AUTO: 2.4 10E9/L (ref 0.8–5.3)
LYMPHOCYTES NFR BLD AUTO: 15.9 %
MCH RBC QN AUTO: 31.2 PG (ref 26.5–33)
MCHC RBC AUTO-ENTMCNC: 35.8 G/DL (ref 31.5–36.5)
MCV RBC AUTO: 87 FL (ref 78–100)
MONOCYTES # BLD AUTO: 0.8 10E9/L (ref 0–1.3)
MONOCYTES NFR BLD AUTO: 5.2 %
MUCOUS THREADS #/AREA URNS LPF: PRESENT /LPF
NEUTROPHILS # BLD AUTO: 11.8 10E9/L (ref 1.6–8.3)
NEUTROPHILS NFR BLD AUTO: 78.1 %
NITRATE UR QL: NEGATIVE
NRBC # BLD AUTO: 0 10*3/UL
NRBC BLD AUTO-RTO: 0 /100
PH UR STRIP: 7 PH (ref 4.7–8)
PLATELET # BLD AUTO: 248 10E9/L (ref 150–450)
RBC # BLD AUTO: 4.2 10E12/L (ref 3.8–5.2)
RBC #/AREA URNS AUTO: 1 /HPF (ref 0–2)
SOURCE: ABNORMAL
SP GR UR STRIP: 1.01 (ref 1–1.03)
SPECIMEN EXP DATE BLD: NORMAL
SQUAMOUS #/AREA URNS AUTO: 2 /HPF (ref 0–1)
UROBILINOGEN UR STRIP-MCNC: NORMAL MG/DL (ref 0–2)
WBC # BLD AUTO: 15.2 10E9/L (ref 4–11)
WBC #/AREA URNS AUTO: 7 /HPF (ref 0–5)

## 2019-10-12 PROCEDURE — 4A1HXCZ MONITORING OF PRODUCTS OF CONCEPTION, CARDIAC RATE, EXTERNAL APPROACH: ICD-10-PCS | Performed by: OBSTETRICS & GYNECOLOGY

## 2019-10-12 PROCEDURE — G0463 HOSPITAL OUTPT CLINIC VISIT: HCPCS | Mod: 25

## 2019-10-12 PROCEDURE — 72200001 ZZH LABOR CARE VAGINAL DELIVERY SINGLE

## 2019-10-12 PROCEDURE — 25000132 ZZH RX MED GY IP 250 OP 250 PS 637: Performed by: OBSTETRICS & GYNECOLOGY

## 2019-10-12 PROCEDURE — 36415 COLL VENOUS BLD VENIPUNCTURE: CPT | Performed by: OBSTETRICS & GYNECOLOGY

## 2019-10-12 PROCEDURE — 25000125 ZZHC RX 250: Performed by: NURSE ANESTHETIST, CERTIFIED REGISTERED

## 2019-10-12 PROCEDURE — 86850 RBC ANTIBODY SCREEN: CPT | Performed by: OBSTETRICS & GYNECOLOGY

## 2019-10-12 PROCEDURE — 25000125 ZZHC RX 250: Performed by: OBSTETRICS & GYNECOLOGY

## 2019-10-12 PROCEDURE — 25800030 ZZH RX IP 258 OP 636: Performed by: NURSE ANESTHETIST, CERTIFIED REGISTERED

## 2019-10-12 PROCEDURE — 25800030 ZZH RX IP 258 OP 636: Performed by: OBSTETRICS & GYNECOLOGY

## 2019-10-12 PROCEDURE — 59400 OBSTETRICAL CARE: CPT | Performed by: NURSE ANESTHETIST, CERTIFIED REGISTERED

## 2019-10-12 PROCEDURE — 37000011 ZZH ANESTHESIA WARD SERVICE: Performed by: NURSE ANESTHETIST, CERTIFIED REGISTERED

## 2019-10-12 PROCEDURE — 86901 BLOOD TYPING SEROLOGIC RH(D): CPT | Performed by: OBSTETRICS & GYNECOLOGY

## 2019-10-12 PROCEDURE — 0KQM0ZZ REPAIR PERINEUM MUSCLE, OPEN APPROACH: ICD-10-PCS | Performed by: OBSTETRICS & GYNECOLOGY

## 2019-10-12 PROCEDURE — 85025 COMPLETE CBC W/AUTO DIFF WBC: CPT | Performed by: OBSTETRICS & GYNECOLOGY

## 2019-10-12 PROCEDURE — 86780 TREPONEMA PALLIDUM: CPT | Performed by: OBSTETRICS & GYNECOLOGY

## 2019-10-12 PROCEDURE — 86900 BLOOD TYPING SEROLOGIC ABO: CPT | Performed by: OBSTETRICS & GYNECOLOGY

## 2019-10-12 PROCEDURE — 59400 OBSTETRICAL CARE: CPT | Performed by: OBSTETRICS & GYNECOLOGY

## 2019-10-12 PROCEDURE — 81001 URINALYSIS AUTO W/SCOPE: CPT | Performed by: OBSTETRICS & GYNECOLOGY

## 2019-10-12 PROCEDURE — 3E0S3BZ INTRODUCTION OF ANESTHETIC AGENT INTO EPIDURAL SPACE, PERCUTANEOUS APPROACH: ICD-10-PCS | Performed by: NURSE ANESTHETIST, CERTIFIED REGISTERED

## 2019-10-12 PROCEDURE — 10907ZC DRAINAGE OF AMNIOTIC FLUID, THERAPEUTIC FROM PRODUCTS OF CONCEPTION, VIA NATURAL OR ARTIFICIAL OPENING: ICD-10-PCS | Performed by: OBSTETRICS & GYNECOLOGY

## 2019-10-12 PROCEDURE — 00HU33Z INSERTION OF INFUSION DEVICE INTO SPINAL CANAL, PERCUTANEOUS APPROACH: ICD-10-PCS | Performed by: NURSE ANESTHETIST, CERTIFIED REGISTERED

## 2019-10-12 PROCEDURE — 12000000 ZZH R&B MED SURG/OB

## 2019-10-12 PROCEDURE — 59025 FETAL NON-STRESS TEST: CPT

## 2019-10-12 PROCEDURE — 25000128 H RX IP 250 OP 636: Performed by: NURSE ANESTHETIST, CERTIFIED REGISTERED

## 2019-10-12 RX ORDER — VITAMIN A ACETATE, .BETA.-CAROTENE, ASCORBIC ACID, CHOLECALCIFEROL, .ALPHA.-TOCOPHEROL ACETATE, DL-, THIAMINE MONONITRATE, RIBOFLAVIN, NIACINAMIDE, PYRIDOXINE HYDROCHLORIDE, FOLIC ACID, CYANOCOBALAMIN, CALCIUM CARBONATE, FERROUS FUMARATE, ZINC OXIDE, AND CUPRIC OXIDE 2000; 2000; 120; 400; 22; 1.84; 3; 20; 10; 1; 12; 200; 27; 25; 2 [IU]/1; [IU]/1; MG/1; [IU]/1; MG/1; MG/1; MG/1; MG/1; MG/1; MG/1; UG/1; MG/1; MG/1; MG/1; MG/1
1 TABLET ORAL DAILY
Status: DISCONTINUED | OUTPATIENT
Start: 2019-10-12 | End: 2019-10-14 | Stop reason: HOSPADM

## 2019-10-12 RX ORDER — EPHEDRINE SULFATE 50 MG/ML
INJECTION, SOLUTION INTRAMUSCULAR; INTRAVENOUS; SUBCUTANEOUS
Status: DISPENSED
Start: 2019-10-12 | End: 2019-10-12

## 2019-10-12 RX ORDER — LIDOCAINE HYDROCHLORIDE AND EPINEPHRINE 15; 5 MG/ML; UG/ML
INJECTION, SOLUTION EPIDURAL PRN
Status: DISCONTINUED | OUTPATIENT
Start: 2019-10-12 | End: 2019-10-12

## 2019-10-12 RX ORDER — NALOXONE HYDROCHLORIDE 0.4 MG/ML
.1-.4 INJECTION, SOLUTION INTRAMUSCULAR; INTRAVENOUS; SUBCUTANEOUS
Status: DISCONTINUED | OUTPATIENT
Start: 2019-10-12 | End: 2019-10-12

## 2019-10-12 RX ORDER — METHYLERGONOVINE MALEATE 0.2 MG/ML
200 INJECTION INTRAVENOUS
Status: DISCONTINUED | OUTPATIENT
Start: 2019-10-12 | End: 2019-10-12

## 2019-10-12 RX ORDER — METOCLOPRAMIDE HYDROCHLORIDE 5 MG/ML
10 INJECTION INTRAMUSCULAR; INTRAVENOUS EVERY 6 HOURS PRN
Status: DISCONTINUED | OUTPATIENT
Start: 2019-10-12 | End: 2019-10-12

## 2019-10-12 RX ORDER — BISACODYL 10 MG
10 SUPPOSITORY, RECTAL RECTAL DAILY PRN
Status: DISCONTINUED | OUTPATIENT
Start: 2019-10-14 | End: 2019-10-14 | Stop reason: HOSPADM

## 2019-10-12 RX ORDER — EPHEDRINE SULFATE 50 MG/ML
5 INJECTION, SOLUTION INTRAMUSCULAR; INTRAVENOUS; SUBCUTANEOUS
Status: DISCONTINUED | OUTPATIENT
Start: 2019-10-12 | End: 2019-10-12

## 2019-10-12 RX ORDER — DIPHENHYDRAMINE HCL 25 MG
50 CAPSULE ORAL EVERY 4 HOURS PRN
Status: DISCONTINUED | OUTPATIENT
Start: 2019-10-12 | End: 2019-10-12 | Stop reason: CLARIF

## 2019-10-12 RX ORDER — DIPHENHYDRAMINE HCL 25 MG
CAPSULE ORAL
Status: DISPENSED
Start: 2019-10-12 | End: 2019-10-13

## 2019-10-12 RX ORDER — OXYTOCIN/0.9 % SODIUM CHLORIDE 30/500 ML
1-24 PLASTIC BAG, INJECTION (ML) INTRAVENOUS CONTINUOUS
Status: DISCONTINUED | OUTPATIENT
Start: 2019-10-12 | End: 2019-10-12

## 2019-10-12 RX ORDER — LIDOCAINE 40 MG/G
CREAM TOPICAL
Status: DISCONTINUED | OUTPATIENT
Start: 2019-10-12 | End: 2019-10-12

## 2019-10-12 RX ORDER — OXYTOCIN/0.9 % SODIUM CHLORIDE 30/500 ML
100 PLASTIC BAG, INJECTION (ML) INTRAVENOUS CONTINUOUS
Status: DISCONTINUED | OUTPATIENT
Start: 2019-10-12 | End: 2019-10-14 | Stop reason: HOSPADM

## 2019-10-12 RX ORDER — LIDOCAINE HYDROCHLORIDE AND EPINEPHRINE 15; 5 MG/ML; UG/ML
3 INJECTION, SOLUTION EPIDURAL
Status: DISCONTINUED | OUTPATIENT
Start: 2019-10-12 | End: 2019-10-12

## 2019-10-12 RX ORDER — DOCUSATE SODIUM 100 MG/1
100 CAPSULE, LIQUID FILLED ORAL 2 TIMES DAILY PRN
Status: DISCONTINUED | OUTPATIENT
Start: 2019-10-12 | End: 2019-10-14 | Stop reason: HOSPADM

## 2019-10-12 RX ORDER — OXYTOCIN 10 [USP'U]/ML
10 INJECTION, SOLUTION INTRAMUSCULAR; INTRAVENOUS
Status: DISCONTINUED | OUTPATIENT
Start: 2019-10-12 | End: 2019-10-12

## 2019-10-12 RX ORDER — METHYLERGONOVINE MALEATE 0.2 MG/ML
200 INJECTION INTRAVENOUS
Status: DISCONTINUED | OUTPATIENT
Start: 2019-10-12 | End: 2019-10-14 | Stop reason: HOSPADM

## 2019-10-12 RX ORDER — OXYTOCIN 10 [USP'U]/ML
10 INJECTION, SOLUTION INTRAMUSCULAR; INTRAVENOUS
Status: DISCONTINUED | OUTPATIENT
Start: 2019-10-12 | End: 2019-10-14 | Stop reason: HOSPADM

## 2019-10-12 RX ORDER — FENTANYL CITRATE 50 UG/ML
50-100 INJECTION, SOLUTION INTRAMUSCULAR; INTRAVENOUS
Status: DISCONTINUED | OUTPATIENT
Start: 2019-10-12 | End: 2019-10-12

## 2019-10-12 RX ORDER — NALOXONE HYDROCHLORIDE 0.4 MG/ML
.1-.4 INJECTION, SOLUTION INTRAMUSCULAR; INTRAVENOUS; SUBCUTANEOUS
Status: DISCONTINUED | OUTPATIENT
Start: 2019-10-12 | End: 2019-10-14 | Stop reason: HOSPADM

## 2019-10-12 RX ORDER — FAMOTIDINE 10 MG
10 TABLET ORAL 2 TIMES DAILY PRN
Status: DISCONTINUED | OUTPATIENT
Start: 2019-10-12 | End: 2019-10-14 | Stop reason: HOSPADM

## 2019-10-12 RX ORDER — FLUTICASONE PROPIONATE 50 MCG
1-2 SPRAY, SUSPENSION (ML) NASAL DAILY
Status: DISCONTINUED | OUTPATIENT
Start: 2019-10-12 | End: 2019-10-14 | Stop reason: CLARIF

## 2019-10-12 RX ORDER — IBUPROFEN 800 MG/1
800 TABLET, FILM COATED ORAL
Status: DISCONTINUED | OUTPATIENT
Start: 2019-10-12 | End: 2019-10-12

## 2019-10-12 RX ORDER — MISOPROSTOL 200 UG/1
200 TABLET ORAL
Status: DISCONTINUED | OUTPATIENT
Start: 2019-10-12 | End: 2019-10-12 | Stop reason: CLARIF

## 2019-10-12 RX ORDER — HYDROXYZINE HYDROCHLORIDE 25 MG/1
50 TABLET, FILM COATED ORAL 3 TIMES DAILY PRN
Status: DISCONTINUED | OUTPATIENT
Start: 2019-10-12 | End: 2019-10-14 | Stop reason: HOSPADM

## 2019-10-12 RX ORDER — OXYTOCIN/0.9 % SODIUM CHLORIDE 30/500 ML
100-340 PLASTIC BAG, INJECTION (ML) INTRAVENOUS CONTINUOUS PRN
Status: COMPLETED | OUTPATIENT
Start: 2019-10-12 | End: 2019-10-12

## 2019-10-12 RX ORDER — CARBOPROST TROMETHAMINE 250 UG/ML
250 INJECTION, SOLUTION INTRAMUSCULAR
Status: DISCONTINUED | OUTPATIENT
Start: 2019-10-12 | End: 2019-10-12

## 2019-10-12 RX ORDER — FENTANYL/BUPIVACAINE/NS/PF 2-1250MCG
PLASTIC BAG, INJECTION (ML) INJECTION CONTINUOUS PRN
Status: DISCONTINUED | OUTPATIENT
Start: 2019-10-12 | End: 2019-10-12

## 2019-10-12 RX ORDER — CITRIC ACID/SODIUM CITRATE 334-500MG
30 SOLUTION, ORAL ORAL ONCE
Status: COMPLETED | OUTPATIENT
Start: 2019-10-12 | End: 2019-10-12

## 2019-10-12 RX ORDER — HYDROCORTISONE 2.5 %
CREAM (GRAM) TOPICAL 3 TIMES DAILY PRN
Status: DISCONTINUED | OUTPATIENT
Start: 2019-10-12 | End: 2019-10-14 | Stop reason: HOSPADM

## 2019-10-12 RX ORDER — IBUPROFEN 800 MG/1
800 TABLET, FILM COATED ORAL EVERY 6 HOURS PRN
Status: DISCONTINUED | OUTPATIENT
Start: 2019-10-12 | End: 2019-10-14 | Stop reason: HOSPADM

## 2019-10-12 RX ORDER — ONDANSETRON 2 MG/ML
4 INJECTION INTRAMUSCULAR; INTRAVENOUS EVERY 6 HOURS PRN
Status: DISCONTINUED | OUTPATIENT
Start: 2019-10-12 | End: 2019-10-12

## 2019-10-12 RX ORDER — METOCLOPRAMIDE 5 MG/1
5 TABLET ORAL 3 TIMES DAILY PRN
Status: DISCONTINUED | OUTPATIENT
Start: 2019-10-12 | End: 2019-10-14 | Stop reason: HOSPADM

## 2019-10-12 RX ORDER — OXYTOCIN/0.9 % SODIUM CHLORIDE 30/500 ML
340 PLASTIC BAG, INJECTION (ML) INTRAVENOUS CONTINUOUS PRN
Status: DISCONTINUED | OUTPATIENT
Start: 2019-10-12 | End: 2019-10-14 | Stop reason: HOSPADM

## 2019-10-12 RX ORDER — LANOLIN 100 %
OINTMENT (GRAM) TOPICAL
Status: DISCONTINUED | OUTPATIENT
Start: 2019-10-12 | End: 2019-10-14 | Stop reason: HOSPADM

## 2019-10-12 RX ADMIN — DIPHENHYDRAMINE HYDROCHLORIDE 50 MG: 25 CAPSULE ORAL at 15:24

## 2019-10-12 RX ADMIN — Medication 10 ML/HR: at 09:20

## 2019-10-12 RX ADMIN — DOCUSATE SODIUM 100 MG: 100 CAPSULE, LIQUID FILLED ORAL at 22:21

## 2019-10-12 RX ADMIN — Medication 3 MILLI-UNITS/MIN: at 11:54

## 2019-10-12 RX ADMIN — Medication 3 ML: at 09:11

## 2019-10-12 RX ADMIN — IBUPROFEN 800 MG: 800 TABLET ORAL at 17:35

## 2019-10-12 RX ADMIN — Medication 5 MG: at 10:19

## 2019-10-12 RX ADMIN — SODIUM CITRATE AND CITRIC ACID MONOHYDRATE 30 ML: 500; 334 SOLUTION ORAL at 08:28

## 2019-10-12 RX ADMIN — SODIUM CHLORIDE, POTASSIUM CHLORIDE, SODIUM LACTATE AND CALCIUM CHLORIDE 1000 ML: 600; 310; 30; 20 INJECTION, SOLUTION INTRAVENOUS at 10:24

## 2019-10-12 RX ADMIN — Medication 340 ML/HR: at 12:13

## 2019-10-12 RX ADMIN — Medication 2 MILLI-UNITS/MIN: at 11:07

## 2019-10-12 RX ADMIN — SODIUM CHLORIDE, POTASSIUM CHLORIDE, SODIUM LACTATE AND CALCIUM CHLORIDE 1000 ML: 600; 310; 30; 20 INJECTION, SOLUTION INTRAVENOUS at 07:34

## 2019-10-12 ASSESSMENT — MIFFLIN-ST. JEOR: SCORE: 1557.61

## 2019-10-12 NOTE — PLAN OF CARE
OB Triage Note  Carolyn Mallory  MRN: [id number]  Gestational Age: 39w2d      Carolyn Mallory presents for Contractions every 3-4 minutes (sign/symptom/concern).      States tammy since  this afternoon.  Rates pain at 7/10.  Bleeding: None.  Denies LOF.      Dr. Goldstein notified of arrival and condition.  Oriented patient to surroundings. Call light within reach.     FHT: 145  NST Start Time: 0331  NST Stop Time: 0500  NST: Non-reactive .  Uterine Assessment:Contractions: frequency q 3-4 minutes of moderate/strong quality.  Updated MD on patterns of minimal variability, possible decels (also maternal movement during decels with emesis at times during contractions making difficult to decipher).     Plan:  -Initial NST, then fetal/uterine monitoring per MD/patient plan.  -Sterile vaginal exam/sterile speculum exam.  -Nursing education on Labor provided.

## 2019-10-12 NOTE — PLAN OF CARE
"Labor Shift Note  Data: See Flowsheets activity for contraction and fetal documentation.   Vitals:    10/12/19 0327   BP: 130/75   Pulse: 110   Resp: 18   Temp: 98  F (36.7  C)   SpO2: 98%   Weight: 85.7 kg (189 lb)   Height: 1.562 m (5' 1.5\")   . Signs and symptoms of infection Present and Absent.    Complications in labor: Present and Absent. Support person Osvaldo significant other present.  Interventions: Continue uterine/fetal assessment per orders and nursing discretion. Vital Signs per order set. Comfort measures/pain control/labor management: Ambulation, hydration, position changes, birthing ball/sling and tub options to facilitate labor.  Plan: Anticipate . Provide labor/coping assistance as needed by patient and support person.  Observe for and notify care provider of indications of progressing labor, need for pain medications, or signs of fetal/maternal compromise.     Face to face report given at bedside with opportunity to observe patient.  Chloe ESPARZA  "

## 2019-10-12 NOTE — PLAN OF CARE
Pt is nauseated, vomiting, epidural stopped and anesthesia on way, anesthesia here epidural at 8ml/hr now and ephedrine 5mg given

## 2019-10-12 NOTE — H&P
[facility] Labor and Delivery History and Physical    Carolyn Mallory MRN# [id number]   Age: 24 year old YOB: 1995     Date of Admission:  10/12/2019    Primary care provider: Susan Clifford           Chief Complaint:   Carolyn Mallory is a 24 year old female who is 39w2d pregnant and being admitted for active labor management. Prenatal record/H and P reviewed with patient and unchanged.          Pregnancy history:     OBSTETRIC HISTORY:    OB History    Para Term  AB Living   3 1 1 0 1 0   SAB TAB Ectopic Multiple Live Births   1 0 0 0 0      # Outcome Date GA Lbr Marcial/2nd Weight Sex Delivery Anes PTL Lv   3 Current            2 SAB 2018           1 Term 03/10/16 39w4d 17:19 / 02:04 2.778 kg (6 lb 2 oz) M Vag-Spont EPI        Apgar1: 6  Apgar5: 7       EDC: Estimated Date of Delivery: Oct 17, 2019    Prenatal Labs:   Lab Results   Component Value Date    ABO O 10/12/2019    RH Pos 10/12/2019    AS Neg 10/12/2019    HEPBANG Nonreactive 2019    CHPCRT  2015     Negative   Negative for C. trachomatis rRNA by transcription mediated amplification.   A negative result by transcription mediated amplification does not preclude the   presence of C. trachomatis infection because results are dependent on proper   and adequate collection, absence of inhibitors, and sufficient rRNA to be   detected.      GCPCRT  2015     Negative   Negative for N. gonorrhoeae rRNA by transcription mediated amplification.   A negative result by transcription mediated amplification does not preclude the   presence of N. gonorrhoeae infection because results are dependent on proper   and adequate collection, absence of inhibitors, and sufficient rRNA to be   detected.      TREPAB Negative 2015    HGB 13.1 10/12/2019       GBS Status:   Lab Results   Component Value Date    GBS Negative 2019       Active Problem List  Patient Active Problem List   Diagnosis     Allergic  rhinitis     Food allergy     Patellofemoral syndrome of both knees     Sacro ilial pain     Calculus of kidney     Flank pain--LEFT & RIGHT--UTI----4/29/2019     ACP (advance care planning)     Chronic right-sided thoracic back pain     Obesity, unspecified obesity severity, unspecified obesity type     Tobacco use disorder     Esophageal reflux     Allergy to pollen     Astigmatism     BMI 34.0-34.9,adult     Hypermetropia     Pregnancy in multigravida     Right lower quadrant pain, evaluated ER with neg findings--3/25/2019     Urinary tract infection without hematuria, site unspecified, Macrobid---3/25/2019,  Keflex--4/29/2019     Anxiety in pregnancy, antepartum     Encounter for triage in pregnant patient     Normal labor and delivery       Medication Prior to Admission  Medications Prior to Admission   Medication Sig Dispense Refill Last Dose     famotidine (PEPCID) 10 MG tablet Take 1 tablet (10 mg) by mouth 2 times daily as needed (reflux) 120 tablet 0 10/11/2019 at 0800     loratadine (CLARITIN) 10 MG tablet Take 10 mg by mouth as needed    Past Month at Unknown time     metoclopramide (REGLAN) 5 MG tablet Take 1 tablet (5 mg) by mouth 3 times daily as needed (headache/nausea) 20 tablet 1 10/11/2019 at 0800     Prenatal Vit-Fe Fumarate-FA (PRENATAL VITAMIN AND MINERAL) 28-0.8 MG TABS Take 1 tablet by mouth daily 90 tablet 3 10/11/2019 at 0800     EPINEPHrine (EPIPEN) 0.3 MG/0.3ML injection Inject 0.3 mLs (0.3 mg) into the muscle once as needed for anaphylaxis (Patient not taking: Reported on 9/26/2019) 4 each 6 Not Taking     fluticasone (FLONASE) 50 MCG/ACT spray Spray 1-2 sprays into both nostrils daily 1 Bottle 11 More than a month at Unknown time   .        Maternal Past Medical History:     Past Medical History:   Diagnosis Date     NO ACTIVE PROBLEMS      Pregnancy     LMP 4/2015                       Family History:   Unchanged from prenatal record            Social History:   Unchanged from prenatal  "record         Review of Systems:   Per HPI.  Other systems reviewed and negative         Physical Exam:     Vitals:    10/12/19 0327 10/12/19 0740 10/12/19 0802 10/12/19 0834   BP: 130/75 114/77 131/84 115/74   Pulse: 110 76  84   Resp: 18 16     Temp: 98  F (36.7  C) 98.4  F (36.9  C)     TempSrc:  Oral     SpO2: 98%      Weight: 85.7 kg (189 lb)      Height: 1.562 m (5' 1.5\")        Chest: CTA  CV:  RRR without murmers  General:  Alert and oriented  Abdomen soft, non-tender, gravid  Ext: neg  Cervix:   Membranes: intact   Dilation: 4   Effacement: 80%   Station:0   Consistency: soft   Position: Posterior  Presentation:Cephalic  Fetal Heart Rate Tracing: reactive and reassuring, Tier 1 (normal)  Tocometer: external monitor and frequency q 2-5 minutes                       Assessment:   Carolyn Mallory is a 39w2d pregnant female admitted with active labor management.          Plan:   Admit, Routine intrapartum care and monitoring per protocol.  desires epidural, anesthesia notified.  Expectant management for .     Jose Goldstein MD    "

## 2019-10-12 NOTE — PLAN OF CARE
Assessments completed as charted. B/P: 120/62, T: 98.6, P: 87, R: 16. Rates pain: 4/10 . Voiding without difficulty. Fundus: Midline . Lochia: Light. Activity: unrestricted with out pain  and normal activity. Infant feeding: Breast feeding going fair, baby wants to suck, but has trouble keeping latched, mom Is putting baby to breast every 2 hours and trying..     LATCH Score:   Latch: 1 - Repeated Attempts  Audible Swallowin - Few  Type of Nipple: (Breast/Nipple) 1 - Flat  Comfort: 2 - Soft, Nontender  Hold: 1 - Min. Assist   Total LATCH Score:     Postpartum breastfeeding assessment completed and education provided, see Patient Education Activity.  Items included in the education are:     proper positioning and latch    effectiveness of feeding    manual expression    handling and storing breastmilk    maintenance of breastfeeding for the first 6 months    sign/symptoms of infant feeding issues requiring referral to qualified health care provider  Postpartum care education provided, see Patient Education activity. Patient denies needs. Will monitor.  Clhoe Valencia RN

## 2019-10-12 NOTE — PLAN OF CARE
Pt complete with strong urge to push at 1203, Dr. Goldstein in room at 1205, baby born at 1208, Placenta out at 1211, and pitocin wide open, 2nd degree tear with repair.

## 2019-10-12 NOTE — PLAN OF CARE
Vaginal Delivery Note   of viable Male, at 1208  Nursery RN Isela RN and Zenia RN present.  Infant with spontaneous cry, to mother's abdomen, dried and stimulated. Willow cares provided.  Mother and baby in stable condition. Baby skin-to-skin with mom. ID bands placed on infant, mom and dad..

## 2019-10-12 NOTE — PLAN OF CARE
Pt had fluid bolus and variability present with accels now.  Contractions strong q3-4 min.  Pt c/o heartburn, Bicitra given as ordered. Pt does not want to get up at this time.

## 2019-10-12 NOTE — ANESTHESIA PREPROCEDURE EVALUATION
Anesthesia Pre-Procedure Evaluation    Patient: Carolyn Mallory   MRN: [id number] : 1995          Preoperative Diagnosis: * No surgery found *        Past Medical History:   Diagnosis Date     NO ACTIVE PROBLEMS      Pregnancy     LMP 2015     Past Surgical History:   Procedure Laterality Date     ARTHROSCOPY KNEE Right 2015    Procedure: ARTHROSCOPY KNEE;  Surgeon: Hernando Kulkarni MD;  Location: [facility]     AS ESOPHAGOSCOPY, DIAGNOSTIC      upper     LAPAROSCOPIC CHOLECYSTECTOMY N/A 10/10/2015    Procedure: LAPAROSCOPIC CHOLECYSTECTOMY;  Surgeon: Drew Padgett MD;  Location: [facility] OR     none         Anesthesia Evaluation       history and physical reviewed .      No history of anesthetic complications          ROS/MED HX    ENT/Pulmonary:  - neg pulmonary ROS     Neurologic:  - neg neurologic ROS     Cardiovascular:  - neg cardiovascular ROS       METS/Exercise Tolerance:     Hematologic:         Musculoskeletal:         GI/Hepatic:     (+) GERD       Renal/Genitourinary:         Endo:         Psychiatric:         Infectious Disease:         Malignancy:         Other:                     neg OB ROS            Physical Exam  Normal systems: cardiovascular, pulmonary and dental    Airway   Mallampati: II  TM distance: > 3 FB  Neck ROM: full  Mouth opening: > 3 cm    Dental     Cardiovascular       Pulmonary             Lab Results   Component Value Date    WBC 15.2 (H) 10/12/2019    HGB 13.1 10/12/2019    HCT 36.6 10/12/2019     10/12/2019    CRP 13.5 (H) 2018    SED 8 2015     10/09/2019    POTASSIUM 3.5 10/09/2019    CHLORIDE 108 10/09/2019    CO2 21 10/09/2019    BUN 8 10/09/2019    CR 0.68 10/09/2019    GLC 90 10/09/2019    JUNAID 9.2 10/09/2019    MAG 6.9 (H) 2016    ALBUMIN 2.6 (L) 10/09/2019    PROTTOTAL 6.7 (L) 10/09/2019    ALT 41 10/09/2019    AST 21 10/09/2019    ALKPHOS 180 (H) 10/09/2019    BILITOTAL 0.3 10/09/2019    LIPASE 118 2018    AMYLASE 52  "01/10/2018    TSH 1.85 10/09/2017    HCG Positive (A) 03/25/2019       Preop Vitals  BP Readings from Last 3 Encounters:   10/12/19 115/74   10/10/19 109/71   10/09/19 111/79    Pulse Readings from Last 3 Encounters:   10/12/19 84   10/10/19 74   10/04/19 73      Resp Readings from Last 3 Encounters:   10/12/19 16   10/09/19 16   10/04/19 18    SpO2 Readings from Last 3 Encounters:   10/12/19 98%   10/10/19 98%   10/09/19 97%      Temp Readings from Last 1 Encounters:   10/12/19 98.4  F (36.9  C) (Oral)    Ht Readings from Last 1 Encounters:   10/12/19 1.562 m (5' 1.5\")      Wt Readings from Last 1 Encounters:   10/12/19 85.7 kg (189 lb)    Estimated body mass index is 35.13 kg/m  as calculated from the following:    Height as of this encounter: 1.562 m (5' 1.5\").    Weight as of this encounter: 85.7 kg (189 lb).       Anesthesia Plan      History & Physical Review      ASA Status:  .  OB Epidural Asa: 2            Postoperative Care      Consents                 AMELIA Cross CRNA  "

## 2019-10-12 NOTE — L&D DELIVERY NOTE
Delivery Summary    Carolyn Mallory MRN# [id number]   Age: 24 year old YOB: 1995     ASSESSMENT & PLAN:       Boy Thierno  2nd degree tear         Rupture date/time: 10/12/19 1007   Rupture type:  Artificial Rupture of Membranes  Fluid color:  Meconium  Fluid odor:  Normal     Delivery/Placenta Date and Time    Delivery Date:  10/12/19 Delivery Time:  12:08 PM   Placenta Date/Time:  10/12/2019 12:08 PM  Oxytocin given at the time of delivery:  after delivery of baby     Apgars    Living status:  Living   1 Minute 5 Minute 10 Minute 15 Minute 20 Minute   Skin color: 1  1       Heart rate: 2  2       Reflex irritability: 2  2       Muscle tone: 1  2       Respiratory effort: 2  2       Total: 8  9       Apgars assigned by:  IGOR LARSEN RN      Resuscitation    Methods:  None          Labor Events and Shoulder Dystocia    Fetal Tracing Prior to Delivery:  Category 2  Shoulder dystocia present?:  Neg             Delivery (Maternal) (Provider to Complete) (704433)    Episiotomy:  None  Perineal lacerations:  2nd Repaired?:  Yes   Est. blood loss (mL):  300  Number of repair packets:  1     Blood Loss  Mother: Carolyn Mallory #[id number]   Start of Mother's Information    IO Blood Loss  10/12/19 0008 - 10/12/19 1233    EBL (mL) Hospital Encounter 300 mL    Total  300 mL         End of Mother's Information  Mother: Carolyn Mallory #[id number]         Delivery - Provider to Complete ([id number])    Delivering clinician:  Jose Goldstein MD  Attempted Delivery Types (Choose all that apply):  Spontaneous Vaginal Delivery  Delivery Type (Choose the 1 that will go to the Birth History):  Vaginal, Spontaneous   Other personnel:   Provider Role   Jose Goldstein MD Arndt, Peter M, MD          Placenta    Delayed Cord Clamping:  Done  Date/Time:  10/12/2019 12:08 PM  Removal:  Spontaneous  Comments:  intact, 3vc  Disposition:  Hospital disposal     Presentation and Position    Presentation:  Vertex   Occiput Anterior            Jose Goldstein MD

## 2019-10-12 NOTE — ANESTHESIA PROCEDURE NOTES
Peripheral nerve/Neuraxial procedure note : epidural catheter  Pre-Procedure  Performed by   Referred by DR. DE LOS SANTOS  Location: OB    Procedure Times:10/12/2019 9:01 AM and 10/12/2019 9:25 AM  Pre-Anesthestic Checklist: patient identified, IV checked, risks and benefits discussed, informed consent, monitors and equipment checked, pre-op evaluation, at physician/surgeon's request and post-op pain management    Timeout  Correct Patient: Yes   Correct Procedure: Yes   Correct Site: Yes   Correct Laterality: N/A   Correct Position: Yes   Site Marked: N/A   .   Procedure Documentation    .    Procedure: epidural catheter, .   Patient Position:sitting Insertion Site:L2-3  (midline approach) Injection technique: LORT saline   Local skin infiltrated with mL of 1% lidocaine.  JAMMIE at 8 cm    Patient Prep/Sterile Barriers; povidone-iodine 7.5% surgical scrub.  .  Needle: Touhy needle   Needle Gauge: 18.    Needle Length (Inches) 3.5   # of attempts: 2 and  # of redirects:  2 .    Catheter: 20 G . .  Catheter threaded easily  5 cm epidural space.  13 cm at skin.   .    Assessment/Narrative  Paresthesias: Yes.  .  .  No aspiration negative for heme or CSF  . Test dose of 3 mL at 09:11. Test dose negative for signs of intravascular, subdural or intrathecal injection. Sensory Level Left: T7  Sensory Level Right: T7

## 2019-10-12 NOTE — PROGRESS NOTES
(S)  Comfortable with epidural  (O)  Vitals:    10/12/19 0941 10/12/19 0942 10/12/19 0944 10/12/19 0946   BP:  115/65 109/72 117/66   BP Location:       Pulse:       Resp:       Temp:       TempSrc:       SpO2: 99%      Weight:       Height:         Cervix:   Membranes: AROM  meconium stained fluid   Dilation: 4   Effacement: 100%   Station:-1    Fetal Heart Rate Tracing: reactive and reassuring, Tier 1 (normal)  Cat:1          Tocometer: external monitor and frequency q 3-4 minutes    (A/P)  Doing well.  MSAF.  Peds notified for delivery.  CPM.  Discussed Pitocin r/b/a for augmentation if needed.  Pt agrees with POC.

## 2019-10-12 NOTE — ANESTHESIA PREPROCEDURE EVALUATION
"Anesthesia Pre-Procedure Evaluation    Patient: Carolyn Mallory   MRN: [id number] : 1995          Preoperative Diagnosis: * No surgery found *        Past Medical History:   Diagnosis Date     NO ACTIVE PROBLEMS      Pregnancy     LMP 2015     Past Surgical History:   Procedure Laterality Date     ARTHROSCOPY KNEE Right 2015    Procedure: ARTHROSCOPY KNEE;  Surgeon: Hernando Kulkarni MD;  Location: [facility]     AS ESOPHAGOSCOPY, DIAGNOSTIC      upper     LAPAROSCOPIC CHOLECYSTECTOMY N/A 10/10/2015    Procedure: LAPAROSCOPIC CHOLECYSTECTOMY;  Surgeon: Drew Padgett MD;  Location: [facility]     none                          Lab Results   Component Value Date    WBC 15.2 (H) 10/12/2019    HGB 13.1 10/12/2019    HCT 36.6 10/12/2019     10/12/2019    CRP 13.5 (H) 2018    SED 8 2015     10/09/2019    POTASSIUM 3.5 10/09/2019    CHLORIDE 108 10/09/2019    CO2 21 10/09/2019    BUN 8 10/09/2019    CR 0.68 10/09/2019    GLC 90 10/09/2019    JUNAID 9.2 10/09/2019    MAG 6.9 (H) 2016    ALBUMIN 2.6 (L) 10/09/2019    PROTTOTAL 6.7 (L) 10/09/2019    ALT 41 10/09/2019    AST 21 10/09/2019    ALKPHOS 180 (H) 10/09/2019    BILITOTAL 0.3 10/09/2019    LIPASE 118 2018    AMYLASE 52 01/10/2018    TSH 1.85 10/09/2017    HCG Positive (A) 2019       Preop Vitals  BP Readings from Last 3 Encounters:   10/12/19 110/68   10/10/19 109/71   10/09/19 111/79    Pulse Readings from Last 3 Encounters:   10/12/19 99   10/10/19 74   10/04/19 73      Resp Readings from Last 3 Encounters:   10/12/19 16   10/09/19 16   10/04/19 18    SpO2 Readings from Last 3 Encounters:   10/12/19 99%   10/10/19 98%   10/09/19 97%      Temp Readings from Last 1 Encounters:   10/12/19 98.4  F (36.9  C) (Oral)    Ht Readings from Last 1 Encounters:   10/12/19 1.562 m (5' 1.5\")      Wt Readings from Last 1 Encounters:   10/12/19 85.7 kg (189 lb)    Estimated body mass index is 35.13 kg/m  as calculated from the " "following:    Height as of this encounter: 1.562 m (5' 1.5\").    Weight as of this encounter: 85.7 kg (189 lb).       Anesthesia Plan  Procedure only, no anesthetic delivered             Postoperative Care      Consents                 AMELIA Cross CRNA  "

## 2019-10-12 NOTE — TELEPHONE ENCOUNTER
Obstetric Phone Triage- [facility]    **REMEMBER: Review patient's prenatal record including complications with pregnancy and medications patient may be on (insulins, tocolytic, etc.)**      October 12, 2019 2:42 AM  Carolyn Mallory 1995   Home Phone [phone]   Mobile [phone]                       Last office visit/Cervix exam: 2 cm on 10/10/2019  G 3 P 1 EDC 10/17/2019 Gestational Age 39 2/7 weeks    Spoke with Patient  Patient lives  10 miles away    Reason for call per pt: Contractions    Is your provider aware of this concern? no    Did you have any complications with this or a previous pregnancy? (Pre-term labor, C/S, Previa, pre-eclampsia, diabetes) No    Are you having contractions? Yes   -If yes: Contractions frequency q 8 minutes and irregular    When did they start? 0100    Have you palpated your contractions? Yes    Describe contraction discomfort (ex: having to breath through    contractions, stop what you are doing, etc.): Stop what doing to breathe through contractions    Vaginal/rectal pressure: yes    What types of activity have you done in the past 24 hours? relaxing    Have you had intercourse/anything in the vagina in the last 48 hours?   No    Are you being treated for any vaginal infections? no    How much fluids have you been drinking? 3-4 glasses H20    Are you having any bloody show/Bleeding? Yes   -If yes: Amount: scant/small (started after membrane striping in the clinic per patient)     Color: brown/red    [place]/anything vaginally in the last 24-48 hours? No    When did it start? After membrane stripping Thursday    Pain since bleeding started? no    Are you having any new vaginal discharge or are you leaking fluids/has your water broken? no    Is your baby moving normally? No   -If no: Have you monitored your baby's movements? no    When did you notice this decrease in baby movement? Since 0100 when contractions started      Additional Concerns (abd pain, headache, swelling,  visual changes, etc): No    Patient Advised: You are always more than welcome to come in and be evaluated.  Patient is coming in to be evaluated.  Patient will be here in the next 30 minutes or so.     Call taken by:  Ava ESPARZA    Call back time:        Information:

## 2019-10-12 NOTE — ANESTHESIA CARE TRANSFER NOTE
Patient: Carolyn Mallory    * No procedures listed *    Diagnosis: * No pre-op diagnosis entered *  Diagnosis Additional Information: No value filed.    Anesthesia Type:   No value filed.     Note:  Airway :Room Air  Patient transferred to:Labor and Delivery  Handoff Report: Identifed the Patient, Identified the Reponsible Provider, Reviewed the pertinent medical history, Discussed the surgical course, Reviewed Intra-OP anesthesia mangement and issues during anesthesia, Set expectations for post-procedure period and Allowed opportunity for questions and acknowledgement of understanding      Vitals: (Last set prior to Anesthesia Care Transfer)              Electronically Signed By: AMELIA Cross CRNA  October 12, 2019  9:43 AM

## 2019-10-13 LAB — HGB BLD-MCNC: 10.7 G/DL (ref 11.7–15.7)

## 2019-10-13 PROCEDURE — 85018 HEMOGLOBIN: CPT | Performed by: OBSTETRICS & GYNECOLOGY

## 2019-10-13 PROCEDURE — 12000000 ZZH R&B MED SURG/OB

## 2019-10-13 PROCEDURE — 25000132 ZZH RX MED GY IP 250 OP 250 PS 637: Performed by: OBSTETRICS & GYNECOLOGY

## 2019-10-13 PROCEDURE — 36415 COLL VENOUS BLD VENIPUNCTURE: CPT | Performed by: OBSTETRICS & GYNECOLOGY

## 2019-10-13 RX ADMIN — IBUPROFEN 800 MG: 800 TABLET ORAL at 13:52

## 2019-10-13 RX ADMIN — Medication 1 TABLET: at 09:04

## 2019-10-13 RX ADMIN — IBUPROFEN 800 MG: 800 TABLET ORAL at 06:11

## 2019-10-13 RX ADMIN — IBUPROFEN 800 MG: 800 TABLET ORAL at 23:29

## 2019-10-13 RX ADMIN — DOCUSATE SODIUM 100 MG: 100 CAPSULE, LIQUID FILLED ORAL at 23:29

## 2019-10-13 NOTE — PLAN OF CARE
Face to face report given with opportunity to observe patient.    Report given to Susu LANDERS RN   10/13/2019  7:14 AM

## 2019-10-13 NOTE — ANESTHESIA POSTPROCEDURE EVALUATION
Patient: Carolyn Mallory    * No procedures listed *    Diagnosis:* No pre-op diagnosis entered *  Diagnosis Additional Information: No value filed.    Anesthesia Type:  No value filed.    Note:  Anesthesia Post Evaluation    Patient location during evaluation: Bedside  Patient participation: Able to participate in evaluation but full recovery from regional anesthesia has not yet ocurrred but is anticipated to occur within 48 hours  Level of consciousness: awake and alert  Pain management: adequate  Airway patency: patent  Cardiovascular status: acceptable and stable  Respiratory status: acceptable and room air  Hydration status: acceptable  PONV: none     Anesthetic complications: None          Last vitals:  Vitals:    10/12/19 1543 10/12/19 1930 10/12/19 2231   BP:  (!) 103/48 (!) 101/47   Pulse: 87     Resp:  16 16   Temp: 98.6  F (37  C) 98.1  F (36.7  C) 98  F (36.7  C)   SpO2: 98% 98% 98%         Electronically Signed By: AMELIA Cross CRNA  October 13, 2019  9:09 AM

## 2019-10-13 NOTE — PLAN OF CARE
Assessments completed as charted. B/P: 112/54, T: 98.2, P: 87, R: 16. Rates pain: 4/10 uterine cramping. Voiding without difficulty. Fundus: Midline @U-1. Lochia: Light. Activity: normal activity. Infant feeding: Breast feeding going with moderate difficulty.     LATCH Score:   Latch: 1 - Repeated Attempts  Audible Swallowin - None  Type of Nipple: (Breast/Nipple) 2 - Everted  Comfort: 2 - Soft, Nontender  Hold: 0 - Full Assist   Total LATCH Score: 5    Postpartum breastfeeding assessment completed and education provided, see Patient Education Activity.  Items included in the education are:   proper positioning and latch  effectiveness of feeding  manual expression  handling and storing breastmilk  maintenance of breastfeeding for the first 6 months  sign/symptoms of infant feeding issues requiring referral to qualified health care provider  Postpartum care education provided, see Patient Education activity. Patient denies needs. Will monitor.  Constanza Knutson RN

## 2019-10-13 NOTE — ANESTHESIA CARE TRANSFER NOTE
Patient: Carolyn Mallory    * No procedures listed *    Diagnosis: * No pre-op diagnosis entered *  Diagnosis Additional Information: No value filed.    Anesthesia Type:   No value filed.     Note:  Airway :Room Air  Patient transferred to:Labor and Delivery  Handoff Report: Identifed the Patient, Identified the Reponsible Provider, Reviewed the pertinent medical history, Discussed the surgical course, Reviewed Intra-OP anesthesia mangement and issues during anesthesia, Set expectations for post-procedure period and Allowed opportunity for questions and acknowledgement of understanding      Vitals: (Last set prior to Anesthesia Care Transfer)              Electronically Signed By: AMELIA Cross CRNA  October 13, 2019  9:08 AM

## 2019-10-13 NOTE — PLAN OF CARE
Assessments completed as charted. B/P: 101/47, T: 98, P: 87, R: 16. Rates pain: 0/10 intermittent back discomfort from epidural site-mild and tolerable. Voiding without difficulty. Fundus: Midline, firm. Lochia: Light. Activity: moving with mild difficulty due to perineal pain. Infant feeding: Breast feeding going fair- baby seems a little spitty.     LATCH Score:   Latch: 1 - Repeated Attempts  Audible Swallowin - Few  Type of Nipple: (Breast/Nipple) 1 - Flat  Comfort: 2 - Soft, Nontender  Hold: 2 - No Assist   Total LATCH Score: This score is per Mother 7    Postpartum breastfeeding assessment completed and education provided, see Patient Education Activity.  Items included in the education are:     proper positioning and latch    effectiveness of feeding    manual expression    handling and storing breastmilk    maintenance of breastfeeding for the first 6 months    sign/symptoms of infant feeding issues requiring referral to qualified health care provider  Postpartum care education provided, see Patient Education activity. Patient denies needs. Will monitor.  BRIGITTE LANDERS RN

## 2019-10-13 NOTE — PROGRESS NOTES
October 13, 2019        DAILY NOTE - POSTPARTUM DAY 1    SUBJECTIVE: No complaints    Pain controlled? Yes  Tolerating a regular diet? YES  Ambulating? YES  Voiding without difficulty? Yes  Lochia? minimal  Breastfeeding:  yes      OBJECTIVE:  Vitals:    10/12/19 1404 10/12/19 1543 10/12/19 1930 10/12/19 2231   BP: 120/62  (!) 103/48 (!) 101/47   BP Location:       Pulse:  87     Resp:   16 16   Temp:  98.6  F (37  C) 98.1  F (36.7  C) 98  F (36.7  C)   TempSrc:  Oral Oral Oral   SpO2:  98% 98% 98%   Weight:       Height:           Constitutional: healthy and alert, NAD    Abdomen:  Uterine fundus is firm, non-tender and at the level of the umbilicus      Extremeties:  neg edema, non-tender    LABS:  Hemoglobin   Date Value Ref Range Status   10/13/2019 10.7 (L) 11.7 - 15.7 g/dL Final   10/12/2019 13.1 11.7 - 15.7 g/dL Final     No results found for: RUBELLAABIGG   Lab Results   Component Value Date    ABO O 10/12/2019           Lab Results   Component Value Date    RH Pos 10/12/2019        ASSESSMENT:  Post-partum day #1  Normal spontaneous vaginal delivery  Doing well except for issues with establishing breastfeeding  No excessive bleeding       PLAN:   Lactation support  Routine pp care  Anticipate discharge tomorrow    Jose Goldstein MD

## 2019-10-13 NOTE — PLAN OF CARE
Face to face report given with opportunity to observe patient.    Report given to Annie Valencia RN   10/12/2019  7:14 PM

## 2019-10-13 NOTE — DISCHARGE INSTRUCTIONS
Pelvic rest for 6 weeks  No vigorous activity, exercise, for 2 weeks.  Then may resume normal activity as tolerated.  Schedule outpatient  lactation f/u 2-4 days prn.    Schedule Postpartum check 2 weeks Jennifer Senior NP and 6 weeks Dr. Goldstein  Call Dr. Goldstein [phone] as necessary if problems in interim

## 2019-10-13 NOTE — PLAN OF CARE
Patient up independently, eating, drinking, bathed with no problems.  Removed #18 peripheral IV from left wrist with no problems.  Site clean, dry, and intact.  Cath tip intact.  Dressing applied.  No bleeding.

## 2019-10-13 NOTE — PLAN OF CARE
Assessments completed as charted. B/P: 101/64, T: 97.4, P: 87, R: 14. Rates pain: 3/10 perineum pain. Voiding without difficulty. Fundus: Midline @U-1. Lochia: Light. Activity: normal activity. Infant feeding: Breast feeding going with moderate difficulty.     Postpartum breastfeeding assessment completed and education provided, see Patient Education Activity.  Items included in the education are:   proper positioning and latch  effectiveness of feeding  manual expression  handling and storing breastmilk  maintenance of breastfeeding for the first 6 months  sign/symptoms of infant feeding issues requiring referral to qualified health care provider  Postpartum care education provided, see Patient Education activity. Patient denies needs. Will monitor.  Constanza Knutson RN

## 2019-10-14 VITALS
OXYGEN SATURATION: 97 % | SYSTOLIC BLOOD PRESSURE: 119 MMHG | DIASTOLIC BLOOD PRESSURE: 70 MMHG | RESPIRATION RATE: 18 BRPM | HEART RATE: 87 BPM | HEIGHT: 62 IN | BODY MASS INDEX: 34.78 KG/M2 | WEIGHT: 189 LBS | TEMPERATURE: 98.1 F

## 2019-10-14 PROBLEM — D62 ANEMIA DUE TO BLOOD LOSS, ACUTE: Status: ACTIVE | Noted: 2019-10-14

## 2019-10-14 PROCEDURE — 25000132 ZZH RX MED GY IP 250 OP 250 PS 637: Performed by: OBSTETRICS & GYNECOLOGY

## 2019-10-14 RX ORDER — IBUPROFEN 800 MG/1
800 TABLET, FILM COATED ORAL
Qty: 40 TABLET | Refills: 1 | Status: SHIPPED | OUTPATIENT
Start: 2019-10-14 | End: 2019-11-26

## 2019-10-14 RX ADMIN — Medication 1 TABLET: at 08:03

## 2019-10-14 RX ADMIN — IBUPROFEN 800 MG: 800 TABLET ORAL at 08:03

## 2019-10-14 NOTE — DISCHARGE SUMMARY
[facility]    Discharge Summary  Obstetrics     Date of Admission:  10/12/2019  Date of Discharge:  10/14/2019  Discharging Provider: Go Lopez    Discharge Diagnoses   Labor at term  NVD  Anemia due to acute blood loss, due to NVD, not complicated.    History of Present Illness   Carolyn Mallory is a 24 year old female who presented with labor    Hospital Course   The patient's hospital course was unremarkable.  She recovered as anticipated and experienced no post-delivery complications.  On discharge, her pain was well controlled. Vaginal bleeding is similar to peak menstrual flow.  Voiding without difficulty.  Ambulating well and tolerating a normal diet.  No fevers.  Breastfeeding well.  Infant is stable.  She was discharged on post-partum day # 2.    Post-partum hemoglobin:   Hemoglobin   Date Value Ref Range Status   10/13/2019 10.7 (L) 11.7 - 15.7 g/dL Final       Go Lopez MD    Discharge Disposition   Discharged to home   Condition at discharge: Good    Primary Care Physician   Susan Clifford    Consultations This Hospital Stay   ANESTHESIOLOGY IP CONSULT  HOME CARE POST PARTUM/ IP CONSULT  LACTATION IP CONSULT    Discharge Orders      Activity    Review discharge instructions     Reason for your hospital stay    Maternity care     Discharge Instructions - Postpartum visit    PP check with Jennifer along with your baby 2 weeks.  PP check and preop for tubal with Goldstein 6 weeks.     Diet    Resume previous diet     Discharge Medications   Current Discharge Medication List      START taking these medications    Details   ibuprofen (ADVIL/MOTRIN) 800 MG tablet Take 1 tablet (800 mg) by mouth 3 times daily (with meals) Take with a full meal  Qty: 40 tablet, Refills: 1    Associated Diagnoses:  (spontaneous vaginal delivery)         CONTINUE these medications which have NOT CHANGED    Details   famotidine (PEPCID) 10 MG tablet Take 1 tablet (10 mg) by mouth 2 times daily as needed  (reflux)  Qty: 120 tablet, Refills: 0    Associated Diagnoses: Gastroesophageal reflux in pregnancy      loratadine (CLARITIN) 10 MG tablet Take 10 mg by mouth as needed       metoclopramide (REGLAN) 5 MG tablet Take 1 tablet (5 mg) by mouth 3 times daily as needed (headache/nausea)  Qty: 20 tablet, Refills: 1    Associated Diagnoses: Chronic migraine without aura without status migrainosus, not intractable      Prenatal Vit-Fe Fumarate-FA (PRENATAL VITAMIN AND MINERAL) 28-0.8 MG TABS Take 1 tablet by mouth daily  Qty: 90 tablet, Refills: 3    Associated Diagnoses: Possible pregnancy      EPINEPHrine (EPIPEN) 0.3 MG/0.3ML injection Inject 0.3 mLs (0.3 mg) into the muscle once as needed for anaphylaxis  Qty: 4 each, Refills: 6    Associated Diagnoses: Allergic rhinitis due to pollen      fluticasone (FLONASE) 50 MCG/ACT spray Spray 1-2 sprays into both nostrils daily  Qty: 1 Bottle, Refills: 11           Allergies   Allergies   Allergen Reactions     Amoxicillin      Oxycodone Visual Disturbance, Nausea and Vomiting and Rash     Vomiting, hallucinations.     Tylenol [Acetaminophen] Other (See Comments) and Rash     1/06/17: Patient reports seeing spots after taking Tylenol.  Patient reports seeing spots after taking Tylenol.       PHYSICAL EXAM    WDF in NAD  Lungs are clear  Cardiac RR  Abdomen  Soft, fundus firm, flow scant  Extremities normal  Refluxes are 1+, equal, symmetric, without clonus.  Urine output is good and clear  Calves are not tender

## 2019-10-14 NOTE — PLAN OF CARE
Patient discharged at 2:15 PM via ambulation accompanied by significant other and staff. Prescriptions sent with patient to fill . All belongings sent with patient.     Discharge instructions reviewed with patient. Listed belongings gathered and returned to patient. yes    Patient discharged to home.     Core Measures and Vaccines  Core Measures applicable during stay: no  Pneumonia and Influenza given prior to discharge, if indicated: No. Pt refuses flu vaccine.     Surgical Patient   Surgical Procedures during stay: no  Did patient receive discharge instruction on wound care and recognition of infection symptoms? N/A    MISC  Follow up appointment made:  Yes  Home and hospital aquired medications returned to patient: N/A  Patient reports pain was well managed at discharge: Yes

## 2019-10-14 NOTE — PLAN OF CARE
Assessments completed as charted. B/P: 119/70, T: 98.1, P: 87, R: 18. Rates pain: 4/10 prn ibuprofen given. Voiding without difficulty. Fundus: Midline U/1. Lochia: Light. Activity: normal activity. Infant feeding: Breast feeding going with mild difficulty. Pt using nipple shield, spoon feeding and pumping after feedings.     LATCH Score:   Latch: 1 - Repeated Attempts  Audible Swallowin - Few  Type of Nipple: (Breast/Nipple) 1 - Flat  Comfort: 2 - Soft, Nontender  Hold: 2 - No Assist   Total LATCH Score: 7    Postpartum breastfeeding assessment completed and education provided, see Patient Education Activity.  Items included in the education are:   proper positioning and latch  effectiveness of feeding  manual expression  handling and storing breastmilk  maintenance of breastfeeding for the first 6 months  sign/symptoms of infant feeding issues requiring referral to qualified health care provider  Postpartum care education provided, see Patient Education activity. Patient denies needs. Will monitor.  Lakisha Menard RN

## 2019-10-15 LAB — T PALLIDUM AB SER QL: NONREACTIVE

## 2019-10-16 ENCOUNTER — PREP FOR PROCEDURE (OUTPATIENT)
Dept: OBGYN | Facility: OTHER | Age: 24
End: 2019-10-16

## 2019-10-16 DIAGNOSIS — Z30.2 ENCOUNTER FOR STERILIZATION: Primary | ICD-10-CM

## 2019-11-26 ENCOUNTER — PRENATAL OFFICE VISIT (OUTPATIENT)
Dept: OBGYN | Facility: OTHER | Age: 24
End: 2019-11-26
Attending: OBSTETRICS & GYNECOLOGY
Payer: COMMERCIAL

## 2019-11-26 VITALS
DIASTOLIC BLOOD PRESSURE: 70 MMHG | OXYGEN SATURATION: 98 % | SYSTOLIC BLOOD PRESSURE: 109 MMHG | HEIGHT: 62 IN | HEART RATE: 96 BPM | WEIGHT: 189 LBS | BODY MASS INDEX: 34.78 KG/M2

## 2019-11-26 DIAGNOSIS — Z01.818 PREOP GENERAL PHYSICAL EXAM: ICD-10-CM

## 2019-11-26 PROBLEM — D62 ANEMIA DUE TO BLOOD LOSS, ACUTE: Status: RESOLVED | Noted: 2019-10-14 | Resolved: 2019-11-26

## 2019-11-26 PROBLEM — N39.0 URINARY TRACT INFECTION WITHOUT HEMATURIA, SITE UNSPECIFIED: Status: RESOLVED | Noted: 2019-03-25 | Resolved: 2019-11-26

## 2019-11-26 PROBLEM — Z34.80 PREGNANCY IN MULTIGRAVIDA: Status: RESOLVED | Noted: 2019-03-14 | Resolved: 2019-11-26

## 2019-11-26 PROBLEM — R10.31 RIGHT LOWER QUADRANT PAIN: Status: RESOLVED | Noted: 2019-03-25 | Resolved: 2019-11-26

## 2019-11-26 PROCEDURE — G0463 HOSPITAL OUTPT CLINIC VISIT: HCPCS | Performed by: OBSTETRICS & GYNECOLOGY

## 2019-11-26 PROCEDURE — 99207 ZZC POST PARTUM EXAM: CPT | Performed by: OBSTETRICS & GYNECOLOGY

## 2019-11-26 ASSESSMENT — PAIN SCALES - GENERAL: PAINLEVEL: NO PAIN (0)

## 2019-11-26 ASSESSMENT — MIFFLIN-ST. JEOR: SCORE: 1565.55

## 2019-11-26 NOTE — NURSING NOTE
"Chief Complaint   Patient presents with     Postpartum Care     6wk pp/  on 10/12/19 Boy- Thierno       Initial /70 (BP Location: Left arm, Cuff Size: Adult Large)   Pulse 96   Ht 1.575 m (5' 2\")   Wt 85.7 kg (189 lb)   LMP 2018   SpO2 98%   BMI 34.57 kg/m   Estimated body mass index is 34.57 kg/m  as calculated from the following:    Height as of this encounter: 1.575 m (5' 2\").    Weight as of this encounter: 85.7 kg (189 lb).  Medication Reconciliation: complete  Nancy Elder LPN  "

## 2019-11-26 NOTE — PROGRESS NOTES
SUBJECTIVE:  Carolyn Mallory is a 24 year old female P2 here for a postpartum visit.  She had a   delivering a healthy baby boy  Currently no complaints and doing well.    Today's Depression Rating was 8    delivery complications:  No  breast feeding:  Yes  bladder problems:  No  bowel problems/hemorrhoids:  No  episiotomy/laceration/incision healed? Yes  vaginal flow:  None  [place]:  No  contraception:  Abstinence, Planned Bilateral salpingectomy on 19  emotional adjustment:  doing well and happy            Past Medical History:   Diagnosis Date     NO ACTIVE PROBLEMS      Pregnancy     LMP 2015            Past Surgical History:   Procedure Laterality Date     ARTHROSCOPY KNEE Right 2015    Procedure: ARTHROSCOPY KNEE;  Surgeon: Hernando Kulkarni MD;  Location: [facility]     AS ESOPHAGOSCOPY, DIAGNOSTIC      upper     LAPAROSCOPIC CHOLECYSTECTOMY N/A 10/10/2015    Procedure: LAPAROSCOPIC CHOLECYSTECTOMY;  Surgeon: Drew Padgett MD;  Location: [facility] OR     none              Social History     Tobacco Use     Smoking status: Former Smoker     Packs/day: 0.25     Years: 1.00     Pack years: 0.25     Types: Cigarettes     Start date: 2014     Smokeless tobacco: Never Used   Substance Use Topics     Alcohol use: No     Alcohol/week: 0.0 standard drinks            Family History   Problem Relation Age of Onset     Thrombophilia Mother         blood clotting     Lupus Mother         erythematosus     Diabetes Mother      Hypertension Father      Asthma Sister      Diabetes Maternal Grandfather      Hypertension Maternal Grandfather      Lupus Maternal Aunt         erythematosus               Allergies   Allergen Reactions     Amoxicillin      Oxycodone Visual Disturbance, Nausea and Vomiting and Rash     Vomiting, hallucinations.     Tylenol [Acetaminophen] Other (See Comments) and Rash     17: Patient reports seeing spots after taking Tylenol.  Patient reports seeing spots after taking Tylenol.  "           Current Outpatient Medications   Medication Sig Dispense Refill     EPINEPHrine (EPIPEN) 0.3 MG/0.3ML injection Inject 0.3 mLs (0.3 mg) into the muscle once as needed for anaphylaxis (Patient not taking: Reported on 9/26/2019) 4 each 6     fluticasone (FLONASE) 50 MCG/ACT spray Spray 1-2 sprays into both nostrils daily (Patient not taking: Reported on 11/26/2019) 1 Bottle 11          Review Of Systems  Constitutional:  Denies fever, CP, SOB  GI/ and other systems reviewed and negative except as noted per hpi      OBJECTIVE:  Blood pressure 109/70, pulse 96, height 1.575 m (5' 2\"), weight 85.7 kg (189 lb), last menstrual period 11/28/2018, SpO2 98 %, unknown if currently breastfeeding.   General - pleasant female in no acute distress.  Breast - no nodularity, asymmetry or nipple discharge bilaterally.  Chest CTA  CV:  RRR without murmers  Abdomen - soft, nontender, nondistended, no hepatosplenomegaly.  Pelvic - EG: normal adult female, BUS: within normal limits, Vagina: well rugated, no discharge, Cervix: no lesions or CMT, Uterus: firm, normal sized and nontender, Adnexae: no masses or tenderness.  Rectovaginal - deferred.  Ext:  neg    ASSESSMENT:  normal postpartum/preoperative  exam.  Released from pregnancy related restrictions    PLAN:  May resume normal activities without restrictions  Pap smear Not Done today    The patient will use tubal ligation for birth control. Full counseling was provided, and all questions answered. Compliance is strongly emphasized.  Return to clinic in one year for an annual, when due for a pap smear or PRN.    We reviewed the risks and benefits of tubal ligation including risks of bleeding, infection, damage to other organs. Risks of tubal failure, and possibility of ectopic pregnancy were also explained. . We discussed alternative forms of birth control.    Specifically, we discussed a laparoscopic salpingectomy.   We reviewed risks of laparoscopy, specifically risk of " bleeding, infection, damage to nerves, blood vessels, bowel and bladder. Discussed recovery period and expected discomfort. She desires to proceed.        Jose Goldstein MD

## 2019-12-02 ENCOUNTER — ANESTHESIA EVENT (OUTPATIENT)
Dept: SURGERY | Facility: HOSPITAL | Age: 24
End: 2019-12-02
Payer: COMMERCIAL

## 2019-12-02 ENCOUNTER — TELEPHONE (OUTPATIENT)
Dept: OBGYN | Facility: OTHER | Age: 24
End: 2019-12-02

## 2019-12-02 ASSESSMENT — LIFESTYLE VARIABLES: TOBACCO_USE: 1

## 2019-12-02 NOTE — ANESTHESIA PREPROCEDURE EVALUATION
Anesthesia Pre-Procedure Evaluation    Patient: Carolyn Mallory   MRN: [id number] : 1995          Preoperative Diagnosis: Encounter for sterilization [Z30.2]    Procedure(s):  LAPAROSCOPIC BILATERAL SALPINGECTOMY    Past Medical History:   Diagnosis Date     NO ACTIVE PROBLEMS      Pregnancy     LMP 2015     Past Surgical History:   Procedure Laterality Date     ARTHROSCOPY KNEE Right 2015    Procedure: ARTHROSCOPY KNEE;  Surgeon: Hernando Kulkarni MD;  Location: [facility]     AS ESOPHAGOSCOPY, DIAGNOSTIC      upper     LAPAROSCOPIC CHOLECYSTECTOMY N/A 10/10/2015    Procedure: LAPAROSCOPIC CHOLECYSTECTOMY;  Surgeon: Drew Padgett MD;  Location: [facility] OR     none         Anesthesia Evaluation     . Pt has had prior anesthetic.     History of anesthetic complications    oxygen drops when waking up      ROS/MED HX    ENT/Pulmonary:     (+)allergic rhinitis, tobacco use, Current use 3 cig/day for 6 years packs/day  , . .    Neurologic:  - neg neurologic ROS     Cardiovascular:  - neg cardiovascular ROS       METS/Exercise Tolerance:  >4 METS   Hematologic:  - neg hematologic  ROS       Musculoskeletal: Comment: sacro-iliac pain - neg musculoskeletal ROS       GI/Hepatic:     (+) GERD Asymptomatic on medication,       Renal/Genitourinary:     (+) Nephrolithiasis ,       Endo:     (+) Obesity, .      Psychiatric:     (+) psychiatric history anxiety      Infectious Disease:  - neg infectious disease ROS       Malignancy:      - no malignancy   Other:    (+) No chance of pregnancy no H/O Chronic Pain,no other significant disability                         Physical Exam  Normal systems: cardiovascular, pulmonary and dental    Airway   Mallampati: II  TM distance: >3 FB  Neck ROM: full    Dental     Cardiovascular   Rhythm and rate: regular and normal      Pulmonary    breath sounds clear to auscultation            Lab Results   Component Value Date    WBC 15.2 (H) 10/12/2019    HGB 10.7 (L) 10/13/2019    HCT 36.6  "10/12/2019     10/12/2019    CRP 13.5 (H) 03/13/2018    SED 8 08/22/2015     10/09/2019    POTASSIUM 3.5 10/09/2019    CHLORIDE 108 10/09/2019    CO2 21 10/09/2019    BUN 8 10/09/2019    CR 0.68 10/09/2019    GLC 90 10/09/2019    JUANID 9.2 10/09/2019    MAG 6.9 (H) 03/11/2016    ALBUMIN 2.6 (L) 10/09/2019    PROTTOTAL 6.7 (L) 10/09/2019    ALT 41 10/09/2019    AST 21 10/09/2019    ALKPHOS 180 (H) 10/09/2019    BILITOTAL 0.3 10/09/2019    LIPASE 118 03/13/2018    AMYLASE 52 01/10/2018    TSH 1.85 10/09/2017    HCG Positive (A) 03/25/2019       Preop Vitals  BP Readings from Last 3 Encounters:   11/26/19 109/70   10/14/19 119/70   10/10/19 109/71    Pulse Readings from Last 3 Encounters:   11/26/19 96   10/12/19 87   10/10/19 74      Resp Readings from Last 3 Encounters:   10/14/19 18   10/09/19 16   10/04/19 18    SpO2 Readings from Last 3 Encounters:   11/26/19 98%   10/14/19 97%   10/10/19 98%      Temp Readings from Last 1 Encounters:   10/14/19 98.1  F (36.7  C) (Oral)    Ht Readings from Last 1 Encounters:   11/26/19 1.575 m (5' 2\")      Wt Readings from Last 1 Encounters:   11/26/19 85.7 kg (189 lb)    Estimated body mass index is 34.57 kg/m  as calculated from the following:    Height as of 11/26/19: 1.575 m (5' 2\").    Weight as of 11/26/19: 85.7 kg (189 lb).       Anesthesia Plan      History & Physical Review  History and physical reviewed and following examination; no interval change.    ASA Status:  2 .    NPO Status:  > 8 hours    Plan for General and ETT with Intravenous and Propofol induction. Maintenance will be Balanced.    PONV prophylaxis:  Ondansetron (or other 5HT-3) and Dexamethasone or Solumedrol  HCG neg      Postoperative Care  Postoperative pain management:  IV analgesics.      Consents  Anesthetic plan, risks, benefits and alternatives discussed with:  Patient..                 AMELIA Cross CRNA  "

## 2019-12-06 ENCOUNTER — HOSPITAL ENCOUNTER (OUTPATIENT)
Facility: HOSPITAL | Age: 24
Discharge: HOME | End: 2019-12-06
Attending: OBSTETRICS & GYNECOLOGY | Admitting: OBSTETRICS & GYNECOLOGY
Payer: COMMERCIAL

## 2019-12-06 ENCOUNTER — APPOINTMENT (OUTPATIENT)
Dept: LAB | Facility: HOSPITAL | Age: 24
End: 2019-12-06
Attending: OBSTETRICS & GYNECOLOGY
Payer: COMMERCIAL

## 2019-12-06 ENCOUNTER — ANESTHESIA (OUTPATIENT)
Dept: SURGERY | Facility: HOSPITAL | Age: 24
End: 2019-12-06
Payer: COMMERCIAL

## 2019-12-06 VITALS
DIASTOLIC BLOOD PRESSURE: 67 MMHG | HEART RATE: 74 BPM | BODY MASS INDEX: 34.6 KG/M2 | OXYGEN SATURATION: 95 % | HEIGHT: 62 IN | RESPIRATION RATE: 16 BRPM | SYSTOLIC BLOOD PRESSURE: 105 MMHG | TEMPERATURE: 97.2 F | WEIGHT: 188 LBS

## 2019-12-06 DIAGNOSIS — Z30.2 ENCOUNTER FOR STERILIZATION: ICD-10-CM

## 2019-12-06 DIAGNOSIS — Z98.890 POST-OPERATIVE STATE: Primary | ICD-10-CM

## 2019-12-06 LAB — HCG UR QL: NEGATIVE

## 2019-12-06 PROCEDURE — 58661 LAPAROSCOPY REMOVE ADNEXA: CPT | Performed by: NURSE ANESTHETIST, CERTIFIED REGISTERED

## 2019-12-06 PROCEDURE — 25000128 H RX IP 250 OP 636: Performed by: NURSE ANESTHETIST, CERTIFIED REGISTERED

## 2019-12-06 PROCEDURE — 71000014 ZZH RECOVERY PHASE 1 LEVEL 2 FIRST HR: Performed by: OBSTETRICS & GYNECOLOGY

## 2019-12-06 PROCEDURE — 25800030 ZZH RX IP 258 OP 636: Performed by: NURSE ANESTHETIST, CERTIFIED REGISTERED

## 2019-12-06 PROCEDURE — 58661 LAPAROSCOPY REMOVE ADNEXA: CPT | Performed by: OBSTETRICS & GYNECOLOGY

## 2019-12-06 PROCEDURE — 27210794 ZZH OR GENERAL SUPPLY STERILE: Performed by: OBSTETRICS & GYNECOLOGY

## 2019-12-06 PROCEDURE — 25000132 ZZH RX MED GY IP 250 OP 250 PS 637: Performed by: NURSE ANESTHETIST, CERTIFIED REGISTERED

## 2019-12-06 PROCEDURE — 25000128 H RX IP 250 OP 636: Performed by: OBSTETRICS & GYNECOLOGY

## 2019-12-06 PROCEDURE — 88302 TISSUE EXAM BY PATHOLOGIST: CPT | Mod: TC | Performed by: OBSTETRICS & GYNECOLOGY

## 2019-12-06 PROCEDURE — 36000056 ZZH SURGERY LEVEL 3 1ST 30 MIN: Performed by: OBSTETRICS & GYNECOLOGY

## 2019-12-06 PROCEDURE — 36000058 ZZH SURGERY LEVEL 3 EA 15 ADDTL MIN: Performed by: OBSTETRICS & GYNECOLOGY

## 2019-12-06 PROCEDURE — 27110028 ZZH OR GENERAL SUPPLY NON-STERILE: Performed by: OBSTETRICS & GYNECOLOGY

## 2019-12-06 PROCEDURE — 40000305 ZZH STATISTIC PRE PROC ASSESS I: Performed by: OBSTETRICS & GYNECOLOGY

## 2019-12-06 PROCEDURE — 37000008 ZZH ANESTHESIA TECHNICAL FEE, 1ST 30 MIN: Performed by: OBSTETRICS & GYNECOLOGY

## 2019-12-06 PROCEDURE — 81025 URINE PREGNANCY TEST: CPT | Performed by: OBSTETRICS & GYNECOLOGY

## 2019-12-06 PROCEDURE — 25000125 ZZHC RX 250: Performed by: OBSTETRICS & GYNECOLOGY

## 2019-12-06 PROCEDURE — 25000125 ZZHC RX 250: Performed by: NURSE ANESTHETIST, CERTIFIED REGISTERED

## 2019-12-06 PROCEDURE — 37000009 ZZH ANESTHESIA TECHNICAL FEE, EACH ADDTL 15 MIN: Performed by: OBSTETRICS & GYNECOLOGY

## 2019-12-06 PROCEDURE — 71000027 ZZH RECOVERY PHASE 2 EACH 15 MINS: Performed by: OBSTETRICS & GYNECOLOGY

## 2019-12-06 RX ORDER — ONDANSETRON 4 MG/1
4 TABLET, ORALLY DISINTEGRATING ORAL EVERY 30 MIN PRN
Status: DISCONTINUED | OUTPATIENT
Start: 2019-12-06 | End: 2019-12-06 | Stop reason: HOSPADM

## 2019-12-06 RX ORDER — CLINDAMYCIN PHOSPHATE 900 MG/50ML
900 INJECTION, SOLUTION INTRAVENOUS
Status: COMPLETED | OUTPATIENT
Start: 2019-12-06 | End: 2019-12-06

## 2019-12-06 RX ORDER — ONDANSETRON 2 MG/ML
4 INJECTION INTRAMUSCULAR; INTRAVENOUS EVERY 30 MIN PRN
Status: DISCONTINUED | OUTPATIENT
Start: 2019-12-06 | End: 2019-12-06 | Stop reason: HOSPADM

## 2019-12-06 RX ORDER — HYDROMORPHONE HYDROCHLORIDE 2 MG/1
2 TABLET ORAL EVERY 6 HOURS PRN
Qty: 15 TABLET | Refills: 0 | Status: SHIPPED | OUTPATIENT
Start: 2019-12-06 | End: 2020-08-09

## 2019-12-06 RX ORDER — NALOXONE HYDROCHLORIDE 0.4 MG/ML
.1-.4 INJECTION, SOLUTION INTRAMUSCULAR; INTRAVENOUS; SUBCUTANEOUS
Status: DISCONTINUED | OUTPATIENT
Start: 2019-12-06 | End: 2019-12-06 | Stop reason: HOSPADM

## 2019-12-06 RX ORDER — SODIUM CHLORIDE, SODIUM LACTATE, POTASSIUM CHLORIDE, CALCIUM CHLORIDE 600; 310; 30; 20 MG/100ML; MG/100ML; MG/100ML; MG/100ML
INJECTION, SOLUTION INTRAVENOUS CONTINUOUS
Status: DISCONTINUED | OUTPATIENT
Start: 2019-12-06 | End: 2019-12-06 | Stop reason: HOSPADM

## 2019-12-06 RX ORDER — MEPERIDINE HYDROCHLORIDE 50 MG/ML
12.5 INJECTION INTRAMUSCULAR; INTRAVENOUS; SUBCUTANEOUS
Status: DISCONTINUED | OUTPATIENT
Start: 2019-12-06 | End: 2019-12-06 | Stop reason: HOSPADM

## 2019-12-06 RX ORDER — KETAMINE HYDROCHLORIDE 10 MG/ML
INJECTION INTRAMUSCULAR; INTRAVENOUS PRN
Status: DISCONTINUED | OUTPATIENT
Start: 2019-12-06 | End: 2019-12-06

## 2019-12-06 RX ORDER — ONDANSETRON 2 MG/ML
INJECTION INTRAMUSCULAR; INTRAVENOUS PRN
Status: DISCONTINUED | OUTPATIENT
Start: 2019-12-06 | End: 2019-12-06

## 2019-12-06 RX ORDER — HYDROXYZINE HYDROCHLORIDE 25 MG/1
50 TABLET, FILM COATED ORAL
Status: DISCONTINUED | OUTPATIENT
Start: 2019-12-06 | End: 2019-12-06 | Stop reason: HOSPADM

## 2019-12-06 RX ORDER — HYDROMORPHONE HYDROCHLORIDE 2 MG/1
2 TABLET ORAL EVERY 4 HOURS PRN
Status: DISCONTINUED | OUTPATIENT
Start: 2019-12-06 | End: 2019-12-06 | Stop reason: HOSPADM

## 2019-12-06 RX ORDER — FENTANYL CITRATE 50 UG/ML
25-50 INJECTION, SOLUTION INTRAMUSCULAR; INTRAVENOUS
Status: DISCONTINUED | OUTPATIENT
Start: 2019-12-06 | End: 2019-12-06 | Stop reason: HOSPADM

## 2019-12-06 RX ORDER — ONDANSETRON 4 MG/1
4 TABLET, ORALLY DISINTEGRATING ORAL
Status: DISCONTINUED | OUTPATIENT
Start: 2019-12-06 | End: 2019-12-06 | Stop reason: HOSPADM

## 2019-12-06 RX ORDER — IBUPROFEN 800 MG/1
800 TABLET, FILM COATED ORAL EVERY 8 HOURS PRN
Qty: 30 TABLET | Refills: 1 | Status: SHIPPED | OUTPATIENT
Start: 2019-12-06

## 2019-12-06 RX ORDER — FENTANYL CITRATE 50 UG/ML
50 INJECTION, SOLUTION INTRAMUSCULAR; INTRAVENOUS ONCE
Status: COMPLETED | OUTPATIENT
Start: 2019-12-06 | End: 2019-12-06

## 2019-12-06 RX ORDER — LIDOCAINE 40 MG/G
CREAM TOPICAL
Status: DISCONTINUED | OUTPATIENT
Start: 2019-12-06 | End: 2019-12-06 | Stop reason: HOSPADM

## 2019-12-06 RX ORDER — PROPOFOL 10 MG/ML
INJECTION, EMULSION INTRAVENOUS PRN
Status: DISCONTINUED | OUTPATIENT
Start: 2019-12-06 | End: 2019-12-06

## 2019-12-06 RX ORDER — LIDOCAINE HYDROCHLORIDE 20 MG/ML
INJECTION, SOLUTION INFILTRATION; PERINEURAL PRN
Status: DISCONTINUED | OUTPATIENT
Start: 2019-12-06 | End: 2019-12-06

## 2019-12-06 RX ORDER — KETOROLAC TROMETHAMINE 30 MG/ML
30 INJECTION, SOLUTION INTRAMUSCULAR; INTRAVENOUS ONCE
Status: COMPLETED | OUTPATIENT
Start: 2019-12-06 | End: 2019-12-06

## 2019-12-06 RX ADMIN — KETOROLAC TROMETHAMINE 30 MG: 30 INJECTION, SOLUTION INTRAMUSCULAR at 08:54

## 2019-12-06 RX ADMIN — ROCURONIUM BROMIDE 5 MG: 10 INJECTION INTRAVENOUS at 09:15

## 2019-12-06 RX ADMIN — MIDAZOLAM 2 MG: 1 INJECTION INTRAMUSCULAR; INTRAVENOUS at 09:11

## 2019-12-06 RX ADMIN — FENTANYL CITRATE 50 MCG: 50 INJECTION, SOLUTION INTRAMUSCULAR; INTRAVENOUS at 10:20

## 2019-12-06 RX ADMIN — PROPOFOL 150 MG: 10 INJECTION, EMULSION INTRAVENOUS at 09:15

## 2019-12-06 RX ADMIN — ONDANSETRON 4 MG: 2 INJECTION INTRAMUSCULAR; INTRAVENOUS at 09:11

## 2019-12-06 RX ADMIN — SODIUM CHLORIDE, POTASSIUM CHLORIDE, SODIUM LACTATE AND CALCIUM CHLORIDE: 600; 310; 30; 20 INJECTION, SOLUTION INTRAVENOUS at 08:53

## 2019-12-06 RX ADMIN — FENTANYL CITRATE 50 MCG: 50 INJECTION, SOLUTION INTRAMUSCULAR; INTRAVENOUS at 10:29

## 2019-12-06 RX ADMIN — FENTANYL CITRATE 50 MCG: 50 INJECTION, SOLUTION INTRAMUSCULAR; INTRAVENOUS at 11:00

## 2019-12-06 RX ADMIN — KETAMINE HYDROCHLORIDE 30 MG: 10 INJECTION, SOLUTION INTRAMUSCULAR; INTRAVENOUS at 09:15

## 2019-12-06 RX ADMIN — Medication 100 MG: at 09:15

## 2019-12-06 RX ADMIN — LIDOCAINE HYDROCHLORIDE 40 MG: 20 INJECTION, SOLUTION INFILTRATION; PERINEURAL at 09:15

## 2019-12-06 RX ADMIN — HYDROMORPHONE HYDROCHLORIDE 2 MG: 2 TABLET ORAL at 11:17

## 2019-12-06 RX ADMIN — CLINDAMYCIN IN 5 PERCENT DEXTROSE 900 MG: 18 INJECTION, SOLUTION INTRAVENOUS at 09:20

## 2019-12-06 ASSESSMENT — MIFFLIN-ST. JEOR: SCORE: 1561.01

## 2019-12-06 NOTE — OR NURSING
Patient and responsible adult given discharge instructions with no questions regarding instructions. Pasha score 19. Pain level 3/10.  Discharged from unit via wheelchair. Patient discharged to home with sig other Osvaldo.

## 2019-12-06 NOTE — ANESTHESIA POSTPROCEDURE EVALUATION
Patient: Carolyn Mallory    Procedure(s):  LAPAROSCOPIC BILATERAL SALPINGECTOMY    Diagnosis:Encounter for sterilization [Z30.2]  Diagnosis Additional Information: No value filed.    Anesthesia Type:  General, ETT    Note:  Anesthesia Post Evaluation    Patient location during evaluation: Bedside  Patient participation: Able to fully participate in evaluation  Level of consciousness: awake  Pain management: adequate  Airway patency: patent  Cardiovascular status: acceptable  Respiratory status: acceptable  Hydration status: acceptable  PONV: none     Anesthetic complications: None          Last vitals:  Vitals:    12/06/19 1040 12/06/19 1045 12/06/19 1105   BP: 101/71 104/65 102/73   Pulse: 82  74   Resp: (!) 10 16 16   Temp: 97.2  F (36.2  C)     SpO2: 100% 95% 94%         Electronically Signed By: AMELIA Ramirez CRNA  December 6, 2019  11:38 AM

## 2019-12-06 NOTE — ANESTHESIA CARE TRANSFER NOTE
Patient: Carolyn Mallory    Procedure(s):  LAPAROSCOPIC BILATERAL SALPINGECTOMY    Diagnosis: Encounter for sterilization [Z30.2]  Diagnosis Additional Information: No value filed.    Anesthesia Type:   General, ETT     Note:  Airway :Nasal Cannula  Patient transferred to:PACU  Handoff Report: Identifed the Patient, Identified the Reponsible Provider, Reviewed the pertinent medical history, Discussed the surgical course, Reviewed Intra-OP anesthesia mangement and issues during anesthesia, Set expectations for post-procedure period and Allowed opportunity for questions and acknowledgement of understanding      Vitals: (Last set prior to Anesthesia Care Transfer)    CRNA VITALS  12/6/2019 0927 - 12/6/2019 1002      12/6/2019             Pulse:  97    SpO2:  100 %    Resp Rate (set):  8                Electronically Signed By: AMELIA Ramirez CRNA  December 6, 2019  10:02 AM

## 2019-12-06 NOTE — DISCHARGE INSTRUCTIONS
Call MD prior to DC.  No driving today or while on pain meds  Pelvic rest for 2 weeks  Call Dr. Goldstein [phone] as necessary if problems in interim, no post op appt needed.  No heavy lifting, vigorous activity, swim, bath, exercise for 1 week        Post-Anesthesia Patient Instructions    IMMEDIATELY FOLLOWING SURGERY:  Do not drive or operate machinery for the first twenty four hours after surgery.  Do not make any important decisions for twenty four hours after surgery or while taking narcotic pain medications or sedatives.  If you develop intractable nausea and vomiting or a severe headache please notify your doctor immediately.    FOLLOW-UP:  Please make an appointment with your surgeon as instructed. You do not need to follow up with anesthesia unless specifically instructed to do so.    WOUND CARE INSTRUCTIONS (if applicable):  Keep a dry clean dressing on the anesthesia/puncture wound site if there is drainage.  Once the wound has quit draining you may leave it open to air.  Generally you should leave the bandage intact for twenty four hours unless there is drainage.  If the epidural site drains for more than 36-48 hours please call the anesthesia department.    QUESTIONS?:  Please feel free to call your physician or the hospital  if you have any questions, and they will be happy to assist you.

## 2019-12-06 NOTE — OP NOTE
[facility] Operative Note:    PREOPERATIVE DIAGNOSIS: Desires sterilization.   POSTOPERATIVE DIAGNOSIS: Desires sterilization.   PROCEDURE: Laparoscopic bilateral salpingectomy   ANESTHESIA: General.   COMPLICATIONS: None.   ESTIMATED BLOOD LOSS: Minimal.   SPECIMENS: Right and left fallopian tubes.    OPERATIVE FINDINGS: Normal pelvic anatomy.   DESCRIPTION OF PROCEDURE: The patients was brought to the operating room and uneventfully placed under general anesthesia. She was prepped and draped in the dorsal lithotomy position and her bladder drained. The cervix was visualized with a weighted speculum and grasped anteriorly with a fine tooth tenaculum and a uterine manipulating device placed. We then changed gloves and proceeded to the abdominal portion of the case. A 5 mm infraumbilical skin incision was performed and a Veress needle introduced without difficulty. A water drop test was performed. The abdomen was insufflated with several liters of carbon dioxide. A 5 mm trocar and a laparoscope were introduced. Visualization was excellent. Findings were as described above. Next, an 8 mm RLQ and 5mm LLQ ports were placed under direct visualization. After initial exploration, the uterus was elevated and the left mesosalpinx was transected with the Ligasure bipolar cutting cautery device.  The tube was then transected at the uterine cornua with the Ligasure.  The same procedure performed on the right fallopian tube.  The tubes were removed through the 8mm port.   At this point, there was excellent hemostasis so we proceeded to closure. The remaining trocars were removed and excess carbon dioxide expressed from the abdomen. The subcutaneous spaces were irrigated and checked for hemostasis. The skin incisions were closed with surgical glue. The uterine manipulating device was removed. There were no complications and the patient was transferred to the recovery room in excellent stable condition.   MAYELA DE LOS SANTOS MD

## 2019-12-06 NOTE — OR NURSING
Called into OR, pt ready to go and dr. raines ok with pt to go home, tolerating PO and PO pain meds.

## 2019-12-10 LAB — COPATH REPORT: NORMAL

## 2020-01-01 ENCOUNTER — TRANSFERRED RECORDS (OUTPATIENT)
Dept: HEALTH INFORMATION MANAGEMENT | Facility: CLINIC | Age: 25
End: 2020-01-01

## 2020-01-10 ENCOUNTER — MEDICAL CORRESPONDENCE (OUTPATIENT)
Dept: HEALTH INFORMATION MANAGEMENT | Facility: CLINIC | Age: 25
End: 2020-01-10

## 2020-08-09 ENCOUNTER — HOSPITAL ENCOUNTER (EMERGENCY)
Facility: HOSPITAL | Age: 25
Discharge: HOME | End: 2020-08-09
Attending: NURSE PRACTITIONER | Admitting: NURSE PRACTITIONER
Payer: COMMERCIAL

## 2020-08-09 VITALS
RESPIRATION RATE: 16 BRPM | SYSTOLIC BLOOD PRESSURE: 124 MMHG | DIASTOLIC BLOOD PRESSURE: 72 MMHG | OXYGEN SATURATION: 100 % | TEMPERATURE: 98 F

## 2020-08-09 DIAGNOSIS — R10.31 RIGHT LOWER QUADRANT PAIN: ICD-10-CM

## 2020-08-09 DIAGNOSIS — J02.9 PHARYNGITIS: ICD-10-CM

## 2020-08-09 DIAGNOSIS — J35.8 TONSIL STONE: ICD-10-CM

## 2020-08-09 LAB
ALBUMIN SERPL-MCNC: 3.5 G/DL (ref 3.4–5)
ALBUMIN UR-MCNC: NEGATIVE MG/DL
ALP SERPL-CCNC: 85 U/L (ref 40–150)
ALT SERPL W P-5'-P-CCNC: 29 U/L (ref 0–50)
ANION GAP SERPL CALCULATED.3IONS-SCNC: 2 MMOL/L (ref 3–14)
APPEARANCE UR: CLEAR
AST SERPL W P-5'-P-CCNC: 12 U/L (ref 0–45)
BACTERIA #/AREA URNS HPF: ABNORMAL /HPF
BASOPHILS # BLD AUTO: 0 10E9/L (ref 0–0.2)
BASOPHILS NFR BLD AUTO: 0.5 %
BILIRUB SERPL-MCNC: 0.4 MG/DL (ref 0.2–1.3)
BILIRUB UR QL STRIP: NEGATIVE
BUN SERPL-MCNC: 6 MG/DL (ref 7–30)
CALCIUM SERPL-MCNC: 9.2 MG/DL (ref 8.5–10.1)
CHLORIDE SERPL-SCNC: 109 MMOL/L (ref 94–109)
CO2 SERPL-SCNC: 28 MMOL/L (ref 20–32)
COLOR UR AUTO: ABNORMAL
CREAT SERPL-MCNC: 0.82 MG/DL (ref 0.52–1.04)
CRP SERPL-MCNC: 6.7 MG/L (ref 0–8)
DIFFERENTIAL METHOD BLD: NORMAL
EOSINOPHIL # BLD AUTO: 0.1 10E9/L (ref 0–0.7)
EOSINOPHIL NFR BLD AUTO: 0.9 %
ERYTHROCYTE [DISTWIDTH] IN BLOOD BY AUTOMATED COUNT: 13.1 % (ref 10–15)
ERYTHROCYTE [SEDIMENTATION RATE] IN BLOOD BY WESTERGREN METHOD: 6 MM/H (ref 0–20)
GFR SERPL CREATININE-BSD FRML MDRD: >90 ML/MIN/{1.73_M2}
GLUCOSE SERPL-MCNC: 102 MG/DL (ref 70–99)
GLUCOSE UR STRIP-MCNC: NEGATIVE MG/DL
HCT VFR BLD AUTO: 37.7 % (ref 35–47)
HGB BLD-MCNC: 12.9 G/DL (ref 11.7–15.7)
HGB UR QL STRIP: NEGATIVE
IMM GRANULOCYTES # BLD: 0 10E9/L (ref 0–0.4)
IMM GRANULOCYTES NFR BLD: 0.3 %
KETONES UR STRIP-MCNC: NEGATIVE MG/DL
LEUKOCYTE ESTERASE UR QL STRIP: ABNORMAL
LYMPHOCYTES # BLD AUTO: 2.1 10E9/L (ref 0.8–5.3)
LYMPHOCYTES NFR BLD AUTO: 26.6 %
MCH RBC QN AUTO: 31 PG (ref 26.5–33)
MCHC RBC AUTO-ENTMCNC: 34.2 G/DL (ref 31.5–36.5)
MCV RBC AUTO: 91 FL (ref 78–100)
MONOCYTES # BLD AUTO: 0.6 10E9/L (ref 0–1.3)
MONOCYTES NFR BLD AUTO: 7.5 %
MUCOUS THREADS #/AREA URNS LPF: PRESENT /LPF
NEUTROPHILS # BLD AUTO: 5.1 10E9/L (ref 1.6–8.3)
NEUTROPHILS NFR BLD AUTO: 64.2 %
NITRATE UR QL: NEGATIVE
NRBC # BLD AUTO: 0 10*3/UL
NRBC BLD AUTO-RTO: 0 /100
PH UR STRIP: 7.5 PH (ref 4.7–8)
PLATELET # BLD AUTO: 266 10E9/L (ref 150–450)
POTASSIUM SERPL-SCNC: 3.7 MMOL/L (ref 3.4–5.3)
PROT SERPL-MCNC: 6.9 G/DL (ref 6.8–8.8)
RBC # BLD AUTO: 4.16 10E12/L (ref 3.8–5.2)
RBC #/AREA URNS AUTO: <1 /HPF (ref 0–2)
SODIUM SERPL-SCNC: 139 MMOL/L (ref 133–144)
SOURCE: ABNORMAL
SP GR UR STRIP: 1.01 (ref 1–1.03)
SPECIMEN SOURCE: NORMAL
SQUAMOUS #/AREA URNS AUTO: 5 /HPF (ref 0–1)
STREP GROUP A PCR: NOT DETECTED
UROBILINOGEN UR STRIP-MCNC: NORMAL MG/DL (ref 0–2)
WBC # BLD AUTO: 7.9 10E9/L (ref 4–11)
WBC #/AREA URNS AUTO: 2 /HPF (ref 0–5)

## 2020-08-09 PROCEDURE — 85652 RBC SED RATE AUTOMATED: CPT | Performed by: NURSE PRACTITIONER

## 2020-08-09 PROCEDURE — 86140 C-REACTIVE PROTEIN: CPT | Performed by: NURSE PRACTITIONER

## 2020-08-09 PROCEDURE — G0463 HOSPITAL OUTPT CLINIC VISIT: HCPCS

## 2020-08-09 PROCEDURE — 36415 COLL VENOUS BLD VENIPUNCTURE: CPT | Performed by: NURSE PRACTITIONER

## 2020-08-09 PROCEDURE — 99212 OFFICE O/P EST SF 10 MIN: CPT | Mod: Z6 | Performed by: NURSE PRACTITIONER

## 2020-08-09 PROCEDURE — 87651 STREP A DNA AMP PROBE: CPT | Performed by: NURSE PRACTITIONER

## 2020-08-09 PROCEDURE — 85025 COMPLETE CBC W/AUTO DIFF WBC: CPT | Performed by: NURSE PRACTITIONER

## 2020-08-09 PROCEDURE — 80053 COMPREHEN METABOLIC PANEL: CPT | Performed by: NURSE PRACTITIONER

## 2020-08-09 PROCEDURE — 81001 URINALYSIS AUTO W/SCOPE: CPT | Performed by: NURSE PRACTITIONER

## 2020-08-09 NOTE — LETTER
[facility]  [address] [address]  Phone: [phone]    August 9, 2020        Carolyn Mallory  [address] [facility] [address]          To whom it may concern:    RE: Carolyn Mallory    Patient was seen and treated today. Please excuse her from work 8/10/20.     Please contact me for questions or concerns.      Sincerely,        AMELIA Kaur CNP   Certified Nurse Practitioner

## 2020-08-09 NOTE — ED PROVIDER NOTES
History     Chief Complaint   Patient presents with     Pharyngitis     Generalized Body Aches     HPI  Carolyn Mallory is a 24 year old female who presents with complaints of pharyngitis since yesterday.  States that she feels that more so on the left side and is constant.  Currently rates her pain at a 4-10.  She did take Tylenol in the last couple hours.  She denies fever, chills, diaphoresis.  States she was nauseous and had one episode of vomiting yesterday but nothing since.    States she has recently been exposed to some new strep throat.    Has been eating and drinking without difficulty stating she is well-hydrated.    She does state she sometimes has a little bit of right lower quadrant pain that comes and goes and reports it has been mild, like a cramp.  States she has noticed this in the past 2 to 3 days.  Normal menstruation cycle, stating she recently had it.  Denies bowel and bladder issues. History of bilateral laparoscopic salpingectomy 12/6/19 and laparoscopic cholecystectomy 10/10/2015.    Home treatment:     Allergies:  Allergies   Allergen Reactions     Amoxicillin      Oxycodone Visual Disturbance, Nausea and Vomiting and Rash     Vomiting, hallucinations.     Tylenol [Acetaminophen] Other (See Comments) and Rash     1/06/17: Patient reports seeing spots after taking Tylenol.  Patient reports seeing spots after taking Tylenol.       Problem List:    Patient Active Problem List    Diagnosis Date Noted     Encounter for sterilization 10/16/2019     Priority: Medium     Added automatically from request for surgery [id number]       Normal labor and delivery 10/12/2019     Priority: Medium     Encounter for triage in pregnant patient 08/23/2019     Priority: Medium     Anxiety in pregnancy, antepartum 05/30/2019     Priority: Medium     Vistaril prn  Counseling referral placed       Allergy to pollen 03/01/2018     Priority: Medium     Obesity, unspecified obesity severity, unspecified obesity type  06/09/2017     Priority: Medium     Tobacco use disorder 06/09/2017     Priority: Medium     Esophageal reflux 06/09/2017     Priority: Medium     BMI 34.0-34.9,adult 01/19/2017     Priority: Medium     Chronic right-sided thoracic back pain 05/24/2016     Priority: Medium     ACP (advance care planning) 04/26/2016     Priority: Medium     Advance Care Planning 4/26/2016: ACP Review of Chart / Resources Provided:  Reviewed chart for advance care plan.  Carolyn Mallory has no plan or code status on file. Discussed available resources and provided with information. .  Added by Lizzette Garcia           Calculus of kidney 12/16/2015     Priority: Medium     Bilateral hydronephrosis, flank pain, 3mm right lower pole non obstructing stone.  Dr. Simon [facility] consult.  Declined stents.  Reconsider if hospitalization and no improvement 2-3 days.         Sacro ilial pain 10/30/2015     Priority: Medium     Patellofemoral syndrome of both knees 01/20/2015     Priority: Medium     Allergic rhinitis 01/17/2014     Priority: Medium     Problem list name updated by automated process. Provider to review       Food allergy 01/17/2014     Priority: Medium     Astigmatism 09/24/2013     Priority: Medium     Overview:   IMO Update       Hypermetropia 09/24/2013     Priority: Medium        Past Medical History:    Past Medical History:   Diagnosis Date     NO ACTIVE PROBLEMS      Pregnancy        Past Surgical History:    Past Surgical History:   Procedure Laterality Date     ARTHROSCOPY KNEE Right 5/4/2015    Procedure: ARTHROSCOPY KNEE;  Surgeon: Hernando Kulkarni MD;  Location: [facility]     AS ESOPHAGOSCOPY, DIAGNOSTIC      upper     LAPAROSCOPIC CHOLECYSTECTOMY N/A 10/10/2015    Procedure: LAPAROSCOPIC CHOLECYSTECTOMY;  Surgeon: Drew Padgett MD;  Location: [facility]     LAPAROSCOPIC SALPINGECTOMY Bilateral 12/6/2019    Procedure: LAPAROSCOPIC BILATERAL SALPINGECTOMY;  Surgeon: Jose Goldstein MD;  Location: [facility] OR     none          Family History:    Family History   Problem Relation Age of Onset     Thrombophilia Mother         blood clotting     Lupus Mother         erythematosus     Diabetes Mother      Hypertension Father      Asthma Sister      Diabetes Maternal Grandfather      Hypertension Maternal Grandfather      Lupus Maternal Aunt         erythematosus       Social History:  Marital Status:  Single [1]  Social History     Tobacco Use     Smoking status: Former Smoker     Packs/day: 0.25     Years: 1.00     Pack years: 0.25     Types: Cigarettes     Start date: 1/20/2014     Smokeless tobacco: Never Used   Substance Use Topics     Alcohol use: No     Alcohol/week: 0.0 standard drinks     Drug use: No        Medications:    EPINEPHrine (EPIPEN) 0.3 MG/0.3ML injection  ibuprofen (ADVIL/MOTRIN) 800 MG tablet          Review of Systems   ROS: 10 point ROS neg other than the symptoms noted above in the HPI.    Physical Exam   BP: 124/72  Heart Rate: 62  Temp: 98  F (36.7  C)  Resp: 16  SpO2: 100 %      Physical Exam  Vitals signs and nursing note reviewed.   Constitutional:       General: She is not in acute distress.     Appearance: She is not ill-appearing.   HENT:      Head: Normocephalic and atraumatic.      Right Ear: Tympanic membrane normal.      Left Ear: Tympanic membrane normal.      Nose: No congestion.      Mouth/Throat:      Mouth: Mucous membranes are moist.      Comments: Tonsils grade 2 bilaterally. No erythema. Tonsillith present on right upper tonsil. Uvula is midline and there is no soft palate deviation.   Eyes:      Conjunctiva/sclera: Conjunctivae normal.      Pupils: Pupils are equal, round, and reactive to light.   Neck:      Musculoskeletal: Normal range of motion and neck supple.   Cardiovascular:      Rate and Rhythm: Normal rate and regular rhythm.   Pulmonary:      Effort: Pulmonary effort is normal.      Breath sounds: Normal breath sounds.   Abdominal:      General: Bowel sounds are normal. There is no  distension.      Palpations: Abdomen is soft. There is no mass.      Tenderness: There is abdominal tenderness (Pt reports tenderness in RLQ with deep palpation. ). There is no guarding or rebound.          Comments: Discomfort with palpation to RLQ.  No rebound tenderness.  No pain with leg lift.    No pain other than with palpation.     States hx tubal removal along with gallbladder removal.   Lymphadenopathy:      Cervical: No cervical adenopathy.   Skin:     General: Skin is warm and dry.      Capillary Refill: Capillary refill takes less than 2 seconds.   Neurological:      General: No focal deficit present.      Mental Status: She is alert and oriented to person, place, and time.   Psychiatric:         Mood and Affect: Mood normal.         Behavior: Behavior normal.         ED Course        Procedures        Results for orders placed or performed during the hospital encounter of 08/09/20 (from the past 24 hour(s))   Group A Streptococcus PCR Throat Swab    Specimen: Throat   Result Value Ref Range    Specimen Description Throat     Strep Group A PCR Not Detected NDET^Not Detected   UA reflex to Microscopic   Result Value Ref Range    Color Urine Straw     Appearance Urine Clear     Glucose Urine Negative NEG^Negative mg/dL    Bilirubin Urine Negative NEG^Negative    Ketones Urine Negative NEG^Negative mg/dL    Specific Gravity Urine 1.009 1.003 - 1.035    Blood Urine Negative NEG^Negative    pH Urine 7.5 4.7 - 8.0 pH    Protein Albumin Urine Negative NEG^Negative mg/dL    Urobilinogen mg/dL Normal 0.0 - 2.0 mg/dL    Nitrite Urine Negative NEG^Negative    Leukocyte Esterase Urine Small (A) NEG^Negative    Source Midstream Urine     RBC Urine <1 0 - 2 /HPF    WBC Urine 2 0 - 5 /HPF    Bacteria Urine None (A) NEG^Negative /HPF    Squamous Epithelial /HPF Urine 5 (H) 0 - 1 /HPF    Mucous Urine Present (A) NEG^Negative /LPF   CBC with platelets differential   Result Value Ref Range    WBC 7.9 4.0 - 11.0 10e9/L     RBC Count 4.16 3.8 - 5.2 10e12/L    Hemoglobin 12.9 11.7 - 15.7 g/dL    Hematocrit 37.7 35.0 - 47.0 %    MCV 91 78 - 100 fl    MCH 31.0 26.5 - 33.0 pg    MCHC 34.2 31.5 - 36.5 g/dL    RDW 13.1 10.0 - 15.0 %    Platelet Count 266 150 - 450 10e9/L    Diff Method Automated Method     % Neutrophils 64.2 %    % Lymphocytes 26.6 %    % Monocytes 7.5 %    % Eosinophils 0.9 %    % Basophils 0.5 %    % Immature Granulocytes 0.3 %    Nucleated RBCs 0 0 /100    Absolute Neutrophil 5.1 1.6 - 8.3 10e9/L    Absolute Lymphocytes 2.1 0.8 - 5.3 10e9/L    Absolute Monocytes 0.6 0.0 - 1.3 10e9/L    Absolute Eosinophils 0.1 0.0 - 0.7 10e9/L    Absolute Basophils 0.0 0.0 - 0.2 10e9/L    Abs Immature Granulocytes 0.0 0 - 0.4 10e9/L    Absolute Nucleated RBC 0.0    Erythrocyte sedimentation rate auto   Result Value Ref Range    Sed Rate 6 0 - 20 mm/h   CRP inflammation   Result Value Ref Range    CRP Inflammation 6.7 0.0 - 8.0 mg/L   Comprehensive metabolic panel   Result Value Ref Range    Sodium 139 133 - 144 mmol/L    Potassium 3.7 3.4 - 5.3 mmol/L    Chloride 109 94 - 109 mmol/L    Carbon Dioxide 28 20 - 32 mmol/L    Anion Gap 2 (L) 3 - 14 mmol/L    Glucose 102 (H) 70 - 99 mg/dL    Urea Nitrogen 6 (L) 7 - 30 mg/dL    Creatinine 0.82 0.52 - 1.04 mg/dL    GFR Estimate >90 >60 mL/min/[1.73_m2]    GFR Estimate If Black >90 >60 mL/min/[1.73_m2]    Calcium 9.2 8.5 - 10.1 mg/dL    Bilirubin Total 0.4 0.2 - 1.3 mg/dL    Albumin 3.5 3.4 - 5.0 g/dL    Protein Total 6.9 6.8 - 8.8 g/dL    Alkaline Phosphatase 85 40 - 150 U/L    ALT 29 0 - 50 U/L    AST 12 0 - 45 U/L       Medications - No data to display    Assessments & Plan (with Medical Decision Making)     I have reviewed the nursing notes.  I have reviewed the findings, diagnosis, plan and need for follow up with the patient.  (J35.8) Tonsil stone  Educated patient that this is benign.  In order to treat and prevent tonsilliths, recommend doing gargles with warm salt water throughout the day  especially after eating.  Use a WaterPik to get them out, otherwise use Tylenol and ibuprofen as needed for discomfort.  (J02.9) Pharyngitis  Comment: Negative throat swab.  Likely due to tonsilliths, possibly viral etiology.  Plan: Symptomatic cares discussed as noted above.  Increase cold fluid intake.  (R10.31) Right lower quadrant pain  Comment: Very mild intermittent symptoms for the last 2 to 3 days.  She has absolutely no pain or discomfort in the right lower quadrant when sitting, standing, walking.  Pain is only present with deep palpation to the right lower quadrant.  She appears to be stable, afebrile, no rebound tenderness, no pain provoked with leg lifts.  Eating and drinking without difficulty.  No bowel or bladder symptoms.  CBC, CMP, ESR, CMP are all normal.  Urinalysis does show mucous, squamous and few leukocytes.  I do not suspect urinary tract infection.  Overall I am not concerned about an acute abdominal process. Symptoms and physical exam findings not  consistent with acute appendicitis.  Plan: Discussed the above with patient.  Advised to continue to monitor her symptoms.  Make sure she is drinking of water.  If at anytime she develops any new or worsening symptoms advised to seek immediate medical care.  Otherwise having her to follow-up with her primary care provider in the next 3 to 5 days for reevaluation.  Offered COVID testing. She deferred.  Discussed quarantine herself until she is symptom free and fever free for at least 24 hours. (she has not yet had a fever).    Patient agrees with plan of care and verbalizes her understanding.    Discharge Medication List as of 8/9/2020  4:13 PM          Final diagnoses:   Tonsil stone   Pharyngitis   Right lower quadrant pain       8/9/2020   [facility]     Georgiana Ayers, AMELIA CNP  08/09/20 1943

## 2020-08-09 NOTE — ED AVS SNAPSHOT
[facility]  [address] [address]  Phone:  [phone]                                    Carolyn Mallory   MRN: [id number]    Department:  [facility]   Date of Visit:  8/9/2020           After Visit Summary Signature Page    I have received my discharge instructions, and my questions have been answered. I have discussed any challenges I see with this plan with the nurse or doctor.    ..........................................................................................................................................  Patient/Patient Representative Signature      ..........................................................................................................................................  Patient Representative Print Name and Relationship to Patient    ..................................................               ................................................  Date                                   Time    ..........................................................................................................................................  Reviewed by Signature/Title    ...................................................              ..............................................  Date                                               Time          22EPIC Rev 08/18

## 2020-08-09 NOTE — DISCHARGE INSTRUCTIONS
Strep throat negative.    Ibuprofen/tylenol for discomfort. Rest, increase fluids, gargle with warm salt water.  Can use an over the counter water pik to get rid of tonsil stones.     Seek immediate medical care with any new or worsening symptoms.    64

## 2020-10-22 ENCOUNTER — HOSPITAL ENCOUNTER (EMERGENCY)
Facility: HOSPITAL | Age: 25
Discharge: HOME | End: 2020-10-22
Attending: NURSE PRACTITIONER | Admitting: NURSE PRACTITIONER
Payer: OTHER MISCELLANEOUS

## 2020-10-22 VITALS
OXYGEN SATURATION: 100 % | TEMPERATURE: 98.9 F | RESPIRATION RATE: 14 BRPM | DIASTOLIC BLOOD PRESSURE: 70 MMHG | SYSTOLIC BLOOD PRESSURE: 124 MMHG | HEART RATE: 93 BPM

## 2020-10-22 DIAGNOSIS — S20.222A CONTUSION OF LEFT SIDE OF BACK, INITIAL ENCOUNTER: ICD-10-CM

## 2020-10-22 LAB
ALBUMIN UR-MCNC: 10 MG/DL
APPEARANCE UR: ABNORMAL
BACTERIA #/AREA URNS HPF: ABNORMAL /HPF
BILIRUB UR QL STRIP: NEGATIVE
COLOR UR AUTO: YELLOW
GLUCOSE UR STRIP-MCNC: NEGATIVE MG/DL
HGB UR QL STRIP: NEGATIVE
KETONES UR STRIP-MCNC: 5 MG/DL
LEUKOCYTE ESTERASE UR QL STRIP: ABNORMAL
MUCOUS THREADS #/AREA URNS LPF: PRESENT /LPF
NITRATE UR QL: NEGATIVE
PH UR STRIP: 6.5 PH (ref 4.7–8)
RBC #/AREA URNS AUTO: 1 /HPF (ref 0–2)
SOURCE: ABNORMAL
SP GR UR STRIP: 1.03 (ref 1–1.03)
SQUAMOUS #/AREA URNS AUTO: 14 /HPF (ref 0–1)
UROBILINOGEN UR STRIP-MCNC: NORMAL MG/DL (ref 0–2)
WBC #/AREA URNS AUTO: 8 /HPF (ref 0–5)

## 2020-10-22 PROCEDURE — G0463 HOSPITAL OUTPT CLINIC VISIT: HCPCS

## 2020-10-22 PROCEDURE — 87086 URINE CULTURE/COLONY COUNT: CPT | Performed by: NURSE PRACTITIONER

## 2020-10-22 PROCEDURE — 99213 OFFICE O/P EST LOW 20 MIN: CPT | Performed by: NURSE PRACTITIONER

## 2020-10-22 PROCEDURE — 81001 URINALYSIS AUTO W/SCOPE: CPT | Performed by: NURSE PRACTITIONER

## 2020-10-22 ASSESSMENT — ENCOUNTER SYMPTOMS
PSYCHIATRIC NEGATIVE: 1
GASTROINTESTINAL NEGATIVE: 1
RESPIRATORY NEGATIVE: 1
CARDIOVASCULAR NEGATIVE: 1
ENDOCRINE NEGATIVE: 1
ALLERGIC/IMMUNOLOGIC NEGATIVE: 1
HEMATOLOGIC/LYMPHATIC NEGATIVE: 1
CONSTITUTIONAL NEGATIVE: 1
EYES NEGATIVE: 1
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
## 2020-10-22 NOTE — DISCHARGE INSTRUCTIONS
Ibuprofen 600-800 mg 3 times a day as needed for pain  Ice 3-4 times a day  Rest as needed  Changes positions as able     Follow up at the clinic on Monday if no improvement   Your symptoms should improve over the next 2-3 days
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
## 2020-10-22 NOTE — ED AVS SNAPSHOT
[facility]  [address] [address]  Phone: [phone]                                    Carolyn Mallory   MRN: [id number]    Department: [facility]   Date of Visit: 10/22/2020           After Visit Summary Signature Page    I have received my discharge instructions, and my questions have been answered. I have discussed any challenges I see with this plan with the nurse or doctor.    ..........................................................................................................................................  Patient/Patient Representative Signature      ..........................................................................................................................................  Patient Representative Print Name and Relationship to Patient    ..................................................               ................................................  Date                                   Time    ..........................................................................................................................................  Reviewed by Signature/Title    ...................................................              ..............................................  Date                                               Time          22EPIC Rev 08/18

## 2020-10-24 LAB
BACTERIA SPEC CULT: ABNORMAL
SPECIMEN SOURCE: ABNORMAL

## 2020-10-25 ENCOUNTER — APPOINTMENT (OUTPATIENT)
Dept: CT IMAGING | Facility: HOSPITAL | Age: 25
End: 2020-10-25
Attending: PHYSICIAN ASSISTANT
Payer: OTHER MISCELLANEOUS

## 2020-10-25 ENCOUNTER — HOSPITAL ENCOUNTER (EMERGENCY)
Facility: HOSPITAL | Age: 25
Discharge: HOME | End: 2020-10-25
Attending: PHYSICIAN ASSISTANT | Admitting: PHYSICIAN ASSISTANT
Payer: OTHER MISCELLANEOUS

## 2020-10-25 VITALS
OXYGEN SATURATION: 100 % | RESPIRATION RATE: 18 BRPM | WEIGHT: 170 LBS | SYSTOLIC BLOOD PRESSURE: 121 MMHG | DIASTOLIC BLOOD PRESSURE: 78 MMHG | HEART RATE: 90 BPM | BODY MASS INDEX: 31.09 KG/M2 | TEMPERATURE: 98.9 F

## 2020-10-25 DIAGNOSIS — S30.0XXD LUMBAR CONTUSION, SUBSEQUENT ENCOUNTER: ICD-10-CM

## 2020-10-25 DIAGNOSIS — R31.9 HEMATURIA: ICD-10-CM

## 2020-10-25 DIAGNOSIS — R82.71 BACTERIURIA: ICD-10-CM

## 2020-10-25 DIAGNOSIS — R82.81 PYURIA: ICD-10-CM

## 2020-10-25 LAB
ALBUMIN UR-MCNC: 10 MG/DL
AMORPH CRY #/AREA URNS HPF: ABNORMAL /HPF
APPEARANCE UR: ABNORMAL
BACTERIA #/AREA URNS HPF: ABNORMAL /HPF
BILIRUB UR QL STRIP: NEGATIVE
COLOR UR AUTO: YELLOW
GLUCOSE UR STRIP-MCNC: NEGATIVE MG/DL
HCG UR QL: NEGATIVE
HGB UR QL STRIP: NEGATIVE
KETONES UR STRIP-MCNC: NEGATIVE MG/DL
LEUKOCYTE ESTERASE UR QL STRIP: ABNORMAL
MUCOUS THREADS #/AREA URNS LPF: PRESENT /LPF
NITRATE UR QL: NEGATIVE
PH UR STRIP: 5.5 PH (ref 4.7–8)
RBC #/AREA URNS AUTO: 4 /HPF (ref 0–2)
SOURCE: ABNORMAL
SP GR UR STRIP: 1.03 (ref 1–1.03)
SQUAMOUS #/AREA URNS AUTO: 18 /HPF (ref 0–1)
UROBILINOGEN UR STRIP-MCNC: NORMAL MG/DL (ref 0–2)
WBC #/AREA URNS AUTO: 11 /HPF (ref 0–5)

## 2020-10-25 PROCEDURE — 99284 EMERGENCY DEPT VISIT MOD MDM: CPT | Mod: 25

## 2020-10-25 PROCEDURE — 99284 EMERGENCY DEPT VISIT MOD MDM: CPT | Performed by: PHYSICIAN ASSISTANT

## 2020-10-25 PROCEDURE — 74176 CT ABD & PELVIS W/O CONTRAST: CPT

## 2020-10-25 PROCEDURE — 81025 URINE PREGNANCY TEST: CPT | Performed by: PHYSICIAN ASSISTANT

## 2020-10-25 PROCEDURE — 81001 URINALYSIS AUTO W/SCOPE: CPT | Performed by: PHYSICIAN ASSISTANT

## 2020-10-25 RX ORDER — SULFAMETHOXAZOLE/TRIMETHOPRIM 800-160 MG
1 TABLET ORAL 2 TIMES DAILY
Qty: 6 TABLET | Refills: 0 | Status: SHIPPED | OUTPATIENT
Start: 2020-10-25 | End: 2020-10-28

## 2020-10-25 RX ORDER — CYCLOBENZAPRINE HCL 10 MG
10 TABLET ORAL 3 TIMES DAILY PRN
Qty: 20 TABLET | Refills: 0 | Status: SHIPPED | OUTPATIENT
Start: 2020-10-25 | End: 2020-10-31

## 2020-10-25 ASSESSMENT — ENCOUNTER SYMPTOMS
BACK PAIN: 1
NAUSEA: 0
BRUISES/BLEEDS EASILY: 0
ACTIVITY CHANGE: 0
CHILLS: 0
SHORTNESS OF BREATH: 0
DYSURIA: 0
FATIGUE: 0
ABDOMINAL PAIN: 0
APPETITE CHANGE: 0
FEVER: 0
DIFFICULTY URINATING: 0

## 2020-10-25 NOTE — ED NOTES
Pt stated she did talk to the nurse this morning about the Bactrim order and she has not picked it up
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
## 2020-10-25 NOTE — ED AVS SNAPSHOT
[facility]  [address] [address]  Phone: [phone]                                    Carolyn Mallory   MRN: [id number]    Department: [facility]   Date of Visit: 10/25/2020           After Visit Summary Signature Page    I have received my discharge instructions, and my questions have been answered. I have discussed any challenges I see with this plan with the nurse or doctor.    ..........................................................................................................................................  Patient/Patient Representative Signature      ..........................................................................................................................................  Patient Representative Print Name and Relationship to Patient    ..................................................               ................................................  Date                                   Time    ..........................................................................................................................................  Reviewed by Signature/Title    ...................................................              ..............................................  Date                                               Time          22EPIC Rev 08/18

## 2020-10-25 NOTE — ED NOTES
Discharge instructions reviewed with patient.  Rx has been e-scribed to pharmacy of choice. encouraged to return with new or worsening symptoms.  Pt has no questions or concerns.  Copy of AVS in hand on discharge.
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
## 2020-10-25 NOTE — DISCHARGE INSTRUCTIONS
You may start the Bactrim as prescribed.    Follow-up in the clinic this week.    Muscle relaxers as needed.    Return here for any worsening symptoms, new symptoms, or other concerns.

## 2020-10-26 ENCOUNTER — TELEPHONE (OUTPATIENT)
Dept: FAMILY MEDICINE | Facility: OTHER | Age: 25
End: 2020-10-26

## 2020-10-26 NOTE — TELEPHONE ENCOUNTER
Attempted to call patient to notify we can not fit her in with Susan Clifford today. Was sent directly to [email or web address]- voicemail is full - unable to leave a message at this time. Will try to call back again. Joselyn Rainey LPN

## 2020-10-26 NOTE — TELEPHONE ENCOUNTER
follow up er / [id number] NEEDS APPT TODAY for work restrictions , is scheduled to work tomorrow and needs to be off work , please call back to advise [phone]. Offered her an appt tomorrow but states she needs today.
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
## 2020-10-26 NOTE — TELEPHONE ENCOUNTER
Received a call from Taisha ([facility]) per Taisha was notified we can not get her in today. She is on the schedule for tomorrow morning. Joselyn Rainey LPN

## 2020-10-27 ENCOUNTER — OFFICE VISIT (OUTPATIENT)
Dept: FAMILY MEDICINE | Facility: OTHER | Age: 25
End: 2020-10-27
Attending: NURSE PRACTITIONER
Payer: OTHER MISCELLANEOUS

## 2020-10-27 ENCOUNTER — TELEPHONE (OUTPATIENT)
Dept: FAMILY MEDICINE | Facility: OTHER | Age: 25
End: 2020-10-27

## 2020-10-27 VITALS
WEIGHT: 190 LBS | BODY MASS INDEX: 34.75 KG/M2 | SYSTOLIC BLOOD PRESSURE: 112 MMHG | TEMPERATURE: 97.5 F | HEART RATE: 95 BPM | DIASTOLIC BLOOD PRESSURE: 74 MMHG | OXYGEN SATURATION: 100 %

## 2020-10-27 DIAGNOSIS — S20.222S: Primary | ICD-10-CM

## 2020-10-27 PROCEDURE — 99213 OFFICE O/P EST LOW 20 MIN: CPT | Performed by: NURSE PRACTITIONER

## 2020-10-27 ASSESSMENT — ANXIETY QUESTIONNAIRES
IF YOU CHECKED OFF ANY PROBLEMS ON THIS QUESTIONNAIRE, HOW DIFFICULT HAVE THESE PROBLEMS MADE IT FOR YOU TO DO YOUR WORK, TAKE CARE OF THINGS AT HOME, OR GET ALONG WITH OTHER PEOPLE: SOMEWHAT DIFFICULT
7. FEELING AFRAID AS IF SOMETHING AWFUL MIGHT HAPPEN: SEVERAL DAYS
4. TROUBLE RELAXING: SEVERAL DAYS
1. FEELING NERVOUS, ANXIOUS, OR ON EDGE: SEVERAL DAYS
3. WORRYING TOO MUCH ABOUT DIFFERENT THINGS: MORE THAN HALF THE DAYS
5. BEING SO RESTLESS THAT IT IS HARD TO SIT STILL: NOT AT ALL
GAD7 TOTAL SCORE: 8
6. BECOMING EASILY ANNOYED OR IRRITABLE: MORE THAN HALF THE DAYS
2. NOT BEING ABLE TO STOP OR CONTROL WORRYING: SEVERAL DAYS

## 2020-10-27 ASSESSMENT — PATIENT HEALTH QUESTIONNAIRE - PHQ9: SUM OF ALL RESPONSES TO PHQ QUESTIONS 1-9: 6

## 2020-10-27 ASSESSMENT — PAIN SCALES - GENERAL: PAINLEVEL: MODERATE PAIN (4)

## 2020-10-27 NOTE — PATIENT INSTRUCTIONS
Patient Education     Relieving Back Pain  Back pain is a common problem. You can strain back muscles by lifting too much weight or just by moving the wrong way. Back strain can be uncomfortable, even painful. And it can take weeks or months to improve. To help yourself feel better and prevent future back strains, try these tips.  Important: Don't give aspirin to children or teens without first discussing it with your child's healthcare provider.  Ice    Ice reduces muscle pain and swelling. It helps most during the first 24 to 48 hours after an injury.    Wrap an ice pack or a bag of frozen peas in a thin towel. Never put ice directly on your skin.    Place the ice where your back hurts the most.    Don t ice for more than 20 minutes at a time.    You can use ice several times a day.  Medicines  Over-the-counter pain relievers include acetaminophen and anti-inflammatory medicines, which includes aspirin, naproxen, or ibuprofen. They can help ease discomfort. Some also reduce swelling.    Tell your healthcare provider about any medicines you are already taking.    Take medicines only as directed.  Manipulation and massage  Having manipulation by an osteopathic doctor or chiropractor may be helpful. Getting a massage also may help.   Heat  After the first 48 hours, heat can relax sore muscles and improve blood flow.    Try a warm bath or shower. Or use a heating pad set on low. To prevent a burn, keep a cloth between you and the heating pad.    Don t use a heating pad for more than 15 minutes at a time. Never sleep on a heating pad.  Date Last Reviewed: 6/1/2018 2000-2019 [employer]. [address] [place] [address], [place] [address]. All rights reserved. This information is not intended as a substitute for professional medical care. Always follow your healthcare professional's instructions.

## 2020-10-27 NOTE — NURSING NOTE
"Chief Complaint   Patient presents with     Work Comp       Initial /74   Pulse 95   Temp 97.5  F (36.4  C) (Tympanic)   Wt 86.2 kg (190 lb)   SpO2 100%   BMI 34.75 kg/m   Estimated body mass index is 34.75 kg/m  as calculated from the following:    Height as of 12/6/19: 1.575 m (5' 2\").    Weight as of this encounter: 86.2 kg (190 lb).  Medication Reconciliation: complete  Joselyn Rainey LPN  "

## 2020-10-28 ASSESSMENT — ANXIETY QUESTIONNAIRES: GAD7 TOTAL SCORE: 8

## 2020-11-05 ENCOUNTER — HOSPITAL ENCOUNTER (EMERGENCY)
Facility: HOSPITAL | Age: 25
Discharge: HOME | End: 2020-11-05
Attending: NURSE PRACTITIONER | Admitting: NURSE PRACTITIONER
Payer: OTHER MISCELLANEOUS

## 2020-11-05 VITALS
HEART RATE: 80 BPM | RESPIRATION RATE: 18 BRPM | OXYGEN SATURATION: 100 % | TEMPERATURE: 98.3 F | DIASTOLIC BLOOD PRESSURE: 80 MMHG | SYSTOLIC BLOOD PRESSURE: 133 MMHG

## 2020-11-05 DIAGNOSIS — T30.4 CHEMICAL BURN: ICD-10-CM

## 2020-11-05 PROCEDURE — G0463 HOSPITAL OUTPT CLINIC VISIT: HCPCS

## 2020-11-05 PROCEDURE — 99213 OFFICE O/P EST LOW 20 MIN: CPT | Performed by: NURSE PRACTITIONER

## 2020-11-05 ASSESSMENT — ENCOUNTER SYMPTOMS: EYE PAIN: 1

## 2020-11-05 NOTE — ED AVS SNAPSHOT
[facility]  [address] [address]  Phone: [phone]                                    Carolyn Mallory   MRN: [id number]    Department: [facility]   Date of Visit: 11/5/2020           After Visit Summary Signature Page    I have received my discharge instructions, and my questions have been answered. I have discussed any challenges I see with this plan with the nurse or doctor.    ..........................................................................................................................................  Patient/Patient Representative Signature      ..........................................................................................................................................  Patient Representative Print Name and Relationship to Patient    ..................................................               ................................................  Date                                   Time    ..........................................................................................................................................  Reviewed by Signature/Title    ...................................................              ..............................................  Date                                               Time          22EPIC Rev 08/18
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
## 2020-11-10 ENCOUNTER — APPOINTMENT (OUTPATIENT)
Dept: CT IMAGING | Facility: HOSPITAL | Age: 25
End: 2020-11-10
Attending: EMERGENCY MEDICINE
Payer: COMMERCIAL

## 2020-11-10 ENCOUNTER — HOSPITAL ENCOUNTER (EMERGENCY)
Facility: HOSPITAL | Age: 25
Discharge: HOME | End: 2020-11-10
Attending: EMERGENCY MEDICINE | Admitting: EMERGENCY MEDICINE
Payer: COMMERCIAL

## 2020-11-10 VITALS
HEART RATE: 112 BPM | SYSTOLIC BLOOD PRESSURE: 119 MMHG | OXYGEN SATURATION: 100 % | RESPIRATION RATE: 20 BRPM | DIASTOLIC BLOOD PRESSURE: 74 MMHG

## 2020-11-10 DIAGNOSIS — S06.0X0A CONCUSSION WITHOUT LOSS OF CONSCIOUSNESS, INITIAL ENCOUNTER: ICD-10-CM

## 2020-11-10 DIAGNOSIS — E87.6 HYPOKALEMIA: ICD-10-CM
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
## 2020-11-10 LAB
ALBUMIN SERPL-MCNC: 3.9 G/DL (ref 3.4–5)
ALBUMIN UR-MCNC: 10 MG/DL
ALP SERPL-CCNC: 88 U/L (ref 40–150)
ALT SERPL W P-5'-P-CCNC: 21 U/L (ref 0–50)
AMPHETAMINES UR QL: NOT DETECTED NG/ML
ANION GAP SERPL CALCULATED.3IONS-SCNC: 8 MMOL/L (ref 3–14)
APPEARANCE UR: CLEAR
APTT PPP: 23 SEC (ref 22–37)
AST SERPL W P-5'-P-CCNC: 10 U/L (ref 0–45)
BACTERIA #/AREA URNS HPF: ABNORMAL /HPF
BARBITURATES UR QL SCN: NOT DETECTED NG/ML
BASOPHILS # BLD AUTO: 0.1 10E9/L (ref 0–0.2)
BASOPHILS NFR BLD AUTO: 0.6 %
BENZODIAZ UR QL SCN: NOT DETECTED NG/ML
BILIRUB SERPL-MCNC: 0.2 MG/DL (ref 0.2–1.3)
BILIRUB UR QL STRIP: NEGATIVE
BUN SERPL-MCNC: 10 MG/DL (ref 7–30)
BUPRENORPHINE UR QL: NOT DETECTED NG/ML
CALCIUM SERPL-MCNC: 8.3 MG/DL (ref 8.5–10.1)
CANNABINOIDS UR QL: ABNORMAL NG/ML
CHLORIDE SERPL-SCNC: 108 MMOL/L (ref 94–109)
CO2 SERPL-SCNC: 23 MMOL/L (ref 20–32)
COCAINE UR QL SCN: NOT DETECTED NG/ML
COLOR UR AUTO: ABNORMAL
CREAT SERPL-MCNC: 0.81 MG/DL (ref 0.52–1.04)
D-METHAMPHET UR QL: NOT DETECTED NG/ML
DIFFERENTIAL METHOD BLD: ABNORMAL
EOSINOPHIL # BLD AUTO: 0.2 10E9/L (ref 0–0.7)
EOSINOPHIL NFR BLD AUTO: 1.3 %
ERYTHROCYTE [DISTWIDTH] IN BLOOD BY AUTOMATED COUNT: 12.7 % (ref 10–15)
ETHANOL SERPL-MCNC: <0.01 G/DL
GFR SERPL CREATININE-BSD FRML MDRD: >90 ML/MIN/{1.73_M2}
GLUCOSE BLDC GLUCOMTR-MCNC: 203 MG/DL (ref 70–99)
GLUCOSE SERPL-MCNC: 203 MG/DL (ref 70–99)
GLUCOSE UR STRIP-MCNC: NEGATIVE MG/DL
HCG UR QL: NEGATIVE
HCT VFR BLD AUTO: 36.8 % (ref 35–47)
HGB BLD-MCNC: 12.8 G/DL (ref 11.7–15.7)
HGB UR QL STRIP: NEGATIVE
IMM GRANULOCYTES # BLD: 0 10E9/L (ref 0–0.4)
IMM GRANULOCYTES NFR BLD: 0.2 %
INR PPP: 0.98 (ref 0.86–1.14)
KETONES UR STRIP-MCNC: NEGATIVE MG/DL
LACTATE BLD-SCNC: 1.6 MMOL/L (ref 0.7–2)
LACTATE BLD-SCNC: 2.5 MMOL/L (ref 0.7–2)
LEUKOCYTE ESTERASE UR QL STRIP: ABNORMAL
LYMPHOCYTES # BLD AUTO: 6.2 10E9/L (ref 0.8–5.3)
LYMPHOCYTES NFR BLD AUTO: 49.2 %
MCH RBC QN AUTO: 31.4 PG (ref 26.5–33)
MCHC RBC AUTO-ENTMCNC: 34.8 G/DL (ref 31.5–36.5)
MCV RBC AUTO: 90 FL (ref 78–100)
METHADONE UR QL SCN: NOT DETECTED NG/ML
MONOCYTES # BLD AUTO: 0.5 10E9/L (ref 0–1.3)
MONOCYTES NFR BLD AUTO: 3.8 %
MUCOUS THREADS #/AREA URNS LPF: PRESENT /LPF
NEUTROPHILS # BLD AUTO: 5.7 10E9/L (ref 1.6–8.3)
NEUTROPHILS NFR BLD AUTO: 44.9 %
NITRATE UR QL: NEGATIVE
NRBC # BLD AUTO: 0 10*3/UL
NRBC BLD AUTO-RTO: 0 /100
OPIATES UR QL SCN: NOT DETECTED NG/ML
OXYCODONE UR QL SCN: NOT DETECTED NG/ML
PCP UR QL SCN: NOT DETECTED NG/ML
PH UR STRIP: 6.5 PH (ref 4.7–8)
PLATELET # BLD AUTO: 399 10E9/L (ref 150–450)
PLATELET # BLD EST: ABNORMAL 10*3/UL
POTASSIUM SERPL-SCNC: 2.7 MMOL/L (ref 3.4–5.3)
POTASSIUM SERPL-SCNC: 3.5 MMOL/L (ref 3.4–5.3)
PROPOXYPH UR QL: NOT DETECTED NG/ML
PROT SERPL-MCNC: 7.1 G/DL (ref 6.8–8.8)
RBC # BLD AUTO: 4.07 10E12/L (ref 3.8–5.2)
RBC #/AREA URNS AUTO: 0 /HPF (ref 0–2)
RBC MORPH BLD: ABNORMAL
SODIUM SERPL-SCNC: 139 MMOL/L (ref 133–144)
SOURCE: ABNORMAL
SP GR UR STRIP: 1.01 (ref 1–1.03)
SQUAMOUS #/AREA URNS AUTO: 2 /HPF (ref 0–1)
TRICYCLICS UR QL SCN: NOT DETECTED NG/ML
UROBILINOGEN UR STRIP-MCNC: NORMAL MG/DL (ref 0–2)
VARIANT LYMPHS BLD QL SMEAR: PRESENT
WBC # BLD AUTO: 12.7 10E9/L (ref 4–11)
WBC #/AREA URNS AUTO: 3 /HPF (ref 0–5)

## 2020-11-10 PROCEDURE — 74177 CT ABD & PELVIS W/CONTRAST: CPT

## 2020-11-10 PROCEDURE — 80320 DRUG SCREEN QUANTALCOHOLS: CPT | Performed by: EMERGENCY MEDICINE

## 2020-11-10 PROCEDURE — 96375 TX/PRO/DX INJ NEW DRUG ADDON: CPT | Mod: XU

## 2020-11-10 PROCEDURE — 70450 CT HEAD/BRAIN W/O DYE: CPT

## 2020-11-10 PROCEDURE — 85610 PROTHROMBIN TIME: CPT | Performed by: EMERGENCY MEDICINE

## 2020-11-10 PROCEDURE — 99285 EMERGENCY DEPT VISIT HI MDM: CPT | Performed by: EMERGENCY MEDICINE

## 2020-11-10 PROCEDURE — 80306 DRUG TEST PRSMV INSTRMNT: CPT | Mod: XU | Performed by: EMERGENCY MEDICINE

## 2020-11-10 PROCEDURE — 250N000013 HC RX MED GY IP 250 OP 250 PS 637: Performed by: EMERGENCY MEDICINE

## 2020-11-10 PROCEDURE — 999N001017 HC STATISTIC GLUCOSE BY METER IP

## 2020-11-10 PROCEDURE — 250N000011 HC RX IP 250 OP 636

## 2020-11-10 PROCEDURE — 81025 URINE PREGNANCY TEST: CPT | Performed by: EMERGENCY MEDICINE

## 2020-11-10 PROCEDURE — 80053 COMPREHEN METABOLIC PANEL: CPT | Performed by: EMERGENCY MEDICINE

## 2020-11-10 PROCEDURE — 84132 ASSAY OF SERUM POTASSIUM: CPT | Performed by: EMERGENCY MEDICINE

## 2020-11-10 PROCEDURE — 81001 URINALYSIS AUTO W/SCOPE: CPT | Performed by: EMERGENCY MEDICINE

## 2020-11-10 PROCEDURE — 85730 THROMBOPLASTIN TIME PARTIAL: CPT | Performed by: EMERGENCY MEDICINE

## 2020-11-10 PROCEDURE — 93010 ELECTROCARDIOGRAM REPORT: CPT | Performed by: INTERNAL MEDICINE

## 2020-11-10 PROCEDURE — 255N000002 HC RX 255 OP 636: Performed by: EMERGENCY MEDICINE

## 2020-11-10 PROCEDURE — 96365 THER/PROPH/DIAG IV INF INIT: CPT | Mod: XU

## 2020-11-10 PROCEDURE — 99285 EMERGENCY DEPT VISIT HI MDM: CPT | Mod: 25

## 2020-11-10 PROCEDURE — 83605 ASSAY OF LACTIC ACID: CPT | Performed by: EMERGENCY MEDICINE

## 2020-11-10 PROCEDURE — 93005 ELECTROCARDIOGRAM TRACING: CPT

## 2020-11-10 PROCEDURE — 85025 COMPLETE CBC W/AUTO DIFF WBC: CPT | Performed by: EMERGENCY MEDICINE

## 2020-11-10 PROCEDURE — 683N000002 HC PARTIAL TRAUMA W/O CC LEVEL III

## 2020-11-10 PROCEDURE — 72125 CT NECK SPINE W/O DYE: CPT

## 2020-11-10 PROCEDURE — 250N000011 HC RX IP 250 OP 636: Performed by: EMERGENCY MEDICINE

## 2020-11-10 PROCEDURE — 36415 COLL VENOUS BLD VENIPUNCTURE: CPT | Performed by: EMERGENCY MEDICINE

## 2020-11-10 RX ORDER — POTASSIUM CHLORIDE 7.45 MG/ML
10 INJECTION INTRAVENOUS ONCE
Status: COMPLETED | OUTPATIENT
Start: 2020-11-10 | End: 2020-11-10

## 2020-11-10 RX ORDER — POTASSIUM CHLORIDE 1500 MG/1
40 TABLET, EXTENDED RELEASE ORAL ONCE
Status: COMPLETED | OUTPATIENT
Start: 2020-11-10 | End: 2020-11-10

## 2020-11-10 RX ORDER — METOCLOPRAMIDE HYDROCHLORIDE 5 MG/ML
INJECTION INTRAMUSCULAR; INTRAVENOUS
Status: COMPLETED
Start: 2020-11-10 | End: 2020-11-10

## 2020-11-10 RX ORDER — IOPAMIDOL 612 MG/ML
100 INJECTION, SOLUTION INTRAVASCULAR ONCE
Status: COMPLETED | OUTPATIENT
Start: 2020-11-10 | End: 2020-11-10

## 2020-11-10 RX ADMIN — IOPAMIDOL 100 ML: 612 INJECTION, SOLUTION INTRAVENOUS at 17:38

## 2020-11-10 RX ADMIN — METOCLOPRAMIDE 10 MG: 5 INJECTION, SOLUTION INTRAMUSCULAR; INTRAVENOUS at 17:17

## 2020-11-10 RX ADMIN — POTASSIUM CHLORIDE 40 MEQ: 1500 TABLET, EXTENDED RELEASE ORAL at 18:07

## 2020-11-10 RX ADMIN — POTASSIUM CHLORIDE 10 MEQ: 7.46 INJECTION, SOLUTION INTRAVENOUS at 18:12

## 2020-11-10 ASSESSMENT — ENCOUNTER SYMPTOMS
CONFUSION: 0
ARTHRALGIAS: 0
EYE REDNESS: 0
NECK STIFFNESS: 0
DIFFICULTY URINATING: 0
COLOR CHANGE: 0
SHORTNESS OF BREATH: 0
FEVER: 0
HEADACHES: 0
ABDOMINAL PAIN: 0

## 2020-11-10 NOTE — LETTER
November 10, 2020      Carolyn Mallory  [address] [place] [id number]        To Whom It May Concern:    Carolyn Mallory was seen in our emergency department She may return to work without restrictions when feeling better.      Sincerely,        Ronn Morrow MD on 11/10/2020 at 8:07 PM

## 2020-11-10 NOTE — ED AVS SNAPSHOT
[facility]  [address] [address]  Phone: [phone]                                    Carolyn Mallory   MRN: [id number]    Department: [facility]   Date of Visit: 11/10/2020           After Visit Summary Signature Page    I have received my discharge instructions, and my questions have been answered. I have discussed any challenges I see with this plan with the nurse or doctor.    ..........................................................................................................................................  Patient/Patient Representative Signature      ..........................................................................................................................................  Patient Representative Print Name and Relationship to Patient    ..................................................               ................................................  Date                                   Time    ..........................................................................................................................................  Reviewed by Signature/Title    ...................................................              ..............................................  Date                                               Time          22EPIC Rev 08/18

## 2020-11-10 NOTE — PROGRESS NOTES
DATE:  11/10/2020   TIME OF RECEIPT FROM LAB:  0754  LAB TEST:  potassium  LAB VALUE:  2.7  RESULTS GIVEN WITH READ-BACK TO (PROVIDER):  Ronn Morrow MD  TIME LAB VALUE REPORTED TO PROVIDER:   5262
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
## 2020-11-11 NOTE — ED NOTES
DATE:  11/10/2020   TIME OF RECEIPT FROM LAB:  3706  LAB TEST:  Lactic acid   LAB VALUE:  2.5  RESULTS GIVEN WITH READ-BACK TO (PROVIDER):  Ronn Morrow MD  TIME LAB VALUE REPORTED TO PROVIDER:   4646
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
## 2020-11-13 ENCOUNTER — OFFICE VISIT (OUTPATIENT)
Dept: FAMILY MEDICINE | Facility: OTHER | Age: 25
End: 2020-11-13
Attending: NURSE PRACTITIONER
Payer: COMMERCIAL

## 2020-11-13 VITALS
BODY MASS INDEX: 35.3 KG/M2 | SYSTOLIC BLOOD PRESSURE: 108 MMHG | RESPIRATION RATE: 18 BRPM | TEMPERATURE: 98.9 F | HEART RATE: 91 BPM | WEIGHT: 193 LBS | DIASTOLIC BLOOD PRESSURE: 80 MMHG | OXYGEN SATURATION: 99 %

## 2020-11-13 DIAGNOSIS — S06.0X9D CONCUSSION WITH LOSS OF CONSCIOUSNESS, SUBSEQUENT ENCOUNTER: ICD-10-CM

## 2020-11-13 DIAGNOSIS — E87.6 HYPOKALEMIA: ICD-10-CM

## 2020-11-13 DIAGNOSIS — R19.7 DIARRHEA, UNSPECIFIED TYPE: ICD-10-CM
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
## 2020-11-13 LAB
ANION GAP SERPL CALCULATED.3IONS-SCNC: 3 MMOL/L (ref 3–14)
BUN SERPL-MCNC: 8 MG/DL (ref 7–30)
CALCIUM SERPL-MCNC: 9.3 MG/DL (ref 8.5–10.1)
CHLORIDE SERPL-SCNC: 107 MMOL/L (ref 94–109)
CO2 SERPL-SCNC: 29 MMOL/L (ref 20–32)
CREAT SERPL-MCNC: 0.86 MG/DL (ref 0.52–1.04)
GFR SERPL CREATININE-BSD FRML MDRD: >90 ML/MIN/{1.73_M2}
GLUCOSE SERPL-MCNC: 100 MG/DL (ref 70–99)
POTASSIUM SERPL-SCNC: 4.2 MMOL/L (ref 3.4–5.3)
SODIUM SERPL-SCNC: 139 MMOL/L (ref 133–144)

## 2020-11-13 PROCEDURE — 99214 OFFICE O/P EST MOD 30 MIN: CPT | Performed by: NURSE PRACTITIONER

## 2020-11-13 PROCEDURE — G0463 HOSPITAL OUTPT CLINIC VISIT: HCPCS | Mod: 25

## 2020-11-13 PROCEDURE — G0463 HOSPITAL OUTPT CLINIC VISIT: HCPCS

## 2020-11-13 PROCEDURE — 80048 BASIC METABOLIC PNL TOTAL CA: CPT | Mod: ZL

## 2020-11-13 PROCEDURE — 36415 COLL VENOUS BLD VENIPUNCTURE: CPT | Mod: ZL

## 2020-11-13 PROCEDURE — 36415 COLL VENOUS BLD VENIPUNCTURE: CPT | Mod: ZL | Performed by: NURSE PRACTITIONER

## 2020-11-13 PROCEDURE — 80048 BASIC METABOLIC PNL TOTAL CA: CPT | Mod: ZL | Performed by: NURSE PRACTITIONER

## 2020-11-13 ASSESSMENT — PAIN SCALES - GENERAL: PAINLEVEL: MODERATE PAIN (4)

## 2020-11-13 NOTE — PATIENT INSTRUCTIONS
Patient Education     After a Concussion     Awaken to check alertness as often as the health care provider suggests.     If you had a mild concussion (a head injury), watch closely for signs of problems during the first 48 hours after the injury. Follow the doctor s advice about recovering at home. Use the tips on this handout as a guide.  Note: You should not be left alone after a concussion. If no adult can stay with the injured person, let the doctor know.  Have someone call 911 or your emergency number if you can't fully wake up or have a seizures or convulsions.  The first 48 hours  Don t take medicine unless approved by your healthcare provider. Try placing a cold, damp cloth on your head to help relieve a headache.    Ask the doctor before using any medicines.    Don't drink alcohol or take sedatives or medicines that make you sleepy.    Don't return to sports or any activity that could cause you to hit your head until all symptoms are gone and you have been cleared by your doctor. A second head injury before fully recovering from the first one can lead to serious brain injury.    Don't do activities that need a lot of concentration or a lot of attention. This will allow your brain to rest and heal more quickly.    Return to regular physical and mental activity as directed and approved by your healthcare provider.  Tips about sleeping  For the first day or two, it may be best not to sleep for long periods of time without being checked for alertness. Follow the doctor s instructions.  ? Have someone wake you every ____ hours for the next ____ hours. He or she should ask you questions to check for alertness.  ? OK to sleep through the night.  When to call the healthcare provider  If you notice any of the following, call the healthcare provider:    Vomiting. Some vomiting is common, but tell the provider about any vomiting.    Clear or bloody drainage from the nose or ear    Constant drowsiness or difficulty in  waking up    Confusion or memory loss    Blurred vision or any vision changes    Inability to walk or talk normally    Increased weakness or problems with coordination    Constant, unrelieved headache that becomes more severe    Changes in behavior or personality    High-pitched crying in infants    Signs of stroke such as paralysis of parts of the body    Uncrontrolled movements suggesting a seizure  [employer] last reviewed this educational content on 12/1/2017 2000-2020 [employer]. [address] [place] [address], [place] [address]. All rights reserved. This information is not intended as a substitute for professional medical care. Always follow your healthcare professional's instructions.

## 2020-11-13 NOTE — NURSING NOTE
"Chief Complaint   Patient presents with     ER F/U       Initial /80 (BP Location: Left arm, Patient Position: Chair, Cuff Size: Adult Regular)   Pulse 91   Temp 98.9  F (37.2  C) (Tympanic)   Resp 18   Wt 87.5 kg (193 lb)   SpO2 99%   BMI 35.30 kg/m   Estimated body mass index is 35.3 kg/m  as calculated from the following:    Height as of 12/6/19: 1.575 m (5' 2\").    Weight as of this encounter: 87.5 kg (193 lb).  Medication Reconciliation: complete  Aaliyah Zavala LPN  "
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
## 2020-11-16 ENCOUNTER — TELEPHONE (OUTPATIENT)
Dept: FAMILY MEDICINE | Facility: OTHER | Age: 25
End: 2020-11-16

## 2020-11-16 DIAGNOSIS — S06.0X9D CONCUSSION WITH LOSS OF CONSCIOUSNESS, SUBSEQUENT ENCOUNTER: Primary | ICD-10-CM

## 2020-11-17 ENCOUNTER — TELEPHONE (OUTPATIENT)
Dept: FAMILY MEDICINE | Facility: OTHER | Age: 25
End: 2020-11-17

## 2020-11-17 NOTE — TELEPHONE ENCOUNTER
I called and left message for pt to call back regarding the [facility] paperwork that I have received.  I have a few questions and left extension for her to call back. Rita Watson LPN

## 2020-11-19 ENCOUNTER — TRANSFERRED RECORDS (OUTPATIENT)
Dept: HEALTH INFORMATION MANAGEMENT | Facility: CLINIC | Age: 25
End: 2020-11-19

## 2020-11-24 ENCOUNTER — TELEPHONE (OUTPATIENT)
Dept: FAMILY MEDICINE | Facility: OTHER | Age: 25
End: 2020-11-24

## 2020-11-24 NOTE — TELEPHONE ENCOUNTER
We can address this at her next appointment   Can help me with this     Susan CISNEROS FNP-BC  Family Nurse Practitioner
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
## 2020-12-03 ENCOUNTER — TELEPHONE (OUTPATIENT)
Dept: FAMILY MEDICINE | Facility: OTHER | Age: 25
End: 2020-12-03

## 2020-12-03 NOTE — PROGRESS NOTES
Subjective     Carolyn Mallory is a 25 year old female who presents to clinic today for the following health issues:    HPI         Concern - concussion recheck  Onset: 11/10/20  Description: concussion recheck  Intensity: moderate  Progression of Symptoms:  See documentation from speech therapist   Accompanying Signs & Symptoms: memory deficit   Previous history of similar problem: none  Precipitating factors:        Worsened by: n/a  Alleviating factors:        Improved by: n/a  Therapies tried and outcome: was referred to speech therapy at - missed appointment yesterday due to exposure of covid- going to speech therapy twice a week.     {additonal problems for provider to add (Optional):613780}    Review of Systems   {ROS COMP (Optional):347055}      Objective    There were no vitals taken for this visit.  There is no height or weight on file to calculate BMI.  Physical Exam   {Exam List (Optional):587354}    {Diagnostic Test Results (Optional):820871}        {PROVIDER CHARTING PREFERENCE:983168}

## 2020-12-03 NOTE — TELEPHONE ENCOUNTER
Pt was exposed to positive covid case and was wondering if her office visit tomorrow 12/4/2020 can be changed to a video visit. Please call patient if you are able to do that. Thanks!

## 2020-12-04 ENCOUNTER — OFFICE VISIT (OUTPATIENT)
Dept: FAMILY MEDICINE | Facility: OTHER | Age: 25
End: 2020-12-04
Attending: NURSE PRACTITIONER
Payer: COMMERCIAL

## 2020-12-04 DIAGNOSIS — F07.81 POST CONCUSSION SYNDROME: Primary | ICD-10-CM

## 2020-12-04 PROCEDURE — 99213 OFFICE O/P EST LOW 20 MIN: CPT | Mod: 95 | Performed by: NURSE PRACTITIONER

## 2020-12-04 ASSESSMENT — PATIENT HEALTH QUESTIONNAIRE - PHQ9: SUM OF ALL RESPONSES TO PHQ QUESTIONS 1-9: 0

## 2020-12-04 ASSESSMENT — ANXIETY QUESTIONNAIRES
GAD7 TOTAL SCORE: 0
6. BECOMING EASILY ANNOYED OR IRRITABLE: NOT AT ALL
7. FEELING AFRAID AS IF SOMETHING AWFUL MIGHT HAPPEN: NOT AT ALL
3. WORRYING TOO MUCH ABOUT DIFFERENT THINGS: NOT AT ALL
4. TROUBLE RELAXING: NOT AT ALL
2. NOT BEING ABLE TO STOP OR CONTROL WORRYING: NOT AT ALL
5. BEING SO RESTLESS THAT IT IS HARD TO SIT STILL: NOT AT ALL
1. FEELING NERVOUS, ANXIOUS, OR ON EDGE: NOT AT ALL

## 2020-12-04 ASSESSMENT — PAIN SCALES - GENERAL: PAINLEVEL: MODERATE PAIN (4)

## 2020-12-04 NOTE — NURSING NOTE
"Chief Complaint   Patient presents with     RECHECK       Initial There were no vitals taken for this visit. Estimated body mass index is 35.3 kg/m  as calculated from the following:    Height as of 12/6/19: 1.575 m (5' 2\").    Weight as of 11/13/20: 87.5 kg (193 lb).  Medication Reconciliation: complete  Joselyn Rainey LPN  "

## 2020-12-04 NOTE — PROGRESS NOTES
"Carolyn Mallory is a 25 year old female who is being evaluated via a billable telephone visit.      The patient has been notified of following:     \"This telephone visit will be conducted via a call between you and your physician/provider. We have found that certain health care needs can be provided without the need for a physical exam.  This service lets us provide the care you need with a short phone conversation.  If a prescription is necessary we can send it directly to your pharmacy.  If lab work is needed we can place an order for that and you can then stop by our lab to have the test done at a later time.    Telephone visits are billed at different rates depending on your insurance coverage. During this emergency period, for some insurers they may be billed the same as an in-person visit.  Please reach out to your insurance provider with any questions.    If during the course of the call the physician/provider feels a telephone visit is not appropriate, you will not be charged for this service.\"    Patient has given verbal consent for Telephone visit?  Yes    What phone number would you like to be contacted at? [phone]    How would you like to obtain your AVS? MyChart    Subjective     Carolyn Mallory is a 25 year old female who presents via phone visit today for the following health issues:    HPI        Concern - concussion recheck  Onset: 11/10/20  Description: concussion recheck  Intensity: moderate  Progression of Symptoms:  See documentation from speech therapist   Accompanying Signs & Symptoms: memory deficit   Previous history of similar problem: none  Precipitating factors:        Worsened by: n/a  Alleviating factors:        Improved by: n/a  Therapies tried and outcome: was referred to speech therapy at [facility]- missed appointment yesterday due to exposure of covid- going to speech therapy twice a week.   Reviewed Speech therapy note. She will continue 2 times a week for now  She remains " out of work due to memory  She states she is having some visual changes - when she looks down she sees red   She has vomited occasionally with irritability  She has noted increased irritability and anxiety since concussion          Review of Systems   CONSTITUTIONAL:sweating more at night  INTEGUMENTARY/SKIN: right hand middle fingers itchy bumps   EYES: she has occasional change in vision when looking down.  She states objects turn red even if they are not  ENT/MOUTH: mild ear discomfort   RESP:mild SOB  CV: chest pain with increased anxiety   GI: occasional vomiting, she is having diarrhea a few times a day   : denies dysuria   MUSCULOSKELETAL: bilateral leg pain/ restless legs, knees cracking - for about a week - comes and goes   NEURO: continues to have some short term memory loss, headaches daily most of the time   PSYCHIATRIC: increased anxiety and irritability        Objective   Vitals - Patient Reported  Pain Score: Moderate Pain (4)(headache)        alert and no distress  PSYCH: Alert and some forgetfulness - ask her sister when she forgets ; coherent speech, normal   rate and volume, able to articulate logical thoughts, able   to abstract reason, no tangential thoughts, no hallucinations   or delusions  Her affect is normal and pleasant  RESP: No cough, no audible wheezing, able to talk in full sentences  Remainder of exam unable to be completed due to telephone visits    Brain MRI pending         Assessment/Plan:    Assessment & Plan     Post concussion syndrome  Continues to have memory problems  She is concerned about going back to work - as she works as a PCA and assist patients with medications.  Discussed - waiting to go back to work for now as she cannot go back with restriction - plan for follow up in a week   Due to continued symptoms plan for Brain MRI  - MR Brain w/o & w Contrast; Future     Tobacco Cessation:   reports that she has been smoking cigarettes. She started smoking about 6 years  "ago. She has a 0.25 pack-year smoking history. She has never used smokeless tobacco.        BMI:   Estimated body mass index is 35.3 kg/m  as calculated from the following:    Height as of 12/6/19: 1.575 m (5' 2\").    Weight as of 11/13/20: 87.5 kg (193 lb).            See Patient Instructions    No follow-ups on file.    AMELIA Martin [facility] [place]    Phone call duration:  16 minutes  Call started: 9:31  Call ended: 9:47                "

## 2020-12-04 NOTE — Clinical Note
Please fax note to her employer.  Also can we schedule her next Friday in clinic for follow up     Susan CISNEROS FNP-BC  Family Nurse Practitioner

## 2020-12-04 NOTE — TELEPHONE ENCOUNTER
Can we switch this or we can just have her come into one of the COVID rooms     Susan CISNEROS FNP-BC  Family Nurse Practitioner
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
## 2020-12-05 ASSESSMENT — ANXIETY QUESTIONNAIRES: GAD7 TOTAL SCORE: 0

## 2020-12-07 ENCOUNTER — HOSPITAL ENCOUNTER (OUTPATIENT)
Dept: MRI IMAGING | Facility: HOSPITAL | Age: 25
Discharge: HOME | End: 2020-12-07
Attending: NURSE PRACTITIONER | Admitting: NURSE PRACTITIONER
Payer: COMMERCIAL

## 2020-12-07 DIAGNOSIS — F07.81 POST CONCUSSION SYNDROME: ICD-10-CM

## 2020-12-07 PROCEDURE — A9585 GADOBUTROL INJECTION: HCPCS | Performed by: RADIOLOGY

## 2020-12-07 PROCEDURE — 70553 MRI BRAIN STEM W/O & W/DYE: CPT

## 2020-12-07 PROCEDURE — 255N000002 HC RX 255 OP 636: Performed by: RADIOLOGY

## 2020-12-07 RX ORDER — GADOBUTROL 604.72 MG/ML
7.5 INJECTION INTRAVENOUS ONCE
Status: COMPLETED | OUTPATIENT
Start: 2020-12-07 | End: 2020-12-07

## 2020-12-07 RX ADMIN — GADOBUTROL 7.5 ML: 604.72 INJECTION INTRAVENOUS at 13:53
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
## 2020-12-11 ENCOUNTER — OFFICE VISIT (OUTPATIENT)
Dept: FAMILY MEDICINE | Facility: OTHER | Age: 25
End: 2020-12-11
Attending: NURSE PRACTITIONER
Payer: COMMERCIAL

## 2020-12-11 VITALS
DIASTOLIC BLOOD PRESSURE: 80 MMHG | OXYGEN SATURATION: 99 % | SYSTOLIC BLOOD PRESSURE: 104 MMHG | BODY MASS INDEX: 33.11 KG/M2 | HEART RATE: 93 BPM | TEMPERATURE: 97.7 F | WEIGHT: 181 LBS

## 2020-12-11 DIAGNOSIS — R11.10 VOMITING AND DIARRHEA: ICD-10-CM

## 2020-12-11 DIAGNOSIS — R19.7 VOMITING AND DIARRHEA: ICD-10-CM

## 2020-12-11 DIAGNOSIS — F07.81 POST CONCUSSION SYNDROME: Primary | ICD-10-CM

## 2020-12-11 LAB
ALBUMIN SERPL-MCNC: 4 G/DL (ref 3.4–5)
ALP SERPL-CCNC: 87 U/L (ref 40–150)
ALT SERPL W P-5'-P-CCNC: 19 U/L (ref 0–50)
ANION GAP SERPL CALCULATED.3IONS-SCNC: 5 MMOL/L (ref 3–14)
AST SERPL W P-5'-P-CCNC: 12 U/L (ref 0–45)
BASOPHILS # BLD AUTO: 0 10E9/L (ref 0–0.2)
BASOPHILS NFR BLD AUTO: 0.5 %
BILIRUB SERPL-MCNC: 0.5 MG/DL (ref 0.2–1.3)
BUN SERPL-MCNC: 10 MG/DL (ref 7–30)
CALCIUM SERPL-MCNC: 8.8 MG/DL (ref 8.5–10.1)
CHLORIDE SERPL-SCNC: 108 MMOL/L (ref 94–109)
CO2 SERPL-SCNC: 27 MMOL/L (ref 20–32)
CREAT SERPL-MCNC: 0.95 MG/DL (ref 0.52–1.04)
DIFFERENTIAL METHOD BLD: NORMAL
EOSINOPHIL # BLD AUTO: 0.1 10E9/L (ref 0–0.7)
EOSINOPHIL NFR BLD AUTO: 1.5 %
ERYTHROCYTE [DISTWIDTH] IN BLOOD BY AUTOMATED COUNT: 12.4 % (ref 10–15)
GFR SERPL CREATININE-BSD FRML MDRD: 84 ML/MIN/{1.73_M2}
GLUCOSE SERPL-MCNC: 101 MG/DL (ref 70–99)
HCT VFR BLD AUTO: 41.1 % (ref 35–47)
HGB BLD-MCNC: 14.1 G/DL (ref 11.7–15.7)
IMM GRANULOCYTES # BLD: 0 10E9/L (ref 0–0.4)
IMM GRANULOCYTES NFR BLD: 0.2 %
LYMPHOCYTES # BLD AUTO: 2.3 10E9/L (ref 0.8–5.3)
LYMPHOCYTES NFR BLD AUTO: 26.9 %
MCH RBC QN AUTO: 31.4 PG (ref 26.5–33)
MCHC RBC AUTO-ENTMCNC: 34.3 G/DL (ref 31.5–36.5)
MCV RBC AUTO: 92 FL (ref 78–100)
MONOCYTES # BLD AUTO: 0.7 10E9/L (ref 0–1.3)
MONOCYTES NFR BLD AUTO: 8.3 %
NEUTROPHILS # BLD AUTO: 5.4 10E9/L (ref 1.6–8.3)
NEUTROPHILS NFR BLD AUTO: 62.6 %
NRBC # BLD AUTO: 0 10*3/UL
NRBC BLD AUTO-RTO: 0 /100
PLATELET # BLD AUTO: 277 10E9/L (ref 150–450)
POTASSIUM SERPL-SCNC: 3.8 MMOL/L (ref 3.4–5.3)
PROT SERPL-MCNC: 7.3 G/DL (ref 6.8–8.8)
RBC # BLD AUTO: 4.49 10E12/L (ref 3.8–5.2)
SODIUM SERPL-SCNC: 140 MMOL/L (ref 133–144)
TSH SERPL DL<=0.005 MIU/L-ACNC: 0.78 MU/L (ref 0.4–4)
WBC # BLD AUTO: 8.7 10E9/L (ref 4–11)

## 2020-12-11 PROCEDURE — 80053 COMPREHEN METABOLIC PANEL: CPT | Mod: ZL | Performed by: NURSE PRACTITIONER

## 2020-12-11 PROCEDURE — 80307 DRUG TEST PRSMV CHEM ANLYZR: CPT | Performed by: NURSE PRACTITIONER

## 2020-12-11 PROCEDURE — 36415 COLL VENOUS BLD VENIPUNCTURE: CPT | Performed by: NURSE PRACTITIONER

## 2020-12-11 PROCEDURE — G0463 HOSPITAL OUTPT CLINIC VISIT: HCPCS

## 2020-12-11 PROCEDURE — 84443 ASSAY THYROID STIM HORMONE: CPT | Performed by: NURSE PRACTITIONER

## 2020-12-11 PROCEDURE — 82746 ASSAY OF FOLIC ACID SERUM: CPT | Mod: ZL | Performed by: NURSE PRACTITIONER

## 2020-12-11 PROCEDURE — 86618 LYME DISEASE ANTIBODY: CPT | Performed by: NURSE PRACTITIONER

## 2020-12-11 PROCEDURE — 80053 COMPREHEN METABOLIC PANEL: CPT | Performed by: NURSE PRACTITIONER

## 2020-12-11 PROCEDURE — 85025 COMPLETE CBC W/AUTO DIFF WBC: CPT | Mod: ZL | Performed by: NURSE PRACTITIONER

## 2020-12-11 PROCEDURE — G0463 HOSPITAL OUTPT CLINIC VISIT: HCPCS | Mod: 25

## 2020-12-11 PROCEDURE — 80307 DRUG TEST PRSMV CHEM ANLYZR: CPT | Mod: ZL | Performed by: NURSE PRACTITIONER

## 2020-12-11 PROCEDURE — 86780 TREPONEMA PALLIDUM: CPT | Performed by: NURSE PRACTITIONER

## 2020-12-11 PROCEDURE — 82607 VITAMIN B-12: CPT | Performed by: NURSE PRACTITIONER

## 2020-12-11 PROCEDURE — 99213 OFFICE O/P EST LOW 20 MIN: CPT | Performed by: NURSE PRACTITIONER

## 2020-12-11 PROCEDURE — 86780 TREPONEMA PALLIDUM: CPT | Mod: ZL | Performed by: NURSE PRACTITIONER

## 2020-12-11 PROCEDURE — 84443 ASSAY THYROID STIM HORMONE: CPT | Mod: ZL | Performed by: NURSE PRACTITIONER

## 2020-12-11 PROCEDURE — 82607 VITAMIN B-12: CPT | Mod: ZL | Performed by: NURSE PRACTITIONER

## 2020-12-11 PROCEDURE — 36415 COLL VENOUS BLD VENIPUNCTURE: CPT | Mod: ZL | Performed by: NURSE PRACTITIONER

## 2020-12-11 PROCEDURE — 85025 COMPLETE CBC W/AUTO DIFF WBC: CPT | Performed by: NURSE PRACTITIONER

## 2020-12-11 PROCEDURE — 86618 LYME DISEASE ANTIBODY: CPT | Mod: ZL | Performed by: NURSE PRACTITIONER

## 2020-12-11 PROCEDURE — 82746 ASSAY OF FOLIC ACID SERUM: CPT | Performed by: NURSE PRACTITIONER

## 2020-12-11 ASSESSMENT — PAIN SCALES - GENERAL: PAINLEVEL: MODERATE PAIN (5)

## 2020-12-11 ASSESSMENT — ANXIETY QUESTIONNAIRES
5. BEING SO RESTLESS THAT IT IS HARD TO SIT STILL: NOT AT ALL
1. FEELING NERVOUS, ANXIOUS, OR ON EDGE: NOT AT ALL
3. WORRYING TOO MUCH ABOUT DIFFERENT THINGS: NOT AT ALL
7. FEELING AFRAID AS IF SOMETHING AWFUL MIGHT HAPPEN: NOT AT ALL
GAD7 TOTAL SCORE: 0
6. BECOMING EASILY ANNOYED OR IRRITABLE: NOT AT ALL
4. TROUBLE RELAXING: NOT AT ALL
2. NOT BEING ABLE TO STOP OR CONTROL WORRYING: NOT AT ALL

## 2020-12-11 ASSESSMENT — PATIENT HEALTH QUESTIONNAIRE - PHQ9: SUM OF ALL RESPONSES TO PHQ QUESTIONS 1-9: 0
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
## 2020-12-11 NOTE — NURSING NOTE
"Chief Complaint   Patient presents with     RECHECK       Initial /80   Pulse 93   Temp 97.7  F (36.5  C) (Tympanic)   Wt 82.1 kg (181 lb)   SpO2 99%   BMI 33.11 kg/m   Estimated body mass index is 33.11 kg/m  as calculated from the following:    Height as of 12/6/19: 1.575 m (5' 2\").    Weight as of this encounter: 82.1 kg (181 lb).  Medication Reconciliation: complete  Joselyn Rainey LPN  "

## 2020-12-11 NOTE — LETTER
December 11, 2020      Carolyn Mallory  [address] [facility] [address]        To Whom It May Concern:    Carolyn Mallory  was seen on 12/11/2020.  Please excuse her symptoms improved  due to cognitive impairment.        Sincerely,        AMELIA Martin CNP

## 2020-12-11 NOTE — LETTER
December 11, 2020      Carolyn Mallory  [address] [facility] [address]        To Whom It May Concern:    Carolyn Mallory was seen in our clinic. She may return to work with the following: *** on or about ***.      Sincerely,        AMELIA Martin CNP

## 2020-12-12 LAB
FOLATE SERPL-MCNC: 7.8 NG/ML
T PALLIDUM AB SER QL: NONREACTIVE
VIT B12 SERPL-MCNC: 370 PG/ML (ref 193–986)

## 2020-12-12 ASSESSMENT — ANXIETY QUESTIONNAIRES: GAD7 TOTAL SCORE: 0

## 2020-12-14 ENCOUNTER — TELEPHONE (OUTPATIENT)
Dept: BEHAVIORAL HEALTH | Facility: OTHER | Age: 25
End: 2020-12-14

## 2020-12-14 ENCOUNTER — TELEPHONE (OUTPATIENT)
Dept: FAMILY MEDICINE | Facility: OTHER | Age: 25
End: 2020-12-14

## 2020-12-14 LAB — B BURGDOR IGG+IGM SER QL: 0.09 (ref 0–0.89)

## 2020-12-14 NOTE — TELEPHONE ENCOUNTER
Called patient and let her know that she needs to make an eye appointment. She states she forgot and will do it today. She was notified that we want to know when and where so we can obtain those records. Patient agreed with no further questions. Joselyn Rainey LPN

## 2020-12-14 NOTE — TELEPHONE ENCOUNTER
Call back from patient reporting eye appt is scheduled as per below:    [facility]   Wednesday, 12/16/20 at 940 am.

## 2020-12-14 NOTE — TELEPHONE ENCOUNTER
Reached out to Brandon sandoval she scheduled an appointment for this Wednesday at [facility].

## 2020-12-14 NOTE — TELEPHONE ENCOUNTER
Judith from [employer] wanting to speak with Susan Hargrove nurse as they patient called over to Bill and was very vague and couldn't remember what day she was seen etc. They are wanting some more info and maybe records as they are unsure if this is an emergency or if it can wait etc. Please give judith a call back at [phone]

## 2020-12-14 NOTE — TELEPHONE ENCOUNTER
IKER DE OLIVEIRA received referral from Susan Clifford CNP. IKER DE OLIVEIRA reached out to patient and explained [facility]. Patient states she is unsure of she is interested in [facility] services - patient agreed to meet with this writer after her appt on 12/23/2020.           PRO Fowler  Social Work Care Coordinator  [facility]   [phone] or [phone]

## 2020-12-15 DIAGNOSIS — F07.81 POST CONCUSSION SYNDROME: Primary | ICD-10-CM

## 2020-12-15 DIAGNOSIS — R41.3 MEMORY LOSS: ICD-10-CM

## 2020-12-16 ENCOUNTER — MYC MEDICAL ADVICE (OUTPATIENT)
Dept: FAMILY MEDICINE | Facility: OTHER | Age: 25
End: 2020-12-16

## 2020-12-16 LAB — PAIN DRUG SCR UR W RPTD MEDS: NORMAL
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
## 2020-12-29 NOTE — PROGRESS NOTES
"Subjective     Carolyn Mallory is a 25 year old female who presents to clinic today for the following health issues:    HPI         Concern - follow up   Onset: ***  Description: ***  Intensity: {.:094684}  Progression of Symptoms:  {.:852425}  Accompanying Signs & Symptoms: ***  Previous history of similar problem: ***  Precipitating factors:        Worsened by: ***  Alleviating factors:        Improved by: ***  Therapies tried and outcome: {NONE DEFAULTED:508959::\" none \"}    {additonal problems for provider to add (Optional):180135}    Review of Systems   {ROS COMP (Optional):773168}      Objective    There were no vitals taken for this visit.  There is no height or weight on file to calculate BMI.  Physical Exam   {Exam List (Optional):567379}    {Diagnostic Test Results (Optional):510956}        {PROVIDER CHARTING PREFERENCE:367102}    "

## 2020-12-30 ENCOUNTER — VIRTUAL VISIT (OUTPATIENT)
Dept: FAMILY MEDICINE | Facility: OTHER | Age: 25
End: 2020-12-30
Attending: NURSE PRACTITIONER
Payer: COMMERCIAL

## 2020-12-30 DIAGNOSIS — N63.20 LEFT BREAST LUMP: Primary | ICD-10-CM

## 2020-12-30 DIAGNOSIS — F41.0 PANIC ATTACK: ICD-10-CM
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
## 2020-12-30 DIAGNOSIS — R68.89 FORGETFULNESS: ICD-10-CM

## 2020-12-30 PROCEDURE — 99214 OFFICE O/P EST MOD 30 MIN: CPT | Mod: 95 | Performed by: NURSE PRACTITIONER

## 2020-12-30 ASSESSMENT — PAIN SCALES - GENERAL: PAINLEVEL: MODERATE PAIN (4)

## 2021-01-04 ENCOUNTER — TELEPHONE (OUTPATIENT)
Dept: FAMILY MEDICINE | Facility: OTHER | Age: 26
End: 2021-01-04

## 2021-01-04 ENCOUNTER — TELEPHONE (OUTPATIENT)
Dept: ULTRASOUND IMAGING | Facility: HOSPITAL | Age: 26
End: 2021-01-04

## 2021-01-04 ENCOUNTER — HOSPITAL ENCOUNTER (OUTPATIENT)
Dept: ULTRASOUND IMAGING | Facility: HOSPITAL | Age: 26
Discharge: HOME | End: 2021-01-04
Attending: NURSE PRACTITIONER | Admitting: NURSE PRACTITIONER
Payer: COMMERCIAL

## 2021-01-04 PROCEDURE — 76642 ULTRASOUND BREAST LIMITED: CPT | Mod: LT

## 2021-01-04 NOTE — TELEPHONE ENCOUNTER
Left message informing patient that Susan would like to see her in clinic for a follow up office visit. Please help patient get scheduled

## 2021-01-04 NOTE — TELEPHONE ENCOUNTER
Called to change patients appointment to 3:30p. Reminded her no guests allowed in building with her at this time

## 2021-01-06 ENCOUNTER — OFFICE VISIT (OUTPATIENT)
Dept: FAMILY MEDICINE | Facility: OTHER | Age: 26
End: 2021-01-06
Attending: NURSE PRACTITIONER
Payer: COMMERCIAL

## 2021-01-06 VITALS
OXYGEN SATURATION: 99 % | HEART RATE: 80 BPM | DIASTOLIC BLOOD PRESSURE: 62 MMHG | WEIGHT: 185 LBS | BODY MASS INDEX: 33.84 KG/M2 | SYSTOLIC BLOOD PRESSURE: 102 MMHG | TEMPERATURE: 98.5 F

## 2021-01-06 DIAGNOSIS — N64.4 BREAST TENDERNESS IN FEMALE: Primary | ICD-10-CM

## 2021-01-06 PROCEDURE — G0463 HOSPITAL OUTPT CLINIC VISIT: HCPCS

## 2021-01-06 PROCEDURE — 99213 OFFICE O/P EST LOW 20 MIN: CPT | Performed by: NURSE PRACTITIONER

## 2021-01-06 ASSESSMENT — ANXIETY QUESTIONNAIRES
7. FEELING AFRAID AS IF SOMETHING AWFUL MIGHT HAPPEN: NOT AT ALL
4. TROUBLE RELAXING: NOT AT ALL
6. BECOMING EASILY ANNOYED OR IRRITABLE: NOT AT ALL
2. NOT BEING ABLE TO STOP OR CONTROL WORRYING: NOT AT ALL
1. FEELING NERVOUS, ANXIOUS, OR ON EDGE: NOT AT ALL
5. BEING SO RESTLESS THAT IT IS HARD TO SIT STILL: NOT AT ALL
GAD7 TOTAL SCORE: 0
3. WORRYING TOO MUCH ABOUT DIFFERENT THINGS: NOT AT ALL

## 2021-01-06 ASSESSMENT — PAIN SCALES - GENERAL: PAINLEVEL: MODERATE PAIN (4)

## 2021-01-06 ASSESSMENT — PATIENT HEALTH QUESTIONNAIRE - PHQ9: SUM OF ALL RESPONSES TO PHQ QUESTIONS 1-9: 0

## 2021-01-06 NOTE — PROGRESS NOTES
Assessment & Plan     Breast tenderness in female  Lower left breast tenderness, normal US, no signs of infection - both breast feel similar   Discussed symptomatic treatment and provided education on self care  She should follow up if tenderness continues              Tobacco Cessation:   reports that she has been smoking cigarettes. She started smoking about 6 years ago. She has a 0.25 pack-year smoking history. She has never used smokeless tobacco.        See Patient Instructions    No follow-ups on file.    AMELIA Martin CNP  [facility] [place]    Subjective     Carolyn Mallory is a 25 year old who presents to clinic today for the following health issues     HPI     Left Breast Lump   During virtual visit on 12/30/2020 Carolyn stated she noted a lump on her left lower breast  She has concerns about lump even after US - she was encouraged to come in for an appointment  To have lump assessed in clinic   US  Completed   Exam: US BREAST LEFT LIMITED 1-3 QUADRANTS  HISTORY: Palpable abnormality left breast.  COMPARISON: None  TECHNIQUE: Grayscale and color ultrasound was performed of the area in  question.     FINDINGS: Ultrasound evaluation demonstrates normal appearance of the  tissues. No evidence of mass or other abnormality.                                                                   IMPRESSION: No ultrasound evidence that would suggest malignancy.     BI-RADS CODE:  1-Negative       RECOMMENDED FOLLOW-UP: Annual Mammography.     CHAU ROBERTSON MD    Concern - left breast lump  Onset: unknown  Description: left breast lump hurts when pushed on   Intensity: mild  Progression of Symptoms:  Unknown   Accompanying Signs & Symptoms: none        Review of Systems   CONSTITUTIONAL: NEGATIVE for fever, chills, change in weight  RESP: NEGATIVE for significant cough or SOB  BREAST: left lower breast tenderness  CV: NEGATIVE for chest pain, palpitations or peripheral edema      Objective     /62   Pulse 80   Temp 98.5  F (36.9  C) (Tympanic)   Wt 83.9 kg (185 lb)   SpO2 99%   BMI 33.84 kg/m    Body mass index is 33.84 kg/m .  Physical Exam   GENERAL: alert and no distress  BREAST: tenderness with slight swelling left lower breast  SKIN: no suspicious lesions or rashes  PSYCH: mentation appears normal and affect flat    Us Breast Left Limited 1-3 Quadrants    Result Date: 1/4/2021  Exam: US BREAST LEFT LIMITED 1-3 QUADRANTS HISTORY: Palpable abnormality left breast. COMPARISON: None TECHNIQUE: Grayscale and color ultrasound was performed of the area in question. FINDINGS: Ultrasound evaluation demonstrates normal appearance of the tissues. No evidence of mass or other abnormality.     IMPRESSION: No ultrasound evidence that would suggest malignancy. BI-RADS CODE:  1-Negative  RECOMMENDED FOLLOW-UP: Annual Mammography. CHAU ROBERTSON MD    Does not need yearly mammograms as she is only 25.  Can repeat US as needed

## 2021-01-06 NOTE — NURSING NOTE
"Chief Complaint   Patient presents with     RECHECK       Initial /62   Pulse 80   Temp 98.5  F (36.9  C) (Tympanic)   Wt 83.9 kg (185 lb)   SpO2 99%   BMI 33.84 kg/m   Estimated body mass index is 33.84 kg/m  as calculated from the following:    Height as of 12/6/19: 1.575 m (5' 2\").    Weight as of this encounter: 83.9 kg (185 lb).  Medication Reconciliation: complete  Joselyn Rainey LPN  "

## 2021-01-06 NOTE — PATIENT INSTRUCTIONS
Patient Education     Breast Lump, Uncertain Cause    A lump was found in your breast. Most breast lumps are not cancer. They may be caused by normal changes in the breast tissue due to hormone variations that occur with your menstrual cycle. Some women may form lumps that are painful and tender. Others may form lumps that are painless.  At this time, is not possible to be certain of the cause of your lump without further evaluation. This could include:    Another exam by your healthcare provider or a gynecologist    Imaging tests, such as a mammogram or ultrasound    Biopsy (procedure to remove small tissue samples from the breast lump)  Your healthcare provider will explain any additional testing that is needed. Be sure to get answers to any questions you may have.  Home care  Until a diagnosis is made, you may be advised to do the following:    If you are having breast pain:  ? Take an over-the-counter pain reliever, if directed to by your provider.  ? Wear a well-fitted bra or sports bra for extra support. If you have breast pain at night, try wearing the bra during sleep.  ? Apply a warm compress (towel soaked in warm water) to the breast. You may also use a hot water bottle.    Check your breasts each day. Keep a log of whether the lump seems to be changing in size or tenderness with your period. This can help your healthcare provider make the correct diagnosis.  Follow-up care  Follow up with your healthcare provider, or as directed. Keep all appointments. Also, prepare for any upcoming tests as directed.  When to seek medical advice  Call your healthcare provider right away if any of these occur:    Fever of 100.4 F (38 C) or higher    Redness or swelling of the breast    Discharge from the nipple    Visible changes in the skin over the nipple or breast    Lump grows larger, feels very hard, or has an irregular shape    New lumps form  Haritha last reviewed this educational content on 3/1/2017    [id number]  [employer]. [address] [place] [address], [place] [address]. All rights reserved. This information is not intended as a substitute for professional medical care. Always follow your healthcare professional's instructions.

## 2021-01-07 ASSESSMENT — ANXIETY QUESTIONNAIRES: GAD7 TOTAL SCORE: 0

## 2021-01-14 ENCOUNTER — TELEPHONE (OUTPATIENT)
Dept: FAMILY MEDICINE | Facility: OTHER | Age: 26
End: 2021-01-14

## 2021-01-14 NOTE — TELEPHONE ENCOUNTER
Patient called and needs a form filled out ASAP. She would like a call back from Susan's nurse today if possible.

## 2021-01-14 NOTE — TELEPHONE ENCOUNTER
Form received DMV form ,placed in provider's wall bin.   After form is completed patient would like form to be brought up front and call pt when done. Pt asks that this is filled out ASAP
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
## 2021-01-20 ENCOUNTER — TRANSFERRED RECORDS (OUTPATIENT)
Dept: HEALTH INFORMATION MANAGEMENT | Facility: CLINIC | Age: 26
End: 2021-01-20

## 2021-05-18 ENCOUNTER — TELEPHONE (OUTPATIENT)
Dept: FAMILY MEDICINE | Facility: OTHER | Age: 26
End: 2021-05-18

## 2021-05-18 ENCOUNTER — MYC MEDICAL ADVICE (OUTPATIENT)
Dept: FAMILY MEDICINE | Facility: OTHER | Age: 26
End: 2021-05-18

## 2021-05-18 NOTE — TELEPHONE ENCOUNTER
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  States she needs the note to cover the past 2 weeks and until she goes back to work.  Patient has an appointment with provider on the 24th to determine when she will go back to work.  She needs a note now because she hasn't been getting paid and needs to pay her bills. Note should states that she is not seeing her provider until the 24th to determine if/when she returns to work.      Post letter on her My Chart and she can forward to the insurance coming.  Address the letter to Krupa Adams.

## 2021-05-19 NOTE — TELEPHONE ENCOUNTER
Already did - she sent a ip.access message that was forwarded to Dr. Henderson. Joselyn Rainey LPN

## 2021-05-19 NOTE — TELEPHONE ENCOUNTER
Please let pt know that PCP is out until 5/24   Marcia: Likely SSS in context of a. fib. TSH unremarkable. Accepted as transfer, to be seen by EP. Admit.

## 2021-05-19 NOTE — TELEPHONE ENCOUNTER
Pt called regarding below. Needs a note stating that she has to be out of work until appt on 5/24. Please advise. Thank you!

## 2021-05-19 NOTE — TELEPHONE ENCOUNTER
The last note I see from [facility] is from January. They were managing her workability forms. Recommend this comes from them.     Carli Henderson MD

## 2021-05-19 NOTE — TELEPHONE ENCOUNTER
Call back from patient inquiring on a workability slip for work to reflect she is out of work and unable to return at this time.     Patient has been instructed to reach out to [facility]  as Susan Clifford referred patient to Neuro and per Dr. Henderson the workability is to come from [facility] as they were managing the workability forms.     Patient verbalized understanding.

## 2021-05-24 ENCOUNTER — OFFICE VISIT (OUTPATIENT)
Dept: FAMILY MEDICINE | Facility: OTHER | Age: 26
End: 2021-05-24
Attending: NURSE PRACTITIONER
Payer: COMMERCIAL

## 2021-05-24 VITALS
WEIGHT: 170 LBS | OXYGEN SATURATION: 97 % | HEART RATE: 87 BPM | BODY MASS INDEX: 31.09 KG/M2 | DIASTOLIC BLOOD PRESSURE: 62 MMHG | SYSTOLIC BLOOD PRESSURE: 102 MMHG | TEMPERATURE: 98.2 F

## 2021-05-24 DIAGNOSIS — F07.81 POST CONCUSSION SYNDROME: Primary | ICD-10-CM

## 2021-05-24 PROCEDURE — 99214 OFFICE O/P EST MOD 30 MIN: CPT | Performed by: NURSE PRACTITIONER

## 2021-05-24 ASSESSMENT — PAIN SCALES - GENERAL: PAINLEVEL: MODERATE PAIN (5)
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
## 2021-05-24 NOTE — NURSING NOTE
"Chief Complaint   Patient presents with     RECHECK       Initial /62   Pulse 87   Temp 98.2  F (36.8  C) (Tympanic)   Wt 77.1 kg (170 lb)   SpO2 97%   BMI 31.09 kg/m   Estimated body mass index is 31.09 kg/m  as calculated from the following:    Height as of 12/6/19: 1.575 m (5' 2\").    Weight as of this encounter: 77.1 kg (170 lb).  Medication Reconciliation: complete  Joselyn Rainey LPN  "

## 2021-05-31 ENCOUNTER — HOSPITAL ENCOUNTER (EMERGENCY)
Facility: HOSPITAL | Age: 26
Discharge: HOME | End: 2021-05-31
Attending: EMERGENCY MEDICINE | Admitting: EMERGENCY MEDICINE
Payer: COMMERCIAL

## 2021-05-31 VITALS
RESPIRATION RATE: 16 BRPM | DIASTOLIC BLOOD PRESSURE: 85 MMHG | TEMPERATURE: 98.3 F | SYSTOLIC BLOOD PRESSURE: 130 MMHG | HEART RATE: 83 BPM | OXYGEN SATURATION: 98 %

## 2021-05-31 DIAGNOSIS — T78.40XA ALLERGIC REACTION, INITIAL ENCOUNTER: ICD-10-CM

## 2021-05-31 DIAGNOSIS — R22.0 SWOLLEN LIP: ICD-10-CM

## 2021-05-31 PROCEDURE — G0463 HOSPITAL OUTPT CLINIC VISIT: HCPCS

## 2021-05-31 PROCEDURE — 99213 OFFICE O/P EST LOW 20 MIN: CPT | Performed by: EMERGENCY MEDICINE

## 2021-05-31 RX ORDER — DIPHENHYDRAMINE HCL 25 MG
25 CAPSULE ORAL EVERY 6 HOURS PRN
COMMUNITY

## 2021-05-31 NOTE — ED NOTES
Discharge instructions completed with patient with no questions or concerns. Will return with worsening symptoms.
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
## 2021-05-31 NOTE — DISCHARGE INSTRUCTIONS
You were seen today in the [facility] for lip swelling.  We do think you are having a mild allergic reaction to a contact irritant in your  Chapstick.  We would recommend washing the affected area well, disposing of the Chapstick, and continue to take Benadryl every 8 hours for persistent symptoms.  Right now with symptoms since yesterday we think this is exceedingly unlikely to turn into anything serious like more systemic allergic reaction or angioedema.  We also do not suspect anything else like an infection in this area.  It would be a good idea to follow-up with your primary care doctor by the end of the week if you do have any ongoing symptoms we expect things will improve within the next 1 to 2 days.

## 2021-08-06 ENCOUNTER — TELEPHONE (OUTPATIENT)
Dept: FAMILY MEDICINE | Facility: OTHER | Age: 26
End: 2021-08-06
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
## 2021-08-09 NOTE — TELEPHONE ENCOUNTER
She needs to schedule with her neurologist who she is already established with.  I cannot giver her another note.  I gave her one the last time she was in until she followed up with neurology.      Susan CISNEROS FNP-BC  Family Nurse Practitioner

## 2021-08-09 NOTE — TELEPHONE ENCOUNTER
She should be getting this from her neurologist     Susan CISNEROS FNP-BC  Family Nurse Practitioner

## 2021-08-09 NOTE — TELEPHONE ENCOUNTER
Patient is now requesting a referral to another neurologist says cannot be seen until September with this neurologist.

## 2021-08-09 NOTE — TELEPHONE ENCOUNTER
Informed patient that this note should come from neurology.  Patient stated they refused to do letter sating it should come from her primary.  She also requested referral to abn\other neurologist.

## 2021-08-09 NOTE — TELEPHONE ENCOUNTER
She can request to be put on a list for an earlier appointment with her neurologist.  I have not seen Carolyn since May and I gave her a note until seen by neurology.  If she missed that appointment she will need to work with them to try and get another appointment.  I cannot give her another note.  I am not sure what happened to the appointment she had scheduled for this summer.    Susan CISNEROS FNP-BC  Family Nurse Practitioner

## 2021-08-23 NOTE — PLAN OF CARE
HPI/Subjective:   Sita Trujillo is a 53year old female who presents for Abdominal Pain (last week stomach pains, felt sour stomach, nausea, no d/v/f x 2 days, subsided, feels ok today)     Symptoms as above. Does feel better today.   Has never had simil Pt reports to L&D unit with complaints of seeing spots, headaches not relieved by prescribed medication (allergy to tylenol, see alternative), feeling dizzy, vomiting, and having on and off contractions that are  5-10 minutes a part. VSS on admission, reflexes +1 bilaterally, no edema noted. Denies bleeding, states decreased fetal movement, does states she feels baby move    Aram Myers MD, 6/17/2021, 8:44 AM Prolonged labor (>20hrs)

## 2021-10-16 ENCOUNTER — HOSPITAL ENCOUNTER (EMERGENCY)
Facility: HOSPITAL | Age: 26
Discharge: HOME | End: 2021-10-16
Attending: NURSE PRACTITIONER | Admitting: NURSE PRACTITIONER
Payer: COMMERCIAL

## 2021-10-16 ENCOUNTER — APPOINTMENT (OUTPATIENT)
Dept: GENERAL RADIOLOGY | Facility: HOSPITAL | Age: 26
End: 2021-10-16
Attending: NURSE PRACTITIONER
Payer: COMMERCIAL

## 2021-10-16 VITALS
RESPIRATION RATE: 16 BRPM | SYSTOLIC BLOOD PRESSURE: 127 MMHG | DIASTOLIC BLOOD PRESSURE: 83 MMHG | HEART RATE: 102 BPM | TEMPERATURE: 99 F | OXYGEN SATURATION: 98 %

## 2021-10-16 DIAGNOSIS — S93.402A SPRAIN OF LEFT ANKLE, UNSPECIFIED LIGAMENT, INITIAL ENCOUNTER: ICD-10-CM

## 2021-10-16 DIAGNOSIS — S93.402A LEFT ANKLE SPRAIN: Primary | ICD-10-CM

## 2021-10-16 PROCEDURE — 73610 X-RAY EXAM OF ANKLE: CPT | Mod: LT

## 2021-10-16 PROCEDURE — G0463 HOSPITAL OUTPT CLINIC VISIT: HCPCS

## 2021-10-16 PROCEDURE — 99213 OFFICE O/P EST LOW 20 MIN: CPT | Performed by: NURSE PRACTITIONER

## 2021-10-16 ASSESSMENT — ENCOUNTER SYMPTOMS
FEVER: 0
CHILLS: 0
WOUND: 0
VOMITING: 0
MYALGIAS: 1
DIARRHEA: 0
PSYCHIATRIC NEGATIVE: 1
SHORTNESS OF BREATH: 0
NAUSEA: 0
ARTHRALGIAS: 1

## 2021-10-16 NOTE — ED TRIAGE NOTES
Pt states she fell down stairs last night and twisted left ankle. Pt denies hitting head, denies any neck pain.
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
## 2021-10-17 NOTE — DISCHARGE INSTRUCTIONS
Rest  Ice  Compression -Ace wrap  Elevate  Alternate Tylenol and ibuprofen  Follow-up with primary care provider or return to urgent care-ED with any worsening in condition or additional concerns.

## 2021-10-21 ENCOUNTER — TRANSFERRED RECORDS (OUTPATIENT)
Dept: HEALTH INFORMATION MANAGEMENT | Facility: HOSPITAL | Age: 26
End: 2021-10-21

## 2021-11-22 ENCOUNTER — TELEPHONE (OUTPATIENT)
Dept: FAMILY MEDICINE | Facility: OTHER | Age: 26
End: 2021-11-22
Payer: COMMERCIAL

## 2021-11-22 NOTE — TELEPHONE ENCOUNTER
Call from patient requesting name of facility for neurology referral and fax # for her work. Patient provided with the following information.     Facility:[facility]  Provider/Specialty:Neurologyis  Phone [phone]  Fax [phone]

## 2021-11-26 ENCOUNTER — MYC MEDICAL ADVICE (OUTPATIENT)
Dept: FAMILY MEDICINE | Facility: OTHER | Age: 26
End: 2021-11-26
Payer: COMMERCIAL

## 2021-12-16 ENCOUNTER — MYC MEDICAL ADVICE (OUTPATIENT)
Dept: FAMILY MEDICINE | Facility: OTHER | Age: 26
End: 2021-12-16
Payer: COMMERCIAL
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
## 2021-12-21 ENCOUNTER — OFFICE VISIT (OUTPATIENT)
Dept: FAMILY MEDICINE | Facility: OTHER | Age: 26
End: 2021-12-21
Attending: NURSE PRACTITIONER
Payer: COMMERCIAL

## 2021-12-21 VITALS
HEART RATE: 82 BPM | DIASTOLIC BLOOD PRESSURE: 70 MMHG | TEMPERATURE: 97.8 F | BODY MASS INDEX: 32.47 KG/M2 | HEIGHT: 61 IN | SYSTOLIC BLOOD PRESSURE: 118 MMHG | WEIGHT: 172 LBS

## 2021-12-21 DIAGNOSIS — S09.90XS COGNITIVE DEFICIT DUE TO OLD HEAD TRAUMA: Primary | ICD-10-CM

## 2021-12-21 DIAGNOSIS — F09 COGNITIVE DEFICIT DUE TO OLD HEAD TRAUMA: Primary | ICD-10-CM

## 2021-12-21 PROCEDURE — G0463 HOSPITAL OUTPT CLINIC VISIT: HCPCS | Mod: 25

## 2021-12-21 PROCEDURE — 99214 OFFICE O/P EST MOD 30 MIN: CPT | Performed by: NURSE PRACTITIONER

## 2021-12-21 ASSESSMENT — PATIENT HEALTH QUESTIONNAIRE - PHQ9: SUM OF ALL RESPONSES TO PHQ QUESTIONS 1-9: 3

## 2021-12-21 ASSESSMENT — ANXIETY QUESTIONNAIRES
5. BEING SO RESTLESS THAT IT IS HARD TO SIT STILL: NOT AT ALL
3. WORRYING TOO MUCH ABOUT DIFFERENT THINGS: NOT AT ALL
6. BECOMING EASILY ANNOYED OR IRRITABLE: NOT AT ALL
GAD7 TOTAL SCORE: 0
2. NOT BEING ABLE TO STOP OR CONTROL WORRYING: NOT AT ALL
4. TROUBLE RELAXING: NOT AT ALL
1. FEELING NERVOUS, ANXIOUS, OR ON EDGE: NOT AT ALL
7. FEELING AFRAID AS IF SOMETHING AWFUL MIGHT HAPPEN: NOT AT ALL

## 2021-12-21 ASSESSMENT — MIFFLIN-ST. JEOR: SCORE: 1462.57

## 2021-12-21 ASSESSMENT — PAIN SCALES - GENERAL: PAINLEVEL: NO PAIN (0)

## 2021-12-21 NOTE — PATIENT INSTRUCTIONS
Patient Education     Problems with Thinking Skills After Brain Injury  One of the brain s main roles is to allow a person to think, remember, reason, and . After a brain injury, a person may be less able to coordinate sequential activities (apraxia), process thought (agnosia), or use language (aphasia). At first, therapy may be provided by medical professionals, physical therapists, and occupational therapists, but it often requires longer-term support by family and friends.     Coordinating function  Coordinating functions can be hard for a person with a brain injury. Even a simple task, such as combing hair, may be hard. It may need to be broken into steps. The team can teach you how to help the person.   You can help:    Find out what your loved one is working on. Ask them to do the task. Allow plenty of time.    Break all tasks into simple steps.    Change topics or tasks if your loved one gets confused.    Use pillboxes to help organize medicines.  Improving memory  One goal is to help people know where they are. Put up signs to label the bathroom, closet, and doorway. Put up maps of the person's room or the gym. Names of family and therapists may be on a daily schedule or in a journal.   You can help:    Keep visits short. But try to visit often.    Say who you are when you greet your loved one. Ask the same questions often.    Look at family photo albums with the person.  Relearning language skills  A person may have trouble understanding or using words. They may need to use gestures or eye blinks to communicate. To help a person relearn words, a therapist may point to an object and ask its name. If a person has physical trouble speaking, exercises may help. A speech therapist may show how to form the lips and mouth to make certain sounds.   Altered speech functions can be frustrating. It is important for family and friends to be supportive.   You can help:    Use picture flash cards with the  person.    Speak slowly. Use common words.    Speak in simple sentences. Stick to 1 idea or action.    Ask yes-or-no questions.    Give the person time to understand you and respond.    Bring the person back to the main topic.    Don t  talk down  to the person. And don't ignore them.    Keep calm if your loved one gets upset or agitated.  Bring some things to rehab that hold meaning for the person:     Photos of family or friends    Plants and [employer]    Favorite clothes    Posters    [employer] last reviewed this educational content on 8/1/2020 2000-2021 [employer]. All rights reserved. This information is not intended as a substitute for professional medical care. Always follow your healthcare professional's instructions.

## 2021-12-21 NOTE — NURSING NOTE
"Chief Complaint   Patient presents with     Letter for School/Work       Initial There were no vitals taken for this visit. Estimated body mass index is 31.09 kg/m  as calculated from the following:    Height as of 12/6/19: 1.575 m (5' 2\").    Weight as of 5/24/21: 77.1 kg (170 lb).  Medication Reconciliation: complete  Vidhi Mixon LPN  "

## 2021-12-21 NOTE — LETTER
December 21, 2021      Carolyn Mallory  [address] [place] [id number]        To Whom It May Concern:    Carolyn Mallory was seen in our clinic. She may return to work with the following: she will need someone to supervisor her due to continued cognitive dysfunction on or about anytime.  She cannot be released to work independent until seen and cleared by specialist.     Sincerely,        AMELIA Martin CNP

## 2021-12-22 ASSESSMENT — ANXIETY QUESTIONNAIRES: GAD7 TOTAL SCORE: 0

## 2022-01-25 ENCOUNTER — TRANSFERRED RECORDS (OUTPATIENT)
Dept: HEALTH INFORMATION MANAGEMENT | Facility: CLINIC | Age: 27
End: 2022-01-25

## 2022-01-27 ENCOUNTER — MYC MEDICAL ADVICE (OUTPATIENT)
Dept: FAMILY MEDICINE | Facility: OTHER | Age: 27
End: 2022-01-27
Payer: COMMERCIAL

## 2022-01-31 NOTE — ED PROVIDER NOTES
History     Chief Complaint   Patient presents with     Elbow Pain     Wrist Pain     Patient is a 23 year old female presenting with fall. The history is provided by the patient.   Trauma  Mechanism of injury: fall  Injury location: shoulder/arm  Injury location detail: R shoulder, R wrist and R elbow     Fall:       Fall occurred: down stairs       Impact surface: hard floor       Point of impact: outstretched arms    Current symptoms:       Associated symptoms:             Denies abdominal pain, back pain, chest pain, headache and neck pain.         Problem List:    Patient Active Problem List    Diagnosis Date Noted     Allergy to pollen 03/01/2018     Priority: Medium     Obesity, unspecified obesity severity, unspecified obesity type 06/09/2017     Priority: Medium     Tobacco use disorder 06/09/2017     Priority: Medium     Esophageal reflux 06/09/2017     Priority: Medium     BMI 34.0-34.9,adult 01/19/2017     Priority: Medium     Chronic right-sided thoracic back pain 05/24/2016     Priority: Medium     ACP (advance care planning) 04/26/2016     Priority: Medium     Advance Care Planning 4/26/2016: ACP Review of Chart / Resources Provided:  Reviewed chart for advance care plan.  Carolyn Mallory has no plan or code status on file. Discussed available resources and provided with information. .  Added by Lizzette Garcia           Calculus of kidney 12/16/2015     Priority: Medium     Bilateral hydronephrosis, flank pain, 3mm right lower pole non obstructing stone.  Dr. [facility] consult.  Declined stents.  Reconsider if hospitalization and no improvement 2-3 days.         Sacro ilial pain 10/30/2015     Priority: Medium     Patellofemoral syndrome of both knees 01/20/2015     Priority: Medium     Allergic rhinitis 01/17/2014     Priority: Medium     Problem list name updated by automated process. Provider to review       Food allergy 01/17/2014     Priority: Medium     Astigmatism 09/24/2013     Priority:  Returned call   Unable to leave voicemail die to the mailbox being full Medium     Overview:   IMO Update       Hypermetropia 09/24/2013     Priority: Medium        Past Medical History:    Past Medical History:   Diagnosis Date     NO ACTIVE PROBLEMS      Pregnancy        Past Surgical History:    Past Surgical History:   Procedure Laterality Date     ARTHROSCOPY KNEE Right 5/4/2015    Procedure: ARTHROSCOPY KNEE;  Surgeon: Hernando Kulkarni MD;  Location: [facility]     AS ESOPHAGOSCOPY, DIAGNOSTIC      upper     LAPAROSCOPIC CHOLECYSTECTOMY N/A 10/10/2015    Procedure: LAPAROSCOPIC CHOLECYSTECTOMY;  Surgeon: Drew Padgett MD;  Location: [facility]     none         Family History:    Family History   Problem Relation Age of Onset     Thrombophilia Mother      blood clotting     Lupus Mother      erythematosus     Diabetes Mother      Hypertension Father      Asthma Sister      Diabetes Maternal Grandfather      Hypertension Maternal Grandfather      Lupus Maternal Aunt      erythematosus       Social History:  Marital Status:  Single [1]  Social History   Substance Use Topics     Smoking status: Current Every Day Smoker     Packs/day: 0.25     Years: 1.00     Types: Cigarettes     Start date: 1/20/2014     Smokeless tobacco: Never Used     Alcohol use No        Medications:      Diphenhyd-HC-Nystatin-Tetracyc (FIRST-JEFFREY MOUTHWASH) SUSP   diphenhydrAMINE (BENADRYL) 25 MG tablet   famotidine (PEPCID) 40 MG tablet   fluticasone (FLONASE) 50 MCG/ACT spray   ibuprofen (ADVIL,MOTRIN) 400 MG tablet   loratadine (CLARITIN) 10 MG tablet   EPINEPHrine (EPIPEN) 0.3 MG/0.3ML injection   traMADol (ULTRAM) 50 MG tablet         Review of Systems   Constitutional: Negative for fever.   HENT: Negative for congestion.    Eyes: Negative for redness.   Respiratory: Negative for shortness of breath.    Cardiovascular: Negative for chest pain.   Gastrointestinal: Negative for abdominal pain.   Genitourinary: Negative for difficulty urinating.   Musculoskeletal: Negative for arthralgias, back pain, gait  problem, joint swelling, neck pain and neck stiffness.   Skin: Negative for color change.   Neurological: Negative for headaches.   Psychiatric/Behavioral: Negative for confusion.       Physical Exam   BP: 134/85  Pulse: 98  Temp: 98.8  F (37.1  C)  Resp: 16  SpO2: 100 %      Physical Exam   Constitutional: She is oriented to person, place, and time.   HENT:   Head: Atraumatic.   Eyes: Pupils are equal, round, and reactive to light.   Cardiovascular: Regular rhythm and normal heart sounds.    Pulmonary/Chest: Breath sounds normal. No respiratory distress. She exhibits no tenderness.   Abdominal: There is no tenderness.   Musculoskeletal:        Right shoulder: She exhibits pain. She exhibits normal range of motion, no swelling, no effusion, no deformity, no laceration, no spasm, normal pulse and normal strength.        Right elbow: She exhibits normal range of motion, no swelling, no effusion, no deformity and no laceration. No tenderness found. No radial head tenderness noted.        Right wrist: She exhibits tenderness and swelling. She exhibits normal range of motion, no bony tenderness and no effusion.        Cervical back: She exhibits no tenderness.        Thoracic back: She exhibits no tenderness.        Lumbar back: She exhibits no tenderness.        Left forearm: She exhibits no tenderness, no bony tenderness and no swelling.   Neurological: She is oriented to person, place, and time.       ED Course     ED Course     Procedures                   No results found for this or any previous visit (from the past 24 hour(s)).    Medications   naproxen (NAPROSYN) tablet 500 mg (500 mg Oral Given 10/30/18 0101)       Assessments & Plan (with Medical Decision Making)   Fell while walking downstairs, about 4 steps, landed on R arm  comlaining of R shoulder, elbow and wrist  Slight swelling of R wrist, other wise no significant obvious injury.  Xrays negative  Arm sling + wrist splint placed  Follow-up with PCP    I  have reviewed the nursing notes.    I have reviewed the findings, diagnosis, plan and need for follow up with the patient.      Discharge Medication List as of 10/30/2018  1:54 AM          Final diagnoses:   Contusion of right wrist, initial encounter   Sprain of right elbow, initial encounter   Sprain of right shoulder, unspecified shoulder sprain type, initial encounter       10/30/2018   [facility]     Augustus Barnes MD  11/02/18 0645       Augustus Barnes MD  11/02/18 0646

## 2022-03-03 ENCOUNTER — HOSPITAL ENCOUNTER (EMERGENCY)
Facility: HOSPITAL | Age: 27
Discharge: HOME | End: 2022-03-03
Attending: NURSE PRACTITIONER | Admitting: NURSE PRACTITIONER
Payer: COMMERCIAL

## 2022-03-03 VITALS
TEMPERATURE: 99.1 F | SYSTOLIC BLOOD PRESSURE: 110 MMHG | OXYGEN SATURATION: 100 % | HEART RATE: 73 BPM | DIASTOLIC BLOOD PRESSURE: 73 MMHG | RESPIRATION RATE: 16 BRPM

## 2022-03-03 DIAGNOSIS — J02.9 PHARYNGITIS, UNSPECIFIED ETIOLOGY: ICD-10-CM

## 2022-03-03 DIAGNOSIS — B34.9 VIRAL ILLNESS: Primary | ICD-10-CM

## 2022-03-03 LAB — GROUP A STREP BY PCR: NOT DETECTED

## 2022-03-03 PROCEDURE — G0463 HOSPITAL OUTPT CLINIC VISIT: HCPCS

## 2022-03-03 PROCEDURE — C9803 HOPD COVID-19 SPEC COLLECT: HCPCS

## 2022-03-03 PROCEDURE — 87637 SARSCOV2&INF A&B&RSV AMP PRB: CPT | Performed by: NURSE PRACTITIONER

## 2022-03-03 PROCEDURE — 99213 OFFICE O/P EST LOW 20 MIN: CPT | Performed by: NURSE PRACTITIONER

## 2022-03-03 PROCEDURE — 87651 STREP A DNA AMP PROBE: CPT | Performed by: NURSE PRACTITIONER

## 2022-03-03 ASSESSMENT — ENCOUNTER SYMPTOMS
DIZZINESS: 1
SORE THROAT: 1
SHORTNESS OF BREATH: 0
WHEEZING: 0
FREQUENCY: 0
DIARRHEA: 1
FATIGUE: 1
NAUSEA: 1
CHILLS: 1
DYSURIA: 0
COLOR CHANGE: 0
BLOOD IN STOOL: 0
RHINORRHEA: 1
LIGHT-HEADEDNESS: 1
FEVER: 1
WOUND: 0
ABDOMINAL PAIN: 0
APPETITE CHANGE: 1
HEADACHES: 1
HEMATURIA: 0
WEAKNESS: 0
COUGH: 1
DIFFICULTY URINATING: 0
CONSTIPATION: 0
VOMITING: 1
PALPITATIONS: 0

## 2022-03-03 NOTE — Clinical Note
Carolyn Mallory was seen and treated in our emergency department on 3/3/2022.  She may return to work on 03/04/2022.  Quarantine at home. May return to work pending test results and feeling improved symptoms.     If you have any questions or concerns, please don't hesitate to call.      Conchis Echeverria, CNP

## 2022-03-04 LAB
FLUAV RNA SPEC QL NAA+PROBE: NEGATIVE
FLUBV RNA RESP QL NAA+PROBE: NEGATIVE
RSV RNA SPEC NAA+PROBE: NEGATIVE
SARS-COV-2 RNA RESP QL NAA+PROBE: NEGATIVE

## 2022-03-04 NOTE — ED TRIAGE NOTES
Pt presents with c/o diarrhea, fever (at home 101), sore throat, migraine, loss of taste and smell, body aches, and left ear pain. Sx started on Monday. Pt is not covid vaccinated. Denies exposure. Pt has been taking ibuprofen.
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
## 2022-03-04 NOTE — DISCHARGE INSTRUCTIONS
(B34.9) Viral illness  (primary encounter diagnosis)  Comment: acute, symptomatic  Plan: Symptoms most consistent with viral URI. COVID pending.   Recommend symptomatic treatments   -- Symptomatic treatments recommended.  -Discussed that antibiotics would not help symptoms of viral URI. Education provided on symptoms of secondary bacterial infection such as new fever, chills, rigors, shortness of breath, increased work of breathing, that can occur with viral URI and need for further evaluation, if they occur.   - Ensure you are staying hydrated by drinking plenty of fluids or eating foods such as popsicles, jello, pudding.  - Honey can be soothing for sore throat  - Warm salt water gurgles can help soothe sore throat  - Rest  - Humidifier can help with congestion and help keep mucus membranes such as throat and nose from drying out.  - Sleeping slightly propped up can help with congestion and postnasal drainage that can worsen cough at bedtime.  - As long as you have never been told to take Tylenol and/or Ibuprofen you can use them to manage fever and body aches per package instructions  Make sure you eat when you take ibuprofen to avoid stomach upset.  - OTC cough medications per package instructions to help with cough. Check to see if the cough/cold medication already has acetaminophen (Tylenol) in it. If it does avoid taking additional Tylenol.  - If sudden onset of new fever, worsening symptoms return for further evaluation.  - OTC nasal steroid such as Flonase can help decrease sinus inflammation to help with congestion.  - Education provided on symptoms of post-viral bacterial infections including ear infection and pneumonia. This would require re-evaluation for treatment.      (J02.9) Pharyngitis, unspecified etiology  Comment: acute, symptomatic  Plan: No clinical findings concerning for strep. Strep test pending. Likely viral. Soft diet and symptomatic treatments.  If strep is positive will send RX for  azithromycin (amoxicillin allergy) to [facility] [place].       RETURN TO THE ED WITH NEW OR WORSENING SYMPTOMS.    FOLLOW-UP WITH YOUR PRIMARY CARE PROVIDER IN 7-10 DAYS IF NO IMPROVEMENT.      Conchis Echeverria, CNP

## 2022-05-19 ENCOUNTER — HOSPITAL ENCOUNTER (EMERGENCY)
Facility: HOSPITAL | Age: 27
Discharge: HOME | End: 2022-05-19
Attending: NURSE PRACTITIONER | Admitting: NURSE PRACTITIONER
Payer: COMMERCIAL

## 2022-05-19 VITALS
SYSTOLIC BLOOD PRESSURE: 136 MMHG | DIASTOLIC BLOOD PRESSURE: 85 MMHG | OXYGEN SATURATION: 99 % | RESPIRATION RATE: 16 BRPM | HEART RATE: 82 BPM | TEMPERATURE: 98 F

## 2022-05-19 DIAGNOSIS — J20.9 ACUTE BRONCHITIS, UNSPECIFIED ORGANISM: ICD-10-CM

## 2022-05-19 DIAGNOSIS — J02.9 VIRAL PHARYNGITIS: ICD-10-CM

## 2022-05-19 LAB — GROUP A STREP BY PCR: NOT DETECTED

## 2022-05-19 PROCEDURE — 99213 OFFICE O/P EST LOW 20 MIN: CPT | Performed by: NURSE PRACTITIONER

## 2022-05-19 PROCEDURE — G0463 HOSPITAL OUTPT CLINIC VISIT: HCPCS

## 2022-05-19 PROCEDURE — 87651 STREP A DNA AMP PROBE: CPT | Performed by: NURSE PRACTITIONER

## 2022-05-19 RX ORDER — CETIRIZINE HYDROCHLORIDE 5 MG/1
10 TABLET ORAL ONCE
Status: DISCONTINUED | OUTPATIENT
Start: 2022-05-19 | End: 2022-05-19

## 2022-05-19 RX ORDER — GUAIFENESIN 600 MG/1
1200 TABLET, EXTENDED RELEASE ORAL 2 TIMES DAILY
Qty: 28 TABLET | Refills: 0 | Status: SHIPPED | OUTPATIENT
Start: 2022-05-19 | End: 2022-05-26

## 2022-05-19 RX ORDER — CETIRIZINE HYDROCHLORIDE 10 MG/1
10 TABLET ORAL 2 TIMES DAILY PRN
Qty: 20 TABLET | Refills: 0 | Status: SHIPPED | OUTPATIENT
Start: 2022-05-19 | End: 2022-05-29

## 2022-05-19 ASSESSMENT — ENCOUNTER SYMPTOMS
ENDOCRINE NEGATIVE: 1
GASTROINTESTINAL NEGATIVE: 1
EYES NEGATIVE: 1
PSYCHIATRIC NEGATIVE: 1
FATIGUE: 0
NEUROLOGICAL NEGATIVE: 1
FEVER: 0
WHEEZING: 0
SORE THROAT: 1
ABDOMINAL PAIN: 0
CONSTITUTIONAL NEGATIVE: 1
SINUS PAIN: 0
MUSCULOSKELETAL NEGATIVE: 1
HEMATOLOGIC/LYMPHATIC NEGATIVE: 1
VOICE CHANGE: 0
SHORTNESS OF BREATH: 0
CARDIOVASCULAR NEGATIVE: 1
SINUS PRESSURE: 0
ALLERGIC/IMMUNOLOGIC NEGATIVE: 1
COUGH: 1

## 2022-05-20 NOTE — ED TRIAGE NOTES
Pt presents with sore throat since Monday. Pt states was vomiting all day Monday. Pt denies fever.

## 2022-05-20 NOTE — DISCHARGE INSTRUCTIONS
Thank you for choosing [facility] for your healthcare needs today.  For your viral pharyngitis that could be related to postnasal drip from allergy seasons try Zyrtec 10 mg daily for 2 weeks.  For your cough that is most likely an acute bronchitis, use Mucinex twice a day, increase fluids, rest is much as needed, and use Tylenol or ibuprofen for discomfort.  If your symptoms worsen or you develop any new concerning symptoms please return.  Thank you

## 2022-08-07 ENCOUNTER — APPOINTMENT (OUTPATIENT)
Dept: GENERAL RADIOLOGY | Facility: HOSPITAL | Age: 27
End: 2022-08-07
Attending: NURSE PRACTITIONER
Payer: COMMERCIAL

## 2022-08-07 ENCOUNTER — HOSPITAL ENCOUNTER (EMERGENCY)
Facility: HOSPITAL | Age: 27
Discharge: HOME | End: 2022-08-07
Attending: NURSE PRACTITIONER | Admitting: NURSE PRACTITIONER
Payer: COMMERCIAL

## 2022-08-07 VITALS
OXYGEN SATURATION: 99 % | SYSTOLIC BLOOD PRESSURE: 124 MMHG | HEART RATE: 109 BPM | DIASTOLIC BLOOD PRESSURE: 80 MMHG | RESPIRATION RATE: 16 BRPM | TEMPERATURE: 97.2 F

## 2022-08-07 DIAGNOSIS — S86.911A KNEE STRAIN, RIGHT, INITIAL ENCOUNTER: ICD-10-CM

## 2022-08-07 PROCEDURE — 73562 X-RAY EXAM OF KNEE 3: CPT | Mod: RT

## 2022-08-07 PROCEDURE — G0463 HOSPITAL OUTPT CLINIC VISIT: HCPCS

## 2022-08-07 PROCEDURE — 99213 OFFICE O/P EST LOW 20 MIN: CPT | Performed by: NURSE PRACTITIONER

## 2022-08-07 ASSESSMENT — ENCOUNTER SYMPTOMS
GASTROINTESTINAL NEGATIVE: 1
ALLERGIC/IMMUNOLOGIC NEGATIVE: 1
PSYCHIATRIC NEGATIVE: 1
CONSTITUTIONAL NEGATIVE: 1
ENDOCRINE NEGATIVE: 1
RESPIRATORY NEGATIVE: 1
NEUROLOGICAL NEGATIVE: 1
HEMATOLOGIC/LYMPHATIC NEGATIVE: 1
EYES NEGATIVE: 1
CARDIOVASCULAR NEGATIVE: 1

## 2022-08-07 NOTE — DISCHARGE INSTRUCTIONS
Rest   Ice  Compression - ace wrap./knee sleeve for support  Elevation   Ibuprofen 600-800 mg 3 times a day as needed      Follow up if no improvement or worsening symptoms

## 2022-08-07 NOTE — ED TRIAGE NOTES
Pt is present today with right knee pain accompanied by Carri Lu, pt turned while closing bedroom door and heard a pop, and fell over from pain, 30 minutes prior to coming in. Tried taking a bath but that caused more pain, able to walk on it but hurts worse when dangling, pain radiates down and up leg, currently is only in knee cap area.

## 2022-08-07 NOTE — ED TRIAGE NOTES
"Pt presents with c/o right knee pain onset 45 minutes ago.  Pt was turning and felt a \"pop\" in her knee. Pt was ambulatory into triage.       "
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
## 2022-08-08 ENCOUNTER — MYC MEDICAL ADVICE (OUTPATIENT)
Dept: FAMILY MEDICINE | Facility: OTHER | Age: 27
End: 2022-08-08

## 2022-08-08 DIAGNOSIS — M25.561 ACUTE PAIN OF RIGHT KNEE: Primary | ICD-10-CM

## 2022-08-12 ENCOUNTER — TELEPHONE (OUTPATIENT)
Dept: FAMILY MEDICINE | Facility: OTHER | Age: 27
End: 2022-08-12

## 2022-08-12 NOTE — TELEPHONE ENCOUNTER
Please call patient back .  She would like an MRI ordered for her right knee      Reason for ER/UC visit: Acute pain of right knee (Primary Dx)   8/11/22  New or Worsening symptoms:  Same has been to ER 2 times for this     Prescription Received/Picked up from Pharmacy?: brace and crutches and was told to stop walking on it Any questions regarding your prescription?: no    Follow-up Results or Labs that are pending: none pending    Do you have any questions or concerns?: yes was told to get a referral for an MRI    ER Recommends Follow-up By: with provider    RN Recommendations:       Thank you for your time, if you start feeling worse, or have any further questions, please feel free to contact us again at [phone].  If needing immediate medical attention at any time please call 911/Go to the ER.

## 2022-08-12 NOTE — TELEPHONE ENCOUNTER
She [facility] to [facility] at her last ER visit.  I was just reviewing the notes.  She can call them for an appointment     She can try [facility] or [facility]. If she wants to be seen sooner may need to go to [facility] CISNEROS FNP-BC  Family Nurse Practitioner

## 2022-08-17 RX ORDER — KETOROLAC TROMETHAMINE 10 MG/1
10 TABLET, FILM COATED ORAL EVERY 6 HOURS PRN
Qty: 20 TABLET | Refills: 0 | Status: SHIPPED | OUTPATIENT
Start: 2022-08-17 | End: 2022-11-23

## 2022-09-08 ENCOUNTER — TELEPHONE (OUTPATIENT)
Dept: CARE COORDINATION | Facility: OTHER | Age: 27
End: 2022-09-08

## 2022-09-08 NOTE — TELEPHONE ENCOUNTER
LVM to schedule pt for upcoming procedure.    covid test / [facility] / DOS 10-6-22 / Dr Hernadez FAX RESULTS [phone] (draw date 9-30-22)

## 2022-11-07 ENCOUNTER — MYC MEDICAL ADVICE (OUTPATIENT)
Dept: FAMILY MEDICINE | Facility: OTHER | Age: 27
End: 2022-11-07

## 2022-11-21 ENCOUNTER — HOSPITAL ENCOUNTER (EMERGENCY)
Facility: HOSPITAL | Age: 27
Discharge: HOME | End: 2022-11-21
Attending: PHYSICIAN ASSISTANT | Admitting: PHYSICIAN ASSISTANT
Payer: COMMERCIAL

## 2022-11-21 ENCOUNTER — APPOINTMENT (OUTPATIENT)
Dept: CT IMAGING | Facility: HOSPITAL | Age: 27
End: 2022-11-21
Attending: PHYSICIAN ASSISTANT
Payer: COMMERCIAL

## 2022-11-21 VITALS
RESPIRATION RATE: 20 BRPM | TEMPERATURE: 98.3 F | DIASTOLIC BLOOD PRESSURE: 67 MMHG | SYSTOLIC BLOOD PRESSURE: 104 MMHG | HEART RATE: 74 BPM | OXYGEN SATURATION: 95 %

## 2022-11-21 DIAGNOSIS — R51.9 HEADACHE: ICD-10-CM

## 2022-11-21 DIAGNOSIS — H81.10 BPPV (BENIGN PAROXYSMAL POSITIONAL VERTIGO): ICD-10-CM

## 2022-11-21 DIAGNOSIS — R42 VERTIGO: ICD-10-CM

## 2022-11-21 LAB
ANION GAP SERPL CALCULATED.3IONS-SCNC: 8 MMOL/L (ref 7–15)
BASOPHILS # BLD AUTO: 0.1 10E3/UL (ref 0–0.2)
BASOPHILS NFR BLD AUTO: 1 %
BUN SERPL-MCNC: 9.6 MG/DL (ref 6–20)
CALCIUM SERPL-MCNC: 8.8 MG/DL (ref 8.6–10)
CHLORIDE SERPL-SCNC: 104 MMOL/L (ref 98–107)
CREAT SERPL-MCNC: 0.81 MG/DL (ref 0.51–0.95)
DEPRECATED HCO3 PLAS-SCNC: 25 MMOL/L (ref 22–29)
EOSINOPHIL # BLD AUTO: 0.1 10E3/UL (ref 0–0.7)
EOSINOPHIL NFR BLD AUTO: 1 %
ERYTHROCYTE [DISTWIDTH] IN BLOOD BY AUTOMATED COUNT: 12.4 % (ref 10–15)
GFR SERPL CREATININE-BSD FRML MDRD: >90 ML/MIN/1.73M2
GLUCOSE SERPL-MCNC: 96 MG/DL (ref 70–99)
HCT VFR BLD AUTO: 39.6 % (ref 35–47)
HGB BLD-MCNC: 13.5 G/DL (ref 11.7–15.7)
IMM GRANULOCYTES # BLD: 0 10E3/UL
IMM GRANULOCYTES NFR BLD: 0 %
LYMPHOCYTES # BLD AUTO: 3 10E3/UL (ref 0.8–5.3)
LYMPHOCYTES NFR BLD AUTO: 32 %
MCH RBC QN AUTO: 31.3 PG (ref 26.5–33)
MCHC RBC AUTO-ENTMCNC: 34.1 G/DL (ref 31.5–36.5)
MCV RBC AUTO: 92 FL (ref 78–100)
MONOCYTES # BLD AUTO: 0.5 10E3/UL (ref 0–1.3)
MONOCYTES NFR BLD AUTO: 6 %
NEUTROPHILS # BLD AUTO: 5.6 10E3/UL (ref 1.6–8.3)
NEUTROPHILS NFR BLD AUTO: 60 %
NRBC # BLD AUTO: 0 10E3/UL
NRBC BLD AUTO-RTO: 0 /100
PLATELET # BLD AUTO: 304 10E3/UL (ref 150–450)
POTASSIUM SERPL-SCNC: 4.6 MMOL/L (ref 3.4–5.3)
RBC # BLD AUTO: 4.32 10E6/UL (ref 3.8–5.2)
SODIUM SERPL-SCNC: 137 MMOL/L (ref 136–145)
WBC # BLD AUTO: 9.3 10E3/UL (ref 4–11)

## 2022-11-21 PROCEDURE — 80048 BASIC METABOLIC PNL TOTAL CA: CPT | Performed by: PHYSICIAN ASSISTANT

## 2022-11-21 PROCEDURE — 85025 COMPLETE CBC W/AUTO DIFF WBC: CPT | Performed by: PHYSICIAN ASSISTANT

## 2022-11-21 PROCEDURE — 96374 THER/PROPH/DIAG INJ IV PUSH: CPT | Performed by: PHYSICIAN ASSISTANT

## 2022-11-21 PROCEDURE — 70450 CT HEAD/BRAIN W/O DYE: CPT

## 2022-11-21 PROCEDURE — 96375 TX/PRO/DX INJ NEW DRUG ADDON: CPT | Performed by: PHYSICIAN ASSISTANT

## 2022-11-21 PROCEDURE — 250N000011 HC RX IP 250 OP 636: Performed by: PHYSICIAN ASSISTANT

## 2022-11-21 PROCEDURE — 250N000011 HC RX IP 250 OP 636: Performed by: EMERGENCY MEDICINE

## 2022-11-21 PROCEDURE — 258N000003 HC RX IP 258 OP 636: Performed by: PHYSICIAN ASSISTANT

## 2022-11-21 PROCEDURE — 250N000013 HC RX MED GY IP 250 OP 250 PS 637: Performed by: PHYSICIAN ASSISTANT

## 2022-11-21 PROCEDURE — 99285 EMERGENCY DEPT VISIT HI MDM: CPT | Mod: 25 | Performed by: PHYSICIAN ASSISTANT

## 2022-11-21 PROCEDURE — 99283 EMERGENCY DEPT VISIT LOW MDM: CPT | Performed by: PHYSICIAN ASSISTANT

## 2022-11-21 PROCEDURE — 36415 COLL VENOUS BLD VENIPUNCTURE: CPT | Performed by: PHYSICIAN ASSISTANT

## 2022-11-21 RX ORDER — ONDANSETRON 4 MG/1
4 TABLET, ORALLY DISINTEGRATING ORAL ONCE
Status: COMPLETED | OUTPATIENT
Start: 2022-11-21 | End: 2022-11-21

## 2022-11-21 RX ORDER — DIPHENHYDRAMINE HYDROCHLORIDE 50 MG/ML
25 INJECTION INTRAMUSCULAR; INTRAVENOUS ONCE
Status: COMPLETED | OUTPATIENT
Start: 2022-11-21 | End: 2022-11-21

## 2022-11-21 RX ORDER — MECLIZINE HYDROCHLORIDE 25 MG/1
50 TABLET ORAL 3 TIMES DAILY PRN
Qty: 30 TABLET | Refills: 0 | Status: SHIPPED | OUTPATIENT
Start: 2022-11-21 | End: 2022-11-29

## 2022-11-21 RX ORDER — DIAZEPAM 5 MG
5 TABLET ORAL EVERY 6 HOURS PRN
Qty: 20 TABLET | Refills: 0 | Status: SHIPPED | OUTPATIENT
Start: 2022-11-21 | End: 2023-01-11

## 2022-11-21 RX ORDER — METOCLOPRAMIDE HYDROCHLORIDE 5 MG/ML
10 INJECTION INTRAMUSCULAR; INTRAVENOUS ONCE
Status: COMPLETED | OUTPATIENT
Start: 2022-11-21 | End: 2022-11-21

## 2022-11-21 RX ORDER — DIAZEPAM 5 MG
5 TABLET ORAL ONCE
Status: COMPLETED | OUTPATIENT
Start: 2022-11-21 | End: 2022-11-21

## 2022-11-21 RX ORDER — KETOROLAC TROMETHAMINE 15 MG/ML
15 INJECTION, SOLUTION INTRAMUSCULAR; INTRAVENOUS ONCE
Status: COMPLETED | OUTPATIENT
Start: 2022-11-21 | End: 2022-11-21

## 2022-11-21 RX ORDER — MECLIZINE HYDROCHLORIDE 25 MG/1
25 TABLET ORAL ONCE
Status: COMPLETED | OUTPATIENT
Start: 2022-11-21 | End: 2022-11-21

## 2022-11-21 RX ADMIN — KETOROLAC TROMETHAMINE 15 MG: 15 INJECTION, SOLUTION INTRAMUSCULAR; INTRAVENOUS at 19:05

## 2022-11-21 RX ADMIN — DIPHENHYDRAMINE HYDROCHLORIDE 25 MG: 50 INJECTION, SOLUTION INTRAMUSCULAR; INTRAVENOUS at 19:04

## 2022-11-21 RX ADMIN — SODIUM CHLORIDE 1000 ML: 9 INJECTION, SOLUTION INTRAVENOUS at 19:04

## 2022-11-21 RX ADMIN — DIAZEPAM 5 MG: 5 TABLET ORAL at 20:57

## 2022-11-21 RX ADMIN — ONDANSETRON 4 MG: 4 TABLET, ORALLY DISINTEGRATING ORAL at 16:57

## 2022-11-21 RX ADMIN — METOCLOPRAMIDE HYDROCHLORIDE 10 MG: 5 INJECTION INTRAMUSCULAR; INTRAVENOUS at 19:06

## 2022-11-21 RX ADMIN — MECLIZINE HYDROCHLORIDE 25 MG: 25 TABLET ORAL at 19:06

## 2022-11-21 ASSESSMENT — ACTIVITIES OF DAILY LIVING (ADL)
ADLS_ACUITY_SCORE: 35
ADLS_ACUITY_SCORE: 35

## 2022-11-21 NOTE — ED NOTES
Ambulated into ER room 11 independently with steady gait. States Zofran helped nausea. Continues to c/o headache rated 6/10. Requesting lights turned down. Light dimmed. Call light within reach.

## 2022-11-21 NOTE — ED TRIAGE NOTES
A few weeks ago had a pain in back of head, occasionally get dizzy spells and sometimes vomits.  Sometimes when she turns her head she has visual changes and sometimes feels a zap. Went to stretch today and got dizzy and almost fell today.  Pt is dizzy at this moment. contacted PCP about it and was told to come to the ED  Gagging in triage

## 2022-11-21 NOTE — ED NOTES
Patient presents with complaints of a headache. States she's had it for the last week now. She states that she does get headaches but this one has been going on for the last week. Patient was throwing up when nurse walked into the room, lights are still down and gave her an ice pack. She also endorses feeling dizzy that has been off and on for the last few hours, but states she has messaged her primary as this has been going on, but was told if it happened again she should come in to be seen.

## 2022-11-22 ENCOUNTER — TELEPHONE (OUTPATIENT)
Dept: FAMILY MEDICINE | Facility: OTHER | Age: 27
End: 2022-11-22

## 2022-11-22 NOTE — DISCHARGE INSTRUCTIONS
Will be primary care doctor in the next day or 2.  If symptoms get worse and you are unable to get in return to the ER.  I have set up an outpatient MRI for you..  Take medications as prescribed.  I also like you to have you follow-up in the balance clinic.

## 2022-11-22 NOTE — TELEPHONE ENCOUNTER
Emergency Department and Urgent Care Follow-up      Reason for ER/UC visit: HA and dizziness  o Date seen: 11.22.2022      New or Worsening symptoms:  no       Prescription Received/Picked up from Pharmacy?: yes   o Medications started? no  o Any questions or issues regarding your prescription?: meclizine,valium      Follow-up Results or Labs that are pending: no      Questions or concerns?: no      ER Recommends Follow-up by:       RN Recommendations: 1-2 days  Appointment scheduled:   Next 5 appointments (look out 90 days)    Nov 23, 2022  1:30 PM  (Arrive by 1:15 PM)  SHORT with AMELIA Pineda CNP  [facility] [place] ([facility] [place] ) [address]  [place] [address]  [phone]      o   o Per PCP she needs to start TX.Advised her to go get the RX's and start.She verbalized understanding.    If you start feeling worse, or have any further questions, please feel free to contact Nurse Triage at [phone].  If needing immediate medical attention at any time please call 911/Go to the ER.

## 2022-11-22 NOTE — PROGRESS NOTES
"  Assessment & Plan     Benign paroxysmal positional vertigo, unspecified laterality  Neck pain  Has decreased ROM with neck with tenderness to left side and pain on the right neck radiating to head.  She has an appointment for PT and speech coming up along with a brain MRI  cranial nerve 2-12 intact on exam today  Discussed stopping valium  She stop meclizine 24 hours before PT appointment   Toradol shot given in clinic today for tension type headache with with neck discomfort  - Physical Therapy Referral; Future    Tension headache  Toradol shot given in clinic today for tension type headache with with neck discomfort  - ketorolac (TORADOL) injection 60 mg  - naproxen (NAPROSYN) 500 MG tablet; Take 1 tablet (500 mg) by mouth 2 times daily (with meals)               MED REC REQUIRED  Post Medication Reconciliation Status:       Nicotine/Tobacco Cessation:  She reports that she has been smoking cigarettes. She started smoking about 8 years ago. She has a 0.25 pack-year smoking history. She has never used smokeless tobacco.  Nicotine/Tobacco Cessation Plan:         BMI:   Estimated body mass index is 32.12 kg/m  as calculated from the following:    Height as of 12/21/21: 1.549 m (5' 1\").    Weight as of this encounter: 77.1 kg (170 lb).       See Patient Instructions    No follow-ups on file.    AMELIA [facility]   Carolyn is a 27 year old accompanied by her self, presenting for the following health issues:  ER F/U      HPI     ED/UC Followup:    Facility:  [facility]  Date of visit: 11/20/22  Reason for visit: dizzy and headache  Prescribed valium and meclizine   Current Status: she is taking 1 valium and 2 meclizine every 6 hours.  Symptoms improve for about 4 hours.  She states she is able to eat and drink without any vomiting.  Denies any vomiting since treated in the ER   She continues to have right sided headache, denies any history of migraine headaches "   She stopped the ibuprofen since starting the valium   She feels she does have some change in her vision, but has not followed up with the eye doctor.      She does have an appointment with PT and speech coming up 11/30/22  Brain MRI scheduled for 11/29/22        Review of Systems   CONSTITUTIONAL: NEGATIVE for fever, chills, change in weight  INTEGUMENTARY/SKIN: NEGATIVE for worrisome rashes, moles or lesions  EYES: visual changes, loss of vision with turning her head, spots in her vision when she has the dizziness  ENT/MOUTH: NEGATIVE for ear, mouth and throat problems  RESP:NEGATIVE for significant cough or SOB  CV: NEGATIVE for chest pain, palpitations or peripheral edema  GI: NEGATIVE for nausea, abdominal pain, heartburn, or change in bowel habits  : denies dysuria   MUSCULOSKELETAL: neck discomfort   NEURO: right sided headache with right sided neck pain   PSYCHIATRIC: NEGATIVE for changes in mood or affect      Objective    /60   Pulse 100   Temp 98.7  F (37.1  C) (Tympanic)   Wt 77.1 kg (170 lb)   SpO2 98%   BMI 32.12 kg/m    Body mass index is 32.12 kg/m .  Physical Exam   GENERAL: alert  EYES: Eyes grossly normal to inspection, PERRL and conjunctivae and sclerae normal  HENT: ear canals and TM's normal, nose and mouth without ulcers or lesions  NECK: no adenopathy, no asymmetry, masses, or scars and thyroid normal to palpation  RESP: lungs clear to auscultation - no rales, rhonchi or wheezes  CV: regular rate and rhythm, normal S1 S2, no S3 or S4, no murmur, click or rub, no peripheral edema and peripheral pulses strong  ABDOMEN: soft, nontender, no hepatosplenomegaly, no masses and bowel sounds normal  MS: tenderness to palpation left lateral neck and upper back   SKIN: no suspicious lesions or rashes  NEURO: Normal strength and tone, sensory exam grossly normal, mentation intact, speech normal and cranial nerves 2-12 intact  PSYCH: mentation appears normal and affect flat    Admission on  11/21/2022, Discharged on 11/21/2022   Component Date Value Ref Range Status     Sodium 11/21/2022 137  136 - 145 mmol/L Final     Potassium 11/21/2022 4.6  3.4 - 5.3 mmol/L Final    Specimen slightly hemolyzed, potassium may be falsely elevated.     Chloride 11/21/2022 104  98 - 107 mmol/L Final     Carbon Dioxide (CO2) 11/21/2022 25  22 - 29 mmol/L Final     Anion Gap 11/21/2022 8  7 - 15 mmol/L Final     Urea Nitrogen 11/21/2022 9.6  6.0 - 20.0 mg/dL Final     Creatinine 11/21/2022 0.81  0.51 - 0.95 mg/dL Final     Calcium 11/21/2022 8.8  8.6 - 10.0 mg/dL Final     Glucose 11/21/2022 96  70 - 99 mg/dL Final     GFR Estimate 11/21/2022 >90  >60 mL/min/1.73m2 Final    Effective December 21, 2021 eGFRcr in adults is calculated using the 2021 CKD-EPI creatinine equation which includes age and gender (Savannah et al., NEJ, DOI: 10.1056/TXCPvl0097556)     WBC Count 11/21/2022 9.3  4.0 - 11.0 10e3/uL Final     RBC Count 11/21/2022 4.32  3.80 - 5.20 10e6/uL Final     Hemoglobin 11/21/2022 13.5  11.7 - 15.7 g/dL Final     Hematocrit 11/21/2022 39.6  35.0 - 47.0 % Final     MCV 11/21/2022 92  78 - 100 fL Final     MCH 11/21/2022 31.3  26.5 - 33.0 pg Final     MCHC 11/21/2022 34.1  31.5 - 36.5 g/dL Final     RDW 11/21/2022 12.4  10.0 - 15.0 % Final     Platelet Count 11/21/2022 304  150 - 450 10e3/uL Final     % Neutrophils 11/21/2022 60  % Final     % Lymphocytes 11/21/2022 32  % Final     % Monocytes 11/21/2022 6  % Final     % Eosinophils 11/21/2022 1  % Final     % Basophils 11/21/2022 1  % Final     % Immature Granulocytes 11/21/2022 0  % Final     NRBCs per 100 WBC 11/21/2022 0  <1 /100 Final     Absolute Neutrophils 11/21/2022 5.6  1.6 - 8.3 10e3/uL Final     Absolute Lymphocytes 11/21/2022 3.0  0.8 - 5.3 10e3/uL Final     Absolute Monocytes 11/21/2022 0.5  0.0 - 1.3 10e3/uL Final     Absolute Eosinophils 11/21/2022 0.1  0.0 - 0.7 10e3/uL Final     Absolute Basophils 11/21/2022 0.1  0.0 - 0.2 10e3/uL Final     Absolute  Immature Granulocytes 11/21/2022 0.0  <=0.4 10e3/uL Final     Absolute NRBCs 11/21/2022 0.0  10e3/uL Final          PROCEDURE: CT HEAD W/O CONTRAST 11/21/2022 7:40 PM     HISTORY:Female, age,  27 years, , , Headache; Acute HA (< 3 months),  no complicating features     COMPARISON: Head CT 11/10/2020     TECHNIQUE: CT of the brain without contrast. Axial; sagittal and  coronal MIP images were reviewed.     FINDINGS: Ventricles and sulci are normal in size and shape. Gray and  white matter demonstrate normal differentiation.     There is no evidence of mass, mass effect or midline shift. No  evidence of acute hemorrhage.     The bones are unremarkable. No fracture.                                                                       IMPRESSION:   No acute intracranial abnormality.  No acute fracture. Unremarkable  examination.     This facility minimizes radiation dose by adjusting the mA and/or kV  according to each patient size.        This CT scan was performed using one or more the following dose  reduction techniques:     -Automated exposure control,  -Adjustment of the mA and/or kV according to patient's size, and/or,  -Use of iterative reconstruction technique.        CHAU ROBERTSON MD

## 2022-11-23 ENCOUNTER — MYC MEDICAL ADVICE (OUTPATIENT)
Dept: FAMILY MEDICINE | Facility: OTHER | Age: 27
End: 2022-11-23

## 2022-11-23 ENCOUNTER — OFFICE VISIT (OUTPATIENT)
Dept: FAMILY MEDICINE | Facility: OTHER | Age: 27
End: 2022-11-23
Attending: NURSE PRACTITIONER
Payer: COMMERCIAL

## 2022-11-23 VITALS
TEMPERATURE: 98.7 F | HEART RATE: 100 BPM | SYSTOLIC BLOOD PRESSURE: 126 MMHG | OXYGEN SATURATION: 98 % | WEIGHT: 170 LBS | DIASTOLIC BLOOD PRESSURE: 60 MMHG | BODY MASS INDEX: 32.12 KG/M2

## 2022-11-23 DIAGNOSIS — R42 VERTIGO: Primary | ICD-10-CM

## 2022-11-23 DIAGNOSIS — M54.2 NECK PAIN: ICD-10-CM

## 2022-11-23 DIAGNOSIS — H81.10 BENIGN PAROXYSMAL POSITIONAL VERTIGO, UNSPECIFIED LATERALITY: Primary | ICD-10-CM

## 2022-11-23 DIAGNOSIS — G44.209 TENSION HEADACHE: ICD-10-CM

## 2022-11-23 PROCEDURE — 96372 THER/PROPH/DIAG INJ SC/IM: CPT

## 2022-11-23 PROCEDURE — G0463 HOSPITAL OUTPT CLINIC VISIT: HCPCS | Mod: 25

## 2022-11-23 PROCEDURE — 99213 OFFICE O/P EST LOW 20 MIN: CPT | Performed by: NURSE PRACTITIONER

## 2022-11-23 RX ORDER — KETOROLAC TROMETHAMINE 30 MG/ML
60 INJECTION, SOLUTION INTRAMUSCULAR; INTRAVENOUS ONCE
Status: COMPLETED | OUTPATIENT
Start: 2022-11-23 | End: 2022-11-23

## 2022-11-23 RX ORDER — NAPROXEN 500 MG/1
500 TABLET ORAL 2 TIMES DAILY WITH MEALS
Qty: 30 TABLET | Refills: 0 | Status: SHIPPED | OUTPATIENT
Start: 2022-11-23

## 2022-11-23 RX ADMIN — KETOROLAC TROMETHAMINE 60 MG: 60 INJECTION, SOLUTION INTRAMUSCULAR at 14:02

## 2022-11-23 ASSESSMENT — ANXIETY QUESTIONNAIRES
3. WORRYING TOO MUCH ABOUT DIFFERENT THINGS: SEVERAL DAYS
2. NOT BEING ABLE TO STOP OR CONTROL WORRYING: SEVERAL DAYS
6. BECOMING EASILY ANNOYED OR IRRITABLE: SEVERAL DAYS
5. BEING SO RESTLESS THAT IT IS HARD TO SIT STILL: SEVERAL DAYS
GAD7 TOTAL SCORE: 7
IF YOU CHECKED OFF ANY PROBLEMS ON THIS QUESTIONNAIRE, HOW DIFFICULT HAVE THESE PROBLEMS MADE IT FOR YOU TO DO YOUR WORK, TAKE CARE OF THINGS AT HOME, OR GET ALONG WITH OTHER PEOPLE: SOMEWHAT DIFFICULT
7. FEELING AFRAID AS IF SOMETHING AWFUL MIGHT HAPPEN: NOT AT ALL
1. FEELING NERVOUS, ANXIOUS, OR ON EDGE: MORE THAN HALF THE DAYS
GAD7 TOTAL SCORE: 7
4. TROUBLE RELAXING: SEVERAL DAYS

## 2022-11-23 ASSESSMENT — PAIN SCALES - GENERAL: PAINLEVEL: MODERATE PAIN (5)

## 2022-11-23 ASSESSMENT — PATIENT HEALTH QUESTIONNAIRE - PHQ9: SUM OF ALL RESPONSES TO PHQ QUESTIONS 1-9: 7

## 2022-11-23 NOTE — LETTER
November 25, 2022      Carolyn BAKER Mabob  [address] [place] [id number]        To Whom It May Concern:    Carolyn Mallory  was ill and unable to work today 11/25/22.        Sincerely,        Susan Clifford, AMELIA CNP
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
## 2022-11-29 ENCOUNTER — HOSPITAL ENCOUNTER (OUTPATIENT)
Dept: MRI IMAGING | Facility: HOSPITAL | Age: 27
Discharge: HOME | End: 2022-11-29
Attending: PHYSICIAN ASSISTANT | Admitting: PHYSICIAN ASSISTANT
Payer: COMMERCIAL

## 2022-11-29 ENCOUNTER — TELEPHONE (OUTPATIENT)
Dept: FAMILY MEDICINE | Facility: OTHER | Age: 27
End: 2022-11-29

## 2022-11-29 DIAGNOSIS — R42 VERTIGO: ICD-10-CM

## 2022-11-29 DIAGNOSIS — R51.9 HEADACHE: ICD-10-CM

## 2022-11-29 PROCEDURE — 255N000002 HC RX 255 OP 636: Performed by: RADIOLOGY

## 2022-11-29 PROCEDURE — A9585 GADOBUTROL INJECTION: HCPCS | Performed by: RADIOLOGY

## 2022-11-29 PROCEDURE — 70553 MRI BRAIN STEM W/O & W/DYE: CPT

## 2022-11-29 RX ORDER — GADOBUTROL 604.72 MG/ML
7.5 INJECTION INTRAVENOUS ONCE
Status: COMPLETED | OUTPATIENT
Start: 2022-11-29 | End: 2022-11-29

## 2022-11-29 RX ORDER — MECLIZINE HYDROCHLORIDE 25 MG/1
50 TABLET ORAL 3 TIMES DAILY PRN
Qty: 30 TABLET | Refills: 0 | Status: SHIPPED | OUTPATIENT
Start: 2022-11-29

## 2022-11-29 RX ADMIN — GADOBUTROL 7.5 ML: 604.72 INJECTION INTRAVENOUS at 07:38

## 2022-11-29 NOTE — TELEPHONE ENCOUNTER
"Pt calling and seen the MRI of her brain from today from [facility]    Please review.She is wondering about the part that says \"no enhancing masses\". She is wondering if there is something and just not enhancing or?    Please advise.    Call back [phone]      Reshma Mcfarland RN    "

## 2022-11-29 NOTE — TELEPHONE ENCOUNTER
Pt updated on Provider results note:    No abnormalities.  Normal brain MRI .    She wants to know what is going on or what is the cause for HA and dizziness.She would like to know why she is in speech therapy?    Since the ED visit on 11.21.2022. If she does not take the Meclizine she gets really dizzy and can barely walk.    The Naproxen is not helping the HA.    She called into work today because she does not feel good.    She continues to vomit since ED. Off and on. She has vomited 2x this AM. She is urinating and drinking fluids.Mouth is moist, noise is moist.    Pt provider updated that she can take Unisom 1/2 tab in morning,1/2 mid day and 1 tab at bedtime.    She verbalized understanding. Advised she return to ED with any new or worsening s/s.    She has multiple questions.Appt scheduled for next Wed.    Next 5 appointments (look out 90 days)    Dec 07, 2022  2:30 PM  (Arrive by 2:15 PM)  SHORT with AMELIA Pineda CNP  [facility] [place] ([facility] [place] ) [address]  [place] [address]  [phone]        Please note    Reshma Mcfarland RN

## 2022-12-07 ENCOUNTER — HOSPITAL ENCOUNTER (EMERGENCY)
Facility: HOSPITAL | Age: 27
Discharge: HOME | End: 2022-12-07
Attending: NURSE PRACTITIONER | Admitting: NURSE PRACTITIONER
Payer: COMMERCIAL

## 2022-12-07 VITALS
RESPIRATION RATE: 16 BRPM | SYSTOLIC BLOOD PRESSURE: 122 MMHG | TEMPERATURE: 101.1 F | HEART RATE: 110 BPM | OXYGEN SATURATION: 97 % | DIASTOLIC BLOOD PRESSURE: 84 MMHG

## 2022-12-07 DIAGNOSIS — J11.1 INFLUENZA-LIKE ILLNESS: Primary | ICD-10-CM

## 2022-12-07 DIAGNOSIS — Z20.828 EXPOSURE TO INFLUENZA: ICD-10-CM

## 2022-12-07 PROCEDURE — 250N000013 HC RX MED GY IP 250 OP 250 PS 637: Performed by: NURSE PRACTITIONER

## 2022-12-07 PROCEDURE — 99213 OFFICE O/P EST LOW 20 MIN: CPT | Performed by: NURSE PRACTITIONER

## 2022-12-07 PROCEDURE — C9803 HOPD COVID-19 SPEC COLLECT: HCPCS

## 2022-12-07 PROCEDURE — G0463 HOSPITAL OUTPT CLINIC VISIT: HCPCS

## 2022-12-07 PROCEDURE — 87637 SARSCOV2&INF A&B&RSV AMP PRB: CPT | Performed by: NURSE PRACTITIONER

## 2022-12-07 RX ORDER — IBUPROFEN 200 MG
400 TABLET ORAL ONCE
Status: COMPLETED | OUTPATIENT
Start: 2022-12-07 | End: 2022-12-07

## 2022-12-07 RX ADMIN — IBUPROFEN 400 MG: 200 TABLET, FILM COATED ORAL at 12:49

## 2022-12-07 ASSESSMENT — ENCOUNTER SYMPTOMS
COUGH: 1
MYALGIAS: 1
DIARRHEA: 0
SHORTNESS OF BREATH: 1
ABDOMINAL PAIN: 0
VOMITING: 1
NAUSEA: 1
FEVER: 1
APPETITE CHANGE: 0

## 2022-12-07 NOTE — Clinical Note
Carolyn Mallory was seen and treated in our emergency department on 12/7/2022.  She may return to work on 12/08/2022.       If you have any questions or concerns, please don't hesitate to call.      Mpofu, Prudence, CNP

## 2022-12-07 NOTE — DISCHARGE INSTRUCTIONS
Take Tylenol or ibuprofen as needed for the pain or fever.  Drink plenty of fluids to stay hydrated.    Follow-up with your doctor if no improvement in symptoms.      Return to urgent care emergency department for any concerning symptoms.

## 2022-12-07 NOTE — ED TRIAGE NOTES
Patient presents to urgent care with c/o cough, chest congestion, emesis, fever that started yesterday. Denies any otc medications taken today.

## 2022-12-07 NOTE — ED TRIAGE NOTES
Patient presents with c/o cough and emesis, chest congestion, symptoms started yesterday,. Denies any home covid testing.
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
## 2022-12-07 NOTE — Clinical Note
Carolyn Mallory was seen and treated in our emergency department on 12/7/2022.         Sincerely,     [facility]

## 2023-01-11 ENCOUNTER — HOSPITAL ENCOUNTER (EMERGENCY)
Facility: HOSPITAL | Age: 28
Discharge: HOME | End: 2023-01-11
Attending: PHYSICIAN ASSISTANT | Admitting: PHYSICIAN ASSISTANT
Payer: COMMERCIAL

## 2023-01-11 VITALS
SYSTOLIC BLOOD PRESSURE: 122 MMHG | HEART RATE: 100 BPM | TEMPERATURE: 98.2 F | RESPIRATION RATE: 16 BRPM | OXYGEN SATURATION: 100 % | DIASTOLIC BLOOD PRESSURE: 72 MMHG

## 2023-01-11 DIAGNOSIS — J06.9 VIRAL UPPER RESPIRATORY TRACT INFECTION: ICD-10-CM

## 2023-01-11 PROCEDURE — 87637 SARSCOV2&INF A&B&RSV AMP PRB: CPT | Performed by: PHYSICIAN ASSISTANT

## 2023-01-11 PROCEDURE — C9803 HOPD COVID-19 SPEC COLLECT: HCPCS

## 2023-01-11 PROCEDURE — 99213 OFFICE O/P EST LOW 20 MIN: CPT | Mod: CS | Performed by: PHYSICIAN ASSISTANT

## 2023-01-11 PROCEDURE — G0463 HOSPITAL OUTPT CLINIC VISIT: HCPCS

## 2023-01-11 RX ORDER — BENZONATATE 100 MG/1
100 CAPSULE ORAL 3 TIMES DAILY PRN
Qty: 20 CAPSULE | Refills: 0 | Status: SHIPPED | OUTPATIENT
Start: 2023-01-11 | End: 2023-03-27

## 2023-01-11 ASSESSMENT — ENCOUNTER SYMPTOMS
CHILLS: 1
APPETITE CHANGE: 1
SORE THROAT: 1
CARDIOVASCULAR NEGATIVE: 1
FEVER: 1
ARTHRALGIAS: 1
DIARRHEA: 1
SINUS PRESSURE: 1
COUGH: 1
EYES NEGATIVE: 1
RHINORRHEA: 1

## 2023-01-11 ASSESSMENT — ACTIVITIES OF DAILY LIVING (ADL): ADLS_ACUITY_SCORE: 35

## 2023-01-11 NOTE — Clinical Note
Carolyn Mallory was seen and treated in our emergency department on 1/11/2023.  She may return to work on 01/16/2023.       If you have any questions or concerns, please don't hesitate to call.      Monty Kwok PA-C
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
## 2023-01-11 NOTE — ED TRIAGE NOTES
Cough cold fever symptoms.  Does not have a fever, states no fever for 1 day.  Cough and back pain.

## 2023-01-11 NOTE — ED TRIAGE NOTES
Patient presents urgent care for cold symptoms but fever broke last night. Patient reports she has had low back pain.   Patient states she has been exposed to pneumonia. COVID, RSV, INFLUENZA test done today. No strep test done due throat is not really sore and patient states it's only sore when she coughs.     Triage Assessment     Row Name 01/11/23 1234       Triage Assessment (Adult)    Airway WDL WDL       Respiratory WDL    Respiratory WDL WDL       Skin Circulation/Temperature WDL    Skin Circulation/Temperature WDL WDL

## 2023-02-23 ENCOUNTER — HOSPITAL ENCOUNTER (EMERGENCY)
Facility: HOSPITAL | Age: 28
Discharge: HOME | End: 2023-02-23
Attending: INTERNAL MEDICINE | Admitting: INTERNAL MEDICINE
Payer: COMMERCIAL

## 2023-02-23 VITALS
TEMPERATURE: 97.8 F | OXYGEN SATURATION: 100 % | SYSTOLIC BLOOD PRESSURE: 125 MMHG | HEART RATE: 92 BPM | DIASTOLIC BLOOD PRESSURE: 85 MMHG | RESPIRATION RATE: 18 BRPM

## 2023-02-23 DIAGNOSIS — T78.40XA ALLERGIC REACTION, INITIAL ENCOUNTER: ICD-10-CM

## 2023-02-23 PROCEDURE — 99282 EMERGENCY DEPT VISIT SF MDM: CPT | Performed by: INTERNAL MEDICINE

## 2023-02-23 PROCEDURE — 99283 EMERGENCY DEPT VISIT LOW MDM: CPT

## 2023-02-23 PROCEDURE — 250N000013 HC RX MED GY IP 250 OP 250 PS 637: Performed by: INTERNAL MEDICINE

## 2023-02-23 PROCEDURE — 250N000012 HC RX MED GY IP 250 OP 636 PS 637: Performed by: INTERNAL MEDICINE

## 2023-02-23 RX ORDER — PREDNISONE 20 MG/1
20 TABLET ORAL ONCE
Status: COMPLETED | OUTPATIENT
Start: 2023-02-23 | End: 2023-02-23

## 2023-02-23 RX ORDER — FAMOTIDINE 20 MG/1
40 TABLET, FILM COATED ORAL ONCE
Status: COMPLETED | OUTPATIENT
Start: 2023-02-23 | End: 2023-02-23

## 2023-02-23 RX ADMIN — FAMOTIDINE 40 MG: 20 TABLET, FILM COATED ORAL at 00:28

## 2023-02-23 RX ADMIN — PREDNISONE 20 MG: 20 TABLET ORAL at 00:28

## 2023-02-23 NOTE — ED NOTES
Patient states that she got a new flavor vape and used it this morning and developed some swelling at cheeks and below her eyes. States she took some Benadryl and slept and swelling was gone when she woke up. States that tonight she used same flavor vape and developed same swelling.  Does have swelling to cheeks and just below the eyes. States she is not short of breath and is able to drink fluids.

## 2023-02-23 NOTE — Clinical Note
Carolyn Mallory was seen and treated in our emergency department on 2/23/2023.  She may return to work on 02/24/2023.       If you have any questions or concerns, please don't hesitate to call.      Augustus Barnes MD

## 2023-02-23 NOTE — ED TRIAGE NOTES
States she had facial swelling this morning and it went away. States swelling returned tonight around 2200. States she used a new flavor vape x 2, just before swelling started. So believes swelling is due to this. Denies anything else new.      Triage Assessment     Row Name 02/23/23 0009       Triage Assessment (Adult)    Airway WDL WDL

## 2023-02-24 ASSESSMENT — ENCOUNTER SYMPTOMS
SHORTNESS OF BREATH: 0
HEADACHES: 0
TROUBLE SWALLOWING: 0
EYE REDNESS: 0
ARTHRALGIAS: 0
DIFFICULTY URINATING: 0
ABDOMINAL PAIN: 0
ALLERGIC REACTION: 1
WHEEZING: 0
COLOR CHANGE: 0
DIFFICULTY BREATHING: 0
FEVER: 0
FACIAL SWELLING: 1
NECK STIFFNESS: 0
CONFUSION: 0
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
## 2023-03-09 ENCOUNTER — MYC MEDICAL ADVICE (OUTPATIENT)
Dept: FAMILY MEDICINE | Facility: OTHER | Age: 28
End: 2023-03-09

## 2023-03-22 ENCOUNTER — TELEPHONE (OUTPATIENT)
Dept: FAMILY MEDICINE | Facility: OTHER | Age: 28
End: 2023-03-22

## 2023-03-22 NOTE — TELEPHONE ENCOUNTER
10:17 AM    Reason for Call: Phone Call    Description: Patient requesting call back from nurse regarding some paperwork. Please return her call     Was an appointment offered for this call? No    Preferred method for responding to this message: Telephone Call  What is your phone number ?[phone]    If we cannot reach you directly, may we leave a detailed response at the number you provided? Yes    Can this message wait until your PCP/provider returns, if available today? Not applicable    Minnie Kiser

## 2023-03-22 NOTE — PROGRESS NOTES
Assessment & Plan     Cervical cancer screening  Will schedule physical in 6 months     Chronic mixed headache syndrome  Poor follow up with neurology and PM&R  Unknown headache. She has a history of post concussion headache.  She was doing well on Topiramate.  Can not find neurology notes and she does not recall which neurologist she was seen by.  Encouraged to to work on stress reduction, massage therapy or chiropractor and follow up with ophthalmology.  Headache is posterior, encouraged to have eye exam.  Pain across shoulders and cervical spine tenderness.  She has not followed up with her neurologist, because she cannot remember who she was seeing.  She has poor follow up for care.  She was hoping for a note for work.  She is not able to get FMLA, because she has not been in her new job for a year yet.  Attempting to find past neurology notes to see why she is off her medication.      For now continue with self care to reduce stress and tension.  Consider massage therapy. Can consider PT in the future.     Mood disorder (H)  Continue with counseling ([facility]), consider psychiatry.  Work on stress reduction and self care.    Nausea  Can try zofran to use if nausea and vomiting with headaches   - ondansetron (ZOFRAN ODT) 4 MG ODT tab; Take 1 tablet (4 mg) by mouth every 8 hours as needed for nausea      I spent a total of 38 minutes on the day of the visit.   Time spent by me doing chart review, history and exam, documentation and further activities per the note       See Patient Instructions    Return in about 4 weeks (around 4/24/2023) for headache and mental health .    AMELIA Martin CNP  [facility] - [facility]   Carolyn is a 27 year old, presenting for the following health issues:  Headache and paper work  No flowsheet data found.  HPI     Migraine /post concussion    Since your last clinic visit, how have your headaches changed?  Worsened    How often  are you getting headaches or migraines? Twice a week     Last 2-3 days     She constantly has a dull ache in the back of her head     Are you able to do normal daily activities when you have a migraine? No    Are you taking rescue/relief medications? (Select all that apply) ibuprofen (Advil, Motrin)    Excedrin migraine - no relief     How helpful is your rescue/relief medication?  I get no relief    Are you taking any medications to prevent migraines? (Select all that apply)  No    In the past 4 weeks, how often have you gone to urgent care or the emergency room because of your headaches?  0     She states headache starts with a tingling feeling to the top of her head with numbness and tingling into her left side of her face.  She states she will also get numbness into her hands and feet.  The pain then is a dull stabbing pain to the back of her head.      She has not had a eye exam since last summer - encouraged to have new eye exam.     Head CT 11/21/22   IMPRESSION:   No acute intracranial abnormality.  No acute fracture. Unremarkable  examination.    Brain MRI done 11/29/22  IMPRESSION: Normal brain    Last seen by PM&R for post concussion syndrome 8/19/21 - she was previous on Topamax for her headaches with reliefs. She was encouraged to neurosycho testing, but she did not.  Speech therapy was limited as it increased her dizziness     Neurologist at [facility] and possible somewhere else - she will get us this information.  She states she was told to stop the Topamax due to concerns for low threshold for seizures.         Review of Systems   CONSTITUTIONAL: NEGATIVE for fever, chills, change in weight  INTEGUMENTARY/SKIN: NEGATIVE for worrisome rashes, moles or lesions  EYES: increased blurry vision   ENT/MOUTH: NEGATIVE for ear, mouth and throat problems  RESP:SOB at times - does Vape couple times a day   CV: NEGATIVE for chest pain, palpitations or peripheral edema  GI: diarrhea, nausea and vomiting - with  headaches   : denies dysuria   MUSCULOSKELETAL: neck   NEURO: chronic headaches with numbness and tingling   ENDOCRINE: NEGATIVE for temperature intolerance, skin/hair changes  PSYCHIATRIC: uncontrolled anxiety and depression - [facility] - counseling - working on getting into psychiatry       Objective    /70   Pulse 76   Temp 98  F (36.7  C) (Tympanic)   Wt 81.6 kg (180 lb)   SpO2 100%   BMI 34.01 kg/m    Body mass index is 34.01 kg/m .  Physical Exam   GENERAL: alert  EYES: Eyes grossly normal to inspection, PERRL and conjunctivae and sclerae normal  NECK: no adenopathy, no asymmetry, masses, or scars and thyroid normal to palpation  RESP: lungs clear to auscultation - no rales, rhonchi or wheezes  CV: regular rate and rhythm, normal S1 S2, no S3 or S4, no murmur, click or rub, no peripheral edema and peripheral pulses strong  ABDOMEN: soft, nontender, no hepatosplenomegaly, no masses and bowel sounds normal  MS: cervical spine tenderness and tenderness across upper back/muscle   SKIN: no suspicious lesions or rashes  NEURO: mentation intact, speech normal and cranial nerves 2-12 intact  PSYCH: mentation appears normal and affect flat    Reviewed previous labs and imaging

## 2023-03-27 ENCOUNTER — OFFICE VISIT (OUTPATIENT)
Dept: FAMILY MEDICINE | Facility: OTHER | Age: 28
End: 2023-03-27
Attending: NURSE PRACTITIONER
Payer: COMMERCIAL

## 2023-03-27 VITALS
DIASTOLIC BLOOD PRESSURE: 70 MMHG | TEMPERATURE: 98 F | WEIGHT: 180 LBS | OXYGEN SATURATION: 100 % | BODY MASS INDEX: 34.01 KG/M2 | SYSTOLIC BLOOD PRESSURE: 120 MMHG | HEART RATE: 76 BPM

## 2023-03-27 DIAGNOSIS — F39 MOOD DISORDER (H): ICD-10-CM

## 2023-03-27 DIAGNOSIS — Z12.4 CERVICAL CANCER SCREENING: Primary | ICD-10-CM

## 2023-03-27 DIAGNOSIS — G44.89 CHRONIC MIXED HEADACHE SYNDROME: ICD-10-CM

## 2023-03-27 DIAGNOSIS — R11.0 NAUSEA: ICD-10-CM

## 2023-03-27 PROCEDURE — G0463 HOSPITAL OUTPT CLINIC VISIT: HCPCS | Mod: 25

## 2023-03-27 PROCEDURE — G0463 HOSPITAL OUTPT CLINIC VISIT: HCPCS

## 2023-03-27 PROCEDURE — 99214 OFFICE O/P EST MOD 30 MIN: CPT | Performed by: NURSE PRACTITIONER

## 2023-03-27 RX ORDER — ONDANSETRON 4 MG/1
4 TABLET, ORALLY DISINTEGRATING ORAL EVERY 8 HOURS PRN
Qty: 20 TABLET | Refills: 1 | Status: SHIPPED | OUTPATIENT
Start: 2023-03-27 | End: 2024-08-11

## 2023-03-27 ASSESSMENT — PAIN SCALES - GENERAL: PAINLEVEL: NO PAIN (0)

## 2023-03-27 NOTE — PATIENT INSTRUCTIONS
Work with psychiatry and counseling     Treatment for anxiety:    -meditation   APPS: (suggested by the ADA) can download on IPhone and Android    -Calm   -Headspace   -Insight Timer  -yoga  -journaling  -relaxation breathing exercises   -weighted blanket  -Epson Salt and lavender baths  -B vitamins (can help to reduce stress and anxiety)    Can try L-theanine 100 mg 2 times a day (OTC vitamin for anxiety)

## 2023-03-27 NOTE — LETTER
March 27, 2023      Carolyn Mallory  [address] [place] [id number]        To Whom It May Concern:    Carolyn Mallory was seen in our clinic. She may return to work without restrictions.  She can work 20-40 hours per week.        Sincerely,        AMELIA Martin CNP

## 2023-06-21 ENCOUNTER — HOSPITAL ENCOUNTER (EMERGENCY)
Facility: HOSPITAL | Age: 28
Discharge: HOME | End: 2023-06-21
Attending: NURSE PRACTITIONER | Admitting: NURSE PRACTITIONER
Payer: COMMERCIAL

## 2023-06-21 VITALS
RESPIRATION RATE: 16 BRPM | OXYGEN SATURATION: 98 % | DIASTOLIC BLOOD PRESSURE: 78 MMHG | TEMPERATURE: 98.9 F | HEART RATE: 85 BPM | SYSTOLIC BLOOD PRESSURE: 116 MMHG

## 2023-06-21 DIAGNOSIS — R21 RASH AND NONSPECIFIC SKIN ERUPTION: ICD-10-CM

## 2023-06-21 DIAGNOSIS — J02.9 VIRAL PHARYNGITIS: ICD-10-CM

## 2023-06-21 LAB — GROUP A STREP BY PCR: NOT DETECTED

## 2023-06-21 PROCEDURE — 87651 STREP A DNA AMP PROBE: CPT | Performed by: NURSE PRACTITIONER

## 2023-06-21 PROCEDURE — 99213 OFFICE O/P EST LOW 20 MIN: CPT | Performed by: NURSE PRACTITIONER

## 2023-06-21 PROCEDURE — G0463 HOSPITAL OUTPT CLINIC VISIT: HCPCS

## 2023-06-21 RX ORDER — GABAPENTIN 300 MG/1
300 CAPSULE ORAL 3 TIMES DAILY
COMMUNITY

## 2023-06-21 RX ORDER — ESCITALOPRAM OXALATE 20 MG/1
20 TABLET ORAL DAILY
COMMUNITY

## 2023-06-21 RX ORDER — GABAPENTIN 100 MG/1
100 CAPSULE ORAL 3 TIMES DAILY
COMMUNITY
Start: 2023-05-10 | End: 2023-06-21

## 2023-06-21 RX ORDER — LAMOTRIGINE 150 MG/1
150 TABLET ORAL DAILY
COMMUNITY
Start: 2023-06-15

## 2023-06-21 RX ORDER — TRIAMCINOLONE ACETONIDE 1 MG/G
CREAM TOPICAL 2 TIMES DAILY
Qty: 45 G | Refills: 1 | Status: SHIPPED | OUTPATIENT
Start: 2023-06-21 | End: 2023-07-05

## 2023-06-21 ASSESSMENT — ENCOUNTER SYMPTOMS
VOICE CHANGE: 0
FEVER: 0
SORE THROAT: 0
RESPIRATORY NEGATIVE: 1
CARDIOVASCULAR NEGATIVE: 1
EYES NEGATIVE: 1

## 2023-06-21 NOTE — DISCHARGE INSTRUCTIONS
Use hypoallergenic laundry detergent, body washes, etc.    Apply thin layer of the topical steroid twice a day for 14 days.    We will call you with results of strep testing and if it is positive you will need an antibiotic prescribed.    Seek immediate medical care if you have any new concerning or worsening symptoms.

## 2023-06-21 NOTE — ED TRIAGE NOTES
Pt present with c/o rash ongoing for a month, rash started on left arm and has continued to grow and spread, now is located between L&R thighs and right arm.Not painful, not itchy. No new exposures known. Has has sore throat and decreased appetite for the last week, no fevers.   Therapies tried benadryl and benadryl cream with no relief.    Jessa Behrman, LPN
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
## 2023-06-22 ENCOUNTER — TELEPHONE (OUTPATIENT)
Dept: FAMILY MEDICINE | Facility: OTHER | Age: 28
End: 2023-06-22

## 2023-06-22 NOTE — TELEPHONE ENCOUNTER
ER F/U  6/22/23   Last attending   Treatment team  Rash and nonspecific skin eruption +1 more  Clinical impression  Rash   Chief complaint     Assessments & Plan (with Medical Decision Making)      I have reviewed the nursing notes.  I have reviewed the findings, diagnosis, plan and need for follow up with the patient.  Instructions:  Use hypoallergenic laundry detergent, body washes, etc.     Apply thin layer of the topical steroid twice a day for 14 days.     Negative strep. Increase fluids, rest and take OTC analgesics as needed.      Seek immediate medical care if you have any new concerning or worsening symptoms.     Follow-up with primary care provider in the next 10 to 14 days, sooner as needed.  She may need referral to dermatology and/or biopsy if things continue with no improvement.     She agrees to plan of care and verbalized understanding.         Discharge Medication List as of 6/21/2023  5:02 PM

## 2023-06-29 NOTE — PROGRESS NOTES
Assessment & Plan     Sore throat   improved since evaluated in urgent care 9 days ago.  No evidence of erythema, tonsillar exudate, or lymph nodes on exam.  Patient worried about cancer, as she did have a enlarged lymph node at the time of the urgent care visit.  Reassured her that this is not present today and she does not have any cervical, supraclavicular, or axillary lymph nodes.  Did agree to check a CBC today for reassurance.  Suspect some of her mild throat discomfort is related to her seasonal allergies, as she is only treating with Benadryl at night.  There is cobblestoning of the posterior pharynx on exam.  For her sore throat, recommend to follow-up if worsening or she develops fevers.  - CBC with platelets and differential; Future  - CBC with platelets and differential    Seasonal allergic rhinitis due to other allergic trigger  Recommend treating with daily Zyrtec and adding Flonase nasal spray.  Will reassess at follow-up in 3 weeks.  - cetirizine (ZYRTEC) 10 MG tablet; Take 1 tablet (10 mg) by mouth daily  - fluticasone (FLONASE) 50 MCG/ACT nasal spray; Spray 1 spray into both nostrils daily    Rash  Rash is near resolved.  Responded well to the Kenalog cream.  Unclear what was the trigger, no one else had the rash and she did not have any new products.  Overall reassuring it has resolved.  It did not involve the palms or soles, so do not see need for other laboratory work-up at this time.     MED REC REQUIRED  Post Medication Reconciliation Status:  Discharge medications reconciled and changed, see notes/orders      Jojo Hollis MD  [facility] - HUYEN Leon is a 27 year old, presenting for the following health issues:  ER F/U    HPI   Answers for HPI/ROS submitted by the patient on 6/30/2023  If you checked off any problems, how difficult have these problems made it for you to do your work, take care of things at home, or get along with other people?: Not  "difficult at all  PHQ9 TOTAL SCORE: 8  HAMMAD 7 TOTAL SCORE: 7      ED/UC Followup:  Facility:  [facility]   Date of visit: 6/21/2023  Reason for visit: rash   Current Status: rash has gotten a little better from the cream that she was given- scaring from it, still on arms, feet and legs - has a patch on both sides of feet that is peeling, fever off and on, lymph node in throat is swollen, also stated she has a tiny on ball on one of her tonsils.     Seen in Emergency Department 6/21. Negative strep testing.     Rash on her arms and lower legs. Came out of nowhere 1.5 months ago. Not on palms or soles. Soles are healing. Not on back or abdomen. Rash did resolve with kenalog cream. No one else with rash. No new products. Uses baby lotion. Dove body wash. No change in laundry detergent - uses extra.     One week ago felt like throat swelled up. Had pain on left side of neck that shoots up and leads to headache, primary has seen her for this. Eye exam was within normal limits. Reports Emergency Department said lymph node on neck was swollen. She thinks it was getting more red. Swelling went down in back of throat, doesn't feel ball anymore but feels pressure. Slight discomfort swallowing. Reports off/on fevers but did not check temp (was cold then hot). Eating & drinking normally. No tylenol (allergic). Usually ibuprofen before bed. Last took two nights ago. Some rhinorrhea. Has bad allergies. Only using Benadryl at night. Hasn't used nasal spray.   She does clench her jaw. Has appointment with dentist next month.     Emergency Department provider told her she could have cancer if she had an enlarged lymph node - that is why she is here today.         Objective    /77 (BP Location: Right arm, Patient Position: Sitting, Cuff Size: Adult Large)   Pulse 94   Temp 98  F (36.7  C) (Tympanic)   Ht 1.562 m (5' 1.5\")   Wt 82.1 kg (181 lb)   SpO2 97%   BMI 33.65 kg/m    Body mass index is 33.65 kg/m .     Physical " Exam  Constitutional:       General: She is not in acute distress.     Appearance: Normal appearance. She is not ill-appearing or diaphoretic.   HENT:      Head: Normocephalic and atraumatic.      Jaw: There is normal jaw occlusion. No tenderness or pain on movement.      Right Ear: Tympanic membrane and external ear normal.      Left Ear: Tympanic membrane and external ear normal.      Nose: No mucosal edema, congestion or rhinorrhea.      Right Turbinates: Pale.      Left Turbinates: Pale.      Right Sinus: No maxillary sinus tenderness or frontal sinus tenderness.      Left Sinus: No maxillary sinus tenderness or frontal sinus tenderness.      Mouth/Throat:      Mouth: Mucous membranes are moist.      Dentition: Normal dentition. No dental caries or dental abscesses.      Tongue: No lesions.      Pharynx: Oropharynx is clear. Uvula midline. No oropharyngeal exudate or posterior oropharyngeal erythema.      Tonsils: No tonsillar exudate or tonsillar abscesses. 1+ on the right. 1+ on the left.      Comments: Cobblestoning of posterior pharynx  Eyes:      General: No scleral icterus.        Right eye: No discharge.         Left eye: No discharge.      Extraocular Movements: Extraocular movements intact.      Conjunctiva/sclera: Conjunctivae normal.      Pupils: Pupils are equal, round, and reactive to light.   Cardiovascular:      Rate and Rhythm: Normal rate and regular rhythm.      Heart sounds: No murmur heard.  Pulmonary:      Effort: Pulmonary effort is normal.      Breath sounds: Normal breath sounds. No wheezing, rhonchi or rales.   Abdominal:      Palpations: Abdomen is soft. There is no hepatomegaly or splenomegaly.      Tenderness: There is no abdominal tenderness. There is no guarding or rebound.   Musculoskeletal:      Cervical back: Normal range of motion and neck supple. No tenderness.      Right lower leg: No edema.      Left lower leg: No edema.   Lymphadenopathy:      Cervical: No cervical  adenopathy.      Right cervical: No superficial, deep or posterior cervical adenopathy.     Left cervical: No superficial, deep or posterior cervical adenopathy.      Upper Body:      Right upper body: No supraclavicular or axillary adenopathy.      Left upper body: No supraclavicular or axillary adenopathy.   Skin:     General: Skin is warm and dry.      Capillary Refill: Capillary refill takes less than 2 seconds.   Neurological:      General: No focal deficit present.      Mental Status: She is alert and oriented to person, place, and time.   Psychiatric:         Mood and Affect: Mood normal.         Behavior: Behavior normal.

## 2023-06-30 ENCOUNTER — OFFICE VISIT (OUTPATIENT)
Dept: FAMILY MEDICINE | Facility: OTHER | Age: 28
End: 2023-06-30
Attending: STUDENT IN AN ORGANIZED HEALTH CARE EDUCATION/TRAINING PROGRAM
Payer: COMMERCIAL

## 2023-06-30 VITALS
BODY MASS INDEX: 33.31 KG/M2 | HEIGHT: 62 IN | WEIGHT: 181 LBS | OXYGEN SATURATION: 97 % | TEMPERATURE: 98 F | HEART RATE: 94 BPM | SYSTOLIC BLOOD PRESSURE: 111 MMHG | DIASTOLIC BLOOD PRESSURE: 77 MMHG

## 2023-06-30 DIAGNOSIS — J02.9 SORE THROAT: Primary | ICD-10-CM

## 2023-06-30 DIAGNOSIS — R21 RASH: ICD-10-CM

## 2023-06-30 DIAGNOSIS — J30.89 SEASONAL ALLERGIC RHINITIS DUE TO OTHER ALLERGIC TRIGGER: ICD-10-CM

## 2023-06-30 LAB
BASOPHILS # BLD AUTO: 0.1 10E3/UL (ref 0–0.2)
BASOPHILS NFR BLD AUTO: 1 %
EOSINOPHIL # BLD AUTO: 0.2 10E3/UL (ref 0–0.7)
EOSINOPHIL NFR BLD AUTO: 2 %
ERYTHROCYTE [DISTWIDTH] IN BLOOD BY AUTOMATED COUNT: 12.7 % (ref 10–15)
HCT VFR BLD AUTO: 40.1 % (ref 35–47)
HGB BLD-MCNC: 13.4 G/DL (ref 11.7–15.7)
IMM GRANULOCYTES # BLD: 0 10E3/UL
IMM GRANULOCYTES NFR BLD: 0 %
LYMPHOCYTES # BLD AUTO: 3.3 10E3/UL (ref 0.8–5.3)
LYMPHOCYTES NFR BLD AUTO: 31 %
MCH RBC QN AUTO: 30.8 PG (ref 26.5–33)
MCHC RBC AUTO-ENTMCNC: 33.4 G/DL (ref 31.5–36.5)
MCV RBC AUTO: 92 FL (ref 78–100)
MONOCYTES # BLD AUTO: 0.8 10E3/UL (ref 0–1.3)
MONOCYTES NFR BLD AUTO: 7 %
NEUTROPHILS # BLD AUTO: 6.4 10E3/UL (ref 1.6–8.3)
NEUTROPHILS NFR BLD AUTO: 59 %
NRBC # BLD AUTO: 0 10E3/UL
NRBC BLD AUTO-RTO: 0 /100
PLATELET # BLD AUTO: 385 10E3/UL (ref 150–450)
RBC # BLD AUTO: 4.35 10E6/UL (ref 3.8–5.2)
WBC # BLD AUTO: 10.7 10E3/UL (ref 4–11)

## 2023-06-30 PROCEDURE — G0463 HOSPITAL OUTPT CLINIC VISIT: HCPCS | Performed by: STUDENT IN AN ORGANIZED HEALTH CARE EDUCATION/TRAINING PROGRAM

## 2023-06-30 PROCEDURE — 36415 COLL VENOUS BLD VENIPUNCTURE: CPT | Mod: ZL | Performed by: STUDENT IN AN ORGANIZED HEALTH CARE EDUCATION/TRAINING PROGRAM

## 2023-06-30 PROCEDURE — 85004 AUTOMATED DIFF WBC COUNT: CPT | Mod: ZL | Performed by: STUDENT IN AN ORGANIZED HEALTH CARE EDUCATION/TRAINING PROGRAM

## 2023-06-30 PROCEDURE — 99213 OFFICE O/P EST LOW 20 MIN: CPT | Performed by: STUDENT IN AN ORGANIZED HEALTH CARE EDUCATION/TRAINING PROGRAM

## 2023-06-30 RX ORDER — FLUTICASONE PROPIONATE 50 MCG
1 SPRAY, SUSPENSION (ML) NASAL DAILY
Qty: 18.2 ML | Refills: 1 | Status: SHIPPED | OUTPATIENT
Start: 2023-06-30

## 2023-06-30 RX ORDER — CETIRIZINE HYDROCHLORIDE 10 MG/1
10 TABLET ORAL DAILY
Qty: 30 TABLET | Refills: 1 | Status: SHIPPED | OUTPATIENT
Start: 2023-06-30 | End: 2023-08-09

## 2023-06-30 ASSESSMENT — ANXIETY QUESTIONNAIRES
7. FEELING AFRAID AS IF SOMETHING AWFUL MIGHT HAPPEN: SEVERAL DAYS
1. FEELING NERVOUS, ANXIOUS, OR ON EDGE: SEVERAL DAYS
3. WORRYING TOO MUCH ABOUT DIFFERENT THINGS: SEVERAL DAYS
GAD7 TOTAL SCORE: 7
GAD7 TOTAL SCORE: 7
8. IF YOU CHECKED OFF ANY PROBLEMS, HOW DIFFICULT HAVE THESE MADE IT FOR YOU TO DO YOUR WORK, TAKE CARE OF THINGS AT HOME, OR GET ALONG WITH OTHER PEOPLE?: SOMEWHAT DIFFICULT
5. BEING SO RESTLESS THAT IT IS HARD TO SIT STILL: SEVERAL DAYS
GAD7 TOTAL SCORE: 7
6. BECOMING EASILY ANNOYED OR IRRITABLE: SEVERAL DAYS
4. TROUBLE RELAXING: SEVERAL DAYS
7. FEELING AFRAID AS IF SOMETHING AWFUL MIGHT HAPPEN: SEVERAL DAYS
2. NOT BEING ABLE TO STOP OR CONTROL WORRYING: SEVERAL DAYS
IF YOU CHECKED OFF ANY PROBLEMS ON THIS QUESTIONNAIRE, HOW DIFFICULT HAVE THESE PROBLEMS MADE IT FOR YOU TO DO YOUR WORK, TAKE CARE OF THINGS AT HOME, OR GET ALONG WITH OTHER PEOPLE: SOMEWHAT DIFFICULT

## 2023-06-30 ASSESSMENT — PATIENT HEALTH QUESTIONNAIRE - PHQ9
SUM OF ALL RESPONSES TO PHQ QUESTIONS 1-9: 8
SUM OF ALL RESPONSES TO PHQ QUESTIONS 1-9: 8
10. IF YOU CHECKED OFF ANY PROBLEMS, HOW DIFFICULT HAVE THESE PROBLEMS MADE IT FOR YOU TO DO YOUR WORK, TAKE CARE OF THINGS AT HOME, OR GET ALONG WITH OTHER PEOPLE: NOT DIFFICULT AT ALL

## 2023-06-30 ASSESSMENT — PAIN SCALES - GENERAL: PAINLEVEL: NO PAIN (0)
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
## 2023-07-21 PROBLEM — F17.200 TOBACCO USE DISORDER: Chronic | Status: ACTIVE | Noted: 2017-06-09

## 2023-07-21 PROBLEM — S06.0X0A CONCUSSION WITHOUT LOSS OF CONSCIOUSNESS, INITIAL ENCOUNTER: Status: RESOLVED | Noted: 2020-11-10 | Resolved: 2023-07-21

## 2024-01-24 ENCOUNTER — MYC MEDICAL ADVICE (OUTPATIENT)
Dept: FAMILY MEDICINE | Facility: OTHER | Age: 29
End: 2024-01-24

## 2024-01-30 ENCOUNTER — TELEPHONE (OUTPATIENT)
Dept: FAMILY MEDICINE | Facility: OTHER | Age: 29
End: 2024-01-30
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
## 2024-01-30 NOTE — TELEPHONE ENCOUNTER
1/30/2024 9:08 AM  Writer made two attempts to call pt regarding my chart message and confirm appt. No answer. Left VM.
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
## 2024-01-30 NOTE — TELEPHONE ENCOUNTER
Patient called back.  She canceled the appointment today.  Advised to schedule another appointment, patient would not let me know why she was coming in, it is personal.  Advised that she could discuss the letter at the appointment with the provider.  Phone suddenly hung up.  Called patient back and left a message tro call back and schedule.

## 2024-02-07 ENCOUNTER — MYC MEDICAL ADVICE (OUTPATIENT)
Dept: FAMILY MEDICINE | Facility: OTHER | Age: 29
End: 2024-02-07

## 2024-08-11 ENCOUNTER — APPOINTMENT (OUTPATIENT)
Dept: CT IMAGING | Facility: HOSPITAL | Age: 29
End: 2024-08-11
Attending: PHYSICIAN ASSISTANT
Payer: COMMERCIAL

## 2024-08-11 ENCOUNTER — HOSPITAL ENCOUNTER (EMERGENCY)
Facility: HOSPITAL | Age: 29
Discharge: HOME | End: 2024-08-11
Attending: PHYSICIAN ASSISTANT | Admitting: PHYSICIAN ASSISTANT
Payer: COMMERCIAL

## 2024-08-11 VITALS
TEMPERATURE: 98.6 F | OXYGEN SATURATION: 98 % | HEART RATE: 68 BPM | DIASTOLIC BLOOD PRESSURE: 73 MMHG | SYSTOLIC BLOOD PRESSURE: 98 MMHG | RESPIRATION RATE: 16 BRPM

## 2024-08-11 DIAGNOSIS — K04.7 DENTAL ABSCESS: ICD-10-CM

## 2024-08-11 DIAGNOSIS — J01.00 MAXILLARY SINUSITIS, ACUTE: ICD-10-CM

## 2024-08-11 DIAGNOSIS — R22.0 SWELLING OF LEFT SIDE OF FACE: ICD-10-CM

## 2024-08-11 LAB
ANION GAP SERPL CALCULATED.3IONS-SCNC: 13 MMOL/L (ref 7–15)
BASOPHILS # BLD AUTO: 0.1 10E3/UL (ref 0–0.2)
BASOPHILS NFR BLD AUTO: 1 %
BUN SERPL-MCNC: 10.4 MG/DL (ref 6–20)
CALCIUM SERPL-MCNC: 9 MG/DL (ref 8.8–10.4)
CHLORIDE SERPL-SCNC: 100 MMOL/L (ref 98–107)
CREAT SERPL-MCNC: 0.85 MG/DL (ref 0.51–0.95)
EGFRCR SERPLBLD CKD-EPI 2021: >90 ML/MIN/1.73M2
EOSINOPHIL # BLD AUTO: 0 10E3/UL (ref 0–0.7)
EOSINOPHIL NFR BLD AUTO: 0 %
ERYTHROCYTE [DISTWIDTH] IN BLOOD BY AUTOMATED COUNT: 12.1 % (ref 10–15)
GLUCOSE SERPL-MCNC: 118 MG/DL (ref 70–99)
HCO3 SERPL-SCNC: 23 MMOL/L (ref 22–29)
HCT VFR BLD AUTO: 38.7 % (ref 35–47)
HGB BLD-MCNC: 13.2 G/DL (ref 11.7–15.7)
IMM GRANULOCYTES # BLD: 0 10E3/UL
IMM GRANULOCYTES NFR BLD: 0 %
LYMPHOCYTES # BLD AUTO: 2.6 10E3/UL (ref 0.8–5.3)
LYMPHOCYTES NFR BLD AUTO: 23 %
MCH RBC QN AUTO: 30.3 PG (ref 26.5–33)
MCHC RBC AUTO-ENTMCNC: 34.1 G/DL (ref 31.5–36.5)
MCV RBC AUTO: 89 FL (ref 78–100)
MONOCYTES # BLD AUTO: 0.9 10E3/UL (ref 0–1.3)
MONOCYTES NFR BLD AUTO: 8 %
NEUTROPHILS # BLD AUTO: 7.4 10E3/UL (ref 1.6–8.3)
NEUTROPHILS NFR BLD AUTO: 68 %
NRBC # BLD AUTO: 0 10E3/UL
NRBC BLD AUTO-RTO: 0 /100
PLATELET # BLD AUTO: 386 10E3/UL (ref 150–450)
POTASSIUM SERPL-SCNC: 4 MMOL/L (ref 3.4–5.3)
RBC # BLD AUTO: 4.36 10E6/UL (ref 3.8–5.2)
SODIUM SERPL-SCNC: 136 MMOL/L (ref 135–145)
WBC # BLD AUTO: 10.9 10E3/UL (ref 4–11)

## 2024-08-11 PROCEDURE — 36415 COLL VENOUS BLD VENIPUNCTURE: CPT | Performed by: PHYSICIAN ASSISTANT

## 2024-08-11 PROCEDURE — 250N000011 HC RX IP 250 OP 636: Performed by: PHYSICIAN ASSISTANT

## 2024-08-11 PROCEDURE — 96365 THER/PROPH/DIAG IV INF INIT: CPT | Mod: XU

## 2024-08-11 PROCEDURE — 70487 CT MAXILLOFACIAL W/DYE: CPT

## 2024-08-11 PROCEDURE — 99285 EMERGENCY DEPT VISIT HI MDM: CPT | Mod: 25

## 2024-08-11 PROCEDURE — 96375 TX/PRO/DX INJ NEW DRUG ADDON: CPT | Mod: XU

## 2024-08-11 PROCEDURE — 85025 COMPLETE CBC W/AUTO DIFF WBC: CPT | Performed by: PHYSICIAN ASSISTANT

## 2024-08-11 PROCEDURE — 258N000003 HC RX IP 258 OP 636: Performed by: PHYSICIAN ASSISTANT

## 2024-08-11 PROCEDURE — 80048 BASIC METABOLIC PNL TOTAL CA: CPT | Performed by: PHYSICIAN ASSISTANT

## 2024-08-11 PROCEDURE — 99284 EMERGENCY DEPT VISIT MOD MDM: CPT | Performed by: PHYSICIAN ASSISTANT

## 2024-08-11 RX ORDER — KETOROLAC TROMETHAMINE 30 MG/ML
30 INJECTION, SOLUTION INTRAMUSCULAR; INTRAVENOUS ONCE
Status: COMPLETED | OUTPATIENT
Start: 2024-08-11 | End: 2024-08-11

## 2024-08-11 RX ORDER — IOPAMIDOL 755 MG/ML
70 INJECTION, SOLUTION INTRAVASCULAR ONCE
Status: COMPLETED | OUTPATIENT
Start: 2024-08-11 | End: 2024-08-11

## 2024-08-11 RX ORDER — CEFTRIAXONE SODIUM 1 G/50ML
1 INJECTION, SOLUTION INTRAVENOUS ONCE
Status: COMPLETED | OUTPATIENT
Start: 2024-08-11 | End: 2024-08-11

## 2024-08-11 RX ORDER — ONDANSETRON 2 MG/ML
4 INJECTION INTRAMUSCULAR; INTRAVENOUS EVERY 30 MIN PRN
Status: DISCONTINUED | OUTPATIENT
Start: 2024-08-11 | End: 2024-08-11 | Stop reason: HOSPADM

## 2024-08-11 RX ORDER — METRONIDAZOLE 500 MG/1
500 TABLET ORAL 3 TIMES DAILY
Qty: 21 TABLET | Refills: 0 | Status: SHIPPED | OUTPATIENT
Start: 2024-08-11 | End: 2024-08-18

## 2024-08-11 RX ORDER — CEFUROXIME AXETIL 500 MG/1
500 TABLET ORAL 2 TIMES DAILY
Qty: 20 TABLET | Refills: 0 | Status: SHIPPED | OUTPATIENT
Start: 2024-08-11 | End: 2024-08-21

## 2024-08-11 RX ORDER — ONDANSETRON 4 MG/1
4 TABLET, ORALLY DISINTEGRATING ORAL EVERY 8 HOURS PRN
Qty: 10 TABLET | Refills: 0 | Status: SHIPPED | OUTPATIENT
Start: 2024-08-11 | End: 2024-08-14

## 2024-08-11 RX ADMIN — SODIUM CHLORIDE 1000 ML: 9 INJECTION, SOLUTION INTRAVENOUS at 16:11

## 2024-08-11 RX ADMIN — CEFTRIAXONE SODIUM 1 G: 1 INJECTION, SOLUTION INTRAVENOUS at 16:10

## 2024-08-11 RX ADMIN — KETOROLAC TROMETHAMINE 30 MG: 30 INJECTION, SOLUTION INTRAMUSCULAR at 15:27

## 2024-08-11 RX ADMIN — IOPAMIDOL 70 ML: 755 INJECTION, SOLUTION INTRAVENOUS at 15:50

## 2024-08-11 RX ADMIN — ONDANSETRON 4 MG: 2 INJECTION INTRAMUSCULAR; INTRAVENOUS at 16:17

## 2024-08-11 ASSESSMENT — COLUMBIA-SUICIDE SEVERITY RATING SCALE - C-SSRS
6. HAVE YOU EVER DONE ANYTHING, STARTED TO DO ANYTHING, OR PREPARED TO DO ANYTHING TO END YOUR LIFE?: NO
2. HAVE YOU ACTUALLY HAD ANY THOUGHTS OF KILLING YOURSELF IN THE PAST MONTH?: NO
1. IN THE PAST MONTH, HAVE YOU WISHED YOU WERE DEAD OR WISHED YOU COULD GO TO SLEEP AND NOT WAKE UP?: NO

## 2024-08-11 ASSESSMENT — ENCOUNTER SYMPTOMS
CHILLS: 1
FEVER: 0

## 2024-08-11 ASSESSMENT — ACTIVITIES OF DAILY LIVING (ADL)
ADLS_ACUITY_SCORE: 35
ADLS_ACUITY_SCORE: 33
ADLS_ACUITY_SCORE: 35

## 2024-08-11 NOTE — DISCHARGE INSTRUCTIONS
Please follow-up in the clinic for reevaluation this week.    Please follow-up with your dentist as scheduled.    Antibiotics as prescribed.  You can start both medications tonight with supper.    Please return here for any other questions or concerns.

## 2024-08-11 NOTE — ED NOTES
Pt comes in with c/o left sided facial swelling that started this morning. Pt reports she has a broken upper left molar that she is seeing a dentist for in two days. Pt reports pain 5/10. Pt states she has vomitted today due to pain. Pt has taken Ibuprofen for pain today with no relief. Pt states the pain causes her to be dizzy. Pt states she is eating and drinking without difficulty. Pt denies trouble breathing/swallowing.
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
## 2024-08-21 ENCOUNTER — MYC MEDICAL ADVICE (OUTPATIENT)
Dept: FAMILY MEDICINE | Facility: OTHER | Age: 29
End: 2024-08-21

## 2024-08-22 ENCOUNTER — TELEPHONE (OUTPATIENT)
Dept: FAMILY MEDICINE | Facility: OTHER | Age: 29
End: 2024-08-22

## 2024-08-22 NOTE — TELEPHONE ENCOUNTER
Called and spoke with patient, advised ER visit ASAP based on reading notes from ER visit on 8/11/24 along with new symptoms.   Patient declined. States they did nothing for her in the ER and she is not going to sit and wait again. Wants to be seen by a covering provider in clinic. Do see some openings today with covering providers, however, informed patient that this RN CC would need to get approval as patient should be seen in the ER.   Spoke with covering provider, Dr. Sera Valerio. Completed chart review, also recommended urgent ER visit. Called patient back and informed her of this. She reports she will try to go in. Will call back after ER visit to get a routine follow up with PCP.

## 2024-08-22 NOTE — TELEPHONE ENCOUNTER
Symptom or reason needing to speak to RN: er follow up/ WANTS TO BE SEEN TODAY     Best number to return call: [phone]     Best time to return call: ASAP

## 2024-08-28 ENCOUNTER — APPOINTMENT (OUTPATIENT)
Dept: ULTRASOUND IMAGING | Facility: HOSPITAL | Age: 29
End: 2024-08-28
Attending: PHYSICIAN ASSISTANT
Payer: COMMERCIAL

## 2024-08-28 ENCOUNTER — HOSPITAL ENCOUNTER (EMERGENCY)
Facility: HOSPITAL | Age: 29
Discharge: HOME | End: 2024-08-28
Attending: PHYSICIAN ASSISTANT | Admitting: PHYSICIAN ASSISTANT
Payer: COMMERCIAL

## 2024-08-28 VITALS
RESPIRATION RATE: 19 BRPM | OXYGEN SATURATION: 100 % | DIASTOLIC BLOOD PRESSURE: 79 MMHG | SYSTOLIC BLOOD PRESSURE: 115 MMHG | HEART RATE: 91 BPM | TEMPERATURE: 99 F

## 2024-08-28 DIAGNOSIS — M79.661 RIGHT CALF PAIN: ICD-10-CM

## 2024-08-28 PROCEDURE — 93971 EXTREMITY STUDY: CPT | Mod: RT

## 2024-08-28 PROCEDURE — 99212 OFFICE O/P EST SF 10 MIN: CPT | Performed by: PHYSICIAN ASSISTANT

## 2024-08-28 PROCEDURE — G0463 HOSPITAL OUTPT CLINIC VISIT: HCPCS | Mod: 25

## 2024-08-28 ASSESSMENT — ACTIVITIES OF DAILY LIVING (ADL): ADLS_ACUITY_SCORE: 35
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
## 2024-08-29 NOTE — ED TRIAGE NOTES
"C/o DVT    Concerned about a DVT due to having a infection dental and sinus, still having sinus pain to the left cheek.   Pain to the right knee with redness and swelling intermitting       Was on 3 abx, finished 1 the other day. A-pack      called , they told her to come here asap for \"MRI with contrast\"    "

## 2024-09-04 ENCOUNTER — MEDICAL CORRESPONDENCE (OUTPATIENT)
Dept: MRI IMAGING | Facility: HOSPITAL | Age: 29
End: 2024-09-04

## 2024-09-10 ENCOUNTER — HOSPITAL ENCOUNTER (OUTPATIENT)
Dept: MRI IMAGING | Facility: HOSPITAL | Age: 29
Discharge: HOME | End: 2024-09-10
Attending: ORTHOPAEDIC SURGERY | Admitting: ORTHOPAEDIC SURGERY
Payer: COMMERCIAL

## 2024-09-10 DIAGNOSIS — M23.91 INTERNAL DERANGEMENT OF KNEE, RIGHT: ICD-10-CM

## 2024-09-10 PROCEDURE — 73721 MRI JNT OF LWR EXTRE W/O DYE: CPT | Mod: RT

## 2024-12-23 ENCOUNTER — HOSPITAL ENCOUNTER (EMERGENCY)
Facility: HOSPITAL | Age: 29
Discharge: HOME | End: 2024-12-23
Attending: NURSE PRACTITIONER
Payer: COMMERCIAL

## 2024-12-23 ENCOUNTER — APPOINTMENT (OUTPATIENT)
Dept: ULTRASOUND IMAGING | Facility: HOSPITAL | Age: 29
End: 2024-12-23
Attending: NURSE PRACTITIONER
Payer: COMMERCIAL

## 2024-12-23 ENCOUNTER — APPOINTMENT (OUTPATIENT)
Dept: CT IMAGING | Facility: HOSPITAL | Age: 29
End: 2024-12-23
Attending: NURSE PRACTITIONER
Payer: COMMERCIAL

## 2024-12-23 VITALS
OXYGEN SATURATION: 97 % | SYSTOLIC BLOOD PRESSURE: 138 MMHG | RESPIRATION RATE: 16 BRPM | TEMPERATURE: 98 F | HEART RATE: 87 BPM | WEIGHT: 166.01 LBS | DIASTOLIC BLOOD PRESSURE: 72 MMHG | BODY MASS INDEX: 30.86 KG/M2

## 2024-12-23 DIAGNOSIS — N83.00 RUPTURE OF FOLLICULAR CYST OF OVARY: ICD-10-CM

## 2024-12-23 LAB
ALBUMIN SERPL BCG-MCNC: 4.4 G/DL (ref 3.5–5.2)
ALBUMIN UR-MCNC: 100 MG/DL
ALP SERPL-CCNC: 64 U/L (ref 40–150)
ALT SERPL W P-5'-P-CCNC: 22 U/L (ref 0–50)
ANION GAP SERPL CALCULATED.3IONS-SCNC: 10 MMOL/L (ref 7–15)
APPEARANCE UR: ABNORMAL
AST SERPL W P-5'-P-CCNC: 22 U/L (ref 0–45)
BACTERIA #/AREA URNS HPF: ABNORMAL /HPF
BILIRUB SERPL-MCNC: 0.9 MG/DL
BILIRUB UR QL STRIP: NEGATIVE
BUN SERPL-MCNC: 7.8 MG/DL (ref 6–20)
CALCIUM SERPL-MCNC: 9.1 MG/DL (ref 8.8–10.4)
CHLORIDE SERPL-SCNC: 105 MMOL/L (ref 98–107)
COLOR UR AUTO: YELLOW
CREAT SERPL-MCNC: 0.97 MG/DL (ref 0.51–0.95)
EGFRCR SERPLBLD CKD-EPI 2021: 81 ML/MIN/1.73M2
ERYTHROCYTE [DISTWIDTH] IN BLOOD BY AUTOMATED COUNT: 13.2 % (ref 10–15)
GLUCOSE SERPL-MCNC: 92 MG/DL (ref 70–99)
GLUCOSE UR STRIP-MCNC: NEGATIVE MG/DL
HCG UR QL: NEGATIVE
HCO3 SERPL-SCNC: 25 MMOL/L (ref 22–29)
HCT VFR BLD AUTO: 37.6 % (ref 35–47)
HGB BLD-MCNC: 13.1 G/DL (ref 11.7–15.7)
HGB UR QL STRIP: NEGATIVE
KETONES UR STRIP-MCNC: 40 MG/DL
LEUKOCYTE ESTERASE UR QL STRIP: ABNORMAL
LIPASE SERPL-CCNC: 10 U/L (ref 13–60)
MCH RBC QN AUTO: 29.8 PG (ref 26.5–33)
MCHC RBC AUTO-ENTMCNC: 34.8 G/DL (ref 31.5–36.5)
MCV RBC AUTO: 86 FL (ref 78–100)
MUCOUS THREADS #/AREA URNS LPF: PRESENT /LPF
NITRATE UR QL: NEGATIVE
PH UR STRIP: 6 [PH] (ref 4.7–8)
PLATELET # BLD AUTO: 282 10E3/UL (ref 150–450)
POTASSIUM SERPL-SCNC: 3.2 MMOL/L (ref 3.4–5.3)
PROT SERPL-MCNC: 6.7 G/DL (ref 6.4–8.3)
RBC # BLD AUTO: 4.4 10E6/UL (ref 3.8–5.2)
RBC URINE: 3 /HPF
SODIUM SERPL-SCNC: 140 MMOL/L (ref 135–145)
SP GR UR STRIP: 1.03 (ref 1–1.03)
SQUAMOUS EPITHELIAL: 13 /HPF
UROBILINOGEN UR STRIP-MCNC: 2 MG/DL
WBC # BLD AUTO: 5.4 10E3/UL (ref 4–11)
WBC URINE: 3 /HPF

## 2024-12-23 PROCEDURE — 99285 EMERGENCY DEPT VISIT HI MDM: CPT | Mod: 25

## 2024-12-23 PROCEDURE — 36415 COLL VENOUS BLD VENIPUNCTURE: CPT | Performed by: NURSE PRACTITIONER

## 2024-12-23 PROCEDURE — 96375 TX/PRO/DX INJ NEW DRUG ADDON: CPT

## 2024-12-23 PROCEDURE — 76705 ECHO EXAM OF ABDOMEN: CPT | Mod: 26 | Performed by: NURSE PRACTITIONER

## 2024-12-23 PROCEDURE — 81003 URINALYSIS AUTO W/O SCOPE: CPT | Performed by: NURSE PRACTITIONER

## 2024-12-23 PROCEDURE — 83690 ASSAY OF LIPASE: CPT | Performed by: NURSE PRACTITIONER

## 2024-12-23 PROCEDURE — 96374 THER/PROPH/DIAG INJ IV PUSH: CPT

## 2024-12-23 PROCEDURE — 250N000011 HC RX IP 250 OP 636: Performed by: NURSE PRACTITIONER

## 2024-12-23 PROCEDURE — 74176 CT ABD & PELVIS W/O CONTRAST: CPT

## 2024-12-23 PROCEDURE — 81025 URINE PREGNANCY TEST: CPT | Performed by: NURSE PRACTITIONER

## 2024-12-23 PROCEDURE — 99284 EMERGENCY DEPT VISIT MOD MDM: CPT | Mod: 25 | Performed by: NURSE PRACTITIONER

## 2024-12-23 PROCEDURE — 85014 HEMATOCRIT: CPT | Performed by: NURSE PRACTITIONER

## 2024-12-23 PROCEDURE — 76705 ECHO EXAM OF ABDOMEN: CPT | Mod: TC

## 2024-12-23 PROCEDURE — 84155 ASSAY OF PROTEIN SERUM: CPT | Performed by: NURSE PRACTITIONER

## 2024-12-23 PROCEDURE — 84132 ASSAY OF SERUM POTASSIUM: CPT | Performed by: NURSE PRACTITIONER

## 2024-12-23 RX ORDER — HYDROMORPHONE HYDROCHLORIDE 1 MG/ML
0.5 INJECTION, SOLUTION INTRAMUSCULAR; INTRAVENOUS; SUBCUTANEOUS ONCE
Status: COMPLETED | OUTPATIENT
Start: 2024-12-23 | End: 2024-12-23

## 2024-12-23 RX ORDER — OXYCODONE HYDROCHLORIDE 5 MG/1
5 TABLET ORAL EVERY 6 HOURS PRN
Qty: 6 TABLET | Refills: 0 | Status: SHIPPED | OUTPATIENT
Start: 2024-12-23 | End: 2024-12-25

## 2024-12-23 RX ORDER — KETOROLAC TROMETHAMINE 30 MG/ML
30 INJECTION, SOLUTION INTRAMUSCULAR; INTRAVENOUS ONCE
Status: COMPLETED | OUTPATIENT
Start: 2024-12-23 | End: 2024-12-23

## 2024-12-23 RX ORDER — ONDANSETRON 4 MG/1
4-8 TABLET, FILM COATED ORAL EVERY 8 HOURS PRN
Qty: 20 TABLET | Refills: 0 | Status: SHIPPED | OUTPATIENT
Start: 2024-12-23

## 2024-12-23 RX ADMIN — KETOROLAC TROMETHAMINE 30 MG: 30 INJECTION, SOLUTION INTRAMUSCULAR at 11:19

## 2024-12-23 RX ADMIN — HYDROMORPHONE HYDROCHLORIDE 0.5 MG: 1 INJECTION, SOLUTION INTRAMUSCULAR; INTRAVENOUS; SUBCUTANEOUS at 13:13

## 2024-12-23 ASSESSMENT — ENCOUNTER SYMPTOMS
NAUSEA: 1
CONSTITUTIONAL NEGATIVE: 1
PSYCHIATRIC NEGATIVE: 1
HEMATURIA: 0
DIARRHEA: 0
NEUROLOGICAL NEGATIVE: 1
ENDOCRINE NEGATIVE: 1
CARDIOVASCULAR NEGATIVE: 1
RESPIRATORY NEGATIVE: 1
EYES NEGATIVE: 1
ABDOMINAL PAIN: 1
HEMATOLOGIC/LYMPHATIC NEGATIVE: 1
ALLERGIC/IMMUNOLOGIC NEGATIVE: 1
BLOOD IN STOOL: 0
VOMITING: 1
CONSTIPATION: 0
FLANK PAIN: 1
DYSURIA: 0

## 2024-12-23 ASSESSMENT — COLUMBIA-SUICIDE SEVERITY RATING SCALE - C-SSRS
2. HAVE YOU ACTUALLY HAD ANY THOUGHTS OF KILLING YOURSELF IN THE PAST MONTH?: NO
1. IN THE PAST MONTH, HAVE YOU WISHED YOU WERE DEAD OR WISHED YOU COULD GO TO SLEEP AND NOT WAKE UP?: NO
6. HAVE YOU EVER DONE ANYTHING, STARTED TO DO ANYTHING, OR PREPARED TO DO ANYTHING TO END YOUR LIFE?: NO

## 2024-12-23 ASSESSMENT — ACTIVITIES OF DAILY LIVING (ADL)
ADLS_ACUITY_SCORE: 48

## 2024-12-23 NOTE — ED NOTES
Patient presents c/o left sided flank pain acute onset this AM. Hx of kidney stones with prev pregnancy many years ago, states this feels similar. Pain in left flank radiates into abd. Notes decreased urination over the last 24h, able to provide 100mL of stacy urine for UA. Vomited x3 this AM.

## 2024-12-23 NOTE — DISCHARGE INSTRUCTIONS
Narcotic pain medications (Oxycodone):   The medications prescribed can be quite effective for control of your pain but ARE NOT a safe long-term choice for your symptom control.  This medication is highly addictive and can lead to medication abuse.  It is recommended to try the ibuprofen/acetaminophen regimen (if able) described below before using the narcotic.   Do not drive, operate machinery, or do work that could harm people when under the influence of narcotic pain medications.  It can impair perception, reaction time, motor skills, and attention in ways that make it dangerous to drive, operate machinery, or engage in any activity at home or at work that could harm others or cause professional malpractice.  Just how long such impairments will last for a particular individual taking a particular type and dose is unknown, but it is at least several hours.  Drinking alcohol while taking narcotic pain medications makes impairment much worse, so abstain for alcohol use.    I recommend taking a stool softener such as Colace or Senokot-S which can be purchased over the counter while you are taking narcotics since these medications can be constipating.  You may also try Miralax OTC if needed for constipation.     Pain control:   If your past medical conditions, allergies, current medications, or current status does not prevent you from using acetaminophen and/or ibuprofen, use the following: Take acetaminophen 650-1000 mg every 6 hours as needed for pain in addition to ibuprofen 400-600 mg every 6 hours as needed for pain.  Take these two medications together.        Follow-up with your primary care provider for reevaluation.  Contact your primary care provider if you have any questions or concerns.  Do not hesitate to return to the ER if any new or worsening symptoms.     Please read the attached instructions (if any).  They highlight more specific treatments and interventions for you at home.              Thank you  for letting me participate in your care and wish you a fast and uneventful recovery,    Hernando CISNEROS, CNP    Do not hesitate to contact me with questions or concerns.  [email or web address]
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
## 2024-12-25 ENCOUNTER — APPOINTMENT (OUTPATIENT)
Dept: ULTRASOUND IMAGING | Facility: HOSPITAL | Age: 29
End: 2024-12-25
Attending: PHYSICIAN ASSISTANT
Payer: COMMERCIAL

## 2024-12-25 ENCOUNTER — HOSPITAL ENCOUNTER (EMERGENCY)
Facility: HOSPITAL | Age: 29
Discharge: HOME | End: 2024-12-25
Attending: PHYSICIAN ASSISTANT
Payer: COMMERCIAL

## 2024-12-25 VITALS
SYSTOLIC BLOOD PRESSURE: 128 MMHG | BODY MASS INDEX: 31.47 KG/M2 | DIASTOLIC BLOOD PRESSURE: 85 MMHG | TEMPERATURE: 97.5 F | WEIGHT: 169.3 LBS | RESPIRATION RATE: 16 BRPM | HEART RATE: 84 BPM | OXYGEN SATURATION: 98 %

## 2024-12-25 DIAGNOSIS — E87.6 HYPOKALEMIA: ICD-10-CM

## 2024-12-25 DIAGNOSIS — N83.209 HEMORRHAGIC OVARIAN CYST: ICD-10-CM

## 2024-12-25 LAB
ANION GAP SERPL CALCULATED.3IONS-SCNC: 9 MMOL/L (ref 7–15)
BASOPHILS # BLD AUTO: 0 10E3/UL (ref 0–0.2)
BASOPHILS NFR BLD AUTO: 0 %
BUN SERPL-MCNC: 8.8 MG/DL (ref 6–20)
CALCIUM SERPL-MCNC: 8.8 MG/DL (ref 8.8–10.4)
CHLORIDE SERPL-SCNC: 99 MMOL/L (ref 98–107)
CREAT SERPL-MCNC: 0.78 MG/DL (ref 0.51–0.95)
CRP SERPL-MCNC: 21.49 MG/L
EGFRCR SERPLBLD CKD-EPI 2021: >90 ML/MIN/1.73M2
EOSINOPHIL # BLD AUTO: 0 10E3/UL (ref 0–0.7)
EOSINOPHIL NFR BLD AUTO: 0 %
ERYTHROCYTE [DISTWIDTH] IN BLOOD BY AUTOMATED COUNT: 13 % (ref 10–15)
GLUCOSE SERPL-MCNC: 95 MG/DL (ref 70–99)
HCO3 SERPL-SCNC: 27 MMOL/L (ref 22–29)
HCT VFR BLD AUTO: 37.5 % (ref 35–47)
HGB BLD-MCNC: 13.1 G/DL (ref 11.7–15.7)
HOLD SPECIMEN: NORMAL
IMM GRANULOCYTES # BLD: 0 10E3/UL
IMM GRANULOCYTES NFR BLD: 0 %
LYMPHOCYTES # BLD AUTO: 2.2 10E3/UL (ref 0.8–5.3)
LYMPHOCYTES NFR BLD AUTO: 28 %
MAGNESIUM SERPL-MCNC: 1.9 MG/DL (ref 1.7–2.3)
MCH RBC QN AUTO: 30.2 PG (ref 26.5–33)
MCHC RBC AUTO-ENTMCNC: 34.9 G/DL (ref 31.5–36.5)
MCV RBC AUTO: 86 FL (ref 78–100)
MONOCYTES # BLD AUTO: 0.5 10E3/UL (ref 0–1.3)
MONOCYTES NFR BLD AUTO: 6 %
NEUTROPHILS # BLD AUTO: 5.2 10E3/UL (ref 1.6–8.3)
NEUTROPHILS NFR BLD AUTO: 65 %
NRBC # BLD AUTO: 0 10E3/UL
NRBC BLD AUTO-RTO: 0 /100
PLATELET # BLD AUTO: 247 10E3/UL (ref 150–450)
POTASSIUM SERPL-SCNC: 2.8 MMOL/L (ref 3.4–5.3)
RBC # BLD AUTO: 4.34 10E6/UL (ref 3.8–5.2)
SODIUM SERPL-SCNC: 135 MMOL/L (ref 135–145)
WBC # BLD AUTO: 8 10E3/UL (ref 4–11)

## 2024-12-25 PROCEDURE — 99284 EMERGENCY DEPT VISIT MOD MDM: CPT | Performed by: PHYSICIAN ASSISTANT

## 2024-12-25 PROCEDURE — 96374 THER/PROPH/DIAG INJ IV PUSH: CPT

## 2024-12-25 PROCEDURE — 93976 VASCULAR STUDY: CPT

## 2024-12-25 PROCEDURE — 36415 COLL VENOUS BLD VENIPUNCTURE: CPT | Performed by: PHYSICIAN ASSISTANT

## 2024-12-25 PROCEDURE — 80048 BASIC METABOLIC PNL TOTAL CA: CPT | Performed by: PHYSICIAN ASSISTANT

## 2024-12-25 PROCEDURE — 82310 ASSAY OF CALCIUM: CPT | Performed by: PHYSICIAN ASSISTANT

## 2024-12-25 PROCEDURE — 250N000013 HC RX MED GY IP 250 OP 250 PS 637: Performed by: PHYSICIAN ASSISTANT

## 2024-12-25 PROCEDURE — 85025 COMPLETE CBC W/AUTO DIFF WBC: CPT | Performed by: PHYSICIAN ASSISTANT

## 2024-12-25 PROCEDURE — 83735 ASSAY OF MAGNESIUM: CPT | Performed by: PHYSICIAN ASSISTANT

## 2024-12-25 PROCEDURE — 86140 C-REACTIVE PROTEIN: CPT | Performed by: PHYSICIAN ASSISTANT

## 2024-12-25 PROCEDURE — 250N000011 HC RX IP 250 OP 636: Performed by: PHYSICIAN ASSISTANT

## 2024-12-25 PROCEDURE — 99285 EMERGENCY DEPT VISIT HI MDM: CPT | Mod: 25

## 2024-12-25 RX ORDER — POTASSIUM CHLORIDE 1500 MG/1
20 TABLET, EXTENDED RELEASE ORAL ONCE
Status: COMPLETED | OUTPATIENT
Start: 2024-12-25 | End: 2024-12-25

## 2024-12-25 RX ORDER — KETOROLAC TROMETHAMINE 30 MG/ML
30 INJECTION, SOLUTION INTRAMUSCULAR; INTRAVENOUS ONCE
Status: COMPLETED | OUTPATIENT
Start: 2024-12-25 | End: 2024-12-25

## 2024-12-25 RX ORDER — DIAZEPAM 5 MG/1
5 TABLET ORAL EVERY 6 HOURS PRN
Qty: 15 TABLET | Refills: 0 | Status: SHIPPED | OUTPATIENT
Start: 2024-12-25

## 2024-12-25 RX ORDER — KETOROLAC TROMETHAMINE 10 MG/1
10 TABLET, FILM COATED ORAL EVERY 6 HOURS PRN
Qty: 20 TABLET | Refills: 0 | Status: SHIPPED | OUTPATIENT
Start: 2024-12-25

## 2024-12-25 RX ADMIN — POTASSIUM CHLORIDE 20 MEQ: 1500 TABLET, EXTENDED RELEASE ORAL at 17:02

## 2024-12-25 RX ADMIN — KETOROLAC TROMETHAMINE 30 MG: 30 INJECTION, SOLUTION INTRAMUSCULAR at 15:34

## 2024-12-25 ASSESSMENT — ENCOUNTER SYMPTOMS
ROS GI COMMENTS: SEE HPI
FEVER: 0

## 2024-12-25 ASSESSMENT — ACTIVITIES OF DAILY LIVING (ADL)
ADLS_ACUITY_SCORE: 46

## 2024-12-25 NOTE — ED NOTES
"Pt presents with generalized abdominal pain with tenderness in the lower quadrants. Pt reports she was seen the other day in the ER and was told she had a ruptured cyst. At that time pain was on the left and now pain more centralized. Pt reports this morning she vomited a \"maroon like blood clot but thinner.\" Pt tried taking pain medication this morning also but vomited. Pt states pain is constant, it hurts to bear down \"I can't even pass gas.\" Pt has hx of cholecystectomy. No BM in past 4 days. Pt has hx of lactose intolerance and typically has no issues with BM.   "

## 2024-12-25 NOTE — ED NOTES
Patient discharged to Home at this time. Patient given written and verbal discharge instructions regarding home care, follow-up, and medications. Patient verbalized understanding of all discharge instructions. Rest and hydration encouraged. Patient encouraged to return to the ED if they experience new, worsening, or concerning symptoms.     Patients RX for valium and Toradol sent to [facility] pharmacy.    Patient to follow-up with PCP in 2 days.
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
## 2024-12-25 NOTE — ED TRIAGE NOTES
"Pt presents with c/o abd pain that began about 5 days ago, Pt was seen here \"a couple days ago\" and today states there was blood in her vomit.        "

## 2024-12-25 NOTE — DISCHARGE INSTRUCTIONS
Pain medications as prescribed.  Given your allergies we will trial Valium for discomfort.  As we discussed, this medication acts similar to alcohol in your brain.  It can be sedating.  Do not drive, climb, or swim on this medication.  Begin taking 5 mg every 6 hours as needed for pain or muscle aches.  If the medication does not seem to work or help, please discontinue.  Rest and stay hydrated.  Return to this emergency department for any new or worsening symptoms.  Please follow-up in the clinic this week for recheck.

## 2024-12-26 RX ORDER — POTASSIUM CHLORIDE 1500 MG/1
20 TABLET, EXTENDED RELEASE ORAL 2 TIMES DAILY
Qty: 14 TABLET | Refills: 0 | Status: SHIPPED | OUTPATIENT
Start: 2024-12-26 | End: 2025-01-02

## 2024-12-27 NOTE — PROGRESS NOTES
Assessment & Plan     Hypokalemia  Potassium has returned to normal   Encouraged to continue to eat a healthy diet   - Basic metabolic panel; Future  - Basic metabolic panel    Hemorrhagic cyst of right ovary  CRP and hemoglobin normal  Continues to have some pain  Would like to follow up with OB/GYN due to history of recurrent ovarian cyst   - CRP, inflammation; Future  - CBC with platelets and differential; Future  - Ob/Gyn  Referral  - CRP, inflammation  - CBC with platelets and differential    Acute gastritis without hemorrhage, unspecified gastritis type  Epigastric pain   Ok to try pepcid - consider using at night with increased reflux or discomfort when laying down     Carolyn left before labs could be reviewed.  Message sent in [employer]  She should follow up if no improvement   Referral to OB/GYN for history of recurrent cyst     MED REC REQUIRED  Post Medication Reconciliation Status: discharge medications reconciled, continue medications without change  Nicotine/Tobacco Cessation  She reports that she has been smoking cigarettes. She started smoking about 10 years ago. She has a 2.7 pack-year smoking history. She has been exposed to tobacco smoke. She has never used smokeless tobacco.  Nicotine/Tobacco Cessation Plan  Encouraged smoking cessation         See Patient Instructions    No follow-ups on file.    Subjective   Carolyn is a 29 year old, presenting for the following health issues:  ER F/U        12/30/2024     1:09 PM   Additional Questions   Roomed by JOSH Mixon CMA   Accompanied by Self         12/30/2024     1:09 PM   Patient Reported Additional Medications   Patient reports taking the following new medications None     HPI     ED/UC Followup:    Facility:  [facility] [place]  Date of visit: 12/25/24  Reason for visit: Hemorrhagic ovarian cyst, Hypokalemia  Current Status: feeling a little better.  Off and on        Review of Systems  CONSTITUTIONAL: NEGATIVE for fever, chills, change in  weight  RESP: NEGATIVE for significant cough or SOB  CV: NEGATIVE for chest pain, palpitations or peripheral edema  GI: bloating   : difficult to start urinating       Objective    /84   Pulse 112   Temp 98.7  F (37.1  C) (Tympanic)   Resp 18   Wt 76.2 kg (168 lb)   SpO2 99%   BMI 31.23 kg/m    Body mass index is 31.23 kg/m .  Physical Exam   GENERAL: alert and no distress  RESP: lungs clear to auscultation - no rales, rhonchi or wheezes  CV: regular rate and rhythm, normal S1 S2, no S3 or S4, no murmur, click or rub, no peripheral edema  ABDOMEN: tenderness epigastric and lower abdomen and bowel sounds normal  PSYCH: mentation appears normal and anxious    Results for orders placed or performed in visit on 12/30/24   Basic metabolic panel     Status: Abnormal   Result Value Ref Range    Sodium 139 135 - 145 mmol/L    Potassium 4.0 3.4 - 5.3 mmol/L    Chloride 104 98 - 107 mmol/L    Carbon Dioxide (CO2) 21 (L) 22 - 29 mmol/L    Anion Gap 14 7 - 15 mmol/L    Urea Nitrogen 6.1 6.0 - 20.0 mg/dL    Creatinine 0.78 0.51 - 0.95 mg/dL    GFR Estimate >90 >60 mL/min/1.73m2    Calcium 9.4 8.8 - 10.4 mg/dL    Glucose 116 (H) 70 - 99 mg/dL   CRP, inflammation     Status: Normal   Result Value Ref Range    CRP Inflammation <3.00 <5.00 mg/L   CBC with platelets and differential     Status: None   Result Value Ref Range    WBC Count 8.0 4.0 - 11.0 10e3/uL    RBC Count 4.45 3.80 - 5.20 10e6/uL    Hemoglobin 13.3 11.7 - 15.7 g/dL    Hematocrit 38.8 35.0 - 47.0 %    MCV 87 78 - 100 fL    MCH 29.9 26.5 - 33.0 pg    MCHC 34.3 31.5 - 36.5 g/dL    RDW 13.2 10.0 - 15.0 %    Platelet Count 337 150 - 450 10e3/uL    % Neutrophils 50 %    % Lymphocytes 44 %    % Monocytes 5 %    % Eosinophils 1 %    % Basophils 1 %    % Immature Granulocytes 0 %    NRBCs per 100 WBC 0 <1 /100    Absolute Neutrophils 4.0 1.6 - 8.3 10e3/uL    Absolute Lymphocytes 3.5 0.8 - 5.3 10e3/uL    Absolute Monocytes 0.4 0.0 - 1.3 10e3/uL    Absolute  Eosinophils 0.1 0.0 - 0.7 10e3/uL    Absolute Basophils 0.0 0.0 - 0.2 10e3/uL    Absolute Immature Granulocytes 0.0 <=0.4 10e3/uL    Absolute NRBCs 0.0 10e3/uL   CBC with platelets and differential     Status: None    Narrative    The following orders were created for panel order CBC with platelets and differential.  Procedure                               Abnormality         Status                     ---------                               -----------         ------                     CBC with platelets and d...[[id number]]                      Final result                 Please view results for these tests on the individual orders.           Signed Electronically by: AMELIA Martin CNP    Answers submitted by the patient for this visit:  Patient Health Questionnaire (Submitted on 12/30/2024)  If you checked off any problems, how difficult have these problems made it for you to do your work, take care of things at home, or get along with other people?: Extremely difficult  PHQ9 TOTAL SCORE: 13  Patient Health Questionnaire (G7) (Submitted on 12/30/2024)  HAMMAD 7 TOTAL SCORE: 20

## 2024-12-30 ENCOUNTER — OFFICE VISIT (OUTPATIENT)
Dept: FAMILY MEDICINE | Facility: OTHER | Age: 29
End: 2024-12-30
Attending: NURSE PRACTITIONER
Payer: COMMERCIAL

## 2024-12-30 VITALS
BODY MASS INDEX: 31.23 KG/M2 | WEIGHT: 168 LBS | TEMPERATURE: 98.7 F | DIASTOLIC BLOOD PRESSURE: 84 MMHG | SYSTOLIC BLOOD PRESSURE: 120 MMHG | OXYGEN SATURATION: 99 % | RESPIRATION RATE: 18 BRPM | HEART RATE: 112 BPM

## 2024-12-30 DIAGNOSIS — K29.00 ACUTE GASTRITIS WITHOUT HEMORRHAGE, UNSPECIFIED GASTRITIS TYPE: ICD-10-CM

## 2024-12-30 DIAGNOSIS — E87.6 HYPOKALEMIA: Primary | ICD-10-CM

## 2024-12-30 DIAGNOSIS — N83.201 HEMORRHAGIC CYST OF RIGHT OVARY: ICD-10-CM

## 2024-12-30 LAB
ANION GAP SERPL CALCULATED.3IONS-SCNC: 14 MMOL/L (ref 7–15)
BASOPHILS # BLD AUTO: 0 10E3/UL (ref 0–0.2)
BASOPHILS NFR BLD AUTO: 1 %
BUN SERPL-MCNC: 6.1 MG/DL (ref 6–20)
CALCIUM SERPL-MCNC: 9.4 MG/DL (ref 8.8–10.4)
CHLORIDE SERPL-SCNC: 104 MMOL/L (ref 98–107)
CREAT SERPL-MCNC: 0.78 MG/DL (ref 0.51–0.95)
CRP SERPL-MCNC: <3 MG/L
EGFRCR SERPLBLD CKD-EPI 2021: >90 ML/MIN/1.73M2
EOSINOPHIL # BLD AUTO: 0.1 10E3/UL (ref 0–0.7)
EOSINOPHIL NFR BLD AUTO: 1 %
ERYTHROCYTE [DISTWIDTH] IN BLOOD BY AUTOMATED COUNT: 13.2 % (ref 10–15)
GLUCOSE SERPL-MCNC: 116 MG/DL (ref 70–99)
HCO3 SERPL-SCNC: 21 MMOL/L (ref 22–29)
HCT VFR BLD AUTO: 38.8 % (ref 35–47)
HGB BLD-MCNC: 13.3 G/DL (ref 11.7–15.7)
IMM GRANULOCYTES # BLD: 0 10E3/UL
IMM GRANULOCYTES NFR BLD: 0 %
LYMPHOCYTES # BLD AUTO: 3.5 10E3/UL (ref 0.8–5.3)
LYMPHOCYTES NFR BLD AUTO: 44 %
MCH RBC QN AUTO: 29.9 PG (ref 26.5–33)
MCHC RBC AUTO-ENTMCNC: 34.3 G/DL (ref 31.5–36.5)
MCV RBC AUTO: 87 FL (ref 78–100)
MONOCYTES # BLD AUTO: 0.4 10E3/UL (ref 0–1.3)
MONOCYTES NFR BLD AUTO: 5 %
NEUTROPHILS # BLD AUTO: 4 10E3/UL (ref 1.6–8.3)
NEUTROPHILS NFR BLD AUTO: 50 %
NRBC # BLD AUTO: 0 10E3/UL
NRBC BLD AUTO-RTO: 0 /100
PLATELET # BLD AUTO: 337 10E3/UL (ref 150–450)
POTASSIUM SERPL-SCNC: 4 MMOL/L (ref 3.4–5.3)
RBC # BLD AUTO: 4.45 10E6/UL (ref 3.8–5.2)
SODIUM SERPL-SCNC: 139 MMOL/L (ref 135–145)
WBC # BLD AUTO: 8 10E3/UL (ref 4–11)

## 2024-12-30 PROCEDURE — G0463 HOSPITAL OUTPT CLINIC VISIT: HCPCS

## 2024-12-30 PROCEDURE — 85004 AUTOMATED DIFF WBC COUNT: CPT | Mod: ZL | Performed by: NURSE PRACTITIONER

## 2024-12-30 PROCEDURE — 36415 COLL VENOUS BLD VENIPUNCTURE: CPT | Mod: ZL | Performed by: NURSE PRACTITIONER

## 2024-12-30 PROCEDURE — 80048 BASIC METABOLIC PNL TOTAL CA: CPT | Mod: ZL | Performed by: NURSE PRACTITIONER

## 2024-12-30 PROCEDURE — 86140 C-REACTIVE PROTEIN: CPT | Mod: ZL | Performed by: NURSE PRACTITIONER

## 2024-12-30 ASSESSMENT — PATIENT HEALTH QUESTIONNAIRE - PHQ9
10. IF YOU CHECKED OFF ANY PROBLEMS, HOW DIFFICULT HAVE THESE PROBLEMS MADE IT FOR YOU TO DO YOUR WORK, TAKE CARE OF THINGS AT HOME, OR GET ALONG WITH OTHER PEOPLE: EXTREMELY DIFFICULT
SUM OF ALL RESPONSES TO PHQ QUESTIONS 1-9: 13
SUM OF ALL RESPONSES TO PHQ QUESTIONS 1-9: 13

## 2024-12-30 ASSESSMENT — ANXIETY QUESTIONNAIRES
3. WORRYING TOO MUCH ABOUT DIFFERENT THINGS: NEARLY EVERY DAY
2. NOT BEING ABLE TO STOP OR CONTROL WORRYING: NEARLY EVERY DAY
IF YOU CHECKED OFF ANY PROBLEMS ON THIS QUESTIONNAIRE, HOW DIFFICULT HAVE THESE PROBLEMS MADE IT FOR YOU TO DO YOUR WORK, TAKE CARE OF THINGS AT HOME, OR GET ALONG WITH OTHER PEOPLE: EXTREMELY DIFFICULT
GAD7 TOTAL SCORE: 20
1. FEELING NERVOUS, ANXIOUS, OR ON EDGE: NEARLY EVERY DAY
6. BECOMING EASILY ANNOYED OR IRRITABLE: NEARLY EVERY DAY
GAD7 TOTAL SCORE: 20
8. IF YOU CHECKED OFF ANY PROBLEMS, HOW DIFFICULT HAVE THESE MADE IT FOR YOU TO DO YOUR WORK, TAKE CARE OF THINGS AT HOME, OR GET ALONG WITH OTHER PEOPLE?: EXTREMELY DIFFICULT
5. BEING SO RESTLESS THAT IT IS HARD TO SIT STILL: MORE THAN HALF THE DAYS
4. TROUBLE RELAXING: NEARLY EVERY DAY
7. FEELING AFRAID AS IF SOMETHING AWFUL MIGHT HAPPEN: NEARLY EVERY DAY
7. FEELING AFRAID AS IF SOMETHING AWFUL MIGHT HAPPEN: NEARLY EVERY DAY
GAD7 TOTAL SCORE: 20

## 2024-12-30 ASSESSMENT — PAIN SCALES - GENERAL: PAINLEVEL_OUTOF10: MILD PAIN (3)

## 2025-02-01 ENCOUNTER — HOSPITAL ENCOUNTER (EMERGENCY)
Facility: HOSPITAL | Age: 30
Discharge: HOME | End: 2025-02-01
Attending: EMERGENCY MEDICINE
Payer: COMMERCIAL

## 2025-02-01 ENCOUNTER — APPOINTMENT (OUTPATIENT)
Dept: CT IMAGING | Facility: HOSPITAL | Age: 30
End: 2025-02-01
Attending: EMERGENCY MEDICINE
Payer: COMMERCIAL

## 2025-02-01 VITALS
BODY MASS INDEX: 29.31 KG/M2 | TEMPERATURE: 98.1 F | RESPIRATION RATE: 16 BRPM | DIASTOLIC BLOOD PRESSURE: 71 MMHG | WEIGHT: 157.7 LBS | OXYGEN SATURATION: 98 % | SYSTOLIC BLOOD PRESSURE: 115 MMHG | HEART RATE: 76 BPM

## 2025-02-01 DIAGNOSIS — R19.7 NAUSEA VOMITING AND DIARRHEA: ICD-10-CM

## 2025-02-01 DIAGNOSIS — R31.9 URINARY TRACT INFECTION WITH HEMATURIA, SITE UNSPECIFIED: ICD-10-CM

## 2025-02-01 DIAGNOSIS — N39.0 URINARY TRACT INFECTION WITH HEMATURIA, SITE UNSPECIFIED: ICD-10-CM

## 2025-02-01 DIAGNOSIS — R11.2 NAUSEA VOMITING AND DIARRHEA: ICD-10-CM

## 2025-02-01 LAB
ALBUMIN SERPL BCG-MCNC: 4.2 G/DL (ref 3.5–5.2)
ALBUMIN UR-MCNC: 30 MG/DL
ALP SERPL-CCNC: 88 U/L (ref 40–150)
ALT SERPL W P-5'-P-CCNC: 22 U/L (ref 0–50)
ANION GAP SERPL CALCULATED.3IONS-SCNC: 12 MMOL/L (ref 7–15)
APPEARANCE UR: ABNORMAL
AST SERPL W P-5'-P-CCNC: 23 U/L (ref 0–45)
BACTERIA #/AREA URNS HPF: ABNORMAL /HPF
BASOPHILS # BLD AUTO: 0 10E3/UL (ref 0–0.2)
BASOPHILS NFR BLD AUTO: 0 %
BILIRUB SERPL-MCNC: 1 MG/DL
BILIRUB UR QL STRIP: NEGATIVE
BUN SERPL-MCNC: 11.4 MG/DL (ref 6–20)
CALCIUM SERPL-MCNC: 9 MG/DL (ref 8.8–10.4)
CHLORIDE SERPL-SCNC: 102 MMOL/L (ref 98–107)
COLOR UR AUTO: YELLOW
CREAT SERPL-MCNC: 0.92 MG/DL (ref 0.51–0.95)
EGFRCR SERPLBLD CKD-EPI 2021: 87 ML/MIN/1.73M2
EOSINOPHIL # BLD AUTO: 0.1 10E3/UL (ref 0–0.7)
EOSINOPHIL NFR BLD AUTO: 1 %
ERYTHROCYTE [DISTWIDTH] IN BLOOD BY AUTOMATED COUNT: 13.2 % (ref 10–15)
GLUCOSE SERPL-MCNC: 108 MG/DL (ref 70–99)
GLUCOSE UR STRIP-MCNC: NEGATIVE MG/DL
HCG UR QL: NEGATIVE
HCO3 SERPL-SCNC: 24 MMOL/L (ref 22–29)
HCT VFR BLD AUTO: 39.7 % (ref 35–47)
HGB BLD-MCNC: 13.7 G/DL (ref 11.7–15.7)
HGB UR QL STRIP: ABNORMAL
HOLD SPECIMEN: NORMAL
IMM GRANULOCYTES # BLD: 0 10E3/UL
IMM GRANULOCYTES NFR BLD: 0 %
KETONES UR STRIP-MCNC: ABNORMAL MG/DL
LEUKOCYTE ESTERASE UR QL STRIP: ABNORMAL
LIPASE SERPL-CCNC: 22 U/L (ref 13–60)
LYMPHOCYTES # BLD AUTO: 2.9 10E3/UL (ref 0.8–5.3)
LYMPHOCYTES NFR BLD AUTO: 43 %
MCH RBC QN AUTO: 30.3 PG (ref 26.5–33)
MCHC RBC AUTO-ENTMCNC: 34.5 G/DL (ref 31.5–36.5)
MCV RBC AUTO: 88 FL (ref 78–100)
MONOCYTES # BLD AUTO: 0.6 10E3/UL (ref 0–1.3)
MONOCYTES NFR BLD AUTO: 8 %
MUCOUS THREADS #/AREA URNS LPF: PRESENT /LPF
NEUTROPHILS # BLD AUTO: 3.3 10E3/UL (ref 1.6–8.3)
NEUTROPHILS NFR BLD AUTO: 48 %
NITRATE UR QL: NEGATIVE
NRBC # BLD AUTO: 0 10E3/UL
NRBC BLD AUTO-RTO: 0 /100
PH UR STRIP: 5.5 [PH] (ref 4.7–8)
PLATELET # BLD AUTO: 299 10E3/UL (ref 150–450)
POTASSIUM SERPL-SCNC: 3.3 MMOL/L (ref 3.4–5.3)
PROT SERPL-MCNC: 6.5 G/DL (ref 6.4–8.3)
RBC # BLD AUTO: 4.52 10E6/UL (ref 3.8–5.2)
RBC URINE: >182 /HPF
SODIUM SERPL-SCNC: 138 MMOL/L (ref 135–145)
SP GR UR STRIP: 1.03 (ref 1–1.03)
SQUAMOUS EPITHELIAL: 4 /HPF
UROBILINOGEN UR STRIP-MCNC: NORMAL MG/DL
WBC # BLD AUTO: 6.9 10E3/UL (ref 4–11)
WBC CLUMPS #/AREA URNS HPF: PRESENT /HPF
WBC URINE: 168 /HPF

## 2025-02-01 PROCEDURE — 96375 TX/PRO/DX INJ NEW DRUG ADDON: CPT

## 2025-02-01 PROCEDURE — 74176 CT ABD & PELVIS W/O CONTRAST: CPT

## 2025-02-01 PROCEDURE — 99284 EMERGENCY DEPT VISIT MOD MDM: CPT | Performed by: EMERGENCY MEDICINE

## 2025-02-01 PROCEDURE — 87088 URINE BACTERIA CULTURE: CPT | Performed by: EMERGENCY MEDICINE

## 2025-02-01 PROCEDURE — 87186 SC STD MICRODIL/AGAR DIL: CPT | Performed by: EMERGENCY MEDICINE

## 2025-02-01 PROCEDURE — 80053 COMPREHEN METABOLIC PANEL: CPT | Performed by: EMERGENCY MEDICINE

## 2025-02-01 PROCEDURE — 99285 EMERGENCY DEPT VISIT HI MDM: CPT | Mod: 25

## 2025-02-01 PROCEDURE — 96374 THER/PROPH/DIAG INJ IV PUSH: CPT

## 2025-02-01 PROCEDURE — 85018 HEMOGLOBIN: CPT | Performed by: EMERGENCY MEDICINE

## 2025-02-01 PROCEDURE — 81025 URINE PREGNANCY TEST: CPT | Performed by: EMERGENCY MEDICINE

## 2025-02-01 PROCEDURE — 258N000003 HC RX IP 258 OP 636: Performed by: EMERGENCY MEDICINE

## 2025-02-01 PROCEDURE — 85004 AUTOMATED DIFF WBC COUNT: CPT | Performed by: EMERGENCY MEDICINE

## 2025-02-01 PROCEDURE — 96361 HYDRATE IV INFUSION ADD-ON: CPT

## 2025-02-01 PROCEDURE — 81003 URINALYSIS AUTO W/O SCOPE: CPT | Performed by: EMERGENCY MEDICINE

## 2025-02-01 PROCEDURE — 36415 COLL VENOUS BLD VENIPUNCTURE: CPT | Performed by: EMERGENCY MEDICINE

## 2025-02-01 PROCEDURE — 84155 ASSAY OF PROTEIN SERUM: CPT | Performed by: EMERGENCY MEDICINE

## 2025-02-01 PROCEDURE — 250N000011 HC RX IP 250 OP 636: Performed by: EMERGENCY MEDICINE

## 2025-02-01 PROCEDURE — 83690 ASSAY OF LIPASE: CPT | Performed by: EMERGENCY MEDICINE

## 2025-02-01 RX ORDER — DIPHENHYDRAMINE HYDROCHLORIDE 50 MG/ML
25 INJECTION INTRAMUSCULAR; INTRAVENOUS ONCE
Status: COMPLETED | OUTPATIENT
Start: 2025-02-01 | End: 2025-02-01

## 2025-02-01 RX ORDER — METOCLOPRAMIDE HYDROCHLORIDE 5 MG/ML
10 INJECTION INTRAMUSCULAR; INTRAVENOUS ONCE
Status: COMPLETED | OUTPATIENT
Start: 2025-02-01 | End: 2025-02-01

## 2025-02-01 RX ORDER — ONDANSETRON 4 MG/1
4 TABLET, ORALLY DISINTEGRATING ORAL EVERY 6 HOURS PRN
Qty: 20 TABLET | Refills: 0 | Status: SHIPPED | OUTPATIENT
Start: 2025-02-01

## 2025-02-01 RX ORDER — METOCLOPRAMIDE HYDROCHLORIDE 5 MG/5ML
10 SOLUTION ORAL 3 TIMES DAILY PRN
Qty: 240 ML | Refills: 0 | Status: SHIPPED | OUTPATIENT
Start: 2025-02-01

## 2025-02-01 RX ORDER — KETOROLAC TROMETHAMINE 15 MG/ML
15 INJECTION, SOLUTION INTRAMUSCULAR; INTRAVENOUS ONCE
Status: COMPLETED | OUTPATIENT
Start: 2025-02-01 | End: 2025-02-01

## 2025-02-01 RX ORDER — CIPROFLOXACIN 500 MG/1
500 TABLET, FILM COATED ORAL 2 TIMES DAILY
Qty: 14 TABLET | Refills: 0 | Status: SHIPPED | OUTPATIENT
Start: 2025-02-01 | End: 2025-02-08

## 2025-02-01 RX ADMIN — DIPHENHYDRAMINE HYDROCHLORIDE 25 MG: 50 INJECTION, SOLUTION INTRAMUSCULAR; INTRAVENOUS at 17:26

## 2025-02-01 RX ADMIN — SODIUM CHLORIDE 1000 ML: 9 INJECTION, SOLUTION INTRAVENOUS at 17:25

## 2025-02-01 RX ADMIN — KETOROLAC TROMETHAMINE 15 MG: 15 INJECTION, SOLUTION INTRAMUSCULAR; INTRAVENOUS at 17:26

## 2025-02-01 RX ADMIN — METOCLOPRAMIDE HYDROCHLORIDE 10 MG: 5 INJECTION INTRAMUSCULAR; INTRAVENOUS at 17:26

## 2025-02-01 ASSESSMENT — ACTIVITIES OF DAILY LIVING (ADL)
ADLS_ACUITY_SCORE: 48
ADLS_ACUITY_SCORE: 46

## 2025-02-01 ASSESSMENT — COLUMBIA-SUICIDE SEVERITY RATING SCALE - C-SSRS
1. IN THE PAST MONTH, HAVE YOU WISHED YOU WERE DEAD OR WISHED YOU COULD GO TO SLEEP AND NOT WAKE UP?: NO
6. HAVE YOU EVER DONE ANYTHING, STARTED TO DO ANYTHING, OR PREPARED TO DO ANYTHING TO END YOUR LIFE?: NO
2. HAVE YOU ACTUALLY HAD ANY THOUGHTS OF KILLING YOURSELF IN THE PAST MONTH?: NO

## 2025-02-01 NOTE — ED TRIAGE NOTES
Pt presents with c/o abd pain, back pain, N/V/D for the past 6 days. Pt also states it feels like she is unable to empty her bladder.
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
## 2025-02-02 NOTE — DISCHARGE INSTRUCTIONS
You were seen in the emergency department for vomiting, diarrhea, and flank pain.  Your evaluation showed evidence of a urinary tract infection.  The rest of your lab and CT imaging evaluation looked reassuring without any signs of complication like infected kidney stone.  We discussed a treatment course of antibiotics.  Please take the ciprofloxacin for the full 7-day course as prescribed even if you are feeling better.  You can continue using Zofran first-line for nausea and Reglan for any breakthrough symptoms.  Please make sure you are drinking plenty of liquids and small but frequent doses to keep his of hydrated during this illness.  You can use Tylenol 650 mg and ibuprofen 400 mg every 6 hours as needed for pain.  Try to follow-up in your clinic within a couple weeks after this emergency department visit to review any ongoing symptoms.

## 2025-02-04 LAB — BACTERIA UR CULT: ABNORMAL

## 2025-07-04 NOTE — PROGRESS NOTES
SUBJECTIVE:   Carolyn Mallory is a 23 year old female who presents to clinic today for the following health issues:      Musculoskeletal problem/pain      Duration: 1 1/2 weeks     Description  Location: Right wrist/elbow/shoulder     Intensity:  mild, severe    Accompanying signs and symptoms: tingling and swelling    History  Previous similar problem: YES  Previous evaluation:  x-ray    Precipitating or alleviating factors:  Trauma or overuse: YES  Aggravating factors include: lifting and overuse    Therapies tried and outcome: sling, ice, heat, ibuprofen, tramadol (alternating every 6 hours)          Problem list and histories reviewed & adjusted, as indicated.  Additional history: as documented    Patient Active Problem List   Diagnosis     Allergic rhinitis     Food allergy     Patellofemoral syndrome of both knees     Sacro ilial pain     Calculus of kidney     ACP (advance care planning)     Chronic right-sided thoracic back pain     Obesity, unspecified obesity severity, unspecified obesity type     Tobacco use disorder     Esophageal reflux     Allergy to pollen     Astigmatism     BMI 34.0-34.9,adult     Hypermetropia     Past Surgical History:   Procedure Laterality Date     ARTHROSCOPY KNEE Right 5/4/2015    Procedure: ARTHROSCOPY KNEE;  Surgeon: Hernando Kulkarni MD;  Location: [facility]     AS ESOPHAGOSCOPY, DIAGNOSTIC      upper     LAPAROSCOPIC CHOLECYSTECTOMY N/A 10/10/2015    Procedure: LAPAROSCOPIC CHOLECYSTECTOMY;  Surgeon: Drew Padgett MD;  Location: [facility] OR     none         Social History   Substance Use Topics     Smoking status: Current Every Day Smoker     Packs/day: 0.25     Years: 1.00     Types: Cigarettes     Start date: 1/20/2014     Smokeless tobacco: Never Used     Alcohol use No     Family History   Problem Relation Age of Onset     Thrombophilia Mother      blood clotting     Lupus Mother      erythematosus     Diabetes Mother      Hypertension Father      Asthma Sister       Diabetes Maternal Grandfather      Hypertension Maternal Grandfather      Lupus Maternal Aunt      erythematosus         Current Outpatient Prescriptions   Medication Sig Dispense Refill     Diphenhyd-HC-Nystatin-Tetracyc (FIRST-JEFFREY MOUTHWASH) SUSP Swish and swallow 5-10 mLs in mouth every 6 hours as needed 237 mL 1     diphenhydrAMINE (BENADRYL) 25 MG tablet Take 25 mg by mouth       EPINEPHrine (EPIPEN) 0.3 MG/0.3ML injection Inject 0.3 mLs (0.3 mg) into the muscle once as needed for anaphylaxis 4 each 6     famotidine (PEPCID) 40 MG tablet Take 1 tablet (40 mg) by mouth daily 90 tablet 3     fluticasone (FLONASE) 50 MCG/ACT spray Spray 1-2 sprays into both nostrils daily 1 Bottle 11     ibuprofen (ADVIL,MOTRIN) 400 MG tablet Take 2 tablets (800 mg) by mouth every 6 hours as needed for other (cramping) 60 tablet 1     loratadine (CLARITIN) 10 MG tablet Take 10 mg by mouth daily       traMADol (ULTRAM) 50 MG tablet Take 1 tablet (50 mg) by mouth every 6 hours as needed for severe pain 20 tablet 0     Allergies   Allergen Reactions     Amoxicillin      Oxycodone Visual Disturbance, Nausea and Vomiting and Rash     Vomiting, hallucinations.     Tylenol [Acetaminophen] Other (See Comments) and Rash     1/06/17: Patient reports seeing spots after taking Tylenol.  Patient reports seeing spots after taking Tylenol.       Reviewed and updated as needed this visit by clinical staff       Reviewed and updated as needed this visit by Provider         ROS:  CONSTITUTIONAL: NEGATIVE for fever, chills, change in weight  INTEGUMENTARY/SKIN: NEGATIVE for worrisome rashes, moles or lesions  RESP: NEGATIVE for significant cough or SOB  CV: NEGATIVE for chest pain, palpitations or peripheral edema  MUSCULOSKELETAL: right shoulder, elbow and wrist pain  NEURO: tingling into her fingers and wrist when trying to strengthen out arm, shooting pain down right arm with raising shoulder     OBJECTIVE:     /74 (BP Location: Left arm,  Patient Position: Chair, Cuff Size: Adult Large)  Pulse 88  Temp 98.5  F (36.9  C) (Tympanic)  Wt 196 lb (88.9 kg)  SpO2 100%  BMI 35.85 kg/m2  Body mass index is 35.85 kg/(m^2).   GENERAL: alert and no distress  RESP: lungs clear to auscultation - no rales, rhonchi or wheezes  CV: regular rate and rhythm, normal S1 S2, no S3 or S4, no murmur, click or rub, no peripheral edema and peripheral pulses strong  MS: decreased range of motion right shoulder due to pain, no edema, peripheral pulses normal and tenderness to palpation right shoulder anterior and posterior, full ROM to right wrist and elbow with minimal discomfort. Capillary refill <3 seconds, fingers warm and able to move without any discomfort.   SKIN: no suspicious lesions or rashes  PSYCH: mentation appears normal, affect normal/bright    Diagnostic Test Results:  Results for orders placed or performed during the hospital encounter of 10/30/18   XR Wrist Right G/E 3 Views    Narrative    PROCEDURE:  XR WRIST RT G/E 3 VW    HISTORY: fall;     COMPARISON:  None.    TECHNIQUE:  4 views of the right wrist were obtained.    FINDINGS:  No fracture or dislocation is identified. The joint spaces  are preserved.        Impression    IMPRESSION: No acute fracture.      EMILY NAVA MD   XR Elbow Right G/E 3 Views    Narrative    PROCEDURE:  XR ELBOW RT G/E 3 VW    HISTORY: fall;     COMPARISON:  None.    TECHNIQUE:  2 views of the right elbow were obtained.    FINDINGS:  No fracture or dislocation is identified. The joint spaces  are preserved.        Impression    IMPRESSION: No acute fracture.      EMILY NAVA MD   XR Shoulder Right G/E 3 Views    Narrative    PROCEDURE:  XR SHOULDER RT G/E 3 VW    HISTORY: fall;     COMPARISON:  None.    TECHNIQUE:  4 views of the right shoulder were obtained.    FINDINGS:  No fracture or dislocation is identified. The joint spaces  are preserved.        Impression    IMPRESSION: No acute fracture.      EMILY  MD ELIJAH       ASSESSMENT/PLAN:     1. Acute pain of right shoulder  Encouraged continued symptomatic treatment (rest, ice, sling and elevation). With decreased pain to the wrist and elbow she is encouraged to start to increase her ROM. She should continue to use the sling as needed for shoulder discomfort. Due to continued pain plan for physical therapy. She can move her shoulder but continues to complain of increased pain with movement. Discussed when she should return for additional follow up. Did provide her another work note to limit her activity with right arm/shoulder.   - PHYSICAL THERAPY REFERRAL; Future    2. Contusion of right elbow, subsequent encounter  As above  - PHYSICAL THERAPY REFERRAL; Future    3. Contusion of right wrist, subsequent encounter  As above  - PHYSICAL THERAPY REFERRAL; Future    If pain continues or worsen can consider MRI     See Patient Instructions    AMELIA Martin [facility]     No indicators present

## 2025-07-12 ENCOUNTER — HOSPITAL ENCOUNTER (EMERGENCY)
Facility: HOSPITAL | Age: 30
Discharge: HOME | End: 2025-07-12
Attending: INTERNAL MEDICINE
Payer: COMMERCIAL

## 2025-07-12 VITALS
HEART RATE: 105 BPM | OXYGEN SATURATION: 97 % | TEMPERATURE: 96.8 F | SYSTOLIC BLOOD PRESSURE: 100 MMHG | RESPIRATION RATE: 10 BRPM | DIASTOLIC BLOOD PRESSURE: 59 MMHG | BODY MASS INDEX: 27.29 KG/M2 | WEIGHT: 146.8 LBS

## 2025-07-12 DIAGNOSIS — F10.929 ALCOHOLIC INTOXICATION WITH COMPLICATION: ICD-10-CM

## 2025-07-12 DIAGNOSIS — R41.82 ALTERED MENTAL STATUS, UNSPECIFIED ALTERED MENTAL STATUS TYPE: ICD-10-CM

## 2025-07-12 LAB
ALBUMIN SERPL BCG-MCNC: 4 G/DL (ref 3.5–5.2)
ALP SERPL-CCNC: 74 U/L (ref 40–150)
ALT SERPL W P-5'-P-CCNC: 11 U/L (ref 0–50)
AMPHETAMINES UR QL SCN: ABNORMAL
ANION GAP SERPL CALCULATED.3IONS-SCNC: 15 MMOL/L (ref 7–15)
APAP SERPL-MCNC: <5 UG/ML (ref 10–30)
AST SERPL W P-5'-P-CCNC: 17 U/L (ref 0–45)
BARBITURATES UR QL SCN: ABNORMAL
BASOPHILS # BLD AUTO: 0.1 10E3/UL (ref 0–0.2)
BASOPHILS NFR BLD AUTO: 1 %
BENZODIAZ UR QL SCN: ABNORMAL
BILIRUB SERPL-MCNC: 0.2 MG/DL
BUN SERPL-MCNC: 7 MG/DL (ref 6–20)
BZE UR QL SCN: ABNORMAL
CALCIUM SERPL-MCNC: 8.3 MG/DL (ref 8.8–10.4)
CANNABINOIDS UR QL SCN: ABNORMAL
CHLORIDE SERPL-SCNC: 101 MMOL/L (ref 98–107)
CREAT SERPL-MCNC: 0.91 MG/DL (ref 0.51–0.95)
EGFRCR SERPLBLD CKD-EPI 2021: 88 ML/MIN/1.73M2
EOSINOPHIL # BLD AUTO: 0 10E3/UL (ref 0–0.7)
EOSINOPHIL NFR BLD AUTO: 0 %
ERYTHROCYTE [DISTWIDTH] IN BLOOD BY AUTOMATED COUNT: 12.7 % (ref 10–15)
ETHANOL SERPL-MCNC: 0.21 G/DL
FENTANYL UR QL: ABNORMAL
GLUCOSE SERPL-MCNC: 98 MG/DL (ref 70–99)
HCO3 SERPL-SCNC: 21 MMOL/L (ref 22–29)
HCT VFR BLD AUTO: 34.5 % (ref 35–47)
HGB BLD-MCNC: 11.8 G/DL (ref 11.7–15.7)
HOLD SPECIMEN: NORMAL
HOLD SPECIMEN: NORMAL
IMM GRANULOCYTES # BLD: 0 10E3/UL
IMM GRANULOCYTES NFR BLD: 0 %
LYMPHOCYTES # BLD AUTO: 2.6 10E3/UL (ref 0.8–5.3)
LYMPHOCYTES NFR BLD AUTO: 33 %
MCH RBC QN AUTO: 31.6 PG (ref 26.5–33)
MCHC RBC AUTO-ENTMCNC: 34.2 G/DL (ref 31.5–36.5)
MCV RBC AUTO: 92 FL (ref 78–100)
MONOCYTES # BLD AUTO: 0.5 10E3/UL (ref 0–1.3)
MONOCYTES NFR BLD AUTO: 6 %
NEUTROPHILS # BLD AUTO: 4.6 10E3/UL (ref 1.6–8.3)
NEUTROPHILS NFR BLD AUTO: 60 %
NRBC # BLD AUTO: 0 10E3/UL
NRBC BLD AUTO-RTO: 0 /100
OPIATES UR QL SCN: ABNORMAL
PCP QUAL URINE (ROCHE): ABNORMAL
PLATELET # BLD AUTO: 307 10E3/UL (ref 150–450)
POTASSIUM SERPL-SCNC: 3.5 MMOL/L (ref 3.4–5.3)
PROT SERPL-MCNC: 5.8 G/DL (ref 6.4–8.3)
RBC # BLD AUTO: 3.74 10E6/UL (ref 3.8–5.2)
SALICYLATES SERPL-MCNC: <0.3 MG/DL (ref ?–30)
SODIUM SERPL-SCNC: 137 MMOL/L (ref 135–145)
TROPONIN T SERPL HS-MCNC: <6 NG/L
WBC # BLD AUTO: 7.7 10E3/UL (ref 4–11)

## 2025-07-12 PROCEDURE — 96360 HYDRATION IV INFUSION INIT: CPT

## 2025-07-12 PROCEDURE — 99291 CRITICAL CARE FIRST HOUR: CPT | Mod: 25

## 2025-07-12 PROCEDURE — 258N000003 HC RX IP 258 OP 636: Performed by: INTERNAL MEDICINE

## 2025-07-12 PROCEDURE — 82310 ASSAY OF CALCIUM: CPT | Performed by: INTERNAL MEDICINE

## 2025-07-12 PROCEDURE — 36415 COLL VENOUS BLD VENIPUNCTURE: CPT | Performed by: INTERNAL MEDICINE

## 2025-07-12 PROCEDURE — 93010 ELECTROCARDIOGRAM REPORT: CPT | Performed by: INTERNAL MEDICINE

## 2025-07-12 PROCEDURE — 84484 ASSAY OF TROPONIN QUANT: CPT | Performed by: INTERNAL MEDICINE

## 2025-07-12 PROCEDURE — 80179 DRUG ASSAY SALICYLATE: CPT | Performed by: INTERNAL MEDICINE

## 2025-07-12 PROCEDURE — 99291 CRITICAL CARE FIRST HOUR: CPT

## 2025-07-12 PROCEDURE — 82077 ASSAY SPEC XCP UR&BREATH IA: CPT | Performed by: INTERNAL MEDICINE

## 2025-07-12 PROCEDURE — 93005 ELECTROCARDIOGRAM TRACING: CPT

## 2025-07-12 PROCEDURE — 99284 EMERGENCY DEPT VISIT MOD MDM: CPT | Performed by: INTERNAL MEDICINE

## 2025-07-12 PROCEDURE — 80307 DRUG TEST PRSMV CHEM ANLYZR: CPT | Performed by: INTERNAL MEDICINE

## 2025-07-12 PROCEDURE — 80143 DRUG ASSAY ACETAMINOPHEN: CPT | Performed by: INTERNAL MEDICINE

## 2025-07-12 PROCEDURE — 85004 AUTOMATED DIFF WBC COUNT: CPT | Performed by: INTERNAL MEDICINE

## 2025-07-12 RX ADMIN — SODIUM CHLORIDE 1000 ML: 9 INJECTION, SOLUTION INTRAVENOUS at 05:33

## 2025-07-12 ASSESSMENT — COLUMBIA-SUICIDE SEVERITY RATING SCALE - C-SSRS
2. HAVE YOU ACTUALLY HAD ANY THOUGHTS OF KILLING YOURSELF IN THE PAST MONTH?: NO
6. HAVE YOU EVER DONE ANYTHING, STARTED TO DO ANYTHING, OR PREPARED TO DO ANYTHING TO END YOUR LIFE?: NO
1. IN THE PAST MONTH, HAVE YOU WISHED YOU WERE DEAD OR WISHED YOU COULD GO TO SLEEP AND NOT WAKE UP?: NO

## 2025-07-12 ASSESSMENT — ACTIVITIES OF DAILY LIVING (ADL)
ADLS_ACUITY_SCORE: 46

## 2025-07-12 ASSESSMENT — LIFESTYLE VARIABLES: INTOXICATION: 1

## 2025-07-12 ASSESSMENT — ENCOUNTER SYMPTOMS
FEVER: 0
ABDOMINAL PAIN: 0
SHORTNESS OF BREATH: 0
COUGH: 0

## 2025-07-12 NOTE — ED NOTES
[place] [employer] at bedside attempting to talk to patient. Mother Sarah called ER and states she is going to come to the ER to be with patient.

## 2025-07-12 NOTE — ED NOTES
Patient asking to go home with mother. Dr. Barnes said if patient could walk she can discharge home. Attempted to have patient walk but she was very unsteady and would drag her feet and was unable to safely ambulate. Dr. Barnes informed.

## 2025-07-12 NOTE — Clinical Note
Carolyn Grier was seen and treated in our emergency department on 7/12/2025.  She may return to work on 07/13/2025.       If you have any questions or concerns, please don't hesitate to call.      Natanael Payton MD
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
## 2025-07-12 NOTE — ED NOTES
Brought patient a soda and a water. Patient is tearful and still disoriented. Provider is okay with patient eating when ready.

## 2025-07-12 NOTE — ED NOTES
"ED Provider Signout Note:    Initial HPI/ED Course: Patient brought in by ambulance due to altered mental status.  Admits to drinking alcohol and taking a pill.  Says that she feels \"high.\" Difficulty walking. No history of trauma.    MDM: Patient evaluated overnight for altered mental status and alcohol intoxication.  Blood alcohol level elevated at 0.21.  Also possible ingestion of other illicit substance.  Signed out this morning pending continued monitoring until clinically sober.  Patient tolerating oral liquids.  Continue to sober as expected from alcohol and drug intoxication.  Ultimately she was clinically improved by midmorning. Now ambulatory and PO tolerant. Her mother presented to the hospital to get her and will be able to continue monitoring her throughout the day today as she continues to sober.  Patient was discharged in stable condition.     Natanael Payton MD  07/12/25 0950    "

## 2025-07-12 NOTE — ED NOTES
Mom, Tawanna stated she will stay with patient to day and ensure she does not drive or operate machinery. Pt discharged into the care of mom.

## 2025-07-13 LAB
ATRIAL RATE - MUSE: 113 BPM
DIASTOLIC BLOOD PRESSURE - MUSE: NORMAL MMHG
INTERPRETATION ECG - MUSE: NORMAL
P AXIS - MUSE: 56 DEGREES
PR INTERVAL - MUSE: 168 MS
QRS DURATION - MUSE: 80 MS
QT - MUSE: 328 MS
QTC - MUSE: 449 MS
R AXIS - MUSE: 21 DEGREES
SYSTOLIC BLOOD PRESSURE - MUSE: NORMAL MMHG
T AXIS - MUSE: 24 DEGREES
VENTRICULAR RATE- MUSE: 113 BPM

## (undated) DEVICE — NDL-INSUFFLATION 120MM

## (undated) DEVICE — SOL-NACL 0.9% 1000ML

## (undated) DEVICE — TROCAR-5X100MM BLADED W/FIXATION

## (undated) DEVICE — CATH-URETHRAL 14FR

## (undated) DEVICE — TRAY-SKIN PREP POVIDONE/IODINE

## (undated) DEVICE — TROCAR-8X100MM OPTICAL BLADELESS

## (undated) DEVICE — SUTURE-MONOCRYL 3-0 PS-1 Y936H

## (undated) DEVICE — SUCTION-IRRIGATION STRYKEFLOW II (STRYKER)

## (undated) DEVICE — APPLICATOR-CHLORAPREP 26ML TINTED CHG 2%+ 70% IPA-SURGICAL

## (undated) DEVICE — SPONGE-LAPAROTOMY PADS 18 X 18

## (undated) DEVICE — TROCAR SLEEVE-KII 5X100MM

## (undated) DEVICE — PUNCTURE CLOSURE DEVICE

## (undated) DEVICE — LIGASURE-5MM BLUNT TIP LAPAROSCOPIC

## (undated) DEVICE — CAUTERY PAD-POLYHESIVE II ADULT

## (undated) DEVICE — IRRIGATION-NACL 1000ML

## (undated) DEVICE — TOPICAL SKIN ADHESIVE EXOFIN

## (undated) DEVICE — INZII RETRIEVAL SYSTEM-10MM

## (undated) DEVICE — SUTURE-VICRYL 0 UR-6 J603H

## (undated) DEVICE — BLADE-SURG CLIPPER

## (undated) DEVICE — POSITIONING KIT-SLT

## (undated) DEVICE — DRSG-NON ADHERING 3 X 8 TELFA

## (undated) DEVICE — LABEL-STERILE PREPRINTED FOR OR

## (undated) DEVICE — GLV-8.5 BIOGEL LATEX

## (undated) DEVICE — CANISTER-SUCTION 2000CC

## (undated) DEVICE — IRRIGATION-H2O 1000ML

## (undated) DEVICE — PACK-LAP LAVH-CUSTOM

## (undated) DEVICE — UTERINE MANIPULATOR-KRONNER MANIPUJECTOR

## (undated) DEVICE — TUBING-INSUFFLATION/LAPAROFLATOR W/FILTER

## (undated) DEVICE — LIGHT HANDLE COVER

## (undated) DEVICE — TUBING-SEECLEAR LAPAROSCOPIC SMOKE EVACUATION

## (undated) DEVICE — TROCAR-11X100MM BLADED W/FIXATION

## (undated) DEVICE — CORD-LAPAROSCOPIC MONOPOLAR-DISPOSABLE

## (undated) DEVICE — SCD SLEEVE-KNEE REG.

## (undated) RX ORDER — DEXAMETHASONE SODIUM PHOSPHATE 10 MG/ML
INJECTION, SOLUTION INTRAMUSCULAR; INTRAVENOUS
Status: DISPENSED
Start: 2019-12-06

## (undated) RX ORDER — ONDANSETRON 2 MG/ML
INJECTION INTRAMUSCULAR; INTRAVENOUS
Status: DISPENSED
Start: 2019-12-06

## (undated) RX ORDER — PROPOFOL 10 MG/ML
INJECTION, EMULSION INTRAVENOUS
Status: DISPENSED
Start: 2019-12-06

## (undated) RX ORDER — LIDOCAINE HYDROCHLORIDE 20 MG/ML
INJECTION, SOLUTION EPIDURAL; INFILTRATION; INTRACAUDAL; PERINEURAL
Status: DISPENSED
Start: 2019-12-06

## (undated) RX ORDER — KETAMINE HCL IN NACL, ISO-OSM 100MG/10ML
SYRINGE (ML) INJECTION
Status: DISPENSED
Start: 2019-12-06